# Patient Record
Sex: FEMALE | Race: ASIAN | NOT HISPANIC OR LATINO
[De-identification: names, ages, dates, MRNs, and addresses within clinical notes are randomized per-mention and may not be internally consistent; named-entity substitution may affect disease eponyms.]

---

## 2014-04-30 RX ORDER — TICAGRELOR 90 MG/1
1 TABLET ORAL
Qty: 0 | Refills: 0 | DISCHARGE
Start: 2014-04-30

## 2015-05-04 RX ORDER — ATORVASTATIN CALCIUM 80 MG/1
1 TABLET, FILM COATED ORAL
Qty: 0 | Refills: 0 | DISCHARGE
Start: 2015-05-04

## 2015-05-04 RX ORDER — ISOSORBIDE MONONITRATE 60 MG/1
1 TABLET, EXTENDED RELEASE ORAL
Qty: 0 | Refills: 0 | DISCHARGE
Start: 2015-05-04

## 2017-01-05 ENCOUNTER — TRANSCRIPTION ENCOUNTER (OUTPATIENT)
Age: 42
End: 2017-01-05

## 2017-01-10 ENCOUNTER — TRANSCRIPTION ENCOUNTER (OUTPATIENT)
Age: 42
End: 2017-01-10

## 2017-01-18 ENCOUNTER — NON-APPOINTMENT (OUTPATIENT)
Age: 42
End: 2017-01-18

## 2017-01-18 ENCOUNTER — APPOINTMENT (OUTPATIENT)
Dept: CARDIOLOGY | Facility: CLINIC | Age: 42
End: 2017-01-18

## 2017-01-18 VITALS
OXYGEN SATURATION: 100 % | BODY MASS INDEX: 40.84 KG/M2 | SYSTOLIC BLOOD PRESSURE: 120 MMHG | DIASTOLIC BLOOD PRESSURE: 81 MMHG | HEART RATE: 82 BPM | RESPIRATION RATE: 16 BRPM | HEIGHT: 60 IN | WEIGHT: 208 LBS

## 2017-02-27 ENCOUNTER — TRANSCRIPTION ENCOUNTER (OUTPATIENT)
Age: 42
End: 2017-02-27

## 2017-04-19 ENCOUNTER — APPOINTMENT (OUTPATIENT)
Dept: CARDIOLOGY | Facility: CLINIC | Age: 42
End: 2017-04-19

## 2017-04-26 ENCOUNTER — TRANSCRIPTION ENCOUNTER (OUTPATIENT)
Age: 42
End: 2017-04-26

## 2017-05-31 ENCOUNTER — APPOINTMENT (OUTPATIENT)
Dept: CARDIOLOGY | Facility: CLINIC | Age: 42
End: 2017-05-31

## 2017-08-09 ENCOUNTER — APPOINTMENT (OUTPATIENT)
Dept: CARDIOLOGY | Facility: CLINIC | Age: 42
End: 2017-08-09
Payer: MEDICAID

## 2017-08-09 ENCOUNTER — NON-APPOINTMENT (OUTPATIENT)
Age: 42
End: 2017-08-09

## 2017-08-09 VITALS
SYSTOLIC BLOOD PRESSURE: 135 MMHG | RESPIRATION RATE: 16 BRPM | DIASTOLIC BLOOD PRESSURE: 80 MMHG | OXYGEN SATURATION: 98 % | HEART RATE: 84 BPM

## 2017-08-09 VITALS — WEIGHT: 200 LBS | BODY MASS INDEX: 39.06 KG/M2

## 2017-08-09 PROCEDURE — 93000 ELECTROCARDIOGRAM COMPLETE: CPT

## 2017-08-09 PROCEDURE — 99215 OFFICE O/P EST HI 40 MIN: CPT

## 2017-08-09 PROCEDURE — 36415 COLL VENOUS BLD VENIPUNCTURE: CPT

## 2017-08-10 LAB
ALBUMIN SERPL ELPH-MCNC: 4 G/DL
ALP BLD-CCNC: 86 U/L
ALT SERPL-CCNC: 16 U/L
ANION GAP SERPL CALC-SCNC: 21 MMOL/L
AST SERPL-CCNC: 15 U/L
BASOPHILS # BLD AUTO: 0.01 K/UL
BASOPHILS NFR BLD AUTO: 0.1 %
BILIRUB SERPL-MCNC: 0.3 MG/DL
BUN SERPL-MCNC: 14 MG/DL
CALCIUM SERPL-MCNC: 9.3 MG/DL
CHLORIDE SERPL-SCNC: 99 MMOL/L
CHOLEST SERPL-MCNC: 276 MG/DL
CHOLEST/HDLC SERPL: 4.8 RATIO
CO2 SERPL-SCNC: 21 MMOL/L
CREAT SERPL-MCNC: 0.7 MG/DL
EOSINOPHIL # BLD AUTO: 0.13 K/UL
EOSINOPHIL NFR BLD AUTO: 1.3 %
GLUCOSE SERPL-MCNC: 63 MG/DL
HBA1C MFR BLD HPLC: 5.8 %
HCT VFR BLD CALC: 38.4 %
HDLC SERPL-MCNC: 58 MG/DL
HGB BLD-MCNC: 12.1 G/DL
IMM GRANULOCYTES NFR BLD AUTO: 0.2 %
LDLC SERPL CALC-MCNC: 181 MG/DL
LYMPHOCYTES # BLD AUTO: 2.25 K/UL
LYMPHOCYTES NFR BLD AUTO: 23.3 %
MAN DIFF?: NORMAL
MCHC RBC-ENTMCNC: 28.9 PG
MCHC RBC-ENTMCNC: 31.5 GM/DL
MCV RBC AUTO: 91.9 FL
MONOCYTES # BLD AUTO: 0.39 K/UL
MONOCYTES NFR BLD AUTO: 4 %
NEUTROPHILS # BLD AUTO: 6.86 K/UL
NEUTROPHILS NFR BLD AUTO: 71.1 %
PLATELET # BLD AUTO: 292 K/UL
POTASSIUM SERPL-SCNC: 3.7 MMOL/L
PROT SERPL-MCNC: 7.8 G/DL
RBC # BLD: 4.18 M/UL
RBC # FLD: 15.7 %
SODIUM SERPL-SCNC: 141 MMOL/L
TRIGL SERPL-MCNC: 184 MG/DL
TSH SERPL-ACNC: 1.33 UIU/ML
WBC # FLD AUTO: 9.66 K/UL

## 2017-10-23 ENCOUNTER — TRANSCRIPTION ENCOUNTER (OUTPATIENT)
Age: 42
End: 2017-10-23

## 2018-02-21 ENCOUNTER — NON-APPOINTMENT (OUTPATIENT)
Age: 43
End: 2018-02-21

## 2018-02-21 ENCOUNTER — APPOINTMENT (OUTPATIENT)
Dept: CARDIOLOGY | Facility: CLINIC | Age: 43
End: 2018-02-21
Payer: MEDICAID

## 2018-02-21 VITALS
HEART RATE: 92 BPM | OXYGEN SATURATION: 98 % | DIASTOLIC BLOOD PRESSURE: 90 MMHG | WEIGHT: 202 LBS | HEIGHT: 60 IN | RESPIRATION RATE: 16 BRPM | BODY MASS INDEX: 39.66 KG/M2 | SYSTOLIC BLOOD PRESSURE: 133 MMHG

## 2018-02-21 PROCEDURE — 99215 OFFICE O/P EST HI 40 MIN: CPT

## 2018-02-21 PROCEDURE — 93000 ELECTROCARDIOGRAM COMPLETE: CPT

## 2018-03-22 ENCOUNTER — TRANSCRIPTION ENCOUNTER (OUTPATIENT)
Age: 43
End: 2018-03-22

## 2018-03-27 ENCOUNTER — TRANSCRIPTION ENCOUNTER (OUTPATIENT)
Age: 43
End: 2018-03-27

## 2018-03-29 ENCOUNTER — TRANSCRIPTION ENCOUNTER (OUTPATIENT)
Age: 43
End: 2018-03-29

## 2018-08-21 ENCOUNTER — APPOINTMENT (OUTPATIENT)
Dept: CARDIOLOGY | Facility: CLINIC | Age: 43
End: 2018-08-21
Payer: COMMERCIAL

## 2018-08-21 ENCOUNTER — NON-APPOINTMENT (OUTPATIENT)
Age: 43
End: 2018-08-21

## 2018-08-21 VITALS
OXYGEN SATURATION: 99 % | DIASTOLIC BLOOD PRESSURE: 93 MMHG | BODY MASS INDEX: 41.23 KG/M2 | SYSTOLIC BLOOD PRESSURE: 146 MMHG | HEIGHT: 60 IN | HEART RATE: 78 BPM | WEIGHT: 210 LBS

## 2018-08-21 PROCEDURE — 99215 OFFICE O/P EST HI 40 MIN: CPT

## 2018-08-21 PROCEDURE — 93000 ELECTROCARDIOGRAM COMPLETE: CPT

## 2018-11-08 ENCOUNTER — TRANSCRIPTION ENCOUNTER (OUTPATIENT)
Age: 43
End: 2018-11-08

## 2018-11-09 ENCOUNTER — INPATIENT (INPATIENT)
Facility: HOSPITAL | Age: 43
LOS: 2 days | Discharge: HOME | End: 2018-11-12
Attending: SPECIALIST | Admitting: SPECIALIST
Payer: MEDICAID

## 2018-11-09 VITALS
DIASTOLIC BLOOD PRESSURE: 90 MMHG | HEART RATE: 135 BPM | SYSTOLIC BLOOD PRESSURE: 147 MMHG | RESPIRATION RATE: 19 BRPM | TEMPERATURE: 101 F | OXYGEN SATURATION: 100 %

## 2018-11-09 DIAGNOSIS — Z90.710 ACQUIRED ABSENCE OF BOTH CERVIX AND UTERUS: Chronic | ICD-10-CM

## 2018-11-09 DIAGNOSIS — A41.9 SEPSIS, UNSPECIFIED ORGANISM: ICD-10-CM

## 2018-11-09 LAB
ALBUMIN SERPL ELPH-MCNC: 3.5 G/DL — SIGNIFICANT CHANGE UP (ref 3.3–5)
ALP SERPL-CCNC: 112 U/L — SIGNIFICANT CHANGE UP (ref 40–120)
ALT FLD-CCNC: 22 U/L — SIGNIFICANT CHANGE UP (ref 4–33)
APPEARANCE UR: SIGNIFICANT CHANGE UP
APTT BLD: 28 SEC — SIGNIFICANT CHANGE UP (ref 27.5–36.3)
AST SERPL-CCNC: 21 U/L — SIGNIFICANT CHANGE UP (ref 4–32)
BACTERIA # UR AUTO: SIGNIFICANT CHANGE UP
BASE EXCESS BLDV CALC-SCNC: -2.7 MMOL/L — SIGNIFICANT CHANGE UP
BASOPHILS # BLD AUTO: 0.05 K/UL — SIGNIFICANT CHANGE UP (ref 0–0.2)
BASOPHILS NFR BLD AUTO: 0.4 % — SIGNIFICANT CHANGE UP (ref 0–2)
BILIRUB SERPL-MCNC: 0.4 MG/DL — SIGNIFICANT CHANGE UP (ref 0.2–1.2)
BILIRUB UR-MCNC: NEGATIVE — SIGNIFICANT CHANGE UP
BLD GP AB SCN SERPL QL: NEGATIVE — SIGNIFICANT CHANGE UP
BLOOD GAS VENOUS - CREATININE: 0.9 MG/DL — SIGNIFICANT CHANGE UP (ref 0.5–1.3)
BLOOD UR QL VISUAL: SIGNIFICANT CHANGE UP
BUN SERPL-MCNC: 15 MG/DL — SIGNIFICANT CHANGE UP (ref 7–23)
CALCIUM SERPL-MCNC: 8.7 MG/DL — SIGNIFICANT CHANGE UP (ref 8.4–10.5)
CHLORIDE BLDV-SCNC: 107 MMOL/L — SIGNIFICANT CHANGE UP (ref 96–108)
CHLORIDE SERPL-SCNC: 98 MMOL/L — SIGNIFICANT CHANGE UP (ref 98–107)
CO2 SERPL-SCNC: 20 MMOL/L — LOW (ref 22–31)
COLOR SPEC: YELLOW — SIGNIFICANT CHANGE UP
CREAT SERPL-MCNC: 0.98 MG/DL — SIGNIFICANT CHANGE UP (ref 0.5–1.3)
EOSINOPHIL # BLD AUTO: 0.07 K/UL — SIGNIFICANT CHANGE UP (ref 0–0.5)
EOSINOPHIL NFR BLD AUTO: 0.5 % — SIGNIFICANT CHANGE UP (ref 0–6)
GAS PNL BLDV: 135 MMOL/L — LOW (ref 136–146)
GLUCOSE BLDV-MCNC: 161 — HIGH (ref 70–99)
GLUCOSE SERPL-MCNC: 166 MG/DL — HIGH (ref 70–99)
GLUCOSE UR-MCNC: NEGATIVE — SIGNIFICANT CHANGE UP
HCG SERPL-ACNC: < 5 MIU/ML — SIGNIFICANT CHANGE UP
HCO3 BLDV-SCNC: 22 MMOL/L — SIGNIFICANT CHANGE UP (ref 20–27)
HCT VFR BLD CALC: 36.1 % — SIGNIFICANT CHANGE UP (ref 34.5–45)
HCT VFR BLDV CALC: 35.9 % — SIGNIFICANT CHANGE UP (ref 34.5–45)
HGB BLD-MCNC: 11.7 G/DL — SIGNIFICANT CHANGE UP (ref 11.5–15.5)
HGB BLDV-MCNC: 11.6 G/DL — SIGNIFICANT CHANGE UP (ref 11.5–15.5)
IMM GRANULOCYTES # BLD AUTO: 0.27 # — SIGNIFICANT CHANGE UP
IMM GRANULOCYTES NFR BLD AUTO: 1.9 % — HIGH (ref 0–1.5)
INR BLD: 1.23 — HIGH (ref 0.88–1.17)
KETONES UR-MCNC: NEGATIVE — SIGNIFICANT CHANGE UP
LACTATE BLDV-MCNC: 2.8 MMOL/L — HIGH (ref 0.5–2)
LACTATE SERPL-SCNC: 1.5 MMOL/L — SIGNIFICANT CHANGE UP (ref 0.5–2)
LEUKOCYTE ESTERASE UR-ACNC: HIGH
LYMPHOCYTES # BLD AUTO: 0.7 K/UL — LOW (ref 1–3.3)
LYMPHOCYTES # BLD AUTO: 4.9 % — LOW (ref 13–44)
MCHC RBC-ENTMCNC: 29.8 PG — SIGNIFICANT CHANGE UP (ref 27–34)
MCHC RBC-ENTMCNC: 32.4 % — SIGNIFICANT CHANGE UP (ref 32–36)
MCV RBC AUTO: 92.1 FL — SIGNIFICANT CHANGE UP (ref 80–100)
MONOCYTES # BLD AUTO: 0.56 K/UL — SIGNIFICANT CHANGE UP (ref 0–0.9)
MONOCYTES NFR BLD AUTO: 4 % — SIGNIFICANT CHANGE UP (ref 2–14)
NEUTROPHILS # BLD AUTO: 12.5 K/UL — HIGH (ref 1.8–7.4)
NEUTROPHILS NFR BLD AUTO: 88.3 % — HIGH (ref 43–77)
NITRITE UR-MCNC: POSITIVE — SIGNIFICANT CHANGE UP
NRBC # FLD: 0 — SIGNIFICANT CHANGE UP
PCO2 BLDV: 32 MMHG — LOW (ref 41–51)
PH BLDV: 7.43 PH — SIGNIFICANT CHANGE UP (ref 7.32–7.43)
PH UR: 6.5 — SIGNIFICANT CHANGE UP (ref 5–8)
PLATELET # BLD AUTO: 280 K/UL — SIGNIFICANT CHANGE UP (ref 150–400)
PMV BLD: 10.8 FL — SIGNIFICANT CHANGE UP (ref 7–13)
PO2 BLDV: 49 MMHG — HIGH (ref 35–40)
POTASSIUM BLDV-SCNC: 3.6 MMOL/L — SIGNIFICANT CHANGE UP (ref 3.4–4.5)
POTASSIUM SERPL-MCNC: 3.8 MMOL/L — SIGNIFICANT CHANGE UP (ref 3.5–5.3)
POTASSIUM SERPL-SCNC: 3.8 MMOL/L — SIGNIFICANT CHANGE UP (ref 3.5–5.3)
PROT SERPL-MCNC: 7.4 G/DL — SIGNIFICANT CHANGE UP (ref 6–8.3)
PROT UR-MCNC: NEGATIVE — SIGNIFICANT CHANGE UP
PROTHROM AB SERPL-ACNC: 14.1 SEC — HIGH (ref 9.8–13.1)
RBC # BLD: 3.92 M/UL — SIGNIFICANT CHANGE UP (ref 3.8–5.2)
RBC # FLD: 13.5 % — SIGNIFICANT CHANGE UP (ref 10.3–14.5)
RBC CASTS # UR COMP ASSIST: SIGNIFICANT CHANGE UP (ref 0–?)
RH IG SCN BLD-IMP: POSITIVE — SIGNIFICANT CHANGE UP
SAO2 % BLDV: 84.5 % — SIGNIFICANT CHANGE UP (ref 60–85)
SODIUM SERPL-SCNC: 134 MMOL/L — LOW (ref 135–145)
SP GR SPEC: 1.02 — SIGNIFICANT CHANGE UP (ref 1–1.04)
SQUAMOUS # UR AUTO: SIGNIFICANT CHANGE UP
UROBILINOGEN FLD QL: NORMAL — SIGNIFICANT CHANGE UP
WBC # BLD: 14.15 K/UL — HIGH (ref 3.8–10.5)
WBC # FLD AUTO: 14.15 K/UL — HIGH (ref 3.8–10.5)
WBC UR QL: HIGH (ref 0–?)

## 2018-11-09 PROCEDURE — 99223 1ST HOSP IP/OBS HIGH 75: CPT

## 2018-11-09 PROCEDURE — 74176 CT ABD & PELVIS W/O CONTRAST: CPT | Mod: 26,GC

## 2018-11-09 PROCEDURE — 71045 X-RAY EXAM CHEST 1 VIEW: CPT | Mod: 26

## 2018-11-09 PROCEDURE — 76770 US EXAM ABDO BACK WALL COMP: CPT | Mod: 26

## 2018-11-09 PROCEDURE — 76775 US EXAM ABDO BACK WALL LIM: CPT | Mod: 26

## 2018-11-09 RX ORDER — CEFTRIAXONE 500 MG/1
1 INJECTION, POWDER, FOR SOLUTION INTRAMUSCULAR; INTRAVENOUS ONCE
Qty: 0 | Refills: 0 | Status: COMPLETED | OUTPATIENT
Start: 2018-11-09 | End: 2018-11-09

## 2018-11-09 RX ORDER — ISOSORBIDE MONONITRATE 60 MG/1
60 TABLET, EXTENDED RELEASE ORAL DAILY
Qty: 0 | Refills: 0 | Status: DISCONTINUED | OUTPATIENT
Start: 2018-11-09 | End: 2018-11-09

## 2018-11-09 RX ORDER — CEFTRIAXONE 500 MG/1
1 INJECTION, POWDER, FOR SOLUTION INTRAMUSCULAR; INTRAVENOUS EVERY 24 HOURS
Qty: 0 | Refills: 0 | Status: DISCONTINUED | OUTPATIENT
Start: 2018-11-09 | End: 2018-11-09

## 2018-11-09 RX ORDER — METOPROLOL TARTRATE 50 MG
25 TABLET ORAL ONCE
Qty: 0 | Refills: 0 | Status: COMPLETED | OUTPATIENT
Start: 2018-11-09 | End: 2018-11-09

## 2018-11-09 RX ORDER — SENNA PLUS 8.6 MG/1
2 TABLET ORAL AT BEDTIME
Qty: 0 | Refills: 0 | Status: DISCONTINUED | OUTPATIENT
Start: 2018-11-09 | End: 2018-11-09

## 2018-11-09 RX ORDER — MORPHINE SULFATE 50 MG/1
4 CAPSULE, EXTENDED RELEASE ORAL
Qty: 0 | Refills: 0 | Status: DISCONTINUED | OUTPATIENT
Start: 2018-11-09 | End: 2018-11-09

## 2018-11-09 RX ORDER — METOPROLOL TARTRATE 50 MG
50 TABLET ORAL
Qty: 0 | Refills: 0 | Status: DISCONTINUED | OUTPATIENT
Start: 2018-11-09 | End: 2018-11-09

## 2018-11-09 RX ORDER — POTASSIUM CHLORIDE 20 MEQ
10 PACKET (EA) ORAL DAILY
Qty: 0 | Refills: 0 | Status: DISCONTINUED | OUTPATIENT
Start: 2018-11-09 | End: 2018-11-12

## 2018-11-09 RX ORDER — FUROSEMIDE 40 MG
20 TABLET ORAL
Qty: 0 | Refills: 0 | Status: DISCONTINUED | OUTPATIENT
Start: 2018-11-09 | End: 2018-11-12

## 2018-11-09 RX ORDER — ATORVASTATIN CALCIUM 80 MG/1
10 TABLET, FILM COATED ORAL AT BEDTIME
Qty: 0 | Refills: 0 | Status: DISCONTINUED | OUTPATIENT
Start: 2018-11-09 | End: 2018-11-09

## 2018-11-09 RX ORDER — OXYCODONE AND ACETAMINOPHEN 5; 325 MG/1; MG/1
1 TABLET ORAL EVERY 4 HOURS
Qty: 0 | Refills: 0 | Status: DISCONTINUED | OUTPATIENT
Start: 2018-11-09 | End: 2018-11-09

## 2018-11-09 RX ORDER — ACETAMINOPHEN 500 MG
650 TABLET ORAL ONCE
Qty: 0 | Refills: 0 | Status: COMPLETED | OUTPATIENT
Start: 2018-11-09 | End: 2018-11-09

## 2018-11-09 RX ORDER — ONDANSETRON 8 MG/1
4 TABLET, FILM COATED ORAL ONCE
Qty: 0 | Refills: 0 | Status: DISCONTINUED | OUTPATIENT
Start: 2018-11-09 | End: 2018-11-09

## 2018-11-09 RX ORDER — SODIUM CHLORIDE 9 MG/ML
1000 INJECTION, SOLUTION INTRAVENOUS
Qty: 0 | Refills: 0 | Status: DISCONTINUED | OUTPATIENT
Start: 2018-11-09 | End: 2018-11-10

## 2018-11-09 RX ORDER — ONDANSETRON 8 MG/1
4 TABLET, FILM COATED ORAL EVERY 6 HOURS
Qty: 0 | Refills: 0 | Status: DISCONTINUED | OUTPATIENT
Start: 2018-11-09 | End: 2018-11-09

## 2018-11-09 RX ORDER — METOPROLOL TARTRATE 50 MG
50 TABLET ORAL
Qty: 0 | Refills: 0 | Status: DISCONTINUED | OUTPATIENT
Start: 2018-11-09 | End: 2018-11-12

## 2018-11-09 RX ORDER — KETOCONAZOLE 20 MG/G
1 AEROSOL, FOAM TOPICAL
Qty: 0 | Refills: 0 | Status: DISCONTINUED | OUTPATIENT
Start: 2018-11-09 | End: 2018-11-09

## 2018-11-09 RX ORDER — FENTANYL CITRATE 50 UG/ML
25 INJECTION INTRAVENOUS
Qty: 0 | Refills: 0 | Status: DISCONTINUED | OUTPATIENT
Start: 2018-11-09 | End: 2018-11-09

## 2018-11-09 RX ORDER — SODIUM CHLORIDE 9 MG/ML
1000 INJECTION INTRAMUSCULAR; INTRAVENOUS; SUBCUTANEOUS ONCE
Qty: 0 | Refills: 0 | Status: COMPLETED | OUTPATIENT
Start: 2018-11-09 | End: 2018-11-09

## 2018-11-09 RX ORDER — HEPARIN SODIUM 5000 [USP'U]/ML
5000 INJECTION INTRAVENOUS; SUBCUTANEOUS EVERY 8 HOURS
Qty: 0 | Refills: 0 | Status: DISCONTINUED | OUTPATIENT
Start: 2018-11-09 | End: 2018-11-09

## 2018-11-09 RX ORDER — ACETAMINOPHEN 500 MG
650 TABLET ORAL EVERY 6 HOURS
Qty: 0 | Refills: 0 | Status: DISCONTINUED | OUTPATIENT
Start: 2018-11-09 | End: 2018-11-12

## 2018-11-09 RX ORDER — SENNA PLUS 8.6 MG/1
2 TABLET ORAL AT BEDTIME
Qty: 0 | Refills: 0 | Status: DISCONTINUED | OUTPATIENT
Start: 2018-11-09 | End: 2018-11-12

## 2018-11-09 RX ORDER — KETOROLAC TROMETHAMINE 30 MG/ML
15 SYRINGE (ML) INJECTION ONCE
Qty: 0 | Refills: 0 | Status: DISCONTINUED | OUTPATIENT
Start: 2018-11-09 | End: 2018-11-09

## 2018-11-09 RX ORDER — HEPARIN SODIUM 5000 [USP'U]/ML
5000 INJECTION INTRAVENOUS; SUBCUTANEOUS EVERY 8 HOURS
Qty: 0 | Refills: 0 | Status: DISCONTINUED | OUTPATIENT
Start: 2018-11-09 | End: 2018-11-12

## 2018-11-09 RX ORDER — TICAGRELOR 90 MG/1
60 TABLET ORAL
Qty: 0 | Refills: 0 | Status: DISCONTINUED | OUTPATIENT
Start: 2018-11-09 | End: 2018-11-12

## 2018-11-09 RX ORDER — OXYCODONE HYDROCHLORIDE 5 MG/1
5 TABLET ORAL ONCE
Qty: 0 | Refills: 0 | Status: DISCONTINUED | OUTPATIENT
Start: 2018-11-09 | End: 2018-11-09

## 2018-11-09 RX ORDER — BUDESONIDE AND FORMOTEROL FUMARATE DIHYDRATE 160; 4.5 UG/1; UG/1
2 AEROSOL RESPIRATORY (INHALATION)
Qty: 0 | Refills: 0 | Status: DISCONTINUED | OUTPATIENT
Start: 2018-11-09 | End: 2018-11-12

## 2018-11-09 RX ORDER — HYDROCORTISONE 1 %
1 OINTMENT (GRAM) TOPICAL
Qty: 0 | Refills: 0 | Status: DISCONTINUED | OUTPATIENT
Start: 2018-11-09 | End: 2018-11-09

## 2018-11-09 RX ORDER — KETOCONAZOLE 20 MG/G
1 AEROSOL, FOAM TOPICAL
Qty: 0 | Refills: 0 | Status: DISCONTINUED | OUTPATIENT
Start: 2018-11-09 | End: 2018-11-12

## 2018-11-09 RX ORDER — OXYCODONE AND ACETAMINOPHEN 5; 325 MG/1; MG/1
2 TABLET ORAL EVERY 6 HOURS
Qty: 0 | Refills: 0 | Status: DISCONTINUED | OUTPATIENT
Start: 2018-11-09 | End: 2018-11-09

## 2018-11-09 RX ORDER — ATORVASTATIN CALCIUM 80 MG/1
80 TABLET, FILM COATED ORAL AT BEDTIME
Qty: 0 | Refills: 0 | Status: DISCONTINUED | OUTPATIENT
Start: 2018-11-09 | End: 2018-11-12

## 2018-11-09 RX ORDER — ASPIRIN/CALCIUM CARB/MAGNESIUM 324 MG
81 TABLET ORAL DAILY
Qty: 0 | Refills: 0 | Status: DISCONTINUED | OUTPATIENT
Start: 2018-11-09 | End: 2018-11-12

## 2018-11-09 RX ORDER — CEFTRIAXONE 500 MG/1
2 INJECTION, POWDER, FOR SOLUTION INTRAMUSCULAR; INTRAVENOUS EVERY 24 HOURS
Qty: 0 | Refills: 0 | Status: DISCONTINUED | OUTPATIENT
Start: 2018-11-09 | End: 2018-11-12

## 2018-11-09 RX ORDER — ASPIRIN/CALCIUM CARB/MAGNESIUM 324 MG
81 TABLET ORAL DAILY
Qty: 0 | Refills: 0 | Status: DISCONTINUED | OUTPATIENT
Start: 2018-11-09 | End: 2018-11-09

## 2018-11-09 RX ORDER — LOSARTAN POTASSIUM 100 MG/1
25 TABLET, FILM COATED ORAL DAILY
Qty: 0 | Refills: 0 | Status: DISCONTINUED | OUTPATIENT
Start: 2018-11-09 | End: 2018-11-12

## 2018-11-09 RX ORDER — ONDANSETRON 8 MG/1
4 TABLET, FILM COATED ORAL EVERY 6 HOURS
Qty: 0 | Refills: 0 | Status: DISCONTINUED | OUTPATIENT
Start: 2018-11-09 | End: 2018-11-12

## 2018-11-09 RX ORDER — SODIUM CHLORIDE 9 MG/ML
1000 INJECTION, SOLUTION INTRAVENOUS
Qty: 0 | Refills: 0 | Status: DISCONTINUED | OUTPATIENT
Start: 2018-11-09 | End: 2018-11-09

## 2018-11-09 RX ADMIN — Medication 650 MILLIGRAM(S): at 21:59

## 2018-11-09 RX ADMIN — Medication 650 MILLIGRAM(S): at 03:58

## 2018-11-09 RX ADMIN — SODIUM CHLORIDE 125 MILLILITER(S): 9 INJECTION, SOLUTION INTRAVENOUS at 07:50

## 2018-11-09 RX ADMIN — CEFTRIAXONE 1 GRAM(S): 500 INJECTION, POWDER, FOR SOLUTION INTRAMUSCULAR; INTRAVENOUS at 03:30

## 2018-11-09 RX ADMIN — SODIUM CHLORIDE 1000 MILLILITER(S): 9 INJECTION INTRAMUSCULAR; INTRAVENOUS; SUBCUTANEOUS at 03:58

## 2018-11-09 RX ADMIN — SODIUM CHLORIDE 1000 MILLILITER(S): 9 INJECTION INTRAMUSCULAR; INTRAVENOUS; SUBCUTANEOUS at 05:04

## 2018-11-09 RX ADMIN — ATORVASTATIN CALCIUM 80 MILLIGRAM(S): 80 TABLET, FILM COATED ORAL at 21:10

## 2018-11-09 RX ADMIN — SODIUM CHLORIDE 1000 MILLILITER(S): 9 INJECTION INTRAMUSCULAR; INTRAVENOUS; SUBCUTANEOUS at 04:02

## 2018-11-09 RX ADMIN — Medication 650 MILLIGRAM(S): at 20:56

## 2018-11-09 RX ADMIN — Medication 25 MILLIGRAM(S): at 23:35

## 2018-11-09 RX ADMIN — ONDANSETRON 4 MILLIGRAM(S): 8 TABLET, FILM COATED ORAL at 21:11

## 2018-11-09 RX ADMIN — SODIUM CHLORIDE 125 MILLILITER(S): 9 INJECTION, SOLUTION INTRAVENOUS at 21:11

## 2018-11-09 RX ADMIN — TICAGRELOR 60 MILLIGRAM(S): 90 TABLET ORAL at 19:22

## 2018-11-09 RX ADMIN — Medication 50 MILLIGRAM(S): at 21:10

## 2018-11-09 RX ADMIN — HEPARIN SODIUM 5000 UNIT(S): 5000 INJECTION INTRAVENOUS; SUBCUTANEOUS at 21:11

## 2018-11-09 RX ADMIN — Medication 15 MILLIGRAM(S): at 06:49

## 2018-11-09 RX ADMIN — CEFTRIAXONE 100 GRAM(S): 500 INJECTION, POWDER, FOR SOLUTION INTRAMUSCULAR; INTRAVENOUS at 03:06

## 2018-11-09 RX ADMIN — HEPARIN SODIUM 5000 UNIT(S): 5000 INJECTION INTRAVENOUS; SUBCUTANEOUS at 14:31

## 2018-11-09 RX ADMIN — SODIUM CHLORIDE 1000 MILLILITER(S): 9 INJECTION INTRAMUSCULAR; INTRAVENOUS; SUBCUTANEOUS at 03:00

## 2018-11-09 RX ADMIN — Medication 650 MILLIGRAM(S): at 03:00

## 2018-11-09 RX ADMIN — Medication 15 MILLIGRAM(S): at 07:15

## 2018-11-09 NOTE — ED PROVIDER NOTE - CONSTITUTIONAL, MLM
normal... appears tired, well nourished, awake, alert, oriented to person, place, time/situation and in no apparent distress.

## 2018-11-09 NOTE — ED ADULT TRIAGE NOTE - CHIEF COMPLAINT QUOTE
Right flank pain x 1 week accompanied fever, chills, n/v/d, general malaise. Diagnosed with uti sunday and taking antibiotics. Hx cardiac stent

## 2018-11-09 NOTE — H&P ADULT - NSHPLABSRESULTS_GEN_ALL_CORE
11.7                  Neurophils% (auto):   88.3   (11-09 @ 02:30):    14.15)-----------(280          Lymphocytes% (auto):  4.9                                           36.1                   Eosinphils% (auto):   0.5      Manual%: Neutrophils x    ; Lymphocytes x    ; Eosinophils x    ; Bands%: x    ; Blasts x          11-09    134<L>  |  98  |  15  ----------------------------<  166<H>  3.8   |  20<L>  |  0.98    Ca    8.7      09 Nov 2018 02:30    TPro  7.4  /  Alb  3.5  /  TBili  0.4  /  DBili  x   /  AST  21  /  ALT  22  /  AlkPhos  112  11-09        VBG - ( 09 Nov 2018 02:30 )  pH: 7.43  /  pCO2: 32    /  pO2: 49    / HCO3: 22    / Base Excess: -2.7  /  SvO2: 84.5  / Lactate: 2.8      RECENT CULTURES:

## 2018-11-09 NOTE — ED ADULT NURSE NOTE - OBJECTIVE STATEMENT
Break Coverage RN: Patient is a 44 y/o female a&ox4 p/w a c/c of right flank pain x1 week that originated in right groin.  Patient endorsing fevers/chill, general malaise, reports was seen at Urgent Care dxed with UTI and started on abx.  Patient denies dysuria/hematuria, SOB, CP.  Respirations unlabored.  Patient endorsing multiple episodes of N/V, inability to tolerate PO for last several days.  18 gauge PIV placed in left ac, vs as noted, waiting to be seen by MD.

## 2018-11-09 NOTE — CHART NOTE - NSCHARTNOTEFT_GEN_A_CORE
Post op Check: 42 yo 4 POD #0 right ureteral stent placement    Pt seen and examined without complaints. Pain is controlled. Denies SOB/CP/N/V, voided without problem    Vital Signs Last 24 Hrs  T(C): 36.6 (09 Nov 2018 16:41), Max: 38.3 (09 Nov 2018 00:58)  T(F): 97.8 (09 Nov 2018 16:41), Max: 101 (09 Nov 2018 00:58)  HR: 85 (09 Nov 2018 16:41) (79 - 135)  BP: 138/82 (09 Nov 2018 16:41) (116/74 - 147/90)  BP(mean): --  RR: 16 (09 Nov 2018 16:41) (14 - 22)  SpO2: 95% (09 Nov 2018 16:41) (95% - 100%)    I&O's Summary    Void: 350 pink tinged        Physical Exam  Gen: NAD  Abd: Soft, NT, ND  Back: No CVAT  Venodynes: in place                          11.7   14.15 )-----------( 280      ( 09 Nov 2018 02:30 )             36.1       11-09    134<L>  |  98  |  15  ----------------------------<  166<H>  3.8   |  20<L>  |  0.98    Ca    8.7      09 Nov 2018 02:30    TPro  7.4  /  Alb  3.5  /  TBili  0.4  /  DBili  x   /  AST  21  /  ALT  22  /  AlkPhos  112  11-09      Assessment:   42 yo 4 POD #0 right ureteral stent placement    Plan:   IVF: LR @ 125  Diet: Reg  Labs: In AM  Abx: Ceftriaxone  Strict I&O's  Analgesia and antiemetics as needed  DVT prophylaxis/OOB  Incentive spirometry

## 2018-11-09 NOTE — CONSULT NOTE ADULT - SUBJECTIVE AND OBJECTIVE BOX
HPI:  Ms. Gaming is a 42 yo female who presents with 1 week right sided flank pain and fevers. Patient states pain started without incident and has been worsening over the last week. Patient states fevers have decreased with motrin but have recurred following. Patient denies hematuria, dysuria, frequency. Patient states she has had intermittent nausea, vomiting and diarrhea. Patient has no history of kidney stones.  CT demonstrates large right sided upper ureteral stone with hydronephrosis. Patient is currently febrile to 101 in ED.    Review of symptoms:  Remainder ROS negative    PAST MEDICAL & SURGICAL HISTORY:  Hypothyroid (not on meds  Coronary artery disease  HLD (hyperlipidemia)  HTN (hypertension)  H/O total hysterectomy      Allergies  codeine (Itching)      Social History:   Denies etoh, drugs, tobacco    FAMILY HISTORY:  Family history of coronary artery disease younger than 40 years of age      MEDICATIONS  (STANDING):  aspirin  chewable 81 milliGRAM(s) Oral daily  atorvastatin 80 milliGRAM(s) Oral at bedtime  buDESOnide  80 MICROgram(s)/formoterol 4.5 MICROgram(s) Inhaler 2 Puff(s) Inhalation two times a day  cefTRIAXone   IVPB 2 Gram(s) IV Intermittent every 24 hours  furosemide    Tablet 20 milliGRAM(s) Oral <User Schedule>  heparin  Injectable 5000 Unit(s) SubCutaneous every 8 hours  ketoconazole 2% Cream 1 Application(s) Topical two times a day  lactated ringers. 1000 milliLiter(s) (125 mL/Hr) IV Continuous <Continuous>  losartan 25 milliGRAM(s) Oral daily  metoprolol tartrate 50 milliGRAM(s) Oral two times a day  potassium chloride    Tablet ER 10 milliEquivalent(s) Oral daily  ticagrelor 60 milliGRAM(s) Oral two times a day    MEDICATIONS  (PRN):  fentaNYL    Injectable 25 MICROGram(s) IV Push every 5 minutes PRN Moderate Pain (4 - 6)  ondansetron Injectable 4 milliGRAM(s) IV Push once PRN Nausea and/or Vomiting  ondansetron Injectable 4 milliGRAM(s) IV Push every 6 hours PRN Nausea and/or Vomiting  oxyCODONE    IR 5 milliGRAM(s) Oral once PRN Severe Pain (7 - 10)  senna 2 Tablet(s) Oral at bedtime PRN Constipation      CAPILLARY BLOOD GLUCOSE        I&O's Summary    2018 07:01  -  2018 15:06  --------------------------------------------------------  IN: 195 mL / OUT: 200 mL / NET: -5 mL        PHYSICAL EXAM:  GEN: NAD  EYES: conjunctiva and sclera clear  HEENT: mmm  CHEST/LUNG: CTABL  HEART: RRR, no m/r/g  ABDOMEN: Soft, nt, nd  EXTREMITIES:  wwp, no edema  PSYCH: AAOx3  NEUROLOGY: non-focal  SKIN: No rashes or lesions    LABS:                        11.7   14.15 )-----------( 280      ( 2018 02:30 )             36.1         134<L>  |  98  |  15  ----------------------------<  166<H>  3.8   |  20<L>  |  0.98    Ca    8.7      2018 02:30    TPro  7.4  /  Alb  3.5  /  TBili  0.4  /  DBili  x   /  AST  21  /  ALT  22  /  AlkPhos  112  11-09    PT/INR - ( 2018 07:46 )   PT: 14.1 SEC;   INR: 1.23          PTT - ( 2018 07:46 )  PTT:28.0 SEC      Urinalysis Basic - ( 2018 03:10 )    Color: YELLOW / Appearance: HAZY / S.020 / pH: 6.5  Gluc: NEGATIVE / Ketone: NEGATIVE  / Bili: NEGATIVE / Urobili: NORMAL   Blood: SMALL / Protein: NEGATIVE / Nitrite: POSITIVE   Leuk Esterase: SMALL / RBC: 2-5 / WBC 11-25   Sq Epi: OCC / Non Sq Epi: x / Bacteria: FEW        RADIOLOGY & ADDITIONAL TESTS:    Imaging Personally Reviewed: CXR poor quality, CTAP with obstructing stone and right sided hydro    Consultant(s) Notes Reviewed:      Care Discussed with Consultants/Other Providers:    EKG: Sinus tach

## 2018-11-09 NOTE — H&P ADULT - ASSESSMENT
Pt is a 42 yo with right obstructing stone causing sepsis.  - Continue CTX  - Follow up cultures  - Admit to urology for emergent stent placement. Emergent stent placement indicated given high risk of progression of sepsis in obstructed kidney.

## 2018-11-09 NOTE — ED ADULT NURSE REASSESSMENT NOTE - NS ED NURSE REASSESS COMMENT FT1
Pt and spouse advised to plan of care - add on to OR for urology procedure : renal stent placement.  Pt is calm/cooperative, accepting of plan of care, in no acute distress at this time.  PreOp labs drawn/sent as ordered, maintenance IVF initiated per Uro admitting orders.  Pre Op check list completed per routine.   Pt and spouse advised that all belongings must go w/ spouse or to security.  Will CTM.

## 2018-11-09 NOTE — ED ADULT NURSE NOTE - NSIMPLEMENTINTERV_GEN_ALL_ED
Implemented All Fall with Harm Risk Interventions:  [place] to call system. Call bell, personal items and telephone within reach. Instruct patient to call for assistance. Room bathroom lighting operational. Non-slip footwear when patient is off stretcher. Physically safe environment: no spills, clutter or unnecessary equipment. Stretcher in lowest position, wheels locked, appropriate side rails in place. Provide visual cue, wrist band, yellow gown, etc. Monitor gait and stability. Monitor for mental status changes and reorient to person, place, and time. Review medications for side effects contributing to fall risk. Reinforce activity limits and safety measures with patient and family. Provide visual clues: red socks.

## 2018-11-09 NOTE — ED PROVIDER NOTE - CARE PLAN
Principal Discharge DX:	Sepsis  Secondary Diagnosis:	UTI (urinary tract infection)  Secondary Diagnosis:	Hydronephrosis

## 2018-11-09 NOTE — ED PROVIDER NOTE - MEDICAL DECISION MAKING DETAILS
43F w/ above history p/w R flank pain, urinary frequency, N/V, malaise, fever, no abd tenderness, concerning for pyelo vs UTI  -labs, ivf, u/a, abx

## 2018-11-09 NOTE — ED PROVIDER NOTE - ATTENDING CONTRIBUTION TO CARE
MD Cordova:  I performed a face to face bedside interview with patient regarding history of present illness, review of symptoms and past medical history. I completed an independent physical exam(documented below).  I have discussed patient's plan of care with resident.   I agree with note as stated above, having amended the EMR as needed to reflect my findings. I have discussed the assessment and plan of care.  This includes during the time I functioned as the attending physician for this patient.  PE:  Gen: Alert, mild distress  Head: NC, AT,  EOMI, normal lids/conjunctiva  ENT:  normal hearing, patent oropharynx without erythema/exudate  Neck: +supple, no tenderness/meningismus/JVD, +Trachea midline  Chest: no chest wall tenderness, equal chest rise  Pulm: Bilateral BS, normal resp effort, no wheeze/stridor/retractions  CV: tachy, no M/R/G, +dist pulses  Abd: +BS, soft, NT/ND  Rectal: deferred  Mskel: no edema/erythema/cyanosis  back: +R cva ttp  Skin: no rash  Neuro: AAOx3  MDM:  44yo F w/ pmh of htn, hypothyroidism, CAD w/ cardiac stent, +family hx of renal stones c/o dysuria X 1wk and associated w/ R flank pain w/ rads to suprapubic region, associated w/ f/c/n/v, mildly improved w/ motrin. [place] [facility] [place] and started on abx for UTI (name unk), but symptoms have since worsened. Septic (tachycardic, febrile). Ddx includes infected renal stone vs pyelo. Less likely acute appy or ovarian pathology. Labs, US, UA, meds, fluids, reassess.

## 2018-11-09 NOTE — BRIEF OPERATIVE NOTE - PROCEDURE
<<-----Click on this checkbox to enter Procedure Cystoscopy with insertion of indwelling ureteral stent  11/09/2018    Active  GRACIELA

## 2018-11-09 NOTE — ED PROVIDER NOTE - PROGRESS NOTE DETAILS
ENEIDA OWENS: Patient signed out to me to f/u CT and urology. CT shows right severe hydro with [id number] stone. Pt is taken to OR by urology. ENEIDA OWENS: Patient signed out to me to f/u CT and urology. CT shows right severe hydro with [id number] stone prox right ureter. Pt is admit to urology for emergent stent placement.

## 2018-11-09 NOTE — CONSULT NOTE ADULT - ASSESSMENT
42 yo female who presents with 1 week right sided flank pain and fevers found to have obstructing kidney stone and severe R hydro    #Sepsis due to urinary tract infection  - pt meets sepsis criteria with elevated wbc, tachy to 135, fever, and urinary source  - growing kleb in blood and GNR in urine  - c/w ctx  - fu sensitivities  - Agree with IVF given recent n/v/d however, given hx of CAD and lasix use, monitor closely for overload  - trend lactate    #CAD  - c/w home asa, ticagrelor  - c/w home statin    #HTN  - c/w home lopressor, losartan, lasix    #FEN/ppx  - DASH diet  - Improve score of 0, no indication for DVT ppx 44 yo female who presents with 1 week right sided flank pain and fevers found to have obstructing kidney stone and severe R hydro    #Sepsis due to urinary tract infection  - pt meets sepsis criteria with elevated wbc, tachy to 135, fever, and urinary source  - growing kleb in blood and GNR in urine  - c/w ctx  - fu sensitivities  - Agree with IVF given recent n/v/d however, given hx of CAD and lasix use, monitor closely for overload  - trend lactate, monitor cbc, fever curve    #CAD  - c/w home asa, ticagrelor  - c/w home statin    #HTN  - c/w home lopressor, losartan, lasix    #FEN/ppx  - DASH diet  - Improve score of 0, no indication for DVT ppx 42 yo female who presents with 1 week right sided flank pain and fevers found to have obstructing kidney stone and severe R hydro    #Sepsis due to urinary tract infection  - pt meets sepsis criteria with elevated wbc, tachy to 135, fever, and urinary source  - fu Bcx, Ucx  - c/w ctx  - fu sensitivities  - Agree with IVF given recent n/v/d however, given hx of CAD and lasix use, monitor closely for overload  - trend lactate, monitor cbc, fever curve    #CAD  - c/w home asa, ticagrelor  - c/w home statin    #HTN  - c/w home lopressor, losartan, lasix    #FEN/ppx  - DASH diet  - Improve score of 0, no indication for DVT ppx

## 2018-11-09 NOTE — H&P ADULT - FAMILY HISTORY
Mother  Still living? Unknown  Family history of coronary artery disease younger than 40 years of age, Age at diagnosis: Age Unknown

## 2018-11-09 NOTE — ED PROVIDER NOTE - OBJECTIVE STATEMENT
43F h/o HTN, Hyporthyroid, CAD with stent presenting w/ R flank pain. Started 1 week ago, R flank radiating to suprapubic area, worsening, a/w fever, chills, malaise, nausea, vomiting, urinary frequency. Was prescribed nitrofurantoin 5 days ago after going to urgent care. 43F h/o HTN, Hyporthyroid, CAD with stent presenting w/ R flank pain. Started 1 week ago, R flank radiating to suprapubic area, persistent, a/w fever, chills, malaise, nausea, vomiting, urinary frequency. Alleviated temporarily w/ motrin at home. Was prescribed antibiotic 5 days ago after going to urgent care, cannot recall name.

## 2018-11-09 NOTE — PRE-OP CHECKLIST - TO WHOM
shakila ponce ED to [facility] rn holding area shakila hernandez ED to [facility] rn holding [facility] SUSANNA HERNANDEZ

## 2018-11-10 DIAGNOSIS — A41.9 SEPSIS, UNSPECIFIED ORGANISM: ICD-10-CM

## 2018-11-10 LAB
SPECIMEN SOURCE: SIGNIFICANT CHANGE UP

## 2018-11-10 PROCEDURE — 99231 SBSQ HOSP IP/OBS SF/LOW 25: CPT

## 2018-11-10 PROCEDURE — 99232 SBSQ HOSP IP/OBS MODERATE 35: CPT

## 2018-11-10 RX ORDER — ACETAMINOPHEN 500 MG
650 TABLET ORAL ONCE
Qty: 0 | Refills: 0 | Status: COMPLETED | OUTPATIENT
Start: 2018-11-10 | End: 2018-11-10

## 2018-11-10 RX ADMIN — LOSARTAN POTASSIUM 25 MILLIGRAM(S): 100 TABLET, FILM COATED ORAL at 05:05

## 2018-11-10 RX ADMIN — BUDESONIDE AND FORMOTEROL FUMARATE DIHYDRATE 2 PUFF(S): 160; 4.5 AEROSOL RESPIRATORY (INHALATION) at 21:30

## 2018-11-10 RX ADMIN — Medication 650 MILLIGRAM(S): at 11:40

## 2018-11-10 RX ADMIN — CEFTRIAXONE 100 GRAM(S): 500 INJECTION, POWDER, FOR SOLUTION INTRAMUSCULAR; INTRAVENOUS at 04:30

## 2018-11-10 RX ADMIN — ATORVASTATIN CALCIUM 80 MILLIGRAM(S): 80 TABLET, FILM COATED ORAL at 21:31

## 2018-11-10 RX ADMIN — TICAGRELOR 60 MILLIGRAM(S): 90 TABLET ORAL at 05:05

## 2018-11-10 RX ADMIN — HEPARIN SODIUM 5000 UNIT(S): 5000 INJECTION INTRAVENOUS; SUBCUTANEOUS at 13:40

## 2018-11-10 RX ADMIN — HEPARIN SODIUM 5000 UNIT(S): 5000 INJECTION INTRAVENOUS; SUBCUTANEOUS at 05:06

## 2018-11-10 RX ADMIN — Medication 650 MILLIGRAM(S): at 23:13

## 2018-11-10 RX ADMIN — BUDESONIDE AND FORMOTEROL FUMARATE DIHYDRATE 2 PUFF(S): 160; 4.5 AEROSOL RESPIRATORY (INHALATION) at 11:40

## 2018-11-10 RX ADMIN — Medication 650 MILLIGRAM(S): at 22:13

## 2018-11-10 RX ADMIN — TICAGRELOR 60 MILLIGRAM(S): 90 TABLET ORAL at 17:26

## 2018-11-10 RX ADMIN — Medication 81 MILLIGRAM(S): at 11:40

## 2018-11-10 RX ADMIN — HEPARIN SODIUM 5000 UNIT(S): 5000 INJECTION INTRAVENOUS; SUBCUTANEOUS at 21:31

## 2018-11-10 RX ADMIN — Medication 100 MILLIGRAM(S): at 22:12

## 2018-11-10 RX ADMIN — Medication 50 MILLIGRAM(S): at 17:26

## 2018-11-10 RX ADMIN — Medication 50 MILLIGRAM(S): at 05:05

## 2018-11-10 RX ADMIN — Medication 10 MILLIEQUIVALENT(S): at 11:40

## 2018-11-10 NOTE — PROGRESS NOTE ADULT - SUBJECTIVE AND OBJECTIVE BOX
Patient is a 43y old  Female who presents with a chief complaint of Right sided flank pain with obstructing stone (10 Nov 2018 13:53)      SUBJECTIVE / OVERNIGHT EVENTS: Pt reports significant clinical improvement, no complaints.    MEDICATIONS  (STANDING):  aspirin  chewable 81 milliGRAM(s) Oral daily  atorvastatin 80 milliGRAM(s) Oral at bedtime  buDESOnide  80 MICROgram(s)/formoterol 4.5 MICROgram(s) Inhaler 2 Puff(s) Inhalation two times a day  cefTRIAXone   IVPB 2 Gram(s) IV Intermittent every 24 hours  furosemide    Tablet 20 milliGRAM(s) Oral <User Schedule>  heparin  Injectable 5000 Unit(s) SubCutaneous every 8 hours  ketoconazole 2% Cream 1 Application(s) Topical two times a day  losartan 25 milliGRAM(s) Oral daily  metoprolol tartrate 50 milliGRAM(s) Oral two times a day  potassium chloride    Tablet ER 10 milliEquivalent(s) Oral daily  ticagrelor 60 milliGRAM(s) Oral two times a day    MEDICATIONS  (PRN):  acetaminophen   Tablet .. 650 milliGRAM(s) Oral every 6 hours PRN Temp greater or equal to 38.5C (101.3F), Mild Pain (1 - 3)  ondansetron Injectable 4 milliGRAM(s) IV Push every 6 hours PRN Nausea and/or Vomiting  senna 2 Tablet(s) Oral at bedtime PRN Constipation      Vital Signs Last 24 Hrs  T(C): 37.1 (11-10-18 @ 14:05), Max: 37.1 (11-10-18 @ 14:05)  T(F): 98.7 (11-10-18 @ 14:05), Max: 98.7 (11-10-18 @ 14:05)  HR: 83 (11-10-18 @ 14:05) (83 - 93)  BP: 128/72 (11-10-18 @ 14:05) (120/71 - 138/82)  BP(mean): --  RR: 18 (11-10-18 @ 14:05) (16 - 18)  SpO2: 95% (11-10-18 @ 14:05) (94% - 97%)  CAPILLARY BLOOD GLUCOSE        I&O's Summary    2018 07:01  -  10 Nov 2018 07:00  --------------------------------------------------------  IN: 570 mL / OUT: 1815 mL / NET: -1245 mL    10 Nov 2018 07:01  -  10 Nov 2018 15:21  --------------------------------------------------------  IN: 0 mL / OUT: 125 mL / NET: -125 mL        PHYSICAL EXAM:  GENERAL: NAD, well-nourished  HEENT: mmm  CHEST/LUNG: CTABL  HEART: RRR, no m/r/g  ABDOMEN: soft, nt, nd  EXTREMITIES:  wwp, no c/c/e  : no CVA tenderness  PSYCH: AAOx3  NEUROLOGY: non-focal  SKIN: No rashes or lesions    LABS:                        11.7   14.15 )-----------( 280      ( 2018 02:30 )             36.1         134<L>  |  98  |  15  ----------------------------<  166<H>  3.8   |  20<L>  |  0.98    Ca    8.7      2018 02:30    TPro  7.4  /  Alb  3.5  /  TBili  0.4  /  DBili  x   /  AST  21  /  ALT  22  /  AlkPhos  112  11-09    PT/INR - ( 2018 07:46 )   PT: 14.1 SEC;   INR: 1.23          PTT - ( 2018 07:46 )  PTT:28.0 SEC      Urinalysis Basic - ( 2018 03:10 )    Color: YELLOW / Appearance: HAZY / S.020 / pH: 6.5  Gluc: NEGATIVE / Ketone: NEGATIVE  / Bili: NEGATIVE / Urobili: NORMAL   Blood: SMALL / Protein: NEGATIVE / Nitrite: POSITIVE   Leuk Esterase: SMALL / RBC: 2-5 / WBC 11-25   Sq Epi: OCC / Non Sq Epi: x / Bacteria: FEW        RADIOLOGY & ADDITIONAL TESTS:    Imaging Personally Reviewed:    Consultant(s) Notes Reviewed:      Care Discussed with Consultants/Other Providers:  1.

## 2018-11-10 NOTE — PROGRESS NOTE ADULT - SUBJECTIVE AND OBJECTIVE BOX
Overnight events:  None, remained afebrile    Subjective:  Pt c/o slight urethral discomfort when voiding, otherwise no complaints    Objective:    Vital signs  T(C): , Max: 37 (11-09-18 @ 10:27)  HR: 86 (11-10-18 @ 05:03)  BP: 123/69 (11-10-18 @ 05:03)  SpO2: 94% (11-10-18 @ 05:03)  Wt(kg): --    Output   Void: 1340      Gen: NAD  Abd: soft, nontender, no CVAT      Labs: none today                 Urine Cx: Pending  OR Cx: Pending    Culture - Blood (11.09.18 @ 04:23)    Culture - Blood:   NO ORGANISMS ISOLATED  NO ORGANISMS ISOLATED AT 24 HOURS    Specimen Source: BLOOD VENOUS    Culture - Blood (11.09.18 @ 04:23)    Culture - Blood:   NO ORGANISMS ISOLATED  NO ORGANISMS ISOLATED AT 24 HOURS    Specimen Source: BLOOD PERIPHERAL

## 2018-11-10 NOTE — PROGRESS NOTE ADULT - SUBJECTIVE AND OBJECTIVE BOX
ANESTHESIA POSTOP CHECK    43y Female POSTOP DAY 1 S/P General anesthesia for cystoscopy, right ureteral stent, right pyelogram on 11/09/2018    Vital Signs Last 24 Hrs  T(C): 36.9 (10 Nov 2018 09:20), Max: 36.9 (09 Nov 2018 21:08)  T(F): 98.5 (10 Nov 2018 09:20), Max: 98.5 (09 Nov 2018 21:08)  HR: 84 (10 Nov 2018 09:20) (80 - 93)  BP: 124/67 (10 Nov 2018 09:20) (116/74 - 138/82)  BP(mean): --  RR: 18 (10 Nov 2018 09:20) (14 - 18)  SpO2: 97% (10 Nov 2018 09:20) (94% - 100%)  I&O's Summary    09 Nov 2018 07:01  -  10 Nov 2018 07:00  --------------------------------------------------------  IN: 570 mL / OUT: 1815 mL / NET: -1245 mL    10 Nov 2018 07:01  -  10 Nov 2018 13:53  --------------------------------------------------------  IN: 0 mL / OUT: 125 mL / NET: -125 mL         NO APPARENT ANESTHESIA COMPLICATIONS      Comments:

## 2018-11-10 NOTE — PROGRESS NOTE ADULT - ASSESSMENT
42 yo female who presents with 1 week right sided flank pain and fevers found to have obstructing kidney stone and severe R hydro    #Sepsis due to urinary tract infection  - pt met sepsis criteria on admission with elevated wbc, tachy to 135, fever, and urinary source  - growing kleb in blood and GNR in urine  - c/w ctx  - fu sensitivities  - monitor cbc, fever curve    #CAD  - c/w home asa, ticagrelor  - c/w home statin    #HTN  - c/w home lopressor, losartan, lasix    #FEN/ppx  - DASH diet  - Improve score of 0, no indication for DVT ppx 44 yo female who presents with 1 week right sided flank pain and fevers found to have obstructing kidney stone and severe R hydro    #Sepsis due to urinary tract infection  - pt met sepsis criteria on admission with elevated wbc, tachy to 135, fever, and urinary source  - fu Bcx, Ucx  - c/w ctx  - fu sensitivities  - monitor cbc, fever curve    #CAD  - c/w home asa, ticagrelor  - c/w home statin    #HTN  - c/w home lopressor, losartan, lasix    #FEN/ppx  - DASH diet  - Improve score of 0, no indication for DVT ppx

## 2018-11-11 LAB
-  AMIKACIN: SIGNIFICANT CHANGE UP
-  AMPICILLIN/SULBACTAM: SIGNIFICANT CHANGE UP
-  AMPICILLIN: SIGNIFICANT CHANGE UP
-  AZTREONAM: SIGNIFICANT CHANGE UP
-  CEFAZOLIN: SIGNIFICANT CHANGE UP
-  CEFEPIME: SIGNIFICANT CHANGE UP
-  CEFOXITIN: SIGNIFICANT CHANGE UP
-  CEFTAZIDIME: SIGNIFICANT CHANGE UP
-  CEFTRIAXONE: SIGNIFICANT CHANGE UP
-  CIPROFLOXACIN: SIGNIFICANT CHANGE UP
-  ERTAPENEM: SIGNIFICANT CHANGE UP
-  GENTAMICIN: SIGNIFICANT CHANGE UP
-  IMIPENEM: SIGNIFICANT CHANGE UP
-  LEVOFLOXACIN: SIGNIFICANT CHANGE UP
-  MEROPENEM: SIGNIFICANT CHANGE UP
-  NITROFURANTOIN: SIGNIFICANT CHANGE UP
-  PIPERACILLIN/TAZOBACTAM: SIGNIFICANT CHANGE UP
-  TIGECYCLINE: SIGNIFICANT CHANGE UP
-  TOBRAMYCIN: SIGNIFICANT CHANGE UP
-  TRIMETHOPRIM/SULFAMETHOXAZOLE: SIGNIFICANT CHANGE UP
BACTERIA UR CULT: SIGNIFICANT CHANGE UP
METHOD TYPE: SIGNIFICANT CHANGE UP
ORGANISM # SPEC MICROSCOPIC CNT: SIGNIFICANT CHANGE UP

## 2018-11-11 PROCEDURE — 99231 SBSQ HOSP IP/OBS SF/LOW 25: CPT

## 2018-11-11 PROCEDURE — 99232 SBSQ HOSP IP/OBS MODERATE 35: CPT

## 2018-11-11 RX ADMIN — Medication 100 MILLIGRAM(S): at 06:48

## 2018-11-11 RX ADMIN — ATORVASTATIN CALCIUM 80 MILLIGRAM(S): 80 TABLET, FILM COATED ORAL at 21:37

## 2018-11-11 RX ADMIN — Medication 100 MILLIGRAM(S): at 21:38

## 2018-11-11 RX ADMIN — Medication 10 MILLIEQUIVALENT(S): at 13:37

## 2018-11-11 RX ADMIN — Medication 650 MILLIGRAM(S): at 06:48

## 2018-11-11 RX ADMIN — LOSARTAN POTASSIUM 25 MILLIGRAM(S): 100 TABLET, FILM COATED ORAL at 06:48

## 2018-11-11 RX ADMIN — Medication 650 MILLIGRAM(S): at 07:48

## 2018-11-11 RX ADMIN — TICAGRELOR 60 MILLIGRAM(S): 90 TABLET ORAL at 06:48

## 2018-11-11 RX ADMIN — Medication 81 MILLIGRAM(S): at 13:33

## 2018-11-11 RX ADMIN — TICAGRELOR 60 MILLIGRAM(S): 90 TABLET ORAL at 17:38

## 2018-11-11 RX ADMIN — CEFTRIAXONE 100 GRAM(S): 500 INJECTION, POWDER, FOR SOLUTION INTRAMUSCULAR; INTRAVENOUS at 04:03

## 2018-11-11 RX ADMIN — HEPARIN SODIUM 5000 UNIT(S): 5000 INJECTION INTRAVENOUS; SUBCUTANEOUS at 06:48

## 2018-11-11 RX ADMIN — BUDESONIDE AND FORMOTEROL FUMARATE DIHYDRATE 2 PUFF(S): 160; 4.5 AEROSOL RESPIRATORY (INHALATION) at 10:04

## 2018-11-11 RX ADMIN — Medication 50 MILLIGRAM(S): at 17:38

## 2018-11-11 RX ADMIN — Medication 50 MILLIGRAM(S): at 06:48

## 2018-11-11 RX ADMIN — Medication 100 MILLIGRAM(S): at 13:34

## 2018-11-11 RX ADMIN — HEPARIN SODIUM 5000 UNIT(S): 5000 INJECTION INTRAVENOUS; SUBCUTANEOUS at 21:37

## 2018-11-11 NOTE — PROGRESS NOTE ADULT - ASSESSMENT
44 yo female who presents with 1 week right sided flank pain and fevers found to have obstructing kidney stone and severe R hydro    #Sepsis due to urinary tract infection  - pt met sepsis criteria on admission with elevated wbc, tachy to 135, fever, and urinary source  - Bcx negative, E. Coli in urine  - based on sensitivities consider changing to PO Cefddinir (3rd gen cephalosporin for d/c    #CAD  - c/w home asa, ticagrelor  - c/w home statin    #HTN  - c/w home lopressor, losartan, lasix    #FEN/ppx  - DASH diet  - Improve score of 0, no indication for DVT ppx

## 2018-11-11 NOTE — PROGRESS NOTE ADULT - SUBJECTIVE AND OBJECTIVE BOX
Patient is a 43y old  Female who presents with a chief complaint of Right sided flank pain with obstructing stone (11 Nov 2018 07:55)      SUBJECTIVE / OVERNIGHT EVENTS: Pt without complaints, would like to go home.    MEDICATIONS  (STANDING):  aspirin  chewable 81 milliGRAM(s) Oral daily  atorvastatin 80 milliGRAM(s) Oral at bedtime  buDESOnide  80 MICROgram(s)/formoterol 4.5 MICROgram(s) Inhaler 2 Puff(s) Inhalation two times a day  cefTRIAXone   IVPB 2 Gram(s) IV Intermittent every 24 hours  furosemide    Tablet 20 milliGRAM(s) Oral <User Schedule>  heparin  Injectable 5000 Unit(s) SubCutaneous every 8 hours  ketoconazole 2% Cream 1 Application(s) Topical two times a day  losartan 25 milliGRAM(s) Oral daily  metoprolol tartrate 50 milliGRAM(s) Oral two times a day  potassium chloride    Tablet ER 10 milliEquivalent(s) Oral daily  ticagrelor 60 milliGRAM(s) Oral two times a day    MEDICATIONS  (PRN):  acetaminophen   Tablet .. 650 milliGRAM(s) Oral every 6 hours PRN Temp greater or equal to 38.5C (101.3F), Mild Pain (1 - 3)  guaiFENesin    Syrup 100 milliGRAM(s) Oral every 6 hours PRN Cough  ondansetron Injectable 4 milliGRAM(s) IV Push every 6 hours PRN Nausea and/or Vomiting  senna 2 Tablet(s) Oral at bedtime PRN Constipation      Vital Signs Last 24 Hrs  T(C): 36.8 (11-11-18 @ 13:32), Max: 37.1 (11-10-18 @ 17:12)  T(F): 98.2 (11-11-18 @ 13:32), Max: 98.8 (11-10-18 @ 17:12)  HR: 76 (11-11-18 @ 13:32) (76 - 87)  BP: 128/68 (11-11-18 @ 13:32) (128/68 - 158/84)  BP(mean): --  RR: 18 (11-11-18 @ 13:32) (18 - 18)  SpO2: 98% (11-11-18 @ 13:32) (94% - 98%)  CAPILLARY BLOOD GLUCOSE        I&O's Summary    10 Nov 2018 07:01  -  11 Nov 2018 07:00  --------------------------------------------------------  IN: 0 mL / OUT: 1745 mL / NET: -1745 mL        PHYSICAL EXAM:  GENERAL: NAD, well-nourished  HEENT: mmm  CHEST/LUNG: CTABL  HEART: RRR, no m/r/g  ABDOMEN: soft, nt, nd  EXTREMITIES:  wwp, no c/c/e  PSYCH: AAOx3  NEUROLOGY: non-focal  SKIN: No rashes or lesions    LABS:                    RADIOLOGY & ADDITIONAL TESTS:    Imaging Personally Reviewed:    Consultant(s) Notes Reviewed:      Care Discussed with Consultants/Other Providers:

## 2018-11-11 NOTE — PROGRESS NOTE ADULT - SUBJECTIVE AND OBJECTIVE BOX
UROLOGY Progress Note  LOUIE QUINTANILLA    S: Patient seen at bedside.  Doing well, mild catheter pain.  Denies n/v.      O:  T(C): 36.3 (11-11-18 @ 06:42), Max: 37.1 (11-10-18 @ 14:05)  HR: 81 (11-11-18 @ 06:42) (80 - 87)  BP: 157/93 (11-11-18 @ 06:42) (124/67 - 158/84)  RR: 18 (11-11-18 @ 06:42) (18 - 18)  SpO2: 96% (11-11-18 @ 06:42) (94% - 97%)        Physical Exam:  Gen: NAD  Abd: Soft/obese/NT  : No CVAT        A/P: 43y Female s/p R. ureteral stent placement on 11/9 for proximal ureteral calculi    - F/U UCx sensitivities, growing E.Coli +2GNR (follow up speciation)  - C/W CTX, adjust PO per sensitivities  - ticagrelor/asa per medicine  - OOB  - DVT ppx

## 2018-11-12 ENCOUNTER — TRANSCRIPTION ENCOUNTER (OUTPATIENT)
Age: 43
End: 2018-11-12

## 2018-11-12 VITALS
HEART RATE: 77 BPM | TEMPERATURE: 99 F | OXYGEN SATURATION: 99 % | DIASTOLIC BLOOD PRESSURE: 83 MMHG | RESPIRATION RATE: 15 BRPM | SYSTOLIC BLOOD PRESSURE: 141 MMHG

## 2018-11-12 DIAGNOSIS — N39.0 URINARY TRACT INFECTION, SITE NOT SPECIFIED: ICD-10-CM

## 2018-11-12 PROCEDURE — 99231 SBSQ HOSP IP/OBS SF/LOW 25: CPT

## 2018-11-12 RX ORDER — LOSARTAN POTASSIUM 100 MG/1
1 TABLET, FILM COATED ORAL
Qty: 0 | Refills: 0 | DISCHARGE
Start: 2018-11-12

## 2018-11-12 RX ORDER — POTASSIUM CHLORIDE 20 MEQ
1 PACKET (EA) ORAL
Qty: 0 | Refills: 0 | DISCHARGE
Start: 2018-11-12

## 2018-11-12 RX ORDER — FUROSEMIDE 40 MG
1 TABLET ORAL
Qty: 0 | Refills: 0 | DISCHARGE
Start: 2018-11-12

## 2018-11-12 RX ORDER — FUROSEMIDE 40 MG
0.5 TABLET ORAL
Qty: 0 | Refills: 0 | DISCHARGE
Start: 2018-11-12

## 2018-11-12 RX ORDER — CEFDINIR 250 MG/5ML
1 POWDER, FOR SUSPENSION ORAL
Qty: 14 | Refills: 0
Start: 2018-11-12 | End: 2018-11-18

## 2018-11-12 RX ADMIN — LOSARTAN POTASSIUM 25 MILLIGRAM(S): 100 TABLET, FILM COATED ORAL at 05:02

## 2018-11-12 RX ADMIN — HEPARIN SODIUM 5000 UNIT(S): 5000 INJECTION INTRAVENOUS; SUBCUTANEOUS at 05:02

## 2018-11-12 RX ADMIN — Medication 50 MILLIGRAM(S): at 05:02

## 2018-11-12 RX ADMIN — CEFTRIAXONE 100 GRAM(S): 500 INJECTION, POWDER, FOR SOLUTION INTRAMUSCULAR; INTRAVENOUS at 05:02

## 2018-11-12 RX ADMIN — TICAGRELOR 60 MILLIGRAM(S): 90 TABLET ORAL at 05:03

## 2018-11-12 NOTE — DISCHARGE NOTE ADULT - NS AS DC FOLLOWUP STROKE INST
ibuprofen, aspirin,  ureteral stent implantation cystoscopy/Influenza vaccination (VIS Pub Date: August 7, 2015)

## 2018-11-12 NOTE — DISCHARGE NOTE ADULT - CARE PROVIDER_API CALL
Louie Sanchez), Urology  [address] [place] [address]  Phone: [phone]  Fax: [phone]

## 2018-11-12 NOTE — DISCHARGE NOTE ADULT - CARE PLAN
Principal Discharge DX:	Sepsis  Goal:	stent  Assessment and plan of treatment:	as above; make appt to have stone removed

## 2018-11-12 NOTE — DISCHARGE NOTE ADULT - MEDICATION SUMMARY - MEDICATIONS TO STOP TAKING
I will STOP taking the medications listed below when I get home from the hospital:    azithromycin 250 mg oral tablet  -- 1 tab(s) by mouth every 24 hours  END DATE 5/7/2014    cephalexin 500 mg oral tablet  -- 1 tab(s) by mouth 2 times a day  -- Finish all this medication unless otherwise directed by prescriber.    Keflex 500 mg oral capsule  -- 1 cap(s) by mouth 2 times a day  -- Finish all this medication unless otherwise directed by prescriber.

## 2018-11-12 NOTE — DISCHARGE NOTE ADULT - PATIENT PORTAL LINK FT
You can access the [facility] Patient Portal, offered by [facility], by registering with the following website: [email or web address]

## 2018-11-12 NOTE — DISCHARGE NOTE ADULT - INSTRUCTIONS
as tolerated Call MD for any c/o difficulty urinating, bloody urine, fever, pain not relieved after medicated for pain and a return appointment.  Take over the counter stool softener to prevent constipation that can be a side effect from taking pain medication.

## 2018-11-12 NOTE — DISCHARGE NOTE ADULT - MEDICATION SUMMARY - MEDICATIONS TO TAKE
I will START or STAY ON the medications listed below when I get home from the hospital:    aspirin 81 mg oral tablet  -- 1 tab(s) by mouth once a day  -- Indication: For Home med     mg oral tablet  -- 1 tab(s) by mouth every 8 hours, As Needed  -- Do not take this drug if you are pregnant.  It is very important that you take or use this exactly as directed.  Do not skip doses or discontinue unless directed by your doctor.  May cause drowsiness or dizziness.  Obtain medical advice before taking any non-prescription drugs as some may affect the action of this medication.  Take with food or milk.      -- Indication: For Home med as needed    losartan 25 mg oral tablet  -- 1 tab(s) by mouth once a day  -- Indication: For Home med    Imdur 60 mg oral tablet, extended release  --  by mouth once a day  -- Indication: For Home med    atorvastatin 10 mg oral tablet  -- 1 tab(s) by mouth once a day (at bedtime)  -- Indication: For Home med    ticagrelor 90 mg oral tablet  -- 1 tab(s) by mouth 2 times a day  -- Indication: For Home med    methimazole 10 mg oral tablet  -- 1 tab(s) by mouth once a day  -- Indication: For Home med    metoprolol tartrate 50 mg oral tablet  -- 1 tab(s) by mouth 2 times a day  -- Indication: For Home med    cefdinir 300 mg oral capsule  -- 1 cap(s) by mouth every 12 hours   -- Finish all this medication unless otherwise directed by prescriber.    -- Indication: For antibiotic    ketoconazole 2% topical cream  -- Apply on skin to affected area 2 times a day  -- Indication: For Home med    furosemide 20 mg oral tablet  -- 1 tab(s) by mouth   -- Indication: For Home med    potassium chloride 10 mEq oral tablet, extended release  -- 1 tab(s) by mouth once a day  -- Indication: For Home med

## 2018-11-12 NOTE — DISCHARGE NOTE ADULT - HOSPITAL COURSE
Pt admitted with R. prox stone and fever; stent placed; has improved; urine growing E. coli; blood NTD; home on cefdinir; f/u in office to remove stone

## 2018-11-12 NOTE — PROGRESS NOTE ADULT - REASON FOR ADMISSION
Right sided flank pain with obstructing stone

## 2018-11-13 LAB — BACTERIA UR CULT: SIGNIFICANT CHANGE UP

## 2018-11-14 LAB
BACTERIA BLD CULT: SIGNIFICANT CHANGE UP
BACTERIA BLD CULT: SIGNIFICANT CHANGE UP

## 2018-11-20 ENCOUNTER — APPOINTMENT (OUTPATIENT)
Dept: UROLOGY | Facility: CLINIC | Age: 43
End: 2018-11-20
Payer: COMMERCIAL

## 2018-11-20 DIAGNOSIS — N20.1 CALCULUS OF URETER: ICD-10-CM

## 2018-11-20 PROCEDURE — 99213 OFFICE O/P EST LOW 20 MIN: CPT

## 2018-11-23 RX ORDER — CIPROFLOXACIN LACTATE 400MG/40ML
400 VIAL (ML) INTRAVENOUS ONCE
Qty: 0 | Refills: 0 | Status: DISCONTINUED | OUTPATIENT
Start: 2018-11-26 | End: 2018-12-11

## 2018-11-25 ENCOUNTER — TRANSCRIPTION ENCOUNTER (OUTPATIENT)
Age: 43
End: 2018-11-25

## 2018-11-26 ENCOUNTER — OUTPATIENT (OUTPATIENT)
Dept: OUTPATIENT SERVICES | Facility: HOSPITAL | Age: 43
LOS: 1 days | End: 2018-11-26
Payer: COMMERCIAL

## 2018-11-26 ENCOUNTER — APPOINTMENT (OUTPATIENT)
Dept: UROLOGY | Facility: HOSPITAL | Age: 43
End: 2018-11-26

## 2018-11-26 ENCOUNTER — RESULT REVIEW (OUTPATIENT)
Age: 43
End: 2018-11-26

## 2018-11-26 VITALS
WEIGHT: 201.06 LBS | TEMPERATURE: 98 F | OXYGEN SATURATION: 99 % | RESPIRATION RATE: 18 BRPM | HEIGHT: 60 IN | SYSTOLIC BLOOD PRESSURE: 134 MMHG | HEART RATE: 82 BPM | DIASTOLIC BLOOD PRESSURE: 88 MMHG

## 2018-11-26 VITALS
DIASTOLIC BLOOD PRESSURE: 87 MMHG | HEART RATE: 88 BPM | SYSTOLIC BLOOD PRESSURE: 136 MMHG | OXYGEN SATURATION: 100 % | RESPIRATION RATE: 20 BRPM | TEMPERATURE: 98 F

## 2018-11-26 DIAGNOSIS — N20.0 CALCULUS OF KIDNEY: ICD-10-CM

## 2018-11-26 DIAGNOSIS — Z90.710 ACQUIRED ABSENCE OF BOTH CERVIX AND UTERUS: Chronic | ICD-10-CM

## 2018-11-26 LAB — BACTERIA UR CULT: NORMAL

## 2018-11-26 PROCEDURE — 76000 FLUOROSCOPY <1 HR PHYS/QHP: CPT

## 2018-11-26 PROCEDURE — 52356 CYSTO/URETERO W/LITHOTRIPSY: CPT | Mod: RT

## 2018-11-26 PROCEDURE — C1758: CPT

## 2018-11-26 PROCEDURE — C1889: CPT

## 2018-11-26 PROCEDURE — 87086 URINE CULTURE/COLONY COUNT: CPT

## 2018-11-26 PROCEDURE — 52352 CYSTOURETERO W/STONE REMOVE: CPT | Mod: 59,RT

## 2018-11-26 PROCEDURE — 88300 SURGICAL PATH GROSS: CPT

## 2018-11-26 PROCEDURE — 88300 SURGICAL PATH GROSS: CPT | Mod: 26

## 2018-11-26 PROCEDURE — 82365 CALCULUS SPECTROSCOPY: CPT

## 2018-11-26 PROCEDURE — C1769: CPT

## 2018-11-26 PROCEDURE — C2617: CPT

## 2018-11-26 RX ORDER — HYDROMORPHONE HYDROCHLORIDE 2 MG/ML
0.5 INJECTION INTRAMUSCULAR; INTRAVENOUS; SUBCUTANEOUS
Qty: 0 | Refills: 0 | Status: DISCONTINUED | OUTPATIENT
Start: 2018-11-26 | End: 2018-11-26

## 2018-11-26 RX ORDER — SODIUM CHLORIDE 9 MG/ML
1000 INJECTION, SOLUTION INTRAVENOUS
Qty: 0 | Refills: 0 | Status: DISCONTINUED | OUTPATIENT
Start: 2018-11-26 | End: 2018-12-11

## 2018-11-26 RX ORDER — ONDANSETRON 8 MG/1
4 TABLET, FILM COATED ORAL
Qty: 0 | Refills: 0 | Status: DISCONTINUED | OUTPATIENT
Start: 2018-11-26 | End: 2018-11-26

## 2018-11-26 RX ORDER — SODIUM CHLORIDE 9 MG/ML
3 INJECTION INTRAMUSCULAR; INTRAVENOUS; SUBCUTANEOUS EVERY 8 HOURS
Qty: 0 | Refills: 0 | Status: DISCONTINUED | OUTPATIENT
Start: 2018-11-26 | End: 2018-11-26

## 2018-11-26 NOTE — BRIEF OPERATIVE NOTE - PROCEDURE
<<-----Click on this checkbox to enter Procedure Cystoscopy and ureteroscopy with laser lithotripsy  11/26/2018    Active  ANG5

## 2018-11-26 NOTE — ASU DISCHARGE PLAN (ADULT/PEDIATRIC). - MEDICATION SUMMARY - MEDICATIONS TO TAKE
I will START or STAY ON the medications listed below when I get home from the hospital:    aspirin 81 mg oral tablet  -- 1 tab(s) by mouth once a day  -- Indication: For home med    losartan 25 mg oral tablet  -- 1 tab(s) by mouth once a day  -- Indication: For home med    Imdur 60 mg oral tablet, extended release  --  by mouth once a day  -- Indication: For home med    atorvastatin 10 mg oral tablet  -- 1 tab(s) by mouth once a day (at bedtime)  -- Indication: For home med    ticagrelor 90 mg oral tablet  -- 1 tab(s) by mouth 2 times a day  -- Indication: For home med    methimazole 10 mg oral tablet  -- 1 tab(s) by mouth once a day  -- Indication: For home med    metoprolol tartrate 50 mg oral tablet  -- 1 tab(s) by mouth 2 times a day  -- Indication: For home med    cefdinir 300 mg oral capsule  -- 1 cap(s) by mouth every 12 hours   -- Finish all this medication unless otherwise directed by prescriber.    -- Indication: For prevention    ketoconazole 2% topical cream  -- Apply on skin to affected area 2 times a day  -- Indication: For home med    furosemide 20 mg oral tablet  -- 1 tab(s) by mouth   -- Indication: For home med    potassium chloride 10 mEq oral tablet, extended release  -- 1 tab(s) by mouth once a day  -- Indication: For home med

## 2018-11-26 NOTE — ASU DISCHARGE PLAN (ADULT/PEDIATRIC). - ITEMS TO FOLLOWUP WITH YOUR PHYSICIAN'S
Please follow up with Dr. Sanchez on 11/30 for removal of stent. You may call  to schedule an appointment.     You have a stent in place. It is attached to a string, which is taped to your pubis. Do not manipulate the string.    Please finish your course of cefdinir.

## 2018-11-27 ENCOUNTER — APPOINTMENT (OUTPATIENT)
Dept: UROLOGY | Facility: CLINIC | Age: 43
End: 2018-11-27
Payer: COMMERCIAL

## 2018-11-27 DIAGNOSIS — N20.0 CALCULUS OF KIDNEY: ICD-10-CM

## 2018-11-27 PROCEDURE — 99213 OFFICE O/P EST LOW 20 MIN: CPT

## 2018-11-28 LAB
COMPN STONE: SIGNIFICANT CHANGE UP
CULTURE RESULTS: SIGNIFICANT CHANGE UP
SPECIMEN SOURCE: SIGNIFICANT CHANGE UP

## 2018-11-29 LAB — SURGICAL PATHOLOGY STUDY: SIGNIFICANT CHANGE UP

## 2019-02-27 ENCOUNTER — NON-APPOINTMENT (OUTPATIENT)
Age: 44
End: 2019-02-27

## 2019-02-27 ENCOUNTER — APPOINTMENT (OUTPATIENT)
Dept: CARDIOLOGY | Facility: CLINIC | Age: 44
End: 2019-02-27
Payer: COMMERCIAL

## 2019-02-27 VITALS
HEART RATE: 84 BPM | OXYGEN SATURATION: 99 % | RESPIRATION RATE: 16 BRPM | BODY MASS INDEX: 40.84 KG/M2 | SYSTOLIC BLOOD PRESSURE: 147 MMHG | HEIGHT: 60 IN | WEIGHT: 208 LBS | DIASTOLIC BLOOD PRESSURE: 94 MMHG

## 2019-02-27 DIAGNOSIS — R73.03 PREDIABETES.: ICD-10-CM

## 2019-02-27 PROCEDURE — 93000 ELECTROCARDIOGRAM COMPLETE: CPT

## 2019-02-27 PROCEDURE — 99214 OFFICE O/P EST MOD 30 MIN: CPT

## 2019-02-27 NOTE — PHYSICAL EXAM
[General Appearance - Well Developed] : well developed [Normal Appearance] : normal appearance [Well Groomed] : well groomed [General Appearance - Well Nourished] : well nourished [No Deformities] : no deformities [General Appearance - In No Acute Distress] : no acute distress [Normal Conjunctiva] : the conjunctiva exhibited no abnormalities [Eyelids - No Xanthelasma] : the eyelids demonstrated no xanthelasmas [Normal Oral Mucosa] : normal oral mucosa [No Oral Pallor] : no oral pallor [No Oral Cyanosis] : no oral cyanosis [Normal Jugular Venous A Waves Present] : normal jugular venous A waves present [Normal Jugular Venous V Waves Present] : normal jugular venous V waves present [No Jugular Venous Ferguson A Waves] : no jugular venous ferguson A waves [Respiration, Rhythm And Depth] : normal respiratory rhythm and effort [Exaggerated Use Of Accessory Muscles For Inspiration] : no accessory muscle use [Auscultation Breath Sounds / Voice Sounds] : lungs were clear to auscultation bilaterally [Heart Rate And Rhythm] : heart rate and rhythm were normal [Heart Sounds] : normal S1 and S2 [Murmurs] : no murmurs present [Abdomen Soft] : soft [Abdomen Tenderness] : non-tender [Abdomen Mass (___ Cm)] : no abdominal mass palpated [Abnormal Walk] : normal gait [Gait - Sufficient For Exercise Testing] : the gait was sufficient for exercise testing [Nail Clubbing] : no clubbing of the fingernails [Cyanosis, Localized] : no localized cyanosis [Petechial Hemorrhages (___cm)] : no petechial hemorrhages [Skin Color & Pigmentation] : normal skin color and pigmentation [] : no rash [No Venous Stasis] : no venous stasis [Skin Lesions] : no skin lesions [No Skin Ulcers] : no skin ulcer [No Xanthoma] : no  xanthoma was observed [Oriented To Time, Place, And Person] : oriented to person, place, and time [Affect] : the affect was normal [Mood] : the mood was normal [No Anxiety] : not feeling anxious

## 2019-02-27 NOTE — ASSESSMENT
[FreeTextEntry1] : 1. Coronary artery disease. No signs of angina. Continue current medications.\par \par 2. Hyperlipidemia. Her primary medical physician checked her labs recently and lower her atorvastatin 40 mg daily.\par \par 3. Elevated hemoglobin A1c. Her primary medical physician appropriately recommended gastric sleeve surgery. The patient still deciding. \par

## 2019-02-27 NOTE — REASON FOR VISIT
[Follow-Up - Clinic] : a clinic follow-up of [Coronary Artery Disease] : coronary artery disease [Hyperlipidemia] : hyperlipidemia [FreeTextEntry1] : Since her last visit, she has been doing very well. However, she did have one episode of urosepsis from infected stone. She underwent extraction of lithotripsy and responded well. Since then, she has been free of symptoms and has had no chest pain or shortness of breath.\par \par 1. Coronary artery disease. No signs of angina. Continue current medications.\par \par 2. Hyperlipidemia. Her primary medical physician checked her labs recently and lower her atorvastatin 40 mg daily.\par \par 3. Elevated hemoglobin A1c. Her primary medical physician appropriately recommended gastric sleeve surgery. The patient still deciding. \par

## 2019-03-28 ENCOUNTER — TRANSCRIPTION ENCOUNTER (OUTPATIENT)
Age: 44
End: 2019-03-28

## 2019-08-09 ENCOUNTER — NON-APPOINTMENT (OUTPATIENT)
Age: 44
End: 2019-08-09

## 2019-08-09 ENCOUNTER — APPOINTMENT (OUTPATIENT)
Dept: CARDIOLOGY | Facility: CLINIC | Age: 44
End: 2019-08-09
Payer: MEDICAID

## 2019-08-09 VITALS
OXYGEN SATURATION: 97 % | WEIGHT: 214 LBS | SYSTOLIC BLOOD PRESSURE: 126 MMHG | HEART RATE: 81 BPM | HEIGHT: 60 IN | DIASTOLIC BLOOD PRESSURE: 85 MMHG | BODY MASS INDEX: 42.01 KG/M2

## 2019-08-09 PROCEDURE — 99215 OFFICE O/P EST HI 40 MIN: CPT

## 2019-08-09 PROCEDURE — 93000 ELECTROCARDIOGRAM COMPLETE: CPT

## 2019-08-09 NOTE — PHYSICAL EXAM
[General Appearance - Well Developed] : well developed [Normal Appearance] : normal appearance [Well Groomed] : well groomed [General Appearance - Well Nourished] : well nourished [No Deformities] : no deformities [General Appearance - In No Acute Distress] : no acute distress [Normal Conjunctiva] : the conjunctiva exhibited no abnormalities [Eyelids - No Xanthelasma] : the eyelids demonstrated no xanthelasmas [Normal Oral Mucosa] : normal oral mucosa [No Oral Pallor] : no oral pallor [No Oral Cyanosis] : no oral cyanosis [Normal Jugular Venous A Waves Present] : normal jugular venous A waves present [Normal Jugular Venous V Waves Present] : normal jugular venous V waves present [No Jugular Venous Ferguson A Waves] : no jugular venous ferguson A waves [Respiration, Rhythm And Depth] : normal respiratory rhythm and effort [Exaggerated Use Of Accessory Muscles For Inspiration] : no accessory muscle use [Auscultation Breath Sounds / Voice Sounds] : lungs were clear to auscultation bilaterally [Heart Rate And Rhythm] : heart rate and rhythm were normal [Heart Sounds] : normal S1 and S2 [Murmurs] : no murmurs present [Abdomen Soft] : soft [Abdomen Tenderness] : non-tender [Abdomen Mass (___ Cm)] : no abdominal mass palpated [Gait - Sufficient For Exercise Testing] : the gait was sufficient for exercise testing [Abnormal Walk] : normal gait [Nail Clubbing] : no clubbing of the fingernails [Cyanosis, Localized] : no localized cyanosis [Petechial Hemorrhages (___cm)] : no petechial hemorrhages [Skin Color & Pigmentation] : normal skin color and pigmentation [] : no rash [No Venous Stasis] : no venous stasis [Skin Lesions] : no skin lesions [No Skin Ulcers] : no skin ulcer [No Xanthoma] : no  xanthoma was observed [Oriented To Time, Place, And Person] : oriented to person, place, and time [Affect] : the affect was normal [Mood] : the mood was normal [No Anxiety] : not feeling anxious

## 2019-08-09 NOTE — REASON FOR VISIT
[Follow-Up - Clinic] : a clinic follow-up of [Coronary Artery Disease] : coronary artery disease [Hyperlipidemia] : hyperlipidemia [FreeTextEntry1] : The patient is here for an urgent evaluation of chest pain. She first complained of this last week, and called me. I asked her to come in urgently, but she wanted to wait until her day off from work, which is today. She has chest pain, which is pressure-like, substernal, occurring in the context of exertion and relieved with rest. The symptoms last for several minutes in duration and are moderate in intensity. So far, she has had no rest angina.\par \par Unstable angina. I asked the patient to schedule an urgent cardiac catheterization. I explained that I felt there was a risk to her life. She was adamant about waiting until August 29 or 30th, approximately 3 weeks away, until she had some time off from her work. She understood the risks of waiting.\par \par I explained that if she were to have rest symptoms, she should come in immediately to the emergency room as there is a high likelihood of having a heart attack or dying in the short-term.\par \par I have restricted her from any physical activity, including sexual intercourse. She reports that she has symptoms during intercourse currently.\par \par Increase her atorvastatin 80 mg daily, and start isosorbide mononitrate 30 mg daily.\par \par The patient will followup with me by phone next week.\par

## 2019-08-09 NOTE — ASSESSMENT
[FreeTextEntry1] : Unstable angina. I asked the patient to schedule an urgent cardiac catheterization. I explained that I felt there was a risk to her life. She was adamant about waiting until August 29 or 30th, approximately 3 weeks away, until she had some time off from her work. She understood the risks of waiting.\par \par I explained that if she were to have rest symptoms, she should come in immediately to the emergency room as there is a high likelihood of having a heart attack or dying in the short-term.\par \par I have restricted her from any physical activity, including sexual intercourse. She reports that she has symptoms during intercourse currently.\par \par Increase her atorvastatin 80 mg daily, and start isosorbide mononitrate 30 mg daily.\par \par The patient will followup with me by phone next week.\par

## 2019-08-14 ENCOUNTER — TRANSCRIPTION ENCOUNTER (OUTPATIENT)
Age: 44
End: 2019-08-14

## 2019-08-30 ENCOUNTER — INPATIENT (INPATIENT)
Facility: HOSPITAL | Age: 44
LOS: 1 days | Discharge: HOME | End: 2019-09-01
Attending: INTERNAL MEDICINE | Admitting: INTERNAL MEDICINE
Payer: MEDICAID

## 2019-08-30 VITALS
SYSTOLIC BLOOD PRESSURE: 133 MMHG | TEMPERATURE: 98 F | RESPIRATION RATE: 18 BRPM | HEIGHT: 60 IN | HEART RATE: 73 BPM | WEIGHT: 208.78 LBS | OXYGEN SATURATION: 100 % | DIASTOLIC BLOOD PRESSURE: 80 MMHG

## 2019-08-30 DIAGNOSIS — Z90.710 ACQUIRED ABSENCE OF BOTH CERVIX AND UTERUS: Chronic | ICD-10-CM

## 2019-08-30 DIAGNOSIS — Z95.5 PRESENCE OF CORONARY ANGIOPLASTY IMPLANT AND GRAFT: Chronic | ICD-10-CM

## 2019-08-30 DIAGNOSIS — I25.10 ATHEROSCLEROTIC HEART DISEASE OF NATIVE CORONARY ARTERY WITHOUT ANGINA PECTORIS: ICD-10-CM

## 2019-08-30 LAB
ANION GAP SERPL CALC-SCNC: 12 MMO/L — SIGNIFICANT CHANGE UP (ref 7–14)
BUN SERPL-MCNC: 17 MG/DL — SIGNIFICANT CHANGE UP (ref 7–23)
CALCIUM SERPL-MCNC: 9.5 MG/DL — SIGNIFICANT CHANGE UP (ref 8.4–10.5)
CHLORIDE SERPL-SCNC: 105 MMOL/L — SIGNIFICANT CHANGE UP (ref 98–107)
CO2 SERPL-SCNC: 23 MMOL/L — SIGNIFICANT CHANGE UP (ref 22–31)
CREAT SERPL-MCNC: 0.78 MG/DL — SIGNIFICANT CHANGE UP (ref 0.5–1.3)
GLUCOSE SERPL-MCNC: 135 MG/DL — HIGH (ref 70–99)
HBA1C BLD-MCNC: 6.5 % — HIGH (ref 4–5.6)
HCG UR QL: NEGATIVE — SIGNIFICANT CHANGE UP
HCT VFR BLD CALC: 39.9 % — SIGNIFICANT CHANGE UP (ref 34.5–45)
HGB BLD-MCNC: 12.7 G/DL — SIGNIFICANT CHANGE UP (ref 11.5–15.5)
MCHC RBC-ENTMCNC: 29.6 PG — SIGNIFICANT CHANGE UP (ref 27–34)
MCHC RBC-ENTMCNC: 31.8 % — LOW (ref 32–36)
MCV RBC AUTO: 93 FL — SIGNIFICANT CHANGE UP (ref 80–100)
NRBC # FLD: 0 K/UL — SIGNIFICANT CHANGE UP (ref 0–0)
PLATELET # BLD AUTO: 245 K/UL — SIGNIFICANT CHANGE UP (ref 150–400)
PMV BLD: 12.1 FL — SIGNIFICANT CHANGE UP (ref 7–13)
POTASSIUM SERPL-MCNC: 4.2 MMOL/L — SIGNIFICANT CHANGE UP (ref 3.5–5.3)
POTASSIUM SERPL-SCNC: 4.2 MMOL/L — SIGNIFICANT CHANGE UP (ref 3.5–5.3)
RBC # BLD: 4.29 M/UL — SIGNIFICANT CHANGE UP (ref 3.8–5.2)
RBC # FLD: 13.2 % — SIGNIFICANT CHANGE UP (ref 10.3–14.5)
SODIUM SERPL-SCNC: 140 MMOL/L — SIGNIFICANT CHANGE UP (ref 135–145)
WBC # BLD: 8.61 K/UL — SIGNIFICANT CHANGE UP (ref 3.8–10.5)
WBC # FLD AUTO: 8.61 K/UL — SIGNIFICANT CHANGE UP (ref 3.8–10.5)

## 2019-08-30 PROCEDURE — 93010 ELECTROCARDIOGRAM REPORT: CPT

## 2019-08-30 RX ORDER — TICAGRELOR 90 MG/1
90 TABLET ORAL EVERY 12 HOURS
Refills: 0 | Status: DISCONTINUED | OUTPATIENT
Start: 2019-08-31 | End: 2019-09-01

## 2019-08-30 RX ORDER — HEPARIN SODIUM 5000 [USP'U]/ML
5000 INJECTION INTRAVENOUS; SUBCUTANEOUS EVERY 12 HOURS
Refills: 0 | Status: DISCONTINUED | OUTPATIENT
Start: 2019-08-31 | End: 2019-09-01

## 2019-08-30 RX ORDER — ISOSORBIDE MONONITRATE 60 MG/1
30 TABLET, EXTENDED RELEASE ORAL DAILY
Refills: 0 | Status: DISCONTINUED | OUTPATIENT
Start: 2019-08-30 | End: 2019-09-01

## 2019-08-30 RX ORDER — FUROSEMIDE 40 MG
10 TABLET ORAL
Refills: 0 | Status: DISCONTINUED | OUTPATIENT
Start: 2019-08-30 | End: 2019-09-01

## 2019-08-30 RX ORDER — METOPROLOL TARTRATE 50 MG
50 TABLET ORAL
Refills: 0 | Status: DISCONTINUED | OUTPATIENT
Start: 2019-08-30 | End: 2019-09-01

## 2019-08-30 RX ORDER — SODIUM CHLORIDE 9 MG/ML
3 INJECTION INTRAMUSCULAR; INTRAVENOUS; SUBCUTANEOUS EVERY 8 HOURS
Refills: 0 | Status: DISCONTINUED | OUTPATIENT
Start: 2019-08-30 | End: 2019-09-01

## 2019-08-30 RX ORDER — ASPIRIN/CALCIUM CARB/MAGNESIUM 324 MG
81 TABLET ORAL DAILY
Refills: 0 | Status: DISCONTINUED | OUTPATIENT
Start: 2019-08-31 | End: 2019-09-01

## 2019-08-30 RX ORDER — ATORVASTATIN CALCIUM 80 MG/1
40 TABLET, FILM COATED ORAL AT BEDTIME
Refills: 0 | Status: DISCONTINUED | OUTPATIENT
Start: 2019-08-30 | End: 2019-09-01

## 2019-08-30 RX ORDER — LOSARTAN POTASSIUM 100 MG/1
25 TABLET, FILM COATED ORAL DAILY
Refills: 0 | Status: DISCONTINUED | OUTPATIENT
Start: 2019-08-30 | End: 2019-09-01

## 2019-08-30 RX ADMIN — Medication 50 MILLIGRAM(S): at 17:43

## 2019-08-30 RX ADMIN — SODIUM CHLORIDE 3 MILLILITER(S): 9 INJECTION INTRAMUSCULAR; INTRAVENOUS; SUBCUTANEOUS at 22:10

## 2019-08-30 RX ADMIN — ATORVASTATIN CALCIUM 40 MILLIGRAM(S): 80 TABLET, FILM COATED ORAL at 22:14

## 2019-08-30 RX ADMIN — SODIUM CHLORIDE 3 MILLILITER(S): 9 INJECTION INTRAMUSCULAR; INTRAVENOUS; SUBCUTANEOUS at 15:31

## 2019-08-30 RX ADMIN — Medication 10 MILLIGRAM(S): at 22:14

## 2019-08-30 NOTE — H&P CARDIOLOGY - HISTORY OF PRESENT ILLNESS
44 y/o F w/ PMH of CAD s/p MIGUEL ANGEL(mLAD 2014), HTN, HLD and Obesity presents for cardiac catheretization. Patient states that she started experiencing exertional dyspnea and chest pressure with minimal exercise over the past few weeks. Patient states that these symptoms are similar to what she experienced prior to needing a stent. Patients symptoms are relieved with rest. Pt denies N/V/D, fevers, chills, cough, palpitations, syncope, orthopnea, nocturnal paroxysmal dyspnea, edema, cyanosis, heart murmurs, varicosities, phlebitis, claudication. 42 y/o F w/ PMH of CAD s/p MIGUEL ANGEL(mLAD 2014), HTN, HLD and Obesity presents for cardiac catheretization. Patient states that she started experiencing exertional dyspnea and chest pressure with minimal exercise over the past few weeks. Patient states that these symptoms are similar to what she experienced prior to needing a stent. Patients symptoms are relieved with rest. Pt denies N/V/D, fevers, chills, cough, palpitations, syncope, orthopnea, nocturnal paroxysmal dyspnea, edema, cyanosis, heart murmurs, varicosities, phlebitis, claudication.

## 2019-08-30 NOTE — H&P CARDIOLOGY - FAMILY HISTORY
Sibling  Still living? Unknown  Family history of coronary artery disease younger than 40 years of age, Age at diagnosis: Age Unknown

## 2019-08-31 LAB
ANION GAP SERPL CALC-SCNC: 13 MMO/L — SIGNIFICANT CHANGE UP (ref 7–14)
BUN SERPL-MCNC: 15 MG/DL — SIGNIFICANT CHANGE UP (ref 7–23)
CALCIUM SERPL-MCNC: 9.2 MG/DL — SIGNIFICANT CHANGE UP (ref 8.4–10.5)
CHLORIDE SERPL-SCNC: 104 MMOL/L — SIGNIFICANT CHANGE UP (ref 98–107)
CO2 SERPL-SCNC: 22 MMOL/L — SIGNIFICANT CHANGE UP (ref 22–31)
CREAT SERPL-MCNC: 0.73 MG/DL — SIGNIFICANT CHANGE UP (ref 0.5–1.3)
GLUCOSE SERPL-MCNC: 117 MG/DL — HIGH (ref 70–99)
HCT VFR BLD CALC: 40 % — SIGNIFICANT CHANGE UP (ref 34.5–45)
HGB BLD-MCNC: 12.6 G/DL — SIGNIFICANT CHANGE UP (ref 11.5–15.5)
MAGNESIUM SERPL-MCNC: 1.9 MG/DL — SIGNIFICANT CHANGE UP (ref 1.6–2.6)
MCHC RBC-ENTMCNC: 29.2 PG — SIGNIFICANT CHANGE UP (ref 27–34)
MCHC RBC-ENTMCNC: 31.5 % — LOW (ref 32–36)
MCV RBC AUTO: 92.8 FL — SIGNIFICANT CHANGE UP (ref 80–100)
NRBC # FLD: 0 K/UL — SIGNIFICANT CHANGE UP (ref 0–0)
PHOSPHATE SERPL-MCNC: 3.1 MG/DL — SIGNIFICANT CHANGE UP (ref 2.5–4.5)
PLATELET # BLD AUTO: 235 K/UL — SIGNIFICANT CHANGE UP (ref 150–400)
PMV BLD: 12.3 FL — SIGNIFICANT CHANGE UP (ref 7–13)
POTASSIUM SERPL-MCNC: 4 MMOL/L — SIGNIFICANT CHANGE UP (ref 3.5–5.3)
POTASSIUM SERPL-SCNC: 4 MMOL/L — SIGNIFICANT CHANGE UP (ref 3.5–5.3)
RBC # BLD: 4.31 M/UL — SIGNIFICANT CHANGE UP (ref 3.8–5.2)
RBC # FLD: 12.9 % — SIGNIFICANT CHANGE UP (ref 10.3–14.5)
SODIUM SERPL-SCNC: 139 MMOL/L — SIGNIFICANT CHANGE UP (ref 135–145)
WBC # BLD: 9.95 K/UL — SIGNIFICANT CHANGE UP (ref 3.8–10.5)
WBC # FLD AUTO: 9.95 K/UL — SIGNIFICANT CHANGE UP (ref 3.8–10.5)

## 2019-08-31 RX ADMIN — TICAGRELOR 90 MILLIGRAM(S): 90 TABLET ORAL at 05:48

## 2019-08-31 RX ADMIN — Medication 81 MILLIGRAM(S): at 12:22

## 2019-08-31 RX ADMIN — Medication 50 MILLIGRAM(S): at 05:48

## 2019-08-31 RX ADMIN — TICAGRELOR 90 MILLIGRAM(S): 90 TABLET ORAL at 17:05

## 2019-08-31 RX ADMIN — ATORVASTATIN CALCIUM 40 MILLIGRAM(S): 80 TABLET, FILM COATED ORAL at 22:06

## 2019-08-31 RX ADMIN — HEPARIN SODIUM 5000 UNIT(S): 5000 INJECTION INTRAVENOUS; SUBCUTANEOUS at 05:48

## 2019-08-31 RX ADMIN — SODIUM CHLORIDE 3 MILLILITER(S): 9 INJECTION INTRAMUSCULAR; INTRAVENOUS; SUBCUTANEOUS at 22:06

## 2019-08-31 RX ADMIN — SODIUM CHLORIDE 3 MILLILITER(S): 9 INJECTION INTRAMUSCULAR; INTRAVENOUS; SUBCUTANEOUS at 05:46

## 2019-08-31 RX ADMIN — ISOSORBIDE MONONITRATE 30 MILLIGRAM(S): 60 TABLET, EXTENDED RELEASE ORAL at 12:22

## 2019-08-31 RX ADMIN — SODIUM CHLORIDE 3 MILLILITER(S): 9 INJECTION INTRAMUSCULAR; INTRAVENOUS; SUBCUTANEOUS at 14:46

## 2019-08-31 RX ADMIN — Medication 50 MILLIGRAM(S): at 17:05

## 2019-08-31 RX ADMIN — LOSARTAN POTASSIUM 25 MILLIGRAM(S): 100 TABLET, FILM COATED ORAL at 07:42

## 2019-09-01 ENCOUNTER — TRANSCRIPTION ENCOUNTER (OUTPATIENT)
Age: 44
End: 2019-09-01

## 2019-09-01 ENCOUNTER — OUTPATIENT (OUTPATIENT)
Dept: OUTPATIENT SERVICES | Facility: HOSPITAL | Age: 44
LOS: 1 days | End: 2019-09-01
Payer: MEDICAID

## 2019-09-01 VITALS
TEMPERATURE: 98 F | SYSTOLIC BLOOD PRESSURE: 130 MMHG | OXYGEN SATURATION: 100 % | DIASTOLIC BLOOD PRESSURE: 78 MMHG | RESPIRATION RATE: 16 BRPM | HEART RATE: 78 BPM

## 2019-09-01 DIAGNOSIS — Z90.710 ACQUIRED ABSENCE OF BOTH CERVIX AND UTERUS: Chronic | ICD-10-CM

## 2019-09-01 DIAGNOSIS — Z95.5 PRESENCE OF CORONARY ANGIOPLASTY IMPLANT AND GRAFT: Chronic | ICD-10-CM

## 2019-09-01 LAB
ANION GAP SERPL CALC-SCNC: 11 MMO/L — SIGNIFICANT CHANGE UP (ref 7–14)
BASOPHILS # BLD AUTO: 0.04 K/UL — SIGNIFICANT CHANGE UP (ref 0–0.2)
BASOPHILS NFR BLD AUTO: 0.4 % — SIGNIFICANT CHANGE UP (ref 0–2)
BUN SERPL-MCNC: 17 MG/DL — SIGNIFICANT CHANGE UP (ref 7–23)
CALCIUM SERPL-MCNC: 9.5 MG/DL — SIGNIFICANT CHANGE UP (ref 8.4–10.5)
CHLORIDE SERPL-SCNC: 103 MMOL/L — SIGNIFICANT CHANGE UP (ref 98–107)
CO2 SERPL-SCNC: 24 MMOL/L — SIGNIFICANT CHANGE UP (ref 22–31)
CREAT SERPL-MCNC: 0.82 MG/DL — SIGNIFICANT CHANGE UP (ref 0.5–1.3)
EOSINOPHIL # BLD AUTO: 0.2 K/UL — SIGNIFICANT CHANGE UP (ref 0–0.5)
EOSINOPHIL NFR BLD AUTO: 2.1 % — SIGNIFICANT CHANGE UP (ref 0–6)
GLUCOSE SERPL-MCNC: 125 MG/DL — HIGH (ref 70–99)
HCT VFR BLD CALC: 43.9 % — SIGNIFICANT CHANGE UP (ref 34.5–45)
HGB BLD-MCNC: 13.5 G/DL — SIGNIFICANT CHANGE UP (ref 11.5–15.5)
IMM GRANULOCYTES NFR BLD AUTO: 0.3 % — SIGNIFICANT CHANGE UP (ref 0–1.5)
LYMPHOCYTES # BLD AUTO: 2.89 K/UL — SIGNIFICANT CHANGE UP (ref 1–3.3)
LYMPHOCYTES # BLD AUTO: 29.9 % — SIGNIFICANT CHANGE UP (ref 13–44)
MAGNESIUM SERPL-MCNC: 1.9 MG/DL — SIGNIFICANT CHANGE UP (ref 1.6–2.6)
MCHC RBC-ENTMCNC: 29.2 PG — SIGNIFICANT CHANGE UP (ref 27–34)
MCHC RBC-ENTMCNC: 30.8 % — LOW (ref 32–36)
MCV RBC AUTO: 94.8 FL — SIGNIFICANT CHANGE UP (ref 80–100)
MONOCYTES # BLD AUTO: 0.74 K/UL — SIGNIFICANT CHANGE UP (ref 0–0.9)
MONOCYTES NFR BLD AUTO: 7.7 % — SIGNIFICANT CHANGE UP (ref 2–14)
NEUTROPHILS # BLD AUTO: 5.75 K/UL — SIGNIFICANT CHANGE UP (ref 1.8–7.4)
NEUTROPHILS NFR BLD AUTO: 59.6 % — SIGNIFICANT CHANGE UP (ref 43–77)
NRBC # FLD: 0 K/UL — SIGNIFICANT CHANGE UP (ref 0–0)
PHOSPHATE SERPL-MCNC: 3.5 MG/DL — SIGNIFICANT CHANGE UP (ref 2.5–4.5)
PLATELET # BLD AUTO: 234 K/UL — SIGNIFICANT CHANGE UP (ref 150–400)
PMV BLD: 12.8 FL — SIGNIFICANT CHANGE UP (ref 7–13)
POTASSIUM SERPL-MCNC: 4 MMOL/L — SIGNIFICANT CHANGE UP (ref 3.5–5.3)
POTASSIUM SERPL-SCNC: 4 MMOL/L — SIGNIFICANT CHANGE UP (ref 3.5–5.3)
RBC # BLD: 4.63 M/UL — SIGNIFICANT CHANGE UP (ref 3.8–5.2)
RBC # FLD: 13 % — SIGNIFICANT CHANGE UP (ref 10.3–14.5)
SODIUM SERPL-SCNC: 138 MMOL/L — SIGNIFICANT CHANGE UP (ref 135–145)
WBC # BLD: 9.65 K/UL — SIGNIFICANT CHANGE UP (ref 3.8–10.5)
WBC # FLD AUTO: 9.65 K/UL — SIGNIFICANT CHANGE UP (ref 3.8–10.5)

## 2019-09-01 PROCEDURE — G9001: CPT

## 2019-09-01 RX ORDER — TICAGRELOR 90 MG/1
1 TABLET ORAL
Qty: 60 | Refills: 0
Start: 2019-09-01 | End: 2019-09-30

## 2019-09-01 RX ADMIN — Medication 81 MILLIGRAM(S): at 11:37

## 2019-09-01 RX ADMIN — Medication 50 MILLIGRAM(S): at 05:40

## 2019-09-01 RX ADMIN — SODIUM CHLORIDE 3 MILLILITER(S): 9 INJECTION INTRAMUSCULAR; INTRAVENOUS; SUBCUTANEOUS at 11:29

## 2019-09-01 RX ADMIN — SODIUM CHLORIDE 3 MILLILITER(S): 9 INJECTION INTRAMUSCULAR; INTRAVENOUS; SUBCUTANEOUS at 05:38

## 2019-09-01 RX ADMIN — ISOSORBIDE MONONITRATE 30 MILLIGRAM(S): 60 TABLET, EXTENDED RELEASE ORAL at 11:37

## 2019-09-01 RX ADMIN — LOSARTAN POTASSIUM 25 MILLIGRAM(S): 100 TABLET, FILM COATED ORAL at 05:40

## 2019-09-01 RX ADMIN — TICAGRELOR 90 MILLIGRAM(S): 90 TABLET ORAL at 17:37

## 2019-09-01 RX ADMIN — TICAGRELOR 90 MILLIGRAM(S): 90 TABLET ORAL at 05:40

## 2019-09-01 RX ADMIN — Medication 50 MILLIGRAM(S): at 17:37

## 2019-09-01 NOTE — DISCHARGE NOTE PROVIDER - CARE PROVIDER_API CALL
Abdoul Lam)  Cardiovascular Disease; Nuclear Cardiology  [address] [address] [place] [address]  Phone: [phone]  Fax: [phone]  Follow Up Time:

## 2019-09-01 NOTE — PROGRESS NOTE ADULT - ASSESSMENT
42 y/o F w/ PMH of CAD s/p MIGUEL ANGEL(mLAD 2014), HTN, HLD and Obesity presents for cardiac catheretization. Patient states that she started experiencing exertional dyspnea and chest pressure with minimal exercise over the past few weeks. Patient states that these symptoms are similar to what she experienced prior to needing a stent. Patients symptoms are relieved with rest. Pt denies N/V/D, fevers, chills, cough, palpitations, syncope, orthopnea, nocturnal paroxysmal dyspnea, edema, cyanosis, heart murmurs, varicosities, phlebitis, claudication.    U/A  - s/p PCI pLAD , OM1   - Continue DAPT   - continue current meds  - f/U with Dr. Lam in 2 weeks     Discussed with Dr. Rodriguez

## 2019-09-01 NOTE — DISCHARGE NOTE NURSING/CASE MANAGEMENT/SOCIAL WORK - PATIENT PORTAL LINK FT
You can access the FollowMyHealth Patient Portal offered by [facility] by registering at the following website: [email or web address]. By joining [facility]’s FollowMyHealth portal, you will also be able to view your health information using other applications (apps) compatible with our system.

## 2019-09-01 NOTE — DISCHARGE NOTE PROVIDER - HOSPITAL COURSE
42 y/o F w/ PMHx of CAD s/p MIGUEL ANGEL (mLAD 2014), HTN, HLD and Obesity presents for cardiac catheterization. LHC: pLAD 80% x1 MIGUEL ANGEL, OM1 80% x2 MIGUEL ANGEL, RRA accessed, site with ecchymosis, Hgb stable. Increased Brilinta to 90mg BID. Continue Aspirin. Patient to follow up with Dr. Lam in 2 weeks.        Case discussed with Cristian (Cardiology NP) who discussed with Dr. Rodriguez and patient is medically stable for discharge home.

## 2019-09-01 NOTE — PROGRESS NOTE ADULT - SUBJECTIVE AND OBJECTIVE BOX
Tele: NSR     Overnight: no complaints     MEDICATIONS  (STANDING):  aspirin enteric coated 81 milliGRAM(s) Oral daily  atorvastatin 40 milliGRAM(s) Oral at bedtime  furosemide    Tablet 10 milliGRAM(s) Oral <User Schedule>  heparin  Injectable 5000 Unit(s) SubCutaneous every 12 hours  isosorbide   mononitrate ER Tablet (IMDUR) 30 milliGRAM(s) Oral daily  losartan 25 milliGRAM(s) Oral daily  metoprolol tartrate 50 milliGRAM(s) Oral two times a day  sodium chloride 0.9% lock flush 3 milliLiter(s) IV Push every 8 hours  ticagrelor 90 milliGRAM(s) Oral every 12 hours    MEDICATIONS  (PRN):        Vital Signs Last 24 Hrs  T(C): 36.6 (01 Sep 2019 11:35), Max: 37.2 (31 Aug 2019 20:16)  T(F): 97.8 (01 Sep 2019 11:35), Max: 98.9 (31 Aug 2019 20:16)  HR: 73 (01 Sep 2019 11:35) (73 - 88)  BP: 20/78 (01 Sep 2019 11:35) (20/78 - 134/89)  RR: 16 (01 Sep 2019 11:35) (16 - 18)  SpO2: 100% (01 Sep 2019 11:35) (98% - 100%)  I&O's Detail        Physical exam:   Gen- NAD  Resp- CTAB  CV- S1S2 RRR  ABD- +BSx4 ND/NT  EXT- No edema   Neuro- A&Ox3                             13.5   9.65  )-----------( 234      ( 01 Sep 2019 05:29 )             43.9       01 Sep 2019 05:29    138    |  103    |  17     ----------------------------<  125<H>  4.0     |  24     |  0.82   31 Aug 2019 05:28    139    |  104    |  15     ----------------------------<  117<H>  4.0     |  22     |  0.73     Ca    9.5        01 Sep 2019 05:29  Ca    9.2        31 Aug 2019 05:28  Phos  3.5       01 Sep 2019 05:29  Phos  3.1       31 Aug 2019 05:28  Mg     1.9       01 Sep 2019 05:29  Mg     1.9       31 Aug 2019 05:28

## 2019-09-01 NOTE — DISCHARGE NOTE PROVIDER - NSDCCPCAREPLAN_GEN_ALL_CORE_FT
PRINCIPAL DISCHARGE DIAGNOSIS  Diagnosis: CAD (coronary artery disease)  Assessment and Plan of Treatment: You received 3 stents this admission. Brilinta was increased to 90mg twice a day. Continue Aspirin, Losartan, Metoprolol, Imdur, and Atorvastatin as prescribed. Follow up with Dr. Lam in 2 weeks, office located at [facility], 4th floor of Oncology Building; call [phone] for appointment.

## 2019-09-04 DIAGNOSIS — Z71.89 OTHER SPECIFIED COUNSELING: ICD-10-CM

## 2019-10-02 ENCOUNTER — NON-APPOINTMENT (OUTPATIENT)
Age: 44
End: 2019-10-02

## 2019-10-02 ENCOUNTER — APPOINTMENT (OUTPATIENT)
Dept: CARDIOLOGY | Facility: CLINIC | Age: 44
End: 2019-10-02
Payer: MEDICAID

## 2019-10-02 VITALS
WEIGHT: 214 LBS | OXYGEN SATURATION: 98 % | HEIGHT: 60 IN | SYSTOLIC BLOOD PRESSURE: 117 MMHG | BODY MASS INDEX: 42.01 KG/M2 | HEART RATE: 70 BPM | DIASTOLIC BLOOD PRESSURE: 77 MMHG

## 2019-10-02 PROCEDURE — 99214 OFFICE O/P EST MOD 30 MIN: CPT

## 2019-10-02 PROCEDURE — 93000 ELECTROCARDIOGRAM COMPLETE: CPT

## 2019-10-02 RX ORDER — ISOSORBIDE MONONITRATE 30 MG/1
30 TABLET, EXTENDED RELEASE ORAL DAILY
Qty: 30 | Refills: 6 | Status: DISCONTINUED | COMMUNITY
Start: 2019-08-09 | End: 2019-10-02

## 2019-10-02 RX ORDER — TAMSULOSIN HYDROCHLORIDE 0.4 MG/1
0.4 CAPSULE ORAL
Qty: 14 | Refills: 1 | Status: DISCONTINUED | COMMUNITY
Start: 2018-11-27 | End: 2019-10-02

## 2019-10-02 NOTE — ASSESSMENT
[FreeTextEntry1] : 1. Coronary artery disease. Discontinue isosorbide. Increase Brilinta to 90 mg twice daily.\par \par 2. Hyperlipidemia. Continue atorvastatin. She will have her blood work done tomorrow with her primary medical physician.\par \par 3. Elevated hemoglobin A1c. We discussed carbohydrate restriction. She has successfully discontinued wheat products and sugar, but continues to eat rice regularly.\par \par 3. Hypertension. Blood pressure is adequately controlled. \par

## 2019-10-02 NOTE — REASON FOR VISIT
[Follow-Up - Clinic] : a clinic follow-up of [Coronary Artery Disease] : coronary artery disease [Hyperlipidemia] : hyperlipidemia [Hypertension] : hypertension [FreeTextEntry1] : Since her last visit, the patient underwent cardiac catheterization with PCI to left anterior descending and 2 stents to her left circumflex artery. Her angina has improved considerably, and she has no chest pain or shortness of breath.\par \par 1. Coronary artery disease. Discontinue isosorbide. Increase Brilinta to 90 mg twice daily.\par \par 2. Hyperlipidemia. Continue atorvastatin. She will have her blood work done tomorrow with her primary medical physician.\par \par 3. Elevated hemoglobin A1c. We discussed carbohydrate restriction. She has successfully discontinued wheat products and sugar, but continues to eat rice regularly.\par \par 3. Hypertension. Blood pressure is adequately controlled. \par

## 2019-11-25 ENCOUNTER — APPOINTMENT (OUTPATIENT)
Dept: CV DIAGNOSTICS | Facility: HOSPITAL | Age: 44
End: 2019-11-25
Payer: MEDICAID

## 2019-11-25 ENCOUNTER — OUTPATIENT (OUTPATIENT)
Dept: OUTPATIENT SERVICES | Facility: HOSPITAL | Age: 44
LOS: 1 days | End: 2019-11-25

## 2019-11-25 DIAGNOSIS — I25.10 ATHEROSCLEROTIC HEART DISEASE OF NATIVE CORONARY ARTERY WITHOUT ANGINA PECTORIS: ICD-10-CM

## 2019-11-25 DIAGNOSIS — Z95.5 PRESENCE OF CORONARY ANGIOPLASTY IMPLANT AND GRAFT: Chronic | ICD-10-CM

## 2019-11-25 DIAGNOSIS — Z90.710 ACQUIRED ABSENCE OF BOTH CERVIX AND UTERUS: Chronic | ICD-10-CM

## 2019-11-25 PROCEDURE — 93016 CV STRESS TEST SUPVJ ONLY: CPT | Mod: GC

## 2019-11-25 PROCEDURE — 93018 CV STRESS TEST I&R ONLY: CPT | Mod: GC

## 2019-12-24 ENCOUNTER — APPOINTMENT (OUTPATIENT)
Dept: CARDIOLOGY | Facility: CLINIC | Age: 44
End: 2019-12-24
Payer: MEDICAID

## 2019-12-24 ENCOUNTER — NON-APPOINTMENT (OUTPATIENT)
Age: 44
End: 2019-12-24

## 2019-12-24 VITALS
SYSTOLIC BLOOD PRESSURE: 117 MMHG | RESPIRATION RATE: 16 BRPM | WEIGHT: 214 LBS | HEIGHT: 60 IN | HEART RATE: 82 BPM | DIASTOLIC BLOOD PRESSURE: 69 MMHG | BODY MASS INDEX: 42.01 KG/M2 | OXYGEN SATURATION: 98 %

## 2019-12-24 PROCEDURE — 99214 OFFICE O/P EST MOD 30 MIN: CPT

## 2019-12-24 PROCEDURE — 93000 ELECTROCARDIOGRAM COMPLETE: CPT

## 2019-12-24 NOTE — PHYSICAL EXAM
[General Appearance - Well Developed] : well developed [Normal Appearance] : normal appearance [Well Groomed] : well groomed [No Deformities] : no deformities [General Appearance - Well Nourished] : well nourished [Eyelids - No Xanthelasma] : the eyelids demonstrated no xanthelasmas [Normal Conjunctiva] : the conjunctiva exhibited no abnormalities [General Appearance - In No Acute Distress] : no acute distress [No Oral Pallor] : no oral pallor [Normal Oral Mucosa] : normal oral mucosa [No Oral Cyanosis] : no oral cyanosis [Normal Jugular Venous A Waves Present] : normal jugular venous A waves present [Normal Jugular Venous V Waves Present] : normal jugular venous V waves present [No Jugular Venous Ferguson A Waves] : no jugular venous ferguson A waves [Auscultation Breath Sounds / Voice Sounds] : lungs were clear to auscultation bilaterally [Exaggerated Use Of Accessory Muscles For Inspiration] : no accessory muscle use [Respiration, Rhythm And Depth] : normal respiratory rhythm and effort [Heart Sounds] : normal S1 and S2 [Heart Rate And Rhythm] : heart rate and rhythm were normal [Abdomen Soft] : soft [Abdomen Tenderness] : non-tender [Murmurs] : no murmurs present [Abnormal Walk] : normal gait [Abdomen Mass (___ Cm)] : no abdominal mass palpated [Gait - Sufficient For Exercise Testing] : the gait was sufficient for exercise testing [Nail Clubbing] : no clubbing of the fingernails [Cyanosis, Localized] : no localized cyanosis [Petechial Hemorrhages (___cm)] : no petechial hemorrhages [Skin Color & Pigmentation] : normal skin color and pigmentation [] : no rash [No Venous Stasis] : no venous stasis [Skin Lesions] : no skin lesions [No Skin Ulcers] : no skin ulcer [No Xanthoma] : no  xanthoma was observed [Oriented To Time, Place, And Person] : oriented to person, place, and time [Mood] : the mood was normal [Affect] : the affect was normal [No Anxiety] : not feeling anxious

## 2019-12-24 NOTE — REASON FOR VISIT
[Hyperlipidemia] : hyperlipidemia [Follow-Up - Clinic] : a clinic follow-up of [Coronary Artery Disease] : coronary artery disease [Hypertension] : hypertension [FreeTextEntry1] : Since her last visit, the patient did well. She has had no chest pain or shortness of breath. She has not been exercising, and her exercise stress test revealed 3 minutes and 30 seconds of Ton protocol with an elevated heart rate and hypertensive response.\par \par 1. Coronary artery disease. Recommend regular exercise given her deconditioning. The patient is planning on joining a gym this week.\par \par 2. Hyperlipidemia. Continue atorvastatin. Her blood work is done by her endocrinologist. We will obtain the labs from her physician.\par \par 3. Hypertension. Blood pressure is well controlled at baseline, still elevated on exercise. This should improve with physical conditioning.\par

## 2019-12-24 NOTE — ASSESSMENT
[FreeTextEntry1] : 1. Coronary artery disease. Recommend regular exercise given her deconditioning. The patient is planning on joining a gym this week.\par \par 2. Hyperlipidemia. Continue atorvastatin. Her blood work is done by her endocrinologist. We will obtain the labs from her physician.\par \par 3. Hypertension. Blood pressure is well controlled at baseline, still elevated on exercise. This should improve with physical conditioning.\par

## 2020-03-03 ENCOUNTER — TRANSCRIPTION ENCOUNTER (OUTPATIENT)
Age: 45
End: 2020-03-03

## 2020-03-16 ENCOUNTER — TRANSCRIPTION ENCOUNTER (OUTPATIENT)
Age: 45
End: 2020-03-16

## 2020-03-17 ENCOUNTER — RX CHANGE (OUTPATIENT)
Age: 45
End: 2020-03-17

## 2020-04-10 ENCOUNTER — INPATIENT (INPATIENT)
Facility: HOSPITAL | Age: 45
LOS: 1 days | Discharge: HOME | End: 2020-04-12
Attending: HOSPITALIST | Admitting: HOSPITALIST
Payer: MEDICAID

## 2020-04-10 VITALS
HEART RATE: 112 BPM | TEMPERATURE: 99 F | RESPIRATION RATE: 20 BRPM | SYSTOLIC BLOOD PRESSURE: 153 MMHG | DIASTOLIC BLOOD PRESSURE: 94 MMHG | OXYGEN SATURATION: 98 %

## 2020-04-10 DIAGNOSIS — Z90.710 ACQUIRED ABSENCE OF BOTH CERVIX AND UTERUS: Chronic | ICD-10-CM

## 2020-04-10 DIAGNOSIS — Z95.5 PRESENCE OF CORONARY ANGIOPLASTY IMPLANT AND GRAFT: Chronic | ICD-10-CM

## 2020-04-10 DIAGNOSIS — B34.9 VIRAL INFECTION, UNSPECIFIED: ICD-10-CM

## 2020-04-10 LAB
ALBUMIN SERPL ELPH-MCNC: 3.8 G/DL — SIGNIFICANT CHANGE UP (ref 3.3–5)
ALP SERPL-CCNC: 84 U/L — SIGNIFICANT CHANGE UP (ref 40–120)
ALT FLD-CCNC: 33 U/L — SIGNIFICANT CHANGE UP (ref 4–33)
ANION GAP SERPL CALC-SCNC: 19 MMO/L — HIGH (ref 7–14)
ANISOCYTOSIS BLD QL: SLIGHT — SIGNIFICANT CHANGE UP
AST SERPL-CCNC: 48 U/L — HIGH (ref 4–32)
BASE EXCESS BLDV CALC-SCNC: 0.1 MMOL/L — SIGNIFICANT CHANGE UP
BASOPHILS # BLD AUTO: 0.02 K/UL — SIGNIFICANT CHANGE UP (ref 0–0.2)
BASOPHILS NFR BLD AUTO: 0.3 % — SIGNIFICANT CHANGE UP (ref 0–2)
BASOPHILS NFR SPEC: 0 % — SIGNIFICANT CHANGE UP (ref 0–2)
BILIRUB SERPL-MCNC: 0.5 MG/DL — SIGNIFICANT CHANGE UP (ref 0.2–1.2)
BLASTS # FLD: 0 % — SIGNIFICANT CHANGE UP (ref 0–0)
BLOOD GAS VENOUS - CREATININE: 0.77 MG/DL — SIGNIFICANT CHANGE UP (ref 0.5–1.3)
BUN SERPL-MCNC: 12 MG/DL — SIGNIFICANT CHANGE UP (ref 7–23)
CALCIUM SERPL-MCNC: 8.9 MG/DL — SIGNIFICANT CHANGE UP (ref 8.4–10.5)
CHLORIDE BLDV-SCNC: 104 MMOL/L — SIGNIFICANT CHANGE UP (ref 96–108)
CHLORIDE SERPL-SCNC: 101 MMOL/L — SIGNIFICANT CHANGE UP (ref 98–107)
CO2 SERPL-SCNC: 21 MMOL/L — LOW (ref 22–31)
CREAT SERPL-MCNC: 0.71 MG/DL — SIGNIFICANT CHANGE UP (ref 0.5–1.3)
CRP SERPL-MCNC: 28.8 MG/L — HIGH
D DIMER BLD IA.RAPID-MCNC: 195 NG/ML — SIGNIFICANT CHANGE UP
EOSINOPHIL # BLD AUTO: 0.06 K/UL — SIGNIFICANT CHANGE UP (ref 0–0.5)
EOSINOPHIL NFR BLD AUTO: 0.9 % — SIGNIFICANT CHANGE UP (ref 0–6)
EOSINOPHIL NFR FLD: 0 % — SIGNIFICANT CHANGE UP (ref 0–6)
FERRITIN SERPL-MCNC: 1179 NG/ML — HIGH (ref 15–150)
GAS PNL BLDV: 141 MMOL/L — SIGNIFICANT CHANGE UP (ref 136–146)
GIANT PLATELETS BLD QL SMEAR: PRESENT — SIGNIFICANT CHANGE UP
GLUCOSE BLDV-MCNC: 131 MG/DL — HIGH (ref 70–99)
GLUCOSE SERPL-MCNC: 136 MG/DL — HIGH (ref 70–99)
HCO3 BLDV-SCNC: 23 MMOL/L — SIGNIFICANT CHANGE UP (ref 20–27)
HCT VFR BLD CALC: 37.3 % — SIGNIFICANT CHANGE UP (ref 34.5–45)
HCT VFR BLDV CALC: 41.3 % — SIGNIFICANT CHANGE UP (ref 34.5–45)
HGB BLD-MCNC: 12.5 G/DL — SIGNIFICANT CHANGE UP (ref 11.5–15.5)
HGB BLDV-MCNC: 13.4 G/DL — SIGNIFICANT CHANGE UP (ref 11.5–15.5)
IMM GRANULOCYTES NFR BLD AUTO: 0.7 % — SIGNIFICANT CHANGE UP (ref 0–1.5)
LACTATE BLDV-MCNC: 4 MMOL/L — CRITICAL HIGH (ref 0.5–2)
LDH SERPL L TO P-CCNC: 338 U/L — HIGH (ref 135–225)
LYMPHOCYTES # BLD AUTO: 1.95 K/UL — SIGNIFICANT CHANGE UP (ref 1–3.3)
LYMPHOCYTES # BLD AUTO: 27.9 % — SIGNIFICANT CHANGE UP (ref 13–44)
LYMPHOCYTES NFR SPEC AUTO: 16.8 % — SIGNIFICANT CHANGE UP (ref 13–44)
MACROCYTES BLD QL: SLIGHT — SIGNIFICANT CHANGE UP
MCHC RBC-ENTMCNC: 30.6 PG — SIGNIFICANT CHANGE UP (ref 27–34)
MCHC RBC-ENTMCNC: 33.5 % — SIGNIFICANT CHANGE UP (ref 32–36)
MCV RBC AUTO: 91.2 FL — SIGNIFICANT CHANGE UP (ref 80–100)
METAMYELOCYTES # FLD: 0 % — SIGNIFICANT CHANGE UP (ref 0–1)
MONOCYTES # BLD AUTO: 0.63 K/UL — SIGNIFICANT CHANGE UP (ref 0–0.9)
MONOCYTES NFR BLD AUTO: 9 % — SIGNIFICANT CHANGE UP (ref 2–14)
MONOCYTES NFR BLD: 6.2 % — SIGNIFICANT CHANGE UP (ref 2–9)
MYELOCYTES NFR BLD: 0 % — SIGNIFICANT CHANGE UP (ref 0–0)
NEUTROPHIL AB SER-ACNC: 68.1 % — SIGNIFICANT CHANGE UP (ref 43–77)
NEUTROPHILS # BLD AUTO: 4.27 K/UL — SIGNIFICANT CHANGE UP (ref 1.8–7.4)
NEUTROPHILS NFR BLD AUTO: 61.2 % — SIGNIFICANT CHANGE UP (ref 43–77)
NEUTS BAND # BLD: 0 % — SIGNIFICANT CHANGE UP (ref 0–6)
NRBC # FLD: 0 K/UL — SIGNIFICANT CHANGE UP (ref 0–0)
OTHER - HEMATOLOGY %: 0 — SIGNIFICANT CHANGE UP
PCO2 BLDV: 42 MMHG — SIGNIFICANT CHANGE UP (ref 41–51)
PH BLDV: 7.38 PH — SIGNIFICANT CHANGE UP (ref 7.32–7.43)
PLATELET # BLD AUTO: 358 K/UL — SIGNIFICANT CHANGE UP (ref 150–400)
PLATELET COUNT - ESTIMATE: NORMAL — SIGNIFICANT CHANGE UP
PMV BLD: 11 FL — SIGNIFICANT CHANGE UP (ref 7–13)
PO2 BLDV: 28 MMHG — LOW (ref 35–40)
POIKILOCYTOSIS BLD QL AUTO: SLIGHT — SIGNIFICANT CHANGE UP
POTASSIUM BLDV-SCNC: 3.5 MMOL/L — SIGNIFICANT CHANGE UP (ref 3.4–4.5)
POTASSIUM SERPL-MCNC: 4.3 MMOL/L — SIGNIFICANT CHANGE UP (ref 3.5–5.3)
POTASSIUM SERPL-SCNC: 4.3 MMOL/L — SIGNIFICANT CHANGE UP (ref 3.5–5.3)
PROCALCITONIN SERPL-MCNC: 0.06 NG/ML — SIGNIFICANT CHANGE UP (ref 0.02–0.1)
PROMYELOCYTES # FLD: 0 % — SIGNIFICANT CHANGE UP (ref 0–0)
PROT SERPL-MCNC: 7.7 G/DL — SIGNIFICANT CHANGE UP (ref 6–8.3)
RBC # BLD: 4.09 M/UL — SIGNIFICANT CHANGE UP (ref 3.8–5.2)
RBC # FLD: 13.2 % — SIGNIFICANT CHANGE UP (ref 10.3–14.5)
SAO2 % BLDV: 42.4 % — LOW (ref 60–85)
SODIUM SERPL-SCNC: 141 MMOL/L — SIGNIFICANT CHANGE UP (ref 135–145)
TROPONIN T, HIGH SENSITIVITY: 8 NG/L — SIGNIFICANT CHANGE UP (ref ?–14)
VARIANT LYMPHS # BLD: 8.9 % — SIGNIFICANT CHANGE UP
WBC # BLD: 6.98 K/UL — SIGNIFICANT CHANGE UP (ref 3.8–10.5)
WBC # FLD AUTO: 6.98 K/UL — SIGNIFICANT CHANGE UP (ref 3.8–10.5)

## 2020-04-10 PROCEDURE — 71045 X-RAY EXAM CHEST 1 VIEW: CPT | Mod: 26

## 2020-04-10 RX ORDER — SODIUM CHLORIDE 9 MG/ML
1000 INJECTION INTRAMUSCULAR; INTRAVENOUS; SUBCUTANEOUS ONCE
Refills: 0 | Status: COMPLETED | OUTPATIENT
Start: 2020-04-10 | End: 2020-04-10

## 2020-04-10 RX ORDER — ALBUTEROL 90 UG/1
1 AEROSOL, METERED ORAL ONCE
Refills: 0 | Status: COMPLETED | OUTPATIENT
Start: 2020-04-10 | End: 2020-04-10

## 2020-04-10 RX ADMIN — ALBUTEROL 1 PUFF(S): 90 AEROSOL, METERED ORAL at 20:49

## 2020-04-10 RX ADMIN — SODIUM CHLORIDE 1000 MILLILITER(S): 9 INJECTION INTRAMUSCULAR; INTRAVENOUS; SUBCUTANEOUS at 21:31

## 2020-04-10 NOTE — ED PROVIDER NOTE - CLINICAL SUMMARY MEDICAL DECISION MAKING FREE TEXT BOX
45 y/o female pmh htn, hld, CAD s/p 4 stents c/o URI symptoms x10 days w/ worsening sob over the last 2-3 days- concern for COVID infection but also concern for possible ACS- will check labs, ekg, cxr and reassess

## 2020-04-10 NOTE — ED ADULT NURSE NOTE - OBJECTIVE STATEMENT
Pt presents to the ED AxOx4, ambulatory, c/o cough and shortness of breath for 1 week. Pt denies any sick contacts or recent travel. Denies chest pain, headache, fevers, chills, nausea, vomiting. Albuterol inhaler given as per MD orders. Respirations even and unlabored. Abdomen soft and nontender. Skin warm, dry and intact. 20 gauge IV placed in left antecubital. Blood drawn and sent to lab. Comfort measures provided. Awaiting further orders. Will continue to monitor.

## 2020-04-10 NOTE — ED ADULT NURSE NOTE - NSIMPLEMENTINTERV_GEN_ALL_ED
Implemented All Universal Safety Interventions:  [place] to call system. Call bell, personal items and telephone within reach. Instruct patient to call for assistance. Room bathroom lighting operational. Non-slip footwear when patient is off stretcher. Physically safe environment: no spills, clutter or unnecessary equipment. Stretcher in lowest position, wheels locked, appropriate side rails in place.

## 2020-04-10 NOTE — ED ADULT TRIAGE NOTE - CHIEF COMPLAINT QUOTE
Pt c/o fever/chills acc with cough x1 week. c/o difficulty breathing. Denies chest pain. Hx of cardiac stents x4.

## 2020-04-10 NOTE — ED CLERICAL - NS ED CLERK NOTE PRE-ARRIVAL INFORMATION; ADDITIONAL PRE-ARRIVAL INFORMATION
This patient is enrolled in the [facility]s Program and receives comprehensive home-based primary care.    - To obtain additional clinical information , or to discuss any questions or concerns, you can call the [facility] Calls team at [phone], 24 hours a day.    - If discharged, this patient will be followed up by the [facility]s team within 2 days.    - If this patient requires admission, please use the hospitalist service.,

## 2020-04-11 ENCOUNTER — TRANSCRIPTION ENCOUNTER (OUTPATIENT)
Age: 45
End: 2020-04-11

## 2020-04-11 DIAGNOSIS — I25.10 ATHEROSCLEROTIC HEART DISEASE OF NATIVE CORONARY ARTERY WITHOUT ANGINA PECTORIS: ICD-10-CM

## 2020-04-11 DIAGNOSIS — Z29.9 ENCOUNTER FOR PROPHYLACTIC MEASURES, UNSPECIFIED: ICD-10-CM

## 2020-04-11 DIAGNOSIS — Z02.9 ENCOUNTER FOR ADMINISTRATIVE EXAMINATIONS, UNSPECIFIED: ICD-10-CM

## 2020-04-11 DIAGNOSIS — I10 ESSENTIAL (PRIMARY) HYPERTENSION: ICD-10-CM

## 2020-04-11 DIAGNOSIS — E78.5 HYPERLIPIDEMIA, UNSPECIFIED: ICD-10-CM

## 2020-04-11 DIAGNOSIS — R68.89 OTHER GENERAL SYMPTOMS AND SIGNS: ICD-10-CM

## 2020-04-11 DIAGNOSIS — J96.01 ACUTE RESPIRATORY FAILURE WITH HYPOXIA: ICD-10-CM

## 2020-04-11 LAB
ALBUMIN SERPL ELPH-MCNC: 3.3 G/DL — SIGNIFICANT CHANGE UP (ref 3.3–5)
ALP SERPL-CCNC: 72 U/L — SIGNIFICANT CHANGE UP (ref 40–120)
ALT FLD-CCNC: 26 U/L — SIGNIFICANT CHANGE UP (ref 4–33)
ANION GAP SERPL CALC-SCNC: 14 MMO/L — SIGNIFICANT CHANGE UP (ref 7–14)
AST SERPL-CCNC: 35 U/L — HIGH (ref 4–32)
BILIRUB SERPL-MCNC: 0.5 MG/DL — SIGNIFICANT CHANGE UP (ref 0.2–1.2)
BUN SERPL-MCNC: 9 MG/DL — SIGNIFICANT CHANGE UP (ref 7–23)
CALCIUM SERPL-MCNC: 8.3 MG/DL — LOW (ref 8.4–10.5)
CHLORIDE SERPL-SCNC: 102 MMOL/L — SIGNIFICANT CHANGE UP (ref 98–107)
CO2 SERPL-SCNC: 19 MMOL/L — LOW (ref 22–31)
CREAT SERPL-MCNC: 0.59 MG/DL — SIGNIFICANT CHANGE UP (ref 0.5–1.3)
GLUCOSE SERPL-MCNC: 101 MG/DL — HIGH (ref 70–99)
HCT VFR BLD CALC: 38.3 % — SIGNIFICANT CHANGE UP (ref 34.5–45)
HGB BLD-MCNC: 12.4 G/DL — SIGNIFICANT CHANGE UP (ref 11.5–15.5)
MAGNESIUM SERPL-MCNC: 2.3 MG/DL — SIGNIFICANT CHANGE UP (ref 1.6–2.6)
MCHC RBC-ENTMCNC: 29.7 PG — SIGNIFICANT CHANGE UP (ref 27–34)
MCHC RBC-ENTMCNC: 32.4 % — SIGNIFICANT CHANGE UP (ref 32–36)
MCV RBC AUTO: 91.6 FL — SIGNIFICANT CHANGE UP (ref 80–100)
NRBC # FLD: 0 K/UL — SIGNIFICANT CHANGE UP (ref 0–0)
PHOSPHATE SERPL-MCNC: 2.8 MG/DL — SIGNIFICANT CHANGE UP (ref 2.5–4.5)
PLATELET # BLD AUTO: 314 K/UL — SIGNIFICANT CHANGE UP (ref 150–400)
PMV BLD: 10.7 FL — SIGNIFICANT CHANGE UP (ref 7–13)
POTASSIUM SERPL-MCNC: 3.5 MMOL/L — SIGNIFICANT CHANGE UP (ref 3.5–5.3)
POTASSIUM SERPL-SCNC: 3.5 MMOL/L — SIGNIFICANT CHANGE UP (ref 3.5–5.3)
PROT SERPL-MCNC: 6.7 G/DL — SIGNIFICANT CHANGE UP (ref 6–8.3)
RBC # BLD: 4.18 M/UL — SIGNIFICANT CHANGE UP (ref 3.8–5.2)
RBC # FLD: 13.3 % — SIGNIFICANT CHANGE UP (ref 10.3–14.5)
SARS-COV-2 RNA SPEC QL NAA+PROBE: DETECTED
SODIUM SERPL-SCNC: 135 MMOL/L — SIGNIFICANT CHANGE UP (ref 135–145)
WBC # BLD: 6.92 K/UL — SIGNIFICANT CHANGE UP (ref 3.8–10.5)
WBC # FLD AUTO: 6.92 K/UL — SIGNIFICANT CHANGE UP (ref 3.8–10.5)

## 2020-04-11 PROCEDURE — 99233 SBSQ HOSP IP/OBS HIGH 50: CPT

## 2020-04-11 RX ORDER — ENOXAPARIN SODIUM 100 MG/ML
40 INJECTION SUBCUTANEOUS EVERY 12 HOURS
Refills: 0 | Status: DISCONTINUED | OUTPATIENT
Start: 2020-04-11 | End: 2020-04-12

## 2020-04-11 RX ORDER — ACETAMINOPHEN 500 MG
650 TABLET ORAL EVERY 4 HOURS
Refills: 0 | Status: DISCONTINUED | OUTPATIENT
Start: 2020-04-11 | End: 2020-04-12

## 2020-04-11 RX ORDER — LOSARTAN POTASSIUM 100 MG/1
25 TABLET, FILM COATED ORAL DAILY
Refills: 0 | Status: DISCONTINUED | OUTPATIENT
Start: 2020-04-11 | End: 2020-04-12

## 2020-04-11 RX ORDER — ATORVASTATIN CALCIUM 80 MG/1
40 TABLET, FILM COATED ORAL AT BEDTIME
Refills: 0 | Status: DISCONTINUED | OUTPATIENT
Start: 2020-04-11 | End: 2020-04-12

## 2020-04-11 RX ORDER — ASPIRIN/CALCIUM CARB/MAGNESIUM 324 MG
81 TABLET ORAL DAILY
Refills: 0 | Status: DISCONTINUED | OUTPATIENT
Start: 2020-04-11 | End: 2020-04-12

## 2020-04-11 RX ORDER — TICAGRELOR 90 MG/1
60 TABLET ORAL EVERY 12 HOURS
Refills: 0 | Status: DISCONTINUED | OUTPATIENT
Start: 2020-04-11 | End: 2020-04-12

## 2020-04-11 RX ORDER — METOPROLOL TARTRATE 50 MG
50 TABLET ORAL
Refills: 0 | Status: DISCONTINUED | OUTPATIENT
Start: 2020-04-11 | End: 2020-04-12

## 2020-04-11 RX ORDER — FUROSEMIDE 40 MG
20 TABLET ORAL
Refills: 0 | Status: DISCONTINUED | OUTPATIENT
Start: 2020-04-11 | End: 2020-04-12

## 2020-04-11 RX ADMIN — Medication 81 MILLIGRAM(S): at 18:02

## 2020-04-11 RX ADMIN — ENOXAPARIN SODIUM 40 MILLIGRAM(S): 100 INJECTION SUBCUTANEOUS at 06:47

## 2020-04-11 RX ADMIN — Medication 50 MILLIGRAM(S): at 06:47

## 2020-04-11 RX ADMIN — Medication 50 MILLIGRAM(S): at 18:02

## 2020-04-11 RX ADMIN — ATORVASTATIN CALCIUM 40 MILLIGRAM(S): 80 TABLET, FILM COATED ORAL at 20:37

## 2020-04-11 RX ADMIN — Medication 100 MILLIGRAM(S): at 12:21

## 2020-04-11 RX ADMIN — ENOXAPARIN SODIUM 40 MILLIGRAM(S): 100 INJECTION SUBCUTANEOUS at 18:02

## 2020-04-11 RX ADMIN — LOSARTAN POTASSIUM 25 MILLIGRAM(S): 100 TABLET, FILM COATED ORAL at 06:47

## 2020-04-11 RX ADMIN — Medication 100 MILLIGRAM(S): at 18:02

## 2020-04-11 RX ADMIN — TICAGRELOR 60 MILLIGRAM(S): 90 TABLET ORAL at 06:47

## 2020-04-11 RX ADMIN — Medication 100 MILLIGRAM(S): at 06:47

## 2020-04-11 RX ADMIN — TICAGRELOR 60 MILLIGRAM(S): 90 TABLET ORAL at 18:02

## 2020-04-11 NOTE — H&P ADULT - NSHPLABSRESULTS_GEN_ALL_CORE
LABS and ADDITIONAL STUDIES:                        12.5   6.98  )-----------( 358      ( 10 Apr 2020 20:43 )             37.3     04-10    141  |  101  |  12  ----------------------------<  136<H>  4.3   |  21<L>  |  0.71    Ca    8.9      10 Apr 2020 20:43    TPro  7.7  /  Alb  3.8  /  TBili  0.5  /  DBili  x   /  AST  48<H>  /  ALT  33  /  AlkPhos  84  04-10    LIVER FUNCTIONS - ( 10 Apr 2020 20:43 )  Alb: 3.8 g/dL / Pro: 7.7 g/dL / ALK PHOS: 84 u/L / ALT: 33 u/L / AST: 48 u/L / GGT: x           D-Dimer Assay, Quantitative (04.10.20 @ 20:43)    D-Dimer Assay, Quantitative: 195    Ferritin, Serum (04.10.20 @ 20:43)    Ferritin, Serum: 1179 ng/mL  Procalcitonin, Serum (04.10.20 @ 20:43)    Procalcitonin, Serum: 0.06  Lactate Dehydrogenase, Serum (04.10.20 @ 20:43)    Lactate Dehydrogenase, Serum: 338: SPECIMEN MILDLY HEMOLYZED U/L  C-Reactive Protein, Serum (04.10.20 @ 20:43)    C-Reactive Protein, Serum: 28.8 mg/L    Blood Gas Venous Comprehensive (04.10.20 @ 20:43)    Blood Gas Venous - Lactate: 4.0: Please note updated reference range. mmol/L    < from: Xray Chest 1 View- PORTABLE-Urgent (04.10.20 @ 20:27) >  ******PRELIMINARY REPORT******        INTERPRETATION:  Clear lungs.  < end of copied text >    EKG - Sinus tach at 109, QRS 80, QTc 455, LVH by voltage criteria, no significant ST-T wave changes from prior

## 2020-04-11 NOTE — H&P ADULT - HISTORY OF PRESENT ILLNESS
This is a 44F with history of CAD s/p 4 stents (most recent in September 2019), HTN, and HLD who presents to the hospital with complaints of cough, SOB, and fevers/chills since last monday. Was prescribed levofloxacin by her PCP but her symptoms never improved and she therefore came to the hospital fo evaluation. Currently states that her breathing is better with supplemental O2. No other complaints. Lives with her  and child who have minimal symptoms.     On arrival to the ED, her vitals were T 98.9. P 112, /94, RR 98% RA (90% with ambulation). Her lab work showed elevated inflammatory markers and elevated lactate. Her CXR showed clear lungs. She was given NS 1L and alb inh x1. She was admitted to medicine for further management.

## 2020-04-11 NOTE — DISCHARGE NOTE PROVIDER - HOSPITAL COURSE
H&P:    This is a 44F with history of CAD s/p 4 stents (most recent in September 2019), HTN, and HLD who presents to the hospital with complaints of cough, SOB, and fevers/chills since last monday. Was prescribed levofloxacin by her PCP but her symptoms never improved and she therefore came to the hospital fo evaluation. Currently states that her breathing is better with supplemental O2. No other complaints. Lives with her  and child who have minimal symptoms.         On arrival to the ED, her vitals were T 98.9. P 112, /94, RR 98% RA (90% with ambulation). Her lab work showed elevated inflammatory markers and elevated lactate. Her CXR showed clear lungs. She was given NS 1L and alb inh x1. She was admitted to medicine for further management.         Hospital Course:        Pt weaned to RA. H&P:    This is a 44F with history of CAD s/p 4 stents (most recent in September 2019), HTN, and HLD who presents to the hospital with complaints of cough, SOB, and fevers/chills since last monday. Was prescribed levofloxacin by her PCP but her symptoms never improved and she therefore came to the hospital fo evaluation. Currently states that her breathing is better with supplemental O2. No other complaints. Lives with her  and child who have minimal symptoms.         On arrival to the ED, her vitals were T 98.9. P 112, /94, RR 98% RA (90% with ambulation). Her lab work showed elevated inflammatory markers and elevated lactate. Her CXR showed clear lungs. She was given NS 1L and alb inh x1. She was admitted to medicine for further management.         Hospital Course:        COVID test was positive. Pt weaned from 2L NC to RA. Pt ambulatory in room with no SOB.        The patient was seen and evaluated and deemed medically stable for discharge.

## 2020-04-11 NOTE — H&P ADULT - NSHPPHYSICALEXAM_GEN_ALL_CORE
Vital Signs Last 24 Hrs  T(C): 36.8 (11 Apr 2020 02:15), Max: 37.2 (10 Apr 2020 18:27)  T(F): 98.2 (11 Apr 2020 02:15), Max: 98.9 (10 Apr 2020 18:27)  HR: 90 (11 Apr 2020 02:15) (90 - 112)  BP: 115/65 (11 Apr 2020 02:15) (115/65 - 153/94)  BP(mean): --  RR: 20 (11 Apr 2020 02:15) (17 - 20)  SpO2: 98% (11 Apr 2020 02:15) (90% - 100%)    As per ED:  · CONSTITUTIONAL: - - -  · Appearance: well appearing  · Development: well developed  · Distress: uncomfortable  · Manner: appropriate for situation  · Mentation: awake, alert, oriented to person, place, time/situation  · Mood: appropriate  · Nourishment: well  · ENMT: Airway patent, Nasal mucosa clear. Mouth with normal mucosa. Throat has no vesicles, no oropharyngeal exudates and uvula is midline.  · CARDIAC: - - -  · CARDIAC RHYTHM: regular  · CARDIAC RATE: TACHYCARDIC  · CARDIAC SOUNDS: S1-S2  · CARDIAC PEDAL EDEMA: absent  · RESPIRATORY: Breath sounds clear and equal bilaterally.  · GASTROINTESTINAL: Abdomen soft, non-tender, no guarding.  · MUSCULOSKELETAL: Spine appears normal, range of motion is not limited, no muscle or joint tenderness  · NEUROLOGICAL: Alert and oriented, no focal deficits, no motor or sensory deficits.  · SKIN: Skin normal color for race, warm, dry and intact. No evidence of rash.

## 2020-04-11 NOTE — DISCHARGE NOTE PROVIDER - PROVIDER TOKENS
FREE:[LAST:[Bautista],FIRST:[Lisa],PHONE:[phone]],FAX:[(   [phone]    -],ADDRESS:[[address] [place] [address]]]

## 2020-04-11 NOTE — H&P ADULT - NSHPREVIEWOFSYSTEMS_GEN_ALL_CORE
REVIEW OF SYSTEMS:    CONSTITUTIONAL: +Fevers/chills, no malaise  EYES: No visual changes or eye discharge  ENT: No rhinorrhea or sore throat  NECK: No pain or stiffness  RESPIRATORY: +Cough (dry), +SOB, no wheezing  CARDIOVASCULAR: No chest pain or palpitations; No lower extremity edema  GASTROINTESTINAL: No abdominal or epigastric pain. No nausea, vomiting, or hematemesis; No diarrhea or constipation. No melena or hematochezia.  BACK: No back pain  GENITOURINARY: No dysuria, frequency or hematuria  NEUROLOGICAL: No numbness or weakness  SKIN: No itching, burning, rashes, or lesions

## 2020-04-11 NOTE — PROGRESS NOTE ADULT - PROBLEM SELECTOR PLAN 1
- Concern for COVID-19 infection, on RA  - Has elevated inflammatory markers but low procalcitonin and no focal opacity on CXR, will hold off abx for now  - f/u COVID-19 swab, if positive consider plaquenol (QTc 455)  - Lactate elevated to 4, given NS 1L in ED

## 2020-04-11 NOTE — H&P ADULT - ATTENDING COMMENTS
Per current hospital medicine emergency protocol, in an effort to reduce COVID exposures and also conserve PPE for necessary encounters, H&P below is obtained from chart review without direct patient contact unless acute changes.    Patient evaluated on 4/11/2020

## 2020-04-11 NOTE — H&P ADULT - NSICDXFAMILYHX_GEN_ALL_CORE_FT
FAMILY HISTORY:  Sibling  Still living? Unknown  Family history of coronary artery disease younger than 40 years of age, Age at diagnosis: Age Unknown

## 2020-04-11 NOTE — H&P ADULT - PROBLEM SELECTOR PLAN 1
- Concern for COVID-19 infection, currently satting well on nasal cannula  - Has elevated inflammatory markers but low procalcitonin and no focal opacity on CXR, will hold off abx for now  - f/u COVID-19 swab, if positive consider plaquenol (QTc 455) - Concern for COVID-19 infection, currently satting well on nasal cannula  - Has elevated inflammatory markers but low procalcitonin and no focal opacity on CXR, will hold off abx for now  - f/u COVID-19 swab, if positive consider plaquenol (QTc 455)  - Lactate elevated to 4, given NS 1L in ED, will f/u lactate in AM

## 2020-04-11 NOTE — DISCHARGE NOTE PROVIDER - NSDCMRMEDTOKEN_GEN_ALL_CORE_FT
aspirin 81 mg oral tablet: 1 tab(s) orally once a day  atorvastatin 40 mg oral tablet: 1 tab(s) orally once a day  benzonatate 100 mg oral capsule: 1 cap(s) orally 3 times a day, As needed, Cough  furosemide 20 mg oral tablet: 1 tab(s) orally once a week on Friday  losartan 25 mg oral tablet: 1 tab(s) orally once a day  metoprolol tartrate 50 mg oral tablet: 1 tab(s) orally 2 times a day  ticagrelor 90 mg oral tablet: 1 tab(s) orally every 12 hours

## 2020-04-11 NOTE — H&P ADULT - NSICDXPASTSURGICALHX_GEN_ALL_CORE_FT
PAST SURGICAL HISTORY:  H/O total hysterectomy     S/P coronary artery stent placement mLAD x1 MIGUEL ANGEL (2014)

## 2020-04-11 NOTE — H&P ADULT - NSICDXPASTMEDICALHX_GEN_ALL_CORE_FT
PAST MEDICAL HISTORY:  Coronary artery disease s/p 4 stents    HLD (hyperlipidemia)     HTN (hypertension)     Hypothyroid

## 2020-04-11 NOTE — PROGRESS NOTE ADULT - SUBJECTIVE AND OBJECTIVE BOX
Patient is a 44y old  Female who presents with a chief complaint of Cough, fevers/chills, SOB (11 Apr 2020 04:17)      INTERVAL HPI/OVERNIGHT EVENTS: Admitted overnight.       REVIEW OF SYSTEMS:    CONSTITUTIONAL: No weakness, fevers or chills  EYES/ENT: No visual changes;  No vertigo or throat pain   NECK: No pain or stiffness  RESPIRATORY: + cough, no wheezing, hemoptysis; No shortness of breath  CARDIOVASCULAR: No chest pain or palpitations  GASTROINTESTINAL: No abdominal or epigastric pain. No nausea, vomiting, or hematemesis; No diarrhea or constipation. No melena or hematochezia.  GENITOURINARY: No dysuria, frequency or hematuria  NEUROLOGICAL: No numbness or weakness  All other review of systems is negative unless indicated above.    FAMILY HISTORY:  Family history of coronary artery disease younger than 40 years of age (Sibling)    T(C): 36.8 (04-11-20 @ 06:45), Max: 37.2 (04-10-20 @ 18:27)  HR: 88 (04-11-20 @ 06:45) (88 - 112)  BP: 121/88 (04-11-20 @ 06:45) (115/65 - 153/94)  RR: 18 (04-11-20 @ 06:45) (17 - 20)  SpO2: 97% (04-11-20 @ 06:45) (90% - 100%)  Wt(kg): --Vital Signs Last 24 Hrs  T(C): 36.8 (11 Apr 2020 06:45), Max: 37.2 (10 Apr 2020 18:27)  T(F): 98.2 (11 Apr 2020 06:45), Max: 98.9 (10 Apr 2020 18:27)  HR: 88 (11 Apr 2020 06:45) (88 - 112)  BP: 121/88 (11 Apr 2020 06:45) (115/65 - 153/94)  BP(mean): --  RR: 18 (11 Apr 2020 06:45) (17 - 20)  SpO2: 97% (11 Apr 2020 06:45) (90% - 100%)    PHYSICAL EXAM:  GENERAL: NAD, well-groomed, well-developed  HEAD:  Atraumatic, Normocephalic  EYES: EOMI, PERRLA, conjunctiva and sclera clear  ENMT: No tonsillar erythema, exudates, or enlargement; Moist mucous membranes, Good dentition, No lesions  NECK: Supple, No JVD, Normal thyroid  NERVOUS SYSTEM:  Alert & Oriented X3, Good concentration; Motor Strength 5/5 B/L upper and lower extremities; DTRs 2+ intact and symmetric  CHEST/LUNG: Clear to percussion bilaterally; No rales, rhonchi, wheezing, or rubs  HEART: Regular rate and rhythm; No murmurs, rubs, or gallops  ABDOMEN: Soft, Nontender, Nondistended; Bowel sounds present  EXTREMITIES:  2+ Peripheral Pulses, No clubbing, cyanosis, or edema  LYMPH: No lymphadenopathy noted  SKIN: No rashes or lesions    Consultant(s) Notes Reviewed:  [x ] YES  [ ] NO  Care Discussed with Consultants/Other Providers [ x] YES  [ ] NO    LABS:                        12.4   6.92  )-----------( 314      ( 11 Apr 2020 07:00 )             38.3     10 Apr 2020 20:43    141    |  101    |  12     ----------------------------<  136    4.3     |  21     |  0.71     Ca    8.9        10 Apr 2020 20:43    TPro  7.7    /  Alb  3.8    /  TBili  0.5    /  DBili  x      /  AST  48     /  ALT  33     /  AlkPhos  84     10 Apr 2020 20:43      CAPILLARY BLOOD GLUCOSE        BLOOD CULTURE      RADIOLOGY & ADDITIONAL TESTS:    Imaging Personally Reviewed:  [ ] YES  [ ] NO  acetaminophen   Tablet .. 650 milliGRAM(s) Oral every 4 hours PRN  aspirin enteric coated 81 milliGRAM(s) Oral daily  atorvastatin 40 milliGRAM(s) Oral at bedtime  benzonatate 100 milliGRAM(s) Oral three times a day PRN  enoxaparin Injectable 40 milliGRAM(s) SubCutaneous every 12 hours  furosemide    Tablet 20 milliGRAM(s) Oral <User Schedule>  losartan 25 milliGRAM(s) Oral daily  metoprolol tartrate 50 milliGRAM(s) Oral two times a day  ticagrelor 60 milliGRAM(s) Oral every 12 hours      HEALTH ISSUES - PROBLEM Dx:  Need for prophylactic measure: Need for prophylactic measure  Hyperlipidemia, unspecified hyperlipidemia type: Hyperlipidemia, unspecified hyperlipidemia type  Essential hypertension: Essential hypertension  Coronary artery disease involving native coronary artery of native heart without angina pectoris: Coronary artery disease involving native coronary artery of native heart without angina pectoris  Acute respiratory failure with hypoxia: Acute respiratory failure with hypoxia  Suspected 2019 novel coronavirus infection: Suspected 2019 novel coronavirus infection

## 2020-04-11 NOTE — H&P ADULT - ASSESSMENT
This is a 44F with history of CAD s/p stents, HTN, and HLD who presents to the hospital with likely COVID-19 infection.

## 2020-04-11 NOTE — DISCHARGE NOTE PROVIDER - NSDCCPCAREPLAN_GEN_ALL_CORE_FT
PRINCIPAL DISCHARGE DIAGNOSIS  Diagnosis: COVID-19  Assessment and Plan of Treatment: Coronavirus disease 2019, or "COVID-19," is an infection caused by a specific virus called SARS-CoV-2. The virus first appeared in late 2019 in the city of [place], [place]. People with COVID-19 can have fever, cough, and trouble breathing. Problems with breathing happen when the infection affects the lungs and causes pneumonia. COVID-19 mainly spreads from person to person, similar to the flu. This usually happens when a sick person coughs or sneezes near other people. Doctors also think it is possible to get sick if you touch a surface that has the virus on it and then touch your mouth, nose, or eyes. From what experts know so far, COVID-19 seems to spread most easily when people are showing symptoms. It is possible to spread it without having symptoms, too, but experts don't know how often this happens. Symptoms usually start a few days after a person is infected with the virus. But in some people it can take even longer for symptoms to appear. Symptoms can include: Fever; Cough; Feeling tired; Trouble breathing; Muscle aches. Most people have mild symptoms. Some people have no symptoms at all. But in other people, COVID-19 can lead to serious problems like pneumonia, not getting enough oxygen, or even death. This is more common in people who are older or have other health problems. While children can get COVID-19, they seem less likely to have severe symptoms. There is no specific treatment for COVID-19. Many people will be able to stay home while they get better, but people with serious symptoms or other health problems might need to go to the hospital: Most people with COVID-19 have only mild illness and can rest at home until they get better. People with mild symptoms seem to get better after about 2 weeks, but it's not the same for everyone. If you are recovering from COVID-19, it's important to stay home, and away from other people, until your doctor or nurse tells you it's safe to go back to your normal activities.        SECONDARY DISCHARGE DIAGNOSES  Diagnosis: Dyspnea  Assessment and Plan of Treatment:

## 2020-04-12 ENCOUNTER — TRANSCRIPTION ENCOUNTER (OUTPATIENT)
Age: 45
End: 2020-04-12

## 2020-04-12 VITALS
TEMPERATURE: 98 F | RESPIRATION RATE: 18 BRPM | DIASTOLIC BLOOD PRESSURE: 76 MMHG | SYSTOLIC BLOOD PRESSURE: 123 MMHG | OXYGEN SATURATION: 94 % | HEART RATE: 70 BPM

## 2020-04-12 LAB
ALBUMIN SERPL ELPH-MCNC: 3.2 G/DL — LOW (ref 3.3–5)
ALP SERPL-CCNC: 71 U/L — SIGNIFICANT CHANGE UP (ref 40–120)
ALT FLD-CCNC: 26 U/L — SIGNIFICANT CHANGE UP (ref 4–33)
ANION GAP SERPL CALC-SCNC: 15 MMO/L — HIGH (ref 7–14)
AST SERPL-CCNC: 28 U/L — SIGNIFICANT CHANGE UP (ref 4–32)
BILIRUB SERPL-MCNC: 0.5 MG/DL — SIGNIFICANT CHANGE UP (ref 0.2–1.2)
BUN SERPL-MCNC: 11 MG/DL — SIGNIFICANT CHANGE UP (ref 7–23)
CALCIUM SERPL-MCNC: 8.8 MG/DL — SIGNIFICANT CHANGE UP (ref 8.4–10.5)
CHLORIDE SERPL-SCNC: 105 MMOL/L — SIGNIFICANT CHANGE UP (ref 98–107)
CO2 SERPL-SCNC: 22 MMOL/L — SIGNIFICANT CHANGE UP (ref 22–31)
CREAT SERPL-MCNC: 0.62 MG/DL — SIGNIFICANT CHANGE UP (ref 0.5–1.3)
GLUCOSE SERPL-MCNC: 124 MG/DL — HIGH (ref 70–99)
HCT VFR BLD CALC: 37.7 % — SIGNIFICANT CHANGE UP (ref 34.5–45)
HGB BLD-MCNC: 12 G/DL — SIGNIFICANT CHANGE UP (ref 11.5–15.5)
LACTATE SERPL-SCNC: 2.8 MMOL/L — HIGH (ref 0.5–2)
MCHC RBC-ENTMCNC: 29.1 PG — SIGNIFICANT CHANGE UP (ref 27–34)
MCHC RBC-ENTMCNC: 31.8 % — LOW (ref 32–36)
MCV RBC AUTO: 91.5 FL — SIGNIFICANT CHANGE UP (ref 80–100)
NRBC # FLD: 0 K/UL — SIGNIFICANT CHANGE UP (ref 0–0)
PLATELET # BLD AUTO: 359 K/UL — SIGNIFICANT CHANGE UP (ref 150–400)
PMV BLD: 10.3 FL — SIGNIFICANT CHANGE UP (ref 7–13)
POTASSIUM SERPL-MCNC: 3.3 MMOL/L — LOW (ref 3.5–5.3)
POTASSIUM SERPL-SCNC: 3.3 MMOL/L — LOW (ref 3.5–5.3)
PROT SERPL-MCNC: 6.8 G/DL — SIGNIFICANT CHANGE UP (ref 6–8.3)
RBC # BLD: 4.12 M/UL — SIGNIFICANT CHANGE UP (ref 3.8–5.2)
RBC # FLD: 13.2 % — SIGNIFICANT CHANGE UP (ref 10.3–14.5)
SODIUM SERPL-SCNC: 142 MMOL/L — SIGNIFICANT CHANGE UP (ref 135–145)
WBC # BLD: 7.24 K/UL — SIGNIFICANT CHANGE UP (ref 3.8–10.5)
WBC # FLD AUTO: 7.24 K/UL — SIGNIFICANT CHANGE UP (ref 3.8–10.5)

## 2020-04-12 RX ORDER — POTASSIUM CHLORIDE 20 MEQ
20 PACKET (EA) ORAL ONCE
Refills: 0 | Status: COMPLETED | OUTPATIENT
Start: 2020-04-12 | End: 2020-04-12

## 2020-04-12 RX ADMIN — Medication 100 MILLIGRAM(S): at 05:27

## 2020-04-12 RX ADMIN — Medication 50 MILLIGRAM(S): at 05:27

## 2020-04-12 RX ADMIN — ENOXAPARIN SODIUM 40 MILLIGRAM(S): 100 INJECTION SUBCUTANEOUS at 05:27

## 2020-04-12 RX ADMIN — Medication 20 MILLIEQUIVALENT(S): at 14:48

## 2020-04-12 RX ADMIN — LOSARTAN POTASSIUM 25 MILLIGRAM(S): 100 TABLET, FILM COATED ORAL at 05:27

## 2020-04-12 RX ADMIN — TICAGRELOR 60 MILLIGRAM(S): 90 TABLET ORAL at 05:27

## 2020-04-12 NOTE — DISCHARGE NOTE NURSING/CASE MANAGEMENT/SOCIAL WORK - PATIENT PORTAL LINK FT
You can access the FollowMyHealth Patient Portal offered by [facility] by registering at the following website: [email or web address]. By joining [employer]’s FollowMyHealth portal, you will also be able to view your health information using other applications (apps) compatible with our system.

## 2020-04-12 NOTE — PROGRESS NOTE ADULT - SUBJECTIVE AND OBJECTIVE BOX
Patient is a 44y old  Female who presents with a chief complaint of Cough, fevers/chills, SOB (11 Apr 2020 10:38)      INTERVAL HPI/OVERNIGHT EVENTS: BE.       REVIEW OF SYSTEMS:    CONSTITUTIONAL: No weakness, fevers or chills  EYES/ENT: No visual changes;  No vertigo or throat pain   NECK: No pain or stiffness  RESPIRATORY: No cough, wheezing, hemoptysis; No shortness of breath  CARDIOVASCULAR: No chest pain or palpitations  GASTROINTESTINAL: No abdominal or epigastric pain. No nausea, vomiting, or hematemesis; No diarrhea or constipation. No melena or hematochezia.  GENITOURINARY: No dysuria, frequency or hematuria  NEUROLOGICAL: No numbness or weakness  All other review of systems is negative unless indicated above.    FAMILY HISTORY:  Family history of coronary artery disease younger than 40 years of age (Sibling)    T(C): 36.8 (04-12-20 @ 05:25), Max: 37 (04-11-20 @ 12:19)  HR: 69 (04-12-20 @ 05:25) (69 - 80)  BP: 120/64 (04-12-20 @ 05:25) (100/64 - 140/79)  RR: 16 (04-12-20 @ 05:25) (16 - 18)  SpO2: 98% (04-12-20 @ 05:25) (97% - 100%)  Wt(kg): --Vital Signs Last 24 Hrs  T(C): 36.8 (12 Apr 2020 05:25), Max: 37 (11 Apr 2020 12:19)  T(F): 98.2 (12 Apr 2020 05:25), Max: 98.6 (11 Apr 2020 12:19)  HR: 69 (12 Apr 2020 05:25) (69 - 80)  BP: 120/64 (12 Apr 2020 05:25) (100/64 - 140/79)  BP(mean): --  RR: 16 (12 Apr 2020 05:25) (16 - 18)  SpO2: 98% (12 Apr 2020 05:25) (97% - 100%)    PHYSICAL EXAM:  GENERAL: NAD, well-groomed, well-developed  HEAD:  Atraumatic, Normocephalic  EYES: EOMI, PERRLA, conjunctiva and sclera clear  ENMT: No tonsillar erythema, exudates, or enlargement; Moist mucous membranes, Good dentition, No lesions  NECK: Supple, No JVD, Normal thyroid  NERVOUS SYSTEM:  Alert & Oriented X3  CHEST/LUNG: Clear to percussion bilaterally; No rales, rhonchi, wheezing, or rubs  HEART: Regular rate and rhythm; No murmurs, rubs, or gallops  ABDOMEN: Soft, Nontender, Nondistended; Bowel sounds present  EXTREMITIES:  2+ Peripheral Pulses, No clubbing, cyanosis, or edema  LYMPH: No lymphadenopathy noted  SKIN: No rashes or lesions    Consultant(s) Notes Reviewed:  [x ] YES  [ ] NO  Care Discussed with Consultants/Other Providers [ x] YES  [ ] NO    LABS:                        12.0   7.24  )-----------( 359      ( 12 Apr 2020 07:10 )             37.7       Ca    8.3        11 Apr 2020 07:00        CAPILLARY BLOOD GLUCOSE        BLOOD CULTURE      RADIOLOGY & ADDITIONAL TESTS:    Imaging Personally Reviewed:  [ ] YES  [ ] NO  acetaminophen   Tablet .. 650 milliGRAM(s) Oral every 4 hours PRN  aspirin enteric coated 81 milliGRAM(s) Oral daily  atorvastatin 40 milliGRAM(s) Oral at bedtime  benzonatate 100 milliGRAM(s) Oral three times a day PRN  enoxaparin Injectable 40 milliGRAM(s) SubCutaneous every 12 hours  furosemide    Tablet 20 milliGRAM(s) Oral <User Schedule>  losartan 25 milliGRAM(s) Oral daily  metoprolol tartrate 50 milliGRAM(s) Oral two times a day  ticagrelor 60 milliGRAM(s) Oral every 12 hours      HEALTH ISSUES - PROBLEM Dx:  Discharge planning issues: Discharge planning issues  Need for prophylactic measure: Need for prophylactic measure  Hyperlipidemia, unspecified hyperlipidemia type: Hyperlipidemia, unspecified hyperlipidemia type  Essential hypertension: Essential hypertension  Coronary artery disease involving native coronary artery of native heart without angina pectoris: Coronary artery disease involving native coronary artery of native heart without angina pectoris  Acute respiratory failure with hypoxia: Acute respiratory failure with hypoxia  Suspected 2019 novel coronavirus infection: Suspected 2019 novel coronavirus infection

## 2020-04-13 ENCOUNTER — TRANSCRIPTION ENCOUNTER (OUTPATIENT)
Age: 45
End: 2020-04-13

## 2020-05-26 ENCOUNTER — APPOINTMENT (OUTPATIENT)
Dept: CARDIOLOGY | Facility: CLINIC | Age: 45
End: 2020-05-26

## 2020-06-17 ENCOUNTER — TRANSCRIPTION ENCOUNTER (OUTPATIENT)
Age: 45
End: 2020-06-17

## 2020-07-15 ENCOUNTER — TRANSCRIPTION ENCOUNTER (OUTPATIENT)
Age: 45
End: 2020-07-15

## 2020-07-31 ENCOUNTER — TRANSCRIPTION ENCOUNTER (OUTPATIENT)
Age: 45
End: 2020-07-31

## 2020-08-26 ENCOUNTER — TRANSCRIPTION ENCOUNTER (OUTPATIENT)
Age: 45
End: 2020-08-26

## 2020-08-27 ENCOUNTER — OUTPATIENT (OUTPATIENT)
Dept: OUTPATIENT SERVICES | Facility: HOSPITAL | Age: 45
LOS: 1 days | End: 2020-08-27
Payer: SELF-PAY

## 2020-08-27 ENCOUNTER — TRANSCRIPTION ENCOUNTER (OUTPATIENT)
Age: 45
End: 2020-08-27

## 2020-08-27 VITALS
HEART RATE: 84 BPM | RESPIRATION RATE: 16 BRPM | DIASTOLIC BLOOD PRESSURE: 80 MMHG | TEMPERATURE: 98 F | OXYGEN SATURATION: 98 % | WEIGHT: 216.05 LBS | SYSTOLIC BLOOD PRESSURE: 140 MMHG | HEIGHT: 60 IN

## 2020-08-27 DIAGNOSIS — Z90.710 ACQUIRED ABSENCE OF BOTH CERVIX AND UTERUS: Chronic | ICD-10-CM

## 2020-08-27 DIAGNOSIS — Z41.1 ENCOUNTER FOR COSMETIC SURGERY: ICD-10-CM

## 2020-08-27 DIAGNOSIS — Z01.818 ENCOUNTER FOR OTHER PREPROCEDURAL EXAMINATION: ICD-10-CM

## 2020-08-27 DIAGNOSIS — I10 ESSENTIAL (PRIMARY) HYPERTENSION: ICD-10-CM

## 2020-08-27 DIAGNOSIS — Z95.5 PRESENCE OF CORONARY ANGIOPLASTY IMPLANT AND GRAFT: Chronic | ICD-10-CM

## 2020-08-27 DIAGNOSIS — Z95.5 PRESENCE OF CORONARY ANGIOPLASTY IMPLANT AND GRAFT: ICD-10-CM

## 2020-08-27 PROCEDURE — G0463: CPT

## 2020-08-27 NOTE — H&P PST ADULT - NS PRO REGISTERED ORGAN DONOR
62 yo female with PMH o f cholecystectomy and kidney stones presents to the ER for dysuria, frequency, urgency +suprapubic pain. Labs, urine and ct done. Pt to be placed on abx for likely pyelo. No infected obstructed stone. No

## 2020-08-27 NOTE — H&P PST ADULT - HISTORY OF PRESENT ILLNESS
This is a 44F with history of CAD s/p 4 stents (most recent in September 2019), HTN, and HLD, obesity, COVID 19 infection (4/2020)  scheduled for abdominoplasty on 9/01/2020.  **Pt on Aspirin & Brilinta, will continue as per cardiology & surgeon  **Covid 19 PCR scheduled on 8/30/2020 This is a 44F with history of CAD s/p 4 stents (most recent in September 2019), HTN, and HLD, obesity (BMI 42), COVID 19 infection (4/2020)  scheduled for abdominoplasty on 9/01/2020.  **Pt on Aspirin & Brilinta, will continue as per cardiology & surgeon  **Covid 19 PCR scheduled on 8/30/2020

## 2020-08-27 NOTE — H&P PST ADULT - RS GEN PE MLT RESP DETAILS PC
airway patent/good air movement/no chest wall tenderness/clear to auscultation bilaterally/breath sounds equal/respirations non-labored

## 2020-08-27 NOTE — H&P PST ADULT - NSICDXPROBLEM_GEN_ALL_CORE_FT
PROBLEM DIAGNOSES  Problem: Encounter for cosmetic surgery  Assessment and Plan: Abdominoplasty  Labs- done with PMD  Pre op eval prior to OR    Problem: HTN (hypertension)  Assessment and Plan: continue with meds    Problem: Stented coronary artery  Assessment and Plan: continue with Brilinta & aspirin as per Cardilogist &Surgeon PROBLEM DIAGNOSES  Problem: Encounter for cosmetic surgery  Assessment and Plan: Abdominoplasty  Labs- done with PMD  Pre op eval prior to OR    Problem: HTN (hypertension)  Assessment and Plan: continue with meds    Problem: Stented coronary artery  Assessment and Plan: continue with Brilinta & aspirin as per Cardiologist &Surgeon  Pre op instructions discussed

## 2020-08-27 NOTE — H&P PST ADULT - NSICDXPASTSURGICALHX_GEN_ALL_CORE_FT
PAST SURGICAL HISTORY:  H/O total hysterectomy 2017    S/P coronary artery stent placement mLAD x1 MIGUEL ANGEL (2014),  3 stents 09/2019

## 2020-08-27 NOTE — H&P PST ADULT - TRANSFUSION REACTION, PREVIOUS, PROFILE
----- Message from Sarah Beth Diaz sent at 3/17/2017  9:59 AM CDT -----  REFILLS:   Patient is requesting a medication refill.   RX name:levothyroxine  Strength: 25 mg  Directions:   Pharmacy name: [facility]  Pharmacy phone #:         no

## 2020-08-27 NOTE — H&P PST ADULT - NSICDXPASTMEDICALHX_GEN_ALL_CORE_FT
PAST MEDICAL HISTORY:  Coronary artery disease s/p MIGUEL ANGEL x 3 (9/2019), MIGUEL ANGEL x1 (2014)    HLD (hyperlipidemia)     HTN (hypertension)     Hypothyroid Resolved    Obesity

## 2020-08-30 ENCOUNTER — OUTPATIENT (OUTPATIENT)
Dept: OUTPATIENT SERVICES | Facility: HOSPITAL | Age: 45
LOS: 1 days | End: 2020-08-30
Payer: MEDICAID

## 2020-08-30 DIAGNOSIS — Z11.59 ENCOUNTER FOR SCREENING FOR OTHER VIRAL DISEASES: ICD-10-CM

## 2020-08-30 DIAGNOSIS — Z90.710 ACQUIRED ABSENCE OF BOTH CERVIX AND UTERUS: Chronic | ICD-10-CM

## 2020-08-30 DIAGNOSIS — Z95.5 PRESENCE OF CORONARY ANGIOPLASTY IMPLANT AND GRAFT: Chronic | ICD-10-CM

## 2020-08-30 LAB — SARS-COV-2 RNA SPEC QL NAA+PROBE: SIGNIFICANT CHANGE UP

## 2020-08-30 PROCEDURE — U0003: CPT

## 2020-08-31 ENCOUNTER — TRANSCRIPTION ENCOUNTER (OUTPATIENT)
Age: 45
End: 2020-08-31

## 2020-08-31 NOTE — ASU PATIENT PROFILE, ADULT - PSH
H/O total hysterectomy  2017  S/P coronary artery stent placement  mLAD x1 MIGUEL ANGEL (2014),  3 stents 09/2019

## 2020-09-01 ENCOUNTER — INPATIENT (INPATIENT)
Facility: HOSPITAL | Age: 45
LOS: 2 days | Discharge: HOME | DRG: 629 | End: 2020-09-04
Attending: PLASTIC SURGERY | Admitting: PLASTIC SURGERY
Payer: SELF-PAY

## 2020-09-01 VITALS
RESPIRATION RATE: 18 BRPM | HEART RATE: 75 BPM | HEIGHT: 60 IN | OXYGEN SATURATION: 100 % | WEIGHT: 220.02 LBS | SYSTOLIC BLOOD PRESSURE: 166 MMHG | TEMPERATURE: 98 F | DIASTOLIC BLOOD PRESSURE: 87 MMHG

## 2020-09-01 DIAGNOSIS — Z95.5 PRESENCE OF CORONARY ANGIOPLASTY IMPLANT AND GRAFT: Chronic | ICD-10-CM

## 2020-09-01 DIAGNOSIS — Z41.1 ENCOUNTER FOR COSMETIC SURGERY: ICD-10-CM

## 2020-09-01 DIAGNOSIS — Z90.710 ACQUIRED ABSENCE OF BOTH CERVIX AND UTERUS: Chronic | ICD-10-CM

## 2020-09-01 LAB
HCT VFR BLD CALC: 31.2 % — LOW (ref 34.5–45)
HCT VFR BLD CALC: 33.4 % — LOW (ref 34.5–45)
HGB BLD-MCNC: 10.3 G/DL — LOW (ref 11.5–15.5)
HGB BLD-MCNC: 10.8 G/DL — LOW (ref 11.5–15.5)
MCHC RBC-ENTMCNC: 30.2 PG — SIGNIFICANT CHANGE UP (ref 27–34)
MCHC RBC-ENTMCNC: 32.3 GM/DL — SIGNIFICANT CHANGE UP (ref 32–36)
MCV RBC AUTO: 93.3 FL — SIGNIFICANT CHANGE UP (ref 80–100)
NRBC # BLD: 0 /100 WBCS — SIGNIFICANT CHANGE UP (ref 0–0)
PLATELET # BLD AUTO: 196 K/UL — SIGNIFICANT CHANGE UP (ref 150–400)
RBC # BLD: 3.58 M/UL — LOW (ref 3.8–5.2)
RBC # FLD: 13.5 % — SIGNIFICANT CHANGE UP (ref 10.3–14.5)
WBC # BLD: 18.56 K/UL — HIGH (ref 3.8–10.5)
WBC # FLD AUTO: 18.56 K/UL — HIGH (ref 3.8–10.5)

## 2020-09-01 PROCEDURE — 99232 SBSQ HOSP IP/OBS MODERATE 35: CPT

## 2020-09-01 RX ORDER — SODIUM CHLORIDE 9 MG/ML
1000 INJECTION INTRAMUSCULAR; INTRAVENOUS; SUBCUTANEOUS
Refills: 0 | Status: DISCONTINUED | OUTPATIENT
Start: 2020-09-01 | End: 2020-09-02

## 2020-09-01 RX ORDER — ONDANSETRON 8 MG/1
4 TABLET, FILM COATED ORAL EVERY 6 HOURS
Refills: 0 | Status: DISCONTINUED | OUTPATIENT
Start: 2020-09-01 | End: 2020-09-01

## 2020-09-01 RX ORDER — SODIUM CHLORIDE 9 MG/ML
250 INJECTION, SOLUTION INTRAVENOUS ONCE
Refills: 0 | Status: COMPLETED | OUTPATIENT
Start: 2020-09-01 | End: 2020-09-01

## 2020-09-01 RX ORDER — SODIUM CHLORIDE 9 MG/ML
1000 INJECTION, SOLUTION INTRAVENOUS
Refills: 0 | Status: DISCONTINUED | OUTPATIENT
Start: 2020-09-01 | End: 2020-09-01

## 2020-09-01 RX ORDER — OXYCODONE AND ACETAMINOPHEN 5; 325 MG/1; MG/1
2 TABLET ORAL EVERY 6 HOURS
Refills: 0 | Status: DISCONTINUED | OUTPATIENT
Start: 2020-09-01 | End: 2020-09-04

## 2020-09-01 RX ORDER — CEFAZOLIN SODIUM 1 G
2000 VIAL (EA) INJECTION ONCE
Refills: 0 | Status: COMPLETED | OUTPATIENT
Start: 2020-09-01 | End: 2020-09-01

## 2020-09-01 RX ORDER — HYDROMORPHONE HYDROCHLORIDE 2 MG/ML
0.5 INJECTION INTRAMUSCULAR; INTRAVENOUS; SUBCUTANEOUS
Refills: 0 | Status: DISCONTINUED | OUTPATIENT
Start: 2020-09-01 | End: 2020-09-01

## 2020-09-01 RX ORDER — ONDANSETRON 8 MG/1
4 TABLET, FILM COATED ORAL ONCE
Refills: 0 | Status: COMPLETED | OUTPATIENT
Start: 2020-09-01 | End: 2020-09-01

## 2020-09-01 RX ORDER — OXYCODONE AND ACETAMINOPHEN 5; 325 MG/1; MG/1
1 TABLET ORAL EVERY 4 HOURS
Refills: 0 | Status: DISCONTINUED | OUTPATIENT
Start: 2020-09-01 | End: 2020-09-04

## 2020-09-01 RX ORDER — ATORVASTATIN CALCIUM 80 MG/1
40 TABLET, FILM COATED ORAL AT BEDTIME
Refills: 0 | Status: DISCONTINUED | OUTPATIENT
Start: 2020-09-01 | End: 2020-09-04

## 2020-09-01 RX ORDER — TICAGRELOR 90 MG/1
90 TABLET ORAL EVERY 12 HOURS
Refills: 0 | Status: DISCONTINUED | OUTPATIENT
Start: 2020-09-01 | End: 2020-09-02

## 2020-09-01 RX ORDER — ONDANSETRON 8 MG/1
4 TABLET, FILM COATED ORAL EVERY 6 HOURS
Refills: 0 | Status: COMPLETED | OUTPATIENT
Start: 2020-09-01 | End: 2020-09-03

## 2020-09-01 RX ORDER — CEFAZOLIN SODIUM 1 G
2000 VIAL (EA) INJECTION EVERY 8 HOURS
Refills: 0 | Status: DISCONTINUED | OUTPATIENT
Start: 2020-09-01 | End: 2020-09-04

## 2020-09-01 RX ORDER — INSULIN LISPRO 100/ML
2 VIAL (ML) SUBCUTANEOUS ONCE
Refills: 0 | Status: COMPLETED | OUTPATIENT
Start: 2020-09-01 | End: 2020-09-01

## 2020-09-01 RX ORDER — ASPIRIN/CALCIUM CARB/MAGNESIUM 324 MG
81 TABLET ORAL DAILY
Refills: 0 | Status: DISCONTINUED | OUTPATIENT
Start: 2020-09-01 | End: 2020-09-02

## 2020-09-01 RX ORDER — HYDROMORPHONE HYDROCHLORIDE 2 MG/ML
1 INJECTION INTRAMUSCULAR; INTRAVENOUS; SUBCUTANEOUS
Refills: 0 | Status: DISCONTINUED | OUTPATIENT
Start: 2020-09-01 | End: 2020-09-01

## 2020-09-01 RX ORDER — SODIUM CHLORIDE 9 MG/ML
500 INJECTION INTRAMUSCULAR; INTRAVENOUS; SUBCUTANEOUS ONCE
Refills: 0 | Status: COMPLETED | OUTPATIENT
Start: 2020-09-01 | End: 2020-09-01

## 2020-09-01 RX ORDER — LOSARTAN POTASSIUM 100 MG/1
25 TABLET, FILM COATED ORAL DAILY
Refills: 0 | Status: DISCONTINUED | OUTPATIENT
Start: 2020-09-01 | End: 2020-09-02

## 2020-09-01 RX ORDER — METOPROLOL TARTRATE 50 MG
50 TABLET ORAL
Refills: 0 | Status: DISCONTINUED | OUTPATIENT
Start: 2020-09-01 | End: 2020-09-04

## 2020-09-01 RX ADMIN — Medication 100 MILLIGRAM(S): at 19:40

## 2020-09-01 RX ADMIN — SODIUM CHLORIDE 75 MILLILITER(S): 9 INJECTION, SOLUTION INTRAVENOUS at 14:32

## 2020-09-01 RX ADMIN — TICAGRELOR 90 MILLIGRAM(S): 90 TABLET ORAL at 18:20

## 2020-09-01 RX ADMIN — OXYCODONE AND ACETAMINOPHEN 2 TABLET(S): 5; 325 TABLET ORAL at 19:43

## 2020-09-01 RX ADMIN — SODIUM CHLORIDE 500 MILLILITER(S): 9 INJECTION, SOLUTION INTRAVENOUS at 15:35

## 2020-09-01 RX ADMIN — ONDANSETRON 4 MILLIGRAM(S): 8 TABLET, FILM COATED ORAL at 14:34

## 2020-09-01 RX ADMIN — SODIUM CHLORIDE 1000 MILLILITER(S): 9 INJECTION INTRAMUSCULAR; INTRAVENOUS; SUBCUTANEOUS at 18:20

## 2020-09-01 RX ADMIN — SODIUM CHLORIDE 75 MILLILITER(S): 9 INJECTION, SOLUTION INTRAVENOUS at 09:58

## 2020-09-01 RX ADMIN — SODIUM CHLORIDE 125 MILLILITER(S): 9 INJECTION INTRAMUSCULAR; INTRAVENOUS; SUBCUTANEOUS at 19:40

## 2020-09-01 RX ADMIN — Medication 2 UNIT(S): at 14:46

## 2020-09-01 RX ADMIN — OXYCODONE AND ACETAMINOPHEN 2 TABLET(S): 5; 325 TABLET ORAL at 21:09

## 2020-09-01 RX ADMIN — ONDANSETRON 4 MILLIGRAM(S): 8 TABLET, FILM COATED ORAL at 18:24

## 2020-09-01 RX ADMIN — ATORVASTATIN CALCIUM 40 MILLIGRAM(S): 80 TABLET, FILM COATED ORAL at 22:24

## 2020-09-01 NOTE — PROGRESS NOTE ADULT - SUBJECTIVE AND OBJECTIVE BOX
S  Pt s/p panniculectomy/abdominoplasty    O VSS  Dressing clean and dry  JUDY's serosanguinous output-milked    A & P Pt doing well post-operatively           Admit for observation           D/w Dr Perlman who will see the patient in consultation

## 2020-09-01 NOTE — PROGRESS NOTE ADULT - SUBJECTIVE AND OBJECTIVE BOX
S  Pt feeling well  Tolerated clear liquids        H/H 10.8/33.4    O Afebrile  VSS     Dressings clean and dry  No bleeding     JUDY's milked-serosanguinous output  R 45cc  L 30cc     No calf tenderness    A&P Pt stable post-op         Incentive Spirometer         Advance diet in AM         Continue monitoring

## 2020-09-01 NOTE — ASU PREOP CHECKLIST - LOOSE TEETH
Occupational Therapy Progress Note    Referred by: Jose F Krishna PA-C  Medical Diagnosis (from order):   Diagnosis Information      Diagnosis    V45.89 (ICD-9-CM) - Z98.890 (ICD-10-CM) - S/P arthroscopy of left shoulder              Current Functional Limitations: Activities above chest height, functional reaching, resistive activities involving affected extremity.     OBJECTIVE   remeasurements as noted in attached daily treatment note    Outcome Measures: (Outcome Scoring)  Quick Disabilities of the Arm, Shoulder and Hand (QDASH): 33 (11-55); Calculated Score: 50 (0-100) (scored 0-100; a higher score indicates greater disability)    ASSESSMENT    To date the patient has made gains not as expected due to edema (which has now resolved) and stiffness in the shoulder joint. as reported which is improving now. Patient continues to have impairments and functional deficits as noted. Patient will continue to benefit from skilled care as outlined.      PLAN    Updates to plan of care: Extend to 19    Goals  To be obtained by end of this plan of care:  1. Patient will be independent with progressed and modified home exercise program  (Pt performing independently)  2. Decrease pain/symptoms to 0-1/10 IMPROVIN/10 intermittently met  3. Improve involved strength to 4+/5 to 5/5 in shoulder, elbow/5  (Initiated within last 2 weeks per reconstruction protocol)  4. Improve involved range of motion to within 5-10 degrees of unaffected  shoulder motion; WNL elbow flexion, extension  (MET for elbow)  PROGRESSING for shoulder  5. Improve  strength to at least 45 #  6. Decrease left affected arm/hand edema, to allow full motion of left shoulder, elbow, to position for reach all directions for functional tasks.  (improving as seen in edema chart)  MET  through improvements listed above patient will:  7. Be able to complete upper body, lower body dressing independently  MET  8. Be able to reach overhead, out to side,  behind back without pain/difficulty to improve activities of independent daily living, instrumental activities of daily living, activity tolerance  PROGRESSING  9. Be able to sleep 6 hours without disruption from pain   PROGRESSING - still difficulty sleeping in bbed  10. Quick DASH: Patient will complete form to reflect an improved score from initial score of 68 to less than or equal to 20 to indicate patient reported improvement in function/disability/impairment.  PROGRESSING: SCORE NOW 50      Function is progressing, with pt able to perform light functional tasks between waist and chest height.       Occupational Therapy Daily Treatment    Visit Count: 29  Plan of Care: 4/15/2019 Through: 7/8/2019  EXTEND TO 8/30/19     NEXT MEDICARE PROGRESS NOTE DUE BY VISIT 31 or prior to MD appt whichever is first)  Insurance Information: Medicare/AARP Supplement; follows medicare guidelines; no auth required    Referred by: Jose F Krishna PA-C; Next provider visit (if known/scheduled): 7/30/19 Dr. Villeda  Medical Diagnosis (from order):  S/P arthroscopy of left shoulder [Z98.890]  Treatment Diagnosis: Shoulder s/p surgery symptoms with increased pain/symptoms, impaired posture, impaired strength, impaired range of motion, impaired scapulohumeral rhythm, increased swelling, impaired joint mobility/play, impaired activity tolerance, impaired motor function/performance/coordination     Date of Surgery: 3/20/2019 Surgery Performed: Left Shoulder Arthroscopy, subacromial decompression, biceps tenotomy, rotator cuff repair Superior Capsular Reconstruction with allograft - Left  Physician Guidelines/Precautions: Follow Type 3 s/p RCR protocol  Per MD as relayed by nurse: with the SCR, he advises that patient follow the Type 3 protocol up until the 3 month derick. At 3 months, he would like patient to hold therapy for 1 month and continue with active ROM on his own during this time. At 4 months , you can resume the protocol and  start initiating light strengthening.     Chart reviewed at time of initial evaluation (relevant co-morbidities, allergies, tests and medications listed): See EMR, significant for chronic left shoulder pain; pt had outpt OT prior to surgery to help maximize ROM       SUBJECTIVE   \"Stiff like usual. Heated today before coming here\"  Current Pain (0-10 scale): 1-2    Functional Change: trying to use arm more and more. Can now wash right side of body and comb hair with L UE.     Patient Goals: Be able to get back to bowling; be able to move the arm more normally    OBJECTIVE     7/2/19:  Received OK from MD office to progress to strengthening 7/12/19.    6/13/`19:  Received message from MD office.  OK to continue both lymph treatment and ROM (more aggressive); but no strengthening for one month, as the pt is quite stiff.    Posture/Observation:  Pt presents with arm beside; muscles are notably tight in the proximal upper arm; tenderness/tightness left upper trapezius    Range of Motion (degrees)  (PROM) AROM          Active standing; passive supine    Left Right Left Left  Left  Left Left Left   Date 4/15/19 Initial (PROM) Initial 5/3/19 (PROM) 5/14/19 5/31/19 6/7/19 7/8/19 7/29/19   Shoulder Flexion (170-180) 74 160 80 P: 95 P 105 A 35  P 115 A 76  P 160 A 97  P 165   Shoulder Adduction        A 40   Shoulder Extension (50-60) NT 65 NT NT A: 35 A 40 A 52 A 55   Shoulder Abduction (170-180) 70 158 65 P: 75 P 90 A 58  P 103 A  74  P 165 A 111  P 165   Shoulder Internal Rotation () 23 (arm at 45 deg. abd) 70 40 (ar at 45 deg abd) P: 45 P 55 A 48 w elbow beside  P 65 w arm 45 deg. abd   A 65 std. Testing position;; functionally just behind left hip    P 70 w arm 90 deg abd A 70  P  w arm 90 deg abd;    Functionally just medial to midline at S1   Shoulder External Rotation (80-90) 25 (arm at 45 deg. abd) 85 25 (arm at 45 deg abd) P: 30 P: 35 A 32 w elbow beside  P 54 w arm 45 deg abd   A 49 against gravity      P 80  w arm 90 deg abd A  65   against gravity std testing position    P 87 w arm 90 deg abd   Elbow Flexion (140-150) NT WNL (120) P A: 125 A (supine) 125  A: (seated) 115 A 135 A 145 NT   Elbow Extension (0-10) -15 WNL -27 A A: -10 A: -4 A 0 A 0 NT   Forearm Supination (90) NT WNL NT  A: 70 A 80 WNL NT   Forearm Pronation (90) NT WNL NT  A: 75 A 90 NT NT   Reported in degrees, active range of motion recorded unless noted as AA=active assistive or P=passive; standard testing positions unless otherwise noted, norms included in ( ); *=pain       Strength  Date: 7/29/19  :  45# left  :  51# right (but pt reports this is sore)    Lymphedema Circumference (cm): Upper Extremity  Date 5/20/19 5/20/19 6/7/19 7/8/19    Landmarks   left right diff. left left    20 cm prox         15 cm prox 36 33.8  34.7 32.7    10 cm prox 34.2 31.5  31.5 30    5 cm prox 29.7 29  29.4 28.5    elbow 28.5 28  28.3 27.5    5 cm dist 30 29  29.4 28.8    10 cm dist 28.5 27.2  27.4 26.7    15 cm dist 24 22  22.5 21.8    20 cm dist         wrist 17.8 17.4  17.8 17.8    Metacarpo  phalangeal  21.5 20.7  21 20.8    Total         % loss/gain         Key: prox = proximal; dist = distal; different=difference        Treatment     Therapeutic Exercises  Reviewed results of objective data/testing with pt  P/ROM shoulder flexion, abduction, ER/IR;  Sidelying abduction, flexion, protraction/retractionion and pro/retraction   Standing A/ROM shoulder    Manual Treatment:   Sidelying scapular mobs  Sidelying stretch to upper trapezius  Grade 3 joint mobs shoulder    Skilled input: verbal instruction/cues, tactile instruction/cues;       Home Program:  * above=instructed home program    Exercise: Date issued Date DC Comments   Reviewed icing 3x/day Reviewed 5/3/19 4/15/19   Therapist instruction   Gentle scapular motion, exercises as reviewed from protocol - hand full finger flexion, hooks; wrist, forearm, and elbow A/ROM (reviewed 5/3/19) 4/15/19    Therapist instruction, pt demonstration   Size D Tubigrip stockinette (also had been issued glove by MD office)  (reinforced use 5/3/19) 4/15/19   Therapist instruction    Pendulum exercises 5/3/19  6/24/19 Therapist instruction, sarita, pt demo, handout   Table P/ROM - only E/R to 45 deg. 5/6/19;  Doing flexion, E/R; abduction added  5/3/19    Therapist instruction, sarita, pt demo, handout   Hand bandaging and lymph bandaging as above 5/17 & 20/2019 6/17/19 Therapist instruction re: removal of coban wrap and bandages.   Clear/flow A/ROM shoulder (as able- small motion flexion); elbow to hand/ and back up for clear and flow exercises 5/20/19 6/24/19 Therapist instruction, pt sarita, handout   Self bandaging - lymph 6/3/19 6/24/19 Therapist instruction/demo   AA/ROM with stick as able      AA/ROM with right as able; scapular stabilization-support of right while doing alphabet with arm at 90 deg. In supine      I/R P/ROM arm at 45 deg. abd      I/R AA/ROM towel stretch (6/24/19 doing with stick which is easier) 6/17/19  Therapist instruction/sarita pt sarita   E/R doorway stretch 6/17/19  Therapist instruction/sarita, pt demo   Towel slide up down wall or finger crawl 6/17/19  \"  \"   AA/ROM E/R with assistance of right 6/21/19  Therapist instruction, pt sarita   AA/ROM with dowel I/R behind back; pulling up 6/21/19  Therapist instruction, pt demo   Sidelying A/AA/ROM flexion, abduction, E/R 6/24/19  Therapist instruction, pt demo   Isometric strengthening 5 second holds, 5 reps to begin with 7/15/19 7/22/19 Therapist instruction/sarita pt sarita, handout   Gripping with ball 7/15/19  Pt demonstration   Light yellow t-band RC strengthening 7/22/19  Therapist instruction, pt demo; handout         Writer verbally educated the patient and received verbal consent from the patient on hand placement, positioning of patient, and techniques to be performed today including therapist position for techniques, hand placement and palpation  for techniques   as described  and how they are pertinent to the patient's plan of care.       Suggestions for next session as indicated: progress per plan of care, Progress as per Type 3 RC protocol  May begin with light strengthening July 12, 2019, continue A/AA/PROM, scapular stabilization., progressive strengthening    ASSESSMENT   Patient slowly progressing;now beginning to reach just above shoulder height.  See evaluation results.  Pt desires reduction to 2x/week, and this is appropriate.     Pain after treatment:  2    Result of above outlined education: Verbalizes understanding and Needs reinforcement         Frequency/Duration: 2 times per week for 12 weeks with tapering as the patient progresses for an estimated total of 24 visits  Changed on 5/28/19 to 4 times per week for up to 6 weeks, then tapering as the patient progresses. As of 7/8/19 is 3x/week and will reduce to 2x/week as appropriate; POC es extended to into August as noted.  As of 7/29/19 reducing to 2x/week.     patient involved in and agreed to plan of care and goals.         THERAPY DAILY BILLING   Insurance: MEDICARE 2. AARP     KX MODIFIERS BEGINNING JULY  Evaluation Procedures:  No evaluation codes were used on this date of service    Timed Procedures:  Manual Therapy, 8 minutes  Therapeutic Exercise, 42 minutes    Untimed Procedures:  No untimed codes were used on this date of service  (    Total Treatment Time:  50 minutes    MD signature on initial evaluation authorizes the plan of care.       no

## 2020-09-01 NOTE — PROGRESS NOTE ADULT - SUBJECTIVE AND OBJECTIVE BOX
LOUIE QUINTANILLA  MRN-[id number]    PLASTIC SURGERY/ DR. NAVARRO    NO N/V, DIZZINESS , SOB, CP, PALPITATION    MEDICATIONS  (STANDING):  aspirin  chewable 81 milliGRAM(s) Oral daily  atorvastatin 40 milliGRAM(s) Oral at bedtime  ceFAZolin   IVPB 2000 milliGRAM(s) IV Intermittent every 8 hours  losartan 25 milliGRAM(s) Oral daily  metoprolol tartrate 50 milliGRAM(s) Oral two times a day  ondansetron Injectable 4 milliGRAM(s) IV Push every 6 hours  sodium chloride 0.9%. 1000 milliLiter(s) (125 mL/Hr) IV Continuous <Continuous>  ticagrelor 90 milliGRAM(s) Oral every 12 hours    MEDICATIONS  (PRN):  oxycodone    5 mG/acetaminophen 325 mG 1 Tablet(s) Oral every 4 hours PRN Moderate Pain (4 - 6)  oxycodone    5 mG/acetaminophen 325 mG 2 Tablet(s) Oral every 6 hours PRN Severe Pain (7 - 10)      Vital Signs Last 24 Hrs  T(C): 36.5 (01 Sep 2020 16:48), Max: 36.7 (01 Sep 2020 09:25)  T(F): 97.7 (01 Sep 2020 16:48), Max: 98.1 (01 Sep 2020 09:25)  HR: 69 (01 Sep 2020 16:48) (60 - 87)  BP: 91/63 (01 Sep 2020 16:48) (91/63 - 166/87)  BP(mean): 66 (01 Sep 2020 16:21) (60 - 70)  RR: 17 (01 Sep 2020 16:48) (14 - 18)  SpO2: 98% (01 Sep 2020 16:48) (98% - 100%)      09-01-20 @ 07:01  -  09-01-20 @ 19:20  --------------------------------------------------------  IN: 400 mL / OUT: 210 mL / NET: 190 mL    BOSE CATH 135 ML  LEFT   JUDY 30 ML  RIGHT JUDY 45 ML      LUNGS: CLEAR TO AUSCULTATION , NO W/R/R  ABDOMEN: DRESSING DRY AND INTACT, FLAP VIABLE, UMBILICUS VIABLE, X2 JUDY IN PLACE SITE DRY AND INTACT, NOT DISTENDED, SOME INCISIONAL TENDERNESS                            SOFT, NON DISTENDED, SOME INCISIONAL TENDERNESS   EXTREMITY:  NO CALF TENDERNESS                             10.8   18.56 )-----------( 196      ( 01 Sep 2020 17:55 )             33.4     Complete Blood Count (09.01.20 @ 12:42)    Nucleated RBC: 0 /100 WBCs    WBC Count: 12.11 K/uL    RBC Count: 3.21 M/uL    Hemoglobin: 9.7 g/dL    Hematocrit: 29.6 %    Mean Cell Volume: 92.2 fl    Mean Cell Hemoglobin: 30.2 pg    Mean Cell Hemoglobin Conc: 32.8 gm/dL    Red Cell Distrib Width: 13.2 %    Platelet Count - Automated: 213 K/uL                           ASSESSMENT &  PLAN:      S/P ABDOMINOPLASTY, LARGE PANNICULECTOMY   HYPOTENSION   H/O CAD WITH STENTS , HTN, HYPERLIPIDEMIA     CLEAR LIQUID DIET  CONTINUE VITALS AND I& Os Q 4 HOURS  MAINTAIN BOSE  CATH   MAINTAIN AND MEASURE JUDY OUT PUT  CONTINUE REMOTE TELE  HOB AT 45 DEGREES AT ALL TIMES  MAINTAIN ABDOMINAL BINDER  ANCEF 1 GRAM EVERY 8 HOURS  ZOFRAN AROUND THE CLOCK   LOVENOX 40 MG SC STARTING TOMORROW  6 PM H/H NOTED, WILL REPEAT ANOTHER H/H AT 10 PM  CHANGE IVF TO NS  ML/ HR  CONTINUE ASA, TICAGRELOR  LOSARTAN AND METOPROLOL WITH PARAMETERS      SURGICAL TEAM WILL FOLLOW UP 10 PM H/H AND WILL REEVALUATE HYPOTENSION

## 2020-09-02 ENCOUNTER — RESULT REVIEW (OUTPATIENT)
Age: 45
End: 2020-09-02

## 2020-09-02 ENCOUNTER — TRANSCRIPTION ENCOUNTER (OUTPATIENT)
Age: 45
End: 2020-09-02

## 2020-09-02 DIAGNOSIS — E78.5 HYPERLIPIDEMIA, UNSPECIFIED: ICD-10-CM

## 2020-09-02 DIAGNOSIS — I25.10 ATHEROSCLEROTIC HEART DISEASE OF NATIVE CORONARY ARTERY WITHOUT ANGINA PECTORIS: ICD-10-CM

## 2020-09-02 DIAGNOSIS — I10 ESSENTIAL (PRIMARY) HYPERTENSION: ICD-10-CM

## 2020-09-02 LAB
ANION GAP SERPL CALC-SCNC: 8 MMOL/L — SIGNIFICANT CHANGE UP (ref 5–17)
BASOPHILS # BLD AUTO: 0.03 K/UL — SIGNIFICANT CHANGE UP (ref 0–0.2)
BASOPHILS NFR BLD AUTO: 0.2 % — SIGNIFICANT CHANGE UP (ref 0–2)
BUN SERPL-MCNC: 11 MG/DL — SIGNIFICANT CHANGE UP (ref 7–23)
CALCIUM SERPL-MCNC: 7.7 MG/DL — LOW (ref 8.5–10.1)
CHLORIDE SERPL-SCNC: 112 MMOL/L — HIGH (ref 96–108)
CO2 SERPL-SCNC: 21 MMOL/L — LOW (ref 22–31)
CREAT SERPL-MCNC: 0.7 MG/DL — SIGNIFICANT CHANGE UP (ref 0.5–1.3)
EOSINOPHIL # BLD AUTO: 0.01 K/UL — SIGNIFICANT CHANGE UP (ref 0–0.5)
EOSINOPHIL NFR BLD AUTO: 0.1 % — SIGNIFICANT CHANGE UP (ref 0–6)
GLUCOSE SERPL-MCNC: 149 MG/DL — HIGH (ref 70–99)
HCT VFR BLD CALC: 28.2 % — LOW (ref 34.5–45)
HGB BLD-MCNC: 9.2 G/DL — LOW (ref 11.5–15.5)
IMM GRANULOCYTES NFR BLD AUTO: 0.6 % — SIGNIFICANT CHANGE UP (ref 0–1.5)
LYMPHOCYTES # BLD AUTO: 12.8 % — LOW (ref 13–44)
LYMPHOCYTES # BLD AUTO: 2.09 K/UL — SIGNIFICANT CHANGE UP (ref 1–3.3)
MCHC RBC-ENTMCNC: 30.3 PG — SIGNIFICANT CHANGE UP (ref 27–34)
MCHC RBC-ENTMCNC: 32.6 GM/DL — SIGNIFICANT CHANGE UP (ref 32–36)
MCV RBC AUTO: 92.8 FL — SIGNIFICANT CHANGE UP (ref 80–100)
MONOCYTES # BLD AUTO: 1.18 K/UL — HIGH (ref 0–0.9)
MONOCYTES NFR BLD AUTO: 7.2 % — SIGNIFICANT CHANGE UP (ref 2–14)
NEUTROPHILS # BLD AUTO: 12.91 K/UL — HIGH (ref 1.8–7.4)
NEUTROPHILS NFR BLD AUTO: 79.1 % — HIGH (ref 43–77)
NRBC # BLD: 0 /100 WBCS — SIGNIFICANT CHANGE UP (ref 0–0)
PLATELET # BLD AUTO: 184 K/UL — SIGNIFICANT CHANGE UP (ref 150–400)
POTASSIUM SERPL-MCNC: 4 MMOL/L — SIGNIFICANT CHANGE UP (ref 3.5–5.3)
POTASSIUM SERPL-SCNC: 4 MMOL/L — SIGNIFICANT CHANGE UP (ref 3.5–5.3)
RBC # BLD: 3.04 M/UL — LOW (ref 3.8–5.2)
RBC # FLD: 13.6 % — SIGNIFICANT CHANGE UP (ref 10.3–14.5)
SODIUM SERPL-SCNC: 141 MMOL/L — SIGNIFICANT CHANGE UP (ref 135–145)
WBC # BLD: 16.32 K/UL — HIGH (ref 3.8–10.5)
WBC # FLD AUTO: 16.32 K/UL — HIGH (ref 3.8–10.5)

## 2020-09-02 PROCEDURE — 99223 1ST HOSP IP/OBS HIGH 75: CPT

## 2020-09-02 PROCEDURE — 93010 ELECTROCARDIOGRAM REPORT: CPT

## 2020-09-02 PROCEDURE — 88300 SURGICAL PATH GROSS: CPT | Mod: 26

## 2020-09-02 RX ORDER — ASPIRIN/CALCIUM CARB/MAGNESIUM 324 MG
162 TABLET ORAL DAILY
Refills: 0 | Status: DISCONTINUED | OUTPATIENT
Start: 2020-09-02 | End: 2020-09-04

## 2020-09-02 RX ORDER — ENOXAPARIN SODIUM 100 MG/ML
40 INJECTION SUBCUTANEOUS DAILY
Refills: 0 | Status: DISCONTINUED | OUTPATIENT
Start: 2020-09-02 | End: 2020-09-04

## 2020-09-02 RX ADMIN — TICAGRELOR 90 MILLIGRAM(S): 90 TABLET ORAL at 06:16

## 2020-09-02 RX ADMIN — Medication 100 MILLIGRAM(S): at 18:30

## 2020-09-02 RX ADMIN — ATORVASTATIN CALCIUM 40 MILLIGRAM(S): 80 TABLET, FILM COATED ORAL at 22:07

## 2020-09-02 RX ADMIN — ENOXAPARIN SODIUM 40 MILLIGRAM(S): 100 INJECTION SUBCUTANEOUS at 11:32

## 2020-09-02 RX ADMIN — OXYCODONE AND ACETAMINOPHEN 2 TABLET(S): 5; 325 TABLET ORAL at 11:32

## 2020-09-02 RX ADMIN — ONDANSETRON 4 MILLIGRAM(S): 8 TABLET, FILM COATED ORAL at 18:30

## 2020-09-02 RX ADMIN — ONDANSETRON 4 MILLIGRAM(S): 8 TABLET, FILM COATED ORAL at 06:17

## 2020-09-02 RX ADMIN — Medication 50 MILLIGRAM(S): at 18:32

## 2020-09-02 RX ADMIN — OXYCODONE AND ACETAMINOPHEN 2 TABLET(S): 5; 325 TABLET ORAL at 12:30

## 2020-09-02 RX ADMIN — ONDANSETRON 4 MILLIGRAM(S): 8 TABLET, FILM COATED ORAL at 00:12

## 2020-09-02 RX ADMIN — Medication 100 MILLIGRAM(S): at 11:31

## 2020-09-02 RX ADMIN — Medication 162 MILLIGRAM(S): at 11:32

## 2020-09-02 RX ADMIN — SODIUM CHLORIDE 125 MILLILITER(S): 9 INJECTION INTRAMUSCULAR; INTRAVENOUS; SUBCUTANEOUS at 03:04

## 2020-09-02 RX ADMIN — ONDANSETRON 4 MILLIGRAM(S): 8 TABLET, FILM COATED ORAL at 23:14

## 2020-09-02 RX ADMIN — ONDANSETRON 4 MILLIGRAM(S): 8 TABLET, FILM COATED ORAL at 11:34

## 2020-09-02 RX ADMIN — Medication 100 MILLIGRAM(S): at 03:05

## 2020-09-02 NOTE — CONSULT NOTE ADULT - SUBJECTIVE AND OBJECTIVE BOX
[facility] - Zakia Barraza, Oskar, Maye, Pj, Spike Rizzo  Office Number: [phone]    Initial Consult Note    CHIEF COMPLAINT: Patient is a 44y old  Female who presents with a chief complaint of Post-op  Observation (01 Sep 2020 21:41)      HPI: 45 yo F with PMH CAD s/p 4 stents, (3 in 2019), HTN, HLD, COVID (4/2020) had abdominoplasty yesterday with post op hypotension s/p 1 unit prbc and LR bolus. BP was 93/59, asx.   Pt was seen and examined at bedside. Pt has no complaints except mild pain at the incision site. Pt's BP when seen was 101/64, her normal SBP is 130-140s. Pt is on ticagrelor and asa due to her h/o stents. H/H stable at 10.3 after given one unit of blood. Pt denies any headache, dizziness, blurry vision, fatigue, weakness, CP, SOB, palpitations, abdominal pain, N/V/D, numbness/tingling.       PAST MEDICAL & SURGICAL HISTORY:  Obesity  Hypothyroid: Resolved  Coronary artery disease: s/p MIGUEL ANGEL x 3 (9/2019), MIGUEL ANGEL x1 (2014)  HLD (hyperlipidemia)  HTN (hypertension)  S/P coronary artery stent placement: mLAD x1 MIGUEL ANGEL (2014),  3 stents 09/2019  H/O total hysterectomy: 2017-11 units of blood given       SOCIAL HISTORY:  No tobacco, ethanol, or drug abuse.    FAMILY HISTORY:  Family history of coronary artery disease younger than 40 years of age (Sibling)    Mother-HTN, HLD, DM, open heart surgery     MEDICATIONS  (STANDING):  aspirin  chewable 81 milliGRAM(s) Oral daily  atorvastatin 40 milliGRAM(s) Oral at bedtime  ceFAZolin   IVPB 2000 milliGRAM(s) IV Intermittent every 8 hours  metoprolol tartrate 50 milliGRAM(s) Oral two times a day  ondansetron Injectable 4 milliGRAM(s) IV Push every 6 hours  sodium chloride 0.9%. 1000 milliLiter(s) (125 mL/Hr) IV Continuous <Continuous>  ticagrelor 90 milliGRAM(s) Oral every 12 hours    MEDICATIONS  (PRN):  oxycodone    5 mG/acetaminophen 325 mG 1 Tablet(s) Oral every 4 hours PRN Moderate Pain (4 - 6)  oxycodone    5 mG/acetaminophen 325 mG 2 Tablet(s) Oral every 6 hours PRN Severe Pain (7 - 10)      Allergies    codeine (Unknown)    Intolerances        REVIEW OF SYSTEMS:    CONSTITUTIONAL: No weakness, fevers or chills  EYES/ENT: No visual changes;  No vertigo or throat pain   NECK: No pain or stiffness  RESPIRATORY: No cough, wheezing, hemoptysis; No shortness of breath  CARDIOVASCULAR: No chest pain or palpitations  GASTROINTESTINAL: + mild abdominal pain around incision site. No nausea, vomiting, or hematemesis; No diarrhea or constipation. No melena or hematochezia.  GENITOURINARY: No dysuria, frequency or hematuria  NEUROLOGICAL: No numbness or weakness  All other review of systems is negative unless indicated above    VITAL SIGNS:   Vital Signs Last 24 Hrs  T(C): 36.6 (02 Sep 2020 04:45), Max: 37 (01 Sep 2020 20:55)  T(F): 97.9 (02 Sep 2020 04:45), Max: 98.6 (01 Sep 2020 20:55)  HR: 87 (02 Sep 2020 04:45) (60 - 89)  BP: 101/64 (02 Sep 2020 04:45) (91/63 - 166/87)  BP(mean): 66 (01 Sep 2020 16:21) (60 - 70)  RR: 17 (02 Sep 2020 04:45) (14 - 18)  SpO2: 95% (02 Sep 2020 04:45) (95% - 100%)    I&O's Summary    01 Sep 2020 07:01  -  02 Sep 2020 07:00  --------------------------------------------------------  IN: 2260 mL / OUT: 1390 mL / NET: 870 mL        On Exam:    Constitutional: NAD, alert and oriented x 3, laying  comfortably in bed  Lungs:  Non-labored, breath sounds are clear bilaterally, No wheezing, rales or rhonchi  Cardiovascular: RRR.  S1 and S2 positive.  No murmurs, rubs, gallops or clicks  Gastrointestinal: Bowel Sounds present, soft, post op dressing intact, no drainage noted    Lymph: No peripheral edema  Neurological: Alert, no focal deficits  Psych:  Mood & affect appropriate.    LABS: All Labs Reviewed:                        9.2    16.32 )-----------( 184      ( 02 Sep 2020 07:12 )             28.2                         10.3   x     )-----------( x        ( 01 Sep 2020 22:27 )             31.2                         10.8   18.56 )-----------( 196      ( 01 Sep 2020 17:55 )             33.4     02 Sep 2020 07:12    141    |  112    |  11     ----------------------------<  149    4.0     |  21     |  0.70     Ca    7.7        02 Sep 2020 07:12            Blood Culture:         RADIOLOGY: N/A    EKG: [facility] - Zakia Barraza, Oskar, Maye, Pj, Spike Rizzo  Office Number: [phone]    Initial Consult Note    CHIEF COMPLAINT: Patient is a 44y old  Female who presents with a chief complaint of Post-op  Observation (01 Sep 2020 21:41)      HPI: 43 yo F with PMH CAD s/p 4 stents, (3 in 2019), HTN, HLD, COVID (4/2020) had abdominoplasty yesterday with post op hypotension s/p 1 unit prbc and LR bolus. BP was 93/59, asx.   Pt was seen and examined at bedside. Pt has no complaints except mild pain at the incision site. Pt's BP when seen was 101/64, her normal SBP is 130-140s. Pt is on ticagrelor and asa due to her h/o stents. H/H stable at 10.3 after given one unit of blood. Still getting fluids. Pt denies any headache, dizziness, blurry vision, fatigue, weakness, CP, SOB, palpitations, abdominal pain, N/V/D, numbness/tingling.       PAST MEDICAL & SURGICAL HISTORY:  Obesity  Hypothyroid: Resolved  Coronary artery disease: s/p MIGUEL ANGEL x 3 (9/2019), MIGUEL ANGEL x1 (2014)  HLD (hyperlipidemia)  HTN (hypertension)  S/P coronary artery stent placement: LAD x1 MIGUEL ANGEL (2014),  3 stents 09/2019  H/O total hysterectomy: 2017-11 units of blood given       SOCIAL HISTORY:  No tobacco, ethanol, or drug abuse.    FAMILY HISTORY:  Family history of coronary artery disease younger than 40 years of age (Sibling)    Mother-HTN, HLD, DM, open heart surgery, MI i    MEDICATIONS  (STANDING):  aspirin  chewable 81 milliGRAM(s) Oral daily  atorvastatin 40 milliGRAM(s) Oral at bedtime  ceFAZolin   IVPB 2000 milliGRAM(s) IV Intermittent every 8 hours  metoprolol tartrate 50 milliGRAM(s) Oral two times a day  ondansetron Injectable 4 milliGRAM(s) IV Push every 6 hours  sodium chloride 0.9%. 1000 milliLiter(s) (125 mL/Hr) IV Continuous <Continuous>  ticagrelor 90 milliGRAM(s) Oral every 12 hours    MEDICATIONS  (PRN):  oxycodone    5 mG/acetaminophen 325 mG 1 Tablet(s) Oral every 4 hours PRN Moderate Pain (4 - 6)  oxycodone    5 mG/acetaminophen 325 mG 2 Tablet(s) Oral every 6 hours PRN Severe Pain (7 - 10)      Allergies    codeine (Unknown)    Intolerances        REVIEW OF SYSTEMS:    CONSTITUTIONAL: No weakness, fevers or chills  EYES/ENT: No visual changes;  No vertigo or throat pain   NECK: No pain or stiffness  RESPIRATORY: No cough, wheezing, hemoptysis; No shortness of breath  CARDIOVASCULAR: No chest pain or palpitations  GASTROINTESTINAL: + mild abdominal pain around incision site. No nausea, vomiting, or hematemesis; No diarrhea or constipation. No melena or hematochezia.  GENITOURINARY: No dysuria, frequency or hematuria  NEUROLOGICAL: No numbness or weakness  All other review of systems is negative unless indicated above    VITAL SIGNS:   Vital Signs Last 24 Hrs  T(C): 36.6 (02 Sep 2020 04:45), Max: 37 (01 Sep 2020 20:55)  T(F): 97.9 (02 Sep 2020 04:45), Max: 98.6 (01 Sep 2020 20:55)  HR: 87 (02 Sep 2020 04:45) (60 - 89)  BP: 101/64 (02 Sep 2020 04:45) (91/63 - 166/87)  BP(mean): 66 (01 Sep 2020 16:21) (60 - 70)  RR: 17 (02 Sep 2020 04:45) (14 - 18)  SpO2: 95% (02 Sep 2020 04:45) (95% - 100%)    I&O's Summary    01 Sep 2020 07:01  -  02 Sep 2020 07:00  --------------------------------------------------------  IN: 2260 mL / OUT: 1390 mL / NET: 870 mL        On Exam:    Constitutional: NAD, alert and oriented x 3, laying  comfortably in bed  Lungs:  Non-labored, breath sounds are clear bilaterally, No wheezing, rales or rhonchi  Cardiovascular: RRR.  S1 and S2 positive.  No murmurs, rubs, gallops or clicks  Gastrointestinal: Bowel Sounds present, soft, post op dressing intact, no drainage noted    Lymph: No peripheral edema  Neurological: Alert, no focal deficits  Psych:  Mood & affect appropriate.    LABS: All Labs Reviewed:                        9.2    16.32 )-----------( 184      ( 02 Sep 2020 07:12 )             28.2                         10.3   x     )-----------( x        ( 01 Sep 2020 22:27 )             31.2                         10.8   18.56 )-----------( 196      ( 01 Sep 2020 17:55 )             33.4     02 Sep 2020 07:12    141    |  112    |  11     ----------------------------<  149    4.0     |  21     |  0.70     Ca    7.7        02 Sep 2020 07:12            Blood Culture:         RADIOLOGY: N/A    EKG: ordered STAT, no ekg on file [facility] - Zakia Barraza, Oskar, Maye, Pj, Spike Rizzo  Office Number: [phone]    Initial Consult Note    CHIEF COMPLAINT: Patient is a 44y old  Female who presents with a chief complaint of Post-op  Observation (01 Sep 2020 21:41)      HPI: 45 yo F with PMH CAD s/p 4 stents, (3 in 2019), HTN, HLD, COVID (4/2020) had abdominoplasty yesterday with post op hypotension s/p 1 unit prbc and LR bolus. BP was 93/59, asx.   Pt was seen and examined at bedside. Pt has no complaints except mild pain at the incision site. Pt's BP when seen was 101/64, her normal SBP is 130-140s. Pt is on ticagrelor and asa due to her h/o stents. H/H stable at 10.3 after given one unit of blood. Still getting fluids. Pt denies any headache, dizziness, blurry vision, fatigue, weakness, CP, SOB, palpitations, abdominal pain, N/V/D, numbness/tingling.       PAST MEDICAL & SURGICAL HISTORY:  Obesity  Hypothyroid: Resolved  Coronary artery disease: s/p MIGUEL ANGEL x 3 (9/2019), MIGUEL ANGEL x1 (2014)  HLD (hyperlipidemia)  HTN (hypertension)  S/P coronary artery stent placement: LAD x1 MIGUEL ANGEL (2014),  3 stents 09/2019  H/O total hysterectomy: 2017-11 units of blood given       SOCIAL HISTORY:  No tobacco, ethanol, or drug abuse.    FAMILY HISTORY:  Family history of coronary artery disease younger than 40 years of age (Sibling)    Mother-HTN, HLD, DM, open heart surgery, MI i    MEDICATIONS  (STANDING):  aspirin  chewable 81 milliGRAM(s) Oral daily  atorvastatin 40 milliGRAM(s) Oral at bedtime  ceFAZolin   IVPB 2000 milliGRAM(s) IV Intermittent every 8 hours  metoprolol tartrate 50 milliGRAM(s) Oral two times a day  ondansetron Injectable 4 milliGRAM(s) IV Push every 6 hours  sodium chloride 0.9%. 1000 milliLiter(s) (125 mL/Hr) IV Continuous <Continuous>  ticagrelor 90 milliGRAM(s) Oral every 12 hours    MEDICATIONS  (PRN):  oxycodone    5 mG/acetaminophen 325 mG 1 Tablet(s) Oral every 4 hours PRN Moderate Pain (4 - 6)  oxycodone    5 mG/acetaminophen 325 mG 2 Tablet(s) Oral every 6 hours PRN Severe Pain (7 - 10)      Allergies    codeine (Unknown)    Intolerances        REVIEW OF SYSTEMS:    CONSTITUTIONAL: No weakness, fevers or chills  EYES/ENT: No visual changes;  No vertigo or throat pain   NECK: No pain or stiffness  RESPIRATORY: No cough, wheezing, hemoptysis; No shortness of breath  CARDIOVASCULAR: No chest pain or palpitations  GASTROINTESTINAL: + mild abdominal pain around incision site. No nausea, vomiting, or hematemesis; No diarrhea or constipation. No melena or hematochezia.  GENITOURINARY: No dysuria, frequency or hematuria  NEUROLOGICAL: No numbness or weakness  All other review of systems is negative unless indicated above    VITAL SIGNS:   Vital Signs Last 24 Hrs  T(C): 36.6 (02 Sep 2020 04:45), Max: 37 (01 Sep 2020 20:55)  T(F): 97.9 (02 Sep 2020 04:45), Max: 98.6 (01 Sep 2020 20:55)  HR: 87 (02 Sep 2020 04:45) (60 - 89)  BP: 101/64 (02 Sep 2020 04:45) (91/63 - 166/87)  BP(mean): 66 (01 Sep 2020 16:21) (60 - 70)  RR: 17 (02 Sep 2020 04:45) (14 - 18)  SpO2: 95% (02 Sep 2020 04:45) (95% - 100%)    I&O's Summary    01 Sep 2020 07:01  -  02 Sep 2020 07:00  --------------------------------------------------------  IN: 2260 mL / OUT: 1390 mL / NET: 870 mL        On Exam:    Constitutional: NAD, alert and oriented x 3, laying  comfortably in bed  Lungs:  Non-labored, breath sounds are clear bilaterally, No wheezing, rales or rhonchi  Cardiovascular: RRR.  S1 and S2 positive.  No murmurs, rubs, gallops or clicks  Gastrointestinal: Bowel Sounds present, soft, post op dressing intact, no drainage noted    Lymph: No peripheral edema  Neurological: Alert, no focal deficits  Psych:  Mood & affect appropriate.    LABS: All Labs Reviewed:                        9.2    16.32 )-----------( 184      ( 02 Sep 2020 07:12 )             28.2                         10.3   x     )-----------( x        ( 01 Sep 2020 22:27 )             31.2                         10.8   18.56 )-----------( 196      ( 01 Sep 2020 17:55 )             33.4     02 Sep 2020 07:12    141    |  112    |  11     ----------------------------<  149    4.0     |  21     |  0.70     Ca    7.7        02 Sep 2020 07:12            Blood Culture:         RADIOLOGY: N/A    EKG: sinus tachy, PVC's bigeminy pattern

## 2020-09-02 NOTE — PROGRESS NOTE ADULT - SUBJECTIVE AND OBJECTIVE BOX
S   Pt feeling well  Tolerating regular diet  Voided post Caldera removal  Ambulating without dizziness, lightheadedness or difficulty    O  Afebrile VSS  BP has normalized over the day      Abdomen-benign exam      Dressings-Clean and dry      JUDY's serosanguinous output-milked    A&P Pt improved         Continued observation

## 2020-09-02 NOTE — CONSULT NOTE ADULT - ASSESSMENT
43 yo F with PMH CAD s/p 4 stents, (3 in 2019), HTN, HLD, COVID (4/2020) had abdominoplasty yesterday with post op hypotension, cardio was consulted due to hypotension.     --CAD s/p 4 stents (1-2014, 3-2019)  -last echo was done on 8/25/20-pt unsure of results, with private PCP   -last stress test was done in 2019-wnl  -last angiogram was done in 2019 when she got DESx3   -Follow cardio outpt Dr. Abdoul Lam from [facility]   -c/w home meds ASA 81 QD and Ticagrelor 90 BID at home  -H/H stable s/p 1 unit prbc, monitor H/H     --HTN  -on Losartan 25 QD and Metoprolol succinate 50 BID at home  -BP: 101/64 HR: 87   -monitor on tele   -C/w home meds...    --HLD  -c/w home med Atorvastatin 40 QD 45 yo F with PMH CAD s/p 4 stents, (3 in 2019), HTN, HLD, COVID (4/2020) had abdominoplasty yesterday with post op hypotension, cardio was consulted due to hypotension.     --CAD s/p 4 stents (1-2014, 3-2019)  -last echo was done on 8/25/20-pt unsure of results, with private PCP   -last stress test was done in 2019-wnl  -last angiogram was done in 2019 when she got DESx3   -Follow cardio outpt Dr. Abdoul Lam from [facility]   -c/w home meds ASA 81 QD and Ticagrelor 90 BID at home  -H/H s/p 1 unit prbc 9.7-->10.3, monitor H/H     --HTN  -on Losartan 25 QD and Metoprolol succinate 50 BID at home  -BP: 101/64 HR: 87   -monitor on tele   -C/w home meds...    --HLD  -c/w home med Atorvastatin 40 QD 45 yo F with PMH CAD s/p 4 stents, (3 in 2019), HTN, HLD, COVID (4/2020) had abdominoplasty yesterday with post op hypotension, cardio was consulted due to hypotension.     --CAD s/p 4 stents (1-2014, 3-2019)  -last echo was done on 8/25/20-pt unsure of results, with private PCP  -reordered echo, f/u results   -last stress test was done in 2019-wnl  -last angiogram was done in sep 2019 when she got DESx3   -Follow cardio outpt Dr. Abdoul Lam from [facility]   -d/c Ticagrelor 90 BID, s/p PCI almost one year ago, no longer needed at this    -Start  daily   -H/H s/p 1 unit prbc 9.7-->10.3-->9.2 (on fluids)  -monitor H/H   -EKG STAT     --HTN  -on Losartan 25 QD and Metoprolol succinate 50 BID at home  -BP: 101/64 HR: 87   -monitor on tele   -Losartan HELD  -c/w Metoprolol with hold parameters     --HLD  -c/w home med Atorvastatin 40 QD 43 yo F with PMH CAD s/p 4 stents, (3 in 2019), HTN, HLD, COVID (4/2020) had abdominoplasty yesterday with post op hypotension, cardio was consulted due to hypotension.     --CAD s/p 4 stents (1-2014, 3-2019)  -last echo was done on 8/25/20-pt unsure of results, with private PCP  -reordered echo, f/u results   -last stress test was done in 2019-wnl  -last angiogram was done in sep 2019 when she got DESx3   -Follow cardio out[facility]   -d/c Ticagrelor 90 BID, s/p PCI almost one year ago, no longer needed at this    -Start  daily   -H/H s/p 1 unit prbc 9.7-->10.3-->9.2 (on fluids)  -monitor H/H   -EKG STAT     --HTN  -on Losartan 25 QD and Metoprolol succinate 50 BID at home  -BP: 101/64 HR: 87   -monitor on tele   -Losartan HELD  -c/w Metoprolol with hold parameters     --HLD  -c/w home med Atorvastatin 40 QD    --DVT PPX  -lovenox 45 yo F with PMH CAD s/p 4 stents, (3 in 2019), HTN, HLD, COVID (4/2020) had abdominoplasty yesterday with post op hypotension, cardio was consulted due to hypotension.     --CAD s/p 4 stents (1-2014, 3-2019)  -last echo was done on 8/25/20-pt unsure of results, with private PCP  -reordered echo, f/u results   -last stress test was done in 2019-wnl  -last angiogram was done in sep 2019 when she got DESx3   -Follow cardio outpt Dr. Abdoul Lam from [facility]   -d/c Ticagrelor 90 BID, s/p PCI almost one year ago, no longer needed at this    -Start  daily   -H/H s/p 1 unit prbc 9.7-->10.3-->9.2 (on fluids)  -monitor H/H   -EKG: sinus tachy, PVC's bigeminy pattern     --HTN  -on Losartan 25 QD and Metoprolol succinate 50 BID at home  -BP: 101/64 HR: 87   -monitor on tele   -Losartan HELD  -c/w Metoprolol with hold parameters     --HLD  -c/w home med Atorvastatin 40 QD    --DVT PPX  -Lovenox 40

## 2020-09-02 NOTE — CONSULT NOTE ADULT - PROBLEM SELECTOR RECOMMENDATION 9
hx PCI w stent   would cont DAPT for now  patient will follow up with her outpatient cardiologist to discuss proper timing of discontinuation of ticagrelor  cont aspirin, cont statin, beta blocker

## 2020-09-02 NOTE — PROGRESS NOTE ADULT - SUBJECTIVE AND OBJECTIVE BOX
S   Pt feeling well  Tolerating regular diet       Seen by Medicine and Cardiology    O  Afebrile  VSS      Abdomen-benign exam                      Dressing changed-suture line C & D  Umbilicus viable                      No collections, ecchymosis or hematoma      No calf tenderness      A&P Follow recommendations from Dr Perlman         D/C Caldera         Ambulate with assistance and walker          Spirometer given to pt-along with instructions in use         Pt to remain in hospital for observation S   Pt feeling well  Tolerating regular diet       Seen by Medicine and Cardiology         H/H 9.2/28.2    O  Afebrile  VSS      Abdomen-benign exam                      Dressing changed-suture line C & D  Umbilicus viable                      No collections, ecchymosis or hematoma      No calf tenderness      A&P Follow recommendations from Dr Perlman         D/C Werner         Ambulate with assistance and walker          Spirometer given to pt-along with instructions in use         Pt to remain in hospital for observation

## 2020-09-02 NOTE — PROGRESS NOTE ADULT - SUBJECTIVE AND OBJECTIVE BOX
STATUS POST:  abdominoplasty/panniculectomy     POST OPERATIVE DAY #: 1    SUBJECTIVE:  Patient seen and examined at bedside. hypotensive since post-op.  Patient with no new complaints at this time, tolerating regular diet, admits to flatus and denies bowel movement.  Patient denies any fevers, chills, chest pain, shortness of breath, nausea, vomiting or diarrhea.    Vital Signs Last 24 Hrs  T(C): 36.6 (02 Sep 2020 04:45), Max: 37 (01 Sep 2020 20:55)  T(F): 97.9 (02 Sep 2020 04:45), Max: 98.6 (01 Sep 2020 20:55)  HR: 87 (02 Sep 2020 04:45) (60 - 89)  BP: 101/64 (02 Sep 2020 04:45) (91/63 - 115/64)  BP(mean): 66 (01 Sep 2020 16:21) (60 - 70)  RR: 17 (02 Sep 2020 04:45) (14 - 18)  SpO2: 95% (02 Sep 2020 04:45) (95% - 100%)    PHYSICAL EXAM:  GENERAL: No acute distress, well-developed  HEAD:  Atraumatic, Normocephalic  ABDOMEN: Soft, minimally-tender, non-distended pannus incision with steris intact with minimal drainage. JUDY drains x2 intact and with serosanguinous drainage present   NEUROLOGY: A&O x 3, no focal deficits    I&O's Summary    01 Sep 2020 07:01  -  02 Sep 2020 07:00  --------------------------------------------------------  IN: 2260 mL / OUT: 1390 mL / NET: 870 mL      I&O's Detail    01 Sep 2020 07:01  -  02 Sep 2020 07:00  --------------------------------------------------------  IN:    lactated ringers.: 150 mL    lactated ringers.: 250 mL    Oral Fluid: 360 mL    sodium chloride 0.9%.: 1500 mL  Total IN: 2260 mL    OUT:    Bulb: 75 mL    Bulb: 80 mL    Indwelling Catheter - Urethral: 1235 mL  Total OUT: 1390 mL    Total NET: 870 mL        MEDICATIONS  (STANDING):  aspirin  chewable 81 milliGRAM(s) Oral daily  atorvastatin 40 milliGRAM(s) Oral at bedtime  ceFAZolin   IVPB 2000 milliGRAM(s) IV Intermittent every 8 hours  metoprolol tartrate 50 milliGRAM(s) Oral two times a day  ondansetron Injectable 4 milliGRAM(s) IV Push every 6 hours  ticagrelor 90 milliGRAM(s) Oral every 12 hours    MEDICATIONS  (PRN):  oxycodone    5 mG/acetaminophen 325 mG 1 Tablet(s) Oral every 4 hours PRN Moderate Pain (4 - 6)  oxycodone    5 mG/acetaminophen 325 mG 2 Tablet(s) Oral every 6 hours PRN Severe Pain (7 - 10)    LABS:                        9.2    16.32 )-----------( 184      ( 02 Sep 2020 07:12 )             28.2     09-02    141  |  112<H>  |  11  ----------------------------<  149<H>  4.0   |  21<L>  |  0.70    Ca    7.7<L>      02 Sep 2020 07:12      RADIOLOGY & ADDITIONAL STUDIES:  no new    ASSESSMENT    44y Female POD 1 s/p panniculectomy with persistent adrianne/post-op hypotension currently doing well post-op.    PLAN  - Will discussed and seen with Dr. Ruiz  - dressings changed  - d/c LUIS mendez ambulating  - cardio/med consulted for medical management and hypotension, recc's appreciated  - Per med brilinta adequate for DVT prophylaxis  - cozaar d/c'd for now, metoprolol with set parameters, holding for now  - slight drop in H/H, will continue to monitor, AM CBC  - incentive spirometer  - pain control  - Diabetic diet, IVF d/c'd  - serial abdominal exams  - labs in am    Surgical Team Contact Information  Spectralink: Ext: [phone] or [phone]  Pager: [phone]

## 2020-09-02 NOTE — CONSULT NOTE ADULT - SUBJECTIVE AND OBJECTIVE BOX
Patient is a 44y old  Female who presents with a chief complaint of Post-op  Observation (01 Sep 2020 21:41)  pod 1 abdominoplasty  feels well presently, without complaints      INTERVAL HPI/OVERNIGHT EVENTS:  T(C): 36.6 (09-02-20 @ 04:45), Max: 37 (09-01-20 @ 20:55)  HR: 87 (09-02-20 @ 04:45) (60 - 89)  BP: 101/64 (09-02-20 @ 04:45) (91/63 - 166/87)  RR: 17 (09-02-20 @ 04:45) (14 - 18)  SpO2: 95% (09-02-20 @ 04:45) (95% - 100%)  Wt(kg): --  I&O's Summary    01 Sep 2020 07:01  -  02 Sep 2020 07:00  --------------------------------------------------------  IN: 2260 mL / OUT: 1390 mL / NET: 870 mL        PAST MEDICAL & SURGICAL HISTORY:  Obesity  Hypothyroid: Resolved  Coronary artery disease: s/p MIGUEL ANGEL x 3 (9/2019), MIGUEL ANGEL x1 (2014)  HLD (hyperlipidemia)  HTN (hypertension)  S/P coronary artery stent placement: mLAD x1 MIGUEL ANGEL (2014),  3 stents 09/2019  H/O total hysterectomy: 2017      SOCIAL HISTORY  Alcohol: neg  Tobacco: neg  Illicit substance use: neg      FAMILY HISTORY: mother with htn, dm, dyslipidemia    Home Medications:  aspirin 81 mg oral tablet: 1 tab(s) orally once a day (01 Sep 2020 09:25)  atorvastatin 40 mg oral tablet: 1 tab(s) orally once a day (at bedtime) (01 Sep 2020 09:25)  losartan 25 mg oral tablet: 1 tab(s) orally once a day (at bedtime) (01 Sep 2020 09:25)  metoprolol tartrate 50 mg oral tablet: 1 tab(s) orally 2 times a day (01 Sep 2020 09:25)        LABS:                        9.2    16.32 )-----------( 184      ( 02 Sep 2020 07:12 )             28.2     09-02    141  |  112<H>  |  11  ----------------------------<  149<H>  4.0   |  21<L>  |  0.70    Ca    7.7<L>      02 Sep 2020 07:12          CAPILLARY BLOOD GLUCOSE      POCT Blood Glucose.: 212 mg/dL (01 Sep 2020 21:46)  POCT Blood Glucose.: 199 mg/dL (01 Sep 2020 16:54)  POCT Blood Glucose.: 220 mg/dL (01 Sep 2020 14:34)  POCT Blood Glucose.: 152 mg/dL (01 Sep 2020 09:53)            MEDICATIONS  (STANDING):  aspirin  chewable 81 milliGRAM(s) Oral daily  atorvastatin 40 milliGRAM(s) Oral at bedtime  ceFAZolin   IVPB 2000 milliGRAM(s) IV Intermittent every 8 hours  metoprolol tartrate 50 milliGRAM(s) Oral two times a day  ondansetron Injectable 4 milliGRAM(s) IV Push every 6 hours  sodium chloride 0.9%. 1000 milliLiter(s) (125 mL/Hr) IV Continuous <Continuous>  ticagrelor 90 milliGRAM(s) Oral every 12 hours    MEDICATIONS  (PRN):  oxycodone    5 mG/acetaminophen 325 mG 1 Tablet(s) Oral every 4 hours PRN Moderate Pain (4 - 6)  oxycodone    5 mG/acetaminophen 325 mG 2 Tablet(s) Oral every 6 hours PRN Severe Pain (7 - 10)      REVIEW OF SYSTEMS:  CONSTITUTIONAL: No fever, weight loss, or fatigue  EYES: No eye pain, visual disturbances, or discharge  ENMT:  No difficulty hearing, tinnitus, vertigo; No sinus or throat pain  NECK: No pain or stiffness  RESPIRATORY: No cough, wheezing, chills or hemoptysis; No shortness of breath  CARDIOVASCULAR: No chest pain, palpitations, dizziness, or leg swelling  GASTROINTESTINAL: No abdominal or epigastric pain. No nausea, vomiting, or hematemesis; No diarrhea or constipation. No melena or hematochezia.  GENITOURINARY: No dysuria, frequency, hematuria, or incontinence  NEUROLOGICAL: No headaches, memory loss, loss of strength, numbness, or tremors  SKIN: No itching, burning, rashes, or lesions   LYMPH NODES: No enlarged glands  ENDOCRINE: No heat or cold intolerance; No hair loss  MUSCULOSKELETAL: No joint pain or swelling; No muscle, back, or extremity pain  PSYCHIATRIC: No depression, anxiety, mood swings, or difficulty sleeping  HEME/LYMPH: No easy bruising, or bleeding gums  ALLERY AND IMMUNOLOGIC: No hives or eczema    RADIOLOGY & ADDITIONAL TESTS:    Imaging Personally Reviewed:  [ ] YES  [ ] NO    Consultant(s) Notes Reviewed:  [ ] YES  [ ] NO        PHYSICAL EXAM:  GENERAL: NAD, well-groomed, well-developed  HEAD:  Atraumatic, Normocephalic  EYES: EOMI, PERRLA, conjunctiva and sclera clear  ENMT: No tonsillar erythema, exudates, or enlargement; Moist mucous membranes, Good dentition, No lesions  NECK: Supple, No JVD, Normal thyroid  NERVOUS SYSTEM:  Alert & Oriented X3, Good concentration; Motor Strength 5/5 B/L upper and lower extremities; DTRs 2+ intact and symmetric  CHEST/LUNG: Clear to percussion bilaterally; No rales, rhonchi, wheezing, or rubs  HEART: Regular rate and rhythm; No murmurs, rubs, or gallops  ABDOMEN: Soft, Nontender, dressing c/d/i  EXTREMITIES:  2+ Peripheral Pulses, No clubbing, cyanosis, or edema  LYMPH: No lymphadenopathy noted  SKIN: No rashes or lesions    Care Discussed with Consultants/Other Providers [ ] YES  [ ] NO

## 2020-09-02 NOTE — ANESTHESIA FOLLOW-UP NOTE - NSEVALATIONFT_GEN_ALL_CORE
POD # 1 s/p abdominoplasty. Patient with asymptomatic hypotension postoperatively. Currently being evaluated by cardiology and medicine teams. Slight drop in H/H. Patient has no specific complaints.

## 2020-09-03 ENCOUNTER — TRANSCRIPTION ENCOUNTER (OUTPATIENT)
Age: 45
End: 2020-09-03

## 2020-09-03 DIAGNOSIS — D50.0 IRON DEFICIENCY ANEMIA SECONDARY TO BLOOD LOSS (CHRONIC): ICD-10-CM

## 2020-09-03 LAB
ANION GAP SERPL CALC-SCNC: 6 MMOL/L — SIGNIFICANT CHANGE UP (ref 5–17)
BUN SERPL-MCNC: 12 MG/DL — SIGNIFICANT CHANGE UP (ref 7–23)
CALCIUM SERPL-MCNC: 8 MG/DL — LOW (ref 8.5–10.1)
CHLORIDE SERPL-SCNC: 110 MMOL/L — HIGH (ref 96–108)
CO2 SERPL-SCNC: 26 MMOL/L — SIGNIFICANT CHANGE UP (ref 22–31)
CREAT SERPL-MCNC: 0.85 MG/DL — SIGNIFICANT CHANGE UP (ref 0.5–1.3)
GLUCOSE SERPL-MCNC: 142 MG/DL — HIGH (ref 70–99)
HCT VFR BLD CALC: 24.6 % — LOW (ref 34.5–45)
HCT VFR BLD CALC: 26.6 % — LOW (ref 34.5–45)
HGB BLD-MCNC: 8 G/DL — LOW (ref 11.5–15.5)
HGB BLD-MCNC: 8.6 G/DL — LOW (ref 11.5–15.5)
MCHC RBC-ENTMCNC: 30.3 PG — SIGNIFICANT CHANGE UP (ref 27–34)
MCHC RBC-ENTMCNC: 30.3 PG — SIGNIFICANT CHANGE UP (ref 27–34)
MCHC RBC-ENTMCNC: 32.3 GM/DL — SIGNIFICANT CHANGE UP (ref 32–36)
MCHC RBC-ENTMCNC: 32.5 GM/DL — SIGNIFICANT CHANGE UP (ref 32–36)
MCV RBC AUTO: 93.2 FL — SIGNIFICANT CHANGE UP (ref 80–100)
MCV RBC AUTO: 93.7 FL — SIGNIFICANT CHANGE UP (ref 80–100)
NRBC # BLD: 0 /100 WBCS — SIGNIFICANT CHANGE UP (ref 0–0)
NRBC # BLD: 0 /100 WBCS — SIGNIFICANT CHANGE UP (ref 0–0)
PLATELET # BLD AUTO: 166 K/UL — SIGNIFICANT CHANGE UP (ref 150–400)
PLATELET # BLD AUTO: 182 K/UL — SIGNIFICANT CHANGE UP (ref 150–400)
POTASSIUM SERPL-MCNC: 3.9 MMOL/L — SIGNIFICANT CHANGE UP (ref 3.5–5.3)
POTASSIUM SERPL-SCNC: 3.9 MMOL/L — SIGNIFICANT CHANGE UP (ref 3.5–5.3)
RBC # BLD: 2.64 M/UL — LOW (ref 3.8–5.2)
RBC # BLD: 2.84 M/UL — LOW (ref 3.8–5.2)
RBC # FLD: 13.7 % — SIGNIFICANT CHANGE UP (ref 10.3–14.5)
RBC # FLD: 13.8 % — SIGNIFICANT CHANGE UP (ref 10.3–14.5)
SODIUM SERPL-SCNC: 142 MMOL/L — SIGNIFICANT CHANGE UP (ref 135–145)
WBC # BLD: 13.55 K/UL — HIGH (ref 3.8–10.5)
WBC # BLD: 15.65 K/UL — HIGH (ref 3.8–10.5)
WBC # FLD AUTO: 13.55 K/UL — HIGH (ref 3.8–10.5)
WBC # FLD AUTO: 15.65 K/UL — HIGH (ref 3.8–10.5)

## 2020-09-03 PROCEDURE — 99232 SBSQ HOSP IP/OBS MODERATE 35: CPT

## 2020-09-03 RX ADMIN — ONDANSETRON 4 MILLIGRAM(S): 8 TABLET, FILM COATED ORAL at 05:23

## 2020-09-03 RX ADMIN — Medication 50 MILLIGRAM(S): at 05:21

## 2020-09-03 RX ADMIN — Medication 100 MILLIGRAM(S): at 02:40

## 2020-09-03 RX ADMIN — ENOXAPARIN SODIUM 40 MILLIGRAM(S): 100 INJECTION SUBCUTANEOUS at 11:21

## 2020-09-03 RX ADMIN — Medication 50 MILLIGRAM(S): at 18:18

## 2020-09-03 RX ADMIN — Medication 100 MILLIGRAM(S): at 18:12

## 2020-09-03 RX ADMIN — ONDANSETRON 4 MILLIGRAM(S): 8 TABLET, FILM COATED ORAL at 11:21

## 2020-09-03 RX ADMIN — Medication 100 MILLIGRAM(S): at 11:21

## 2020-09-03 RX ADMIN — ATORVASTATIN CALCIUM 40 MILLIGRAM(S): 80 TABLET, FILM COATED ORAL at 21:04

## 2020-09-03 RX ADMIN — Medication 162 MILLIGRAM(S): at 11:21

## 2020-09-03 NOTE — PROGRESS NOTE ADULT - SUBJECTIVE AND OBJECTIVE BOX
S  Pt feeling well  Toleration PO  Voiding  Ambulating without lightheadedness or dizziness       + BM      Pt had an echocardiogram today      She is using her spirometer regularly       H/H 8.6/26.6    O  Afebrile VSS      Benign abdominal exam      No bleeding-suture line intact      No hematomas or ecchymosis of op site      No calf tenderness    A&P  Pt stable          CPM

## 2020-09-03 NOTE — PROGRESS NOTE ADULT - SUBJECTIVE AND OBJECTIVE BOX
Patient is a 44y old  Female who presents with a chief complaint of hypotension (03 Sep 2020 08:52)  feels well presently, without complaints  no distress, no abdominal pain      INTERVAL HPI/OVERNIGHT EVENTS:  T(C): 36.7 (09-03-20 @ 05:15), Max: 37.3 (09-02-20 @ 20:18)  HR: 99 (09-03-20 @ 05:15) (87 - 102)  BP: 112/72 (09-03-20 @ 05:15) (105/73 - 131/73)  RR: 17 (09-03-20 @ 05:15) (16 - 17)  SpO2: 95% (09-03-20 @ 05:15) (95% - 97%)  Wt(kg): --  I&O's Summary    02 Sep 2020 07:01  -  03 Sep 2020 07:00  --------------------------------------------------------  IN: 1225 mL / OUT: 2180 mL / NET: -955 mL    03 Sep 2020 07:01  -  03 Sep 2020 10:37  --------------------------------------------------------  IN: 240 mL / OUT: 458 mL / NET: -218 mL        LABS:                        8.0    13.55 )-----------( 166      ( 03 Sep 2020 06:38 )             24.6     09-03    142  |  110<H>  |  12  ----------------------------<  142<H>  3.9   |  26  |  0.85    Ca    8.0<L>      03 Sep 2020 06:38          CAPILLARY BLOOD GLUCOSE                MEDICATIONS  (STANDING):  aspirin  chewable 162 milliGRAM(s) Oral daily  atorvastatin 40 milliGRAM(s) Oral at bedtime  ceFAZolin   IVPB 2000 milliGRAM(s) IV Intermittent every 8 hours  enoxaparin Injectable 40 milliGRAM(s) SubCutaneous daily  metoprolol tartrate 50 milliGRAM(s) Oral two times a day  ondansetron Injectable 4 milliGRAM(s) IV Push every 6 hours    MEDICATIONS  (PRN):  oxycodone    5 mG/acetaminophen 325 mG 1 Tablet(s) Oral every 4 hours PRN Moderate Pain (4 - 6)  oxycodone    5 mG/acetaminophen 325 mG 2 Tablet(s) Oral every 6 hours PRN Severe Pain (7 - 10)      REVIEW OF SYSTEMS:  CONSTITUTIONAL: No fever, weight loss, or fatigue  EYES: No eye pain, visual disturbances, or discharge  ENMT:  No difficulty hearing, tinnitus, vertigo; No sinus or throat pain  NECK: No pain or stiffness  RESPIRATORY: No cough, wheezing, chills or hemoptysis; No shortness of breath  CARDIOVASCULAR: No chest pain, palpitations, dizziness, or leg swelling  GASTROINTESTINAL: less post op discomfort.  GENITOURINARY: No dysuria, frequency, hematuria, or incontinence  NEUROLOGICAL: No headaches, memory loss, loss of strength, numbness, or tremors  SKIN: No itching, burning, rashes, or lesions   LYMPH NODES: No enlarged glands  ENDOCRINE: No heat or cold intolerance; No hair loss  MUSCULOSKELETAL: No joint pain or swelling; No muscle, back, or extremity pain  PSYCHIATRIC: No depression, anxiety, mood swings, or difficulty sleeping  HEME/LYMPH: No easy bruising, or bleeding gums  ALLERY AND IMMUNOLOGIC: No hives or eczema    RADIOLOGY & ADDITIONAL TESTS:    Imaging Personally Reviewed:  [ ] YES  [ ] NO    Consultant(s) Notes Reviewed:  [ ] YES  [ ] NO    PHYSICAL EXAM:  GENERAL: NAD, well-groomed, well-developed  HEAD:  Atraumatic, Normocephalic  EYES: EOMI, PERRLA, conjunctiva and sclera clear  ENMT: No tonsillar erythema, exudates, or enlargement; Moist mucous membranes, Good dentition, No lesions  NECK: Supple, No JVD, Normal thyroid  NERVOUS SYSTEM:  Alert & Oriented X3, Good concentration; Motor Strength 5/5 B/L upper and lower extremities; DTRs 2+ intact and symmetric  CHEST/LUNG: Clear to percussion bilaterally; No rales, rhonchi, wheezing, or rubs  HEART: Regular rate and rhythm; No murmurs, rubs, or gallops  ABDOMEN: Soft, Nontender, Nondistended; Bowel sounds present  EXTREMITIES:  2+ Peripheral Pulses, No clubbing, cyanosis, or edema  LYMPH: No lymphadenopathy noted  SKIN: No rashes or lesions    Care Discussed with Consultants/Other Providers [ ] YES  [ ] NO

## 2020-09-03 NOTE — PROGRESS NOTE ADULT - SUBJECTIVE AND OBJECTIVE BOX
S    Pt feeling well-Ambulating without difficulty, lightheadedness or dizziness-tolerating PO  Voiding without difficulty        Pt is being followed by Dr Perlman and cardiology          H/H 8/24.6    O  Afebrile  VSS      Benign abdominal exam      Suture lines intact      No collections or hematomas      Pubic ecchymosis      No calf tenderness        JUDY's R 20  L 38-last shift             milked-serosanguinous output      A&P Discussed with Dr Perlman-will continue to observe pt as dip in H/H may be due to third spacing as pt is completely asymptomatic- and the JUDY output is clearing         CPM

## 2020-09-03 NOTE — PROGRESS NOTE ADULT - SUBJECTIVE AND OBJECTIVE BOX
[facility] -- Zakia Barraza, Maye Schmitt, Matthias Oliva Savella  Office # [phone]      Follow Up:    hypotension     Subjective/Observations:   No events overnight resting comfortably in bed.  No complaints of chest pain, dyspnea, or palpitations reported. No signs of orthopnea or PND.      REVIEW OF SYSTEMS: All other review of systems is negative unless indicated above    PAST MEDICAL & SURGICAL HISTORY:  Obesity  Hypothyroid: Resolved  Coronary artery disease: s/p MIGUEL ANGEL x 3 (9/2019), MIGUEL ANGEL x1 (2014)  HLD (hyperlipidemia)  HTN (hypertension)  S/P coronary artery stent placement: mLAD x1 MIGUEL ANGEL (2014),  3 stents 09/2019  H/O total hysterectomy: 2017      MEDICATIONS  (STANDING):  aspirin  chewable 162 milliGRAM(s) Oral daily  atorvastatin 40 milliGRAM(s) Oral at bedtime  ceFAZolin   IVPB 2000 milliGRAM(s) IV Intermittent every 8 hours  enoxaparin Injectable 40 milliGRAM(s) SubCutaneous daily  metoprolol tartrate 50 milliGRAM(s) Oral two times a day  ondansetron Injectable 4 milliGRAM(s) IV Push every 6 hours    MEDICATIONS  (PRN):  oxycodone    5 mG/acetaminophen 325 mG 1 Tablet(s) Oral every 4 hours PRN Moderate Pain (4 - 6)  oxycodone    5 mG/acetaminophen 325 mG 2 Tablet(s) Oral every 6 hours PRN Severe Pain (7 - 10)      Allergies    codeine (Unknown)    Intolerances        Vital Signs Last 24 Hrs  T(C): 36.7 (03 Sep 2020 05:15), Max: 37.3 (02 Sep 2020 20:18)  T(F): 98 (03 Sep 2020 05:15), Max: 99.2 (02 Sep 2020 20:18)  HR: 99 (03 Sep 2020 05:15) (54 - 102)  BP: 112/72 (03 Sep 2020 05:15) (105/73 - 152/81)  BP(mean): --  RR: 17 (03 Sep 2020 05:15) (16 - 18)  SpO2: 95% (03 Sep 2020 05:15) (95% - 97%)    I&O's Summary    02 Sep 2020 07:01  -  03 Sep 2020 07:00  --------------------------------------------------------  IN: 1225 mL / OUT: 2180 mL / NET: -955 mL          PHYSICAL EXAM:  TELE: SR  Constitutional: NAD, awake and alert, well-developed  HEENT: Moist Mucous Membranes, Anicteric  Pulmonary: Non-labored, breath sounds are clear bilaterally, No wheezing, crackles or rhonchi  Cardiovascular: Regular, S1 and S2 nl, No murmurs, rubs, gallops or clicks  Gastrointestinal: Bowel Sounds present, soft, nontender, JUDY  Lymph: No lymphadenopathy. No peripheral edema.  Skin: No visible rashes or ulcers.  Psych:  Mood & affect appropriate    LABS: All Labs Reviewed:                        8.0    13.55 )-----------( 166      ( 03 Sep 2020 06:38 )             24.6                         9.2    16.32 )-----------( 184      ( 02 Sep 2020 07:12 )             28.2                         10.3   x     )-----------( x        ( 01 Sep 2020 22:27 )             31.2     03 Sep 2020 06:38    142    |  110    |  12     ----------------------------<  142    3.9     |  26     |  0.85   02 Sep 2020 07:12    141    |  112    |  11     ----------------------------<  149    4.0     |  21     |  0.70     Ca    8.0        03 Sep 2020 06:38  Ca    7.7        02 Sep 2020 07:12               ECG:  < from: 12 Lead ECG (09.02.20 @ 11:57) >    Ventricular Rate 110 BPM    Atrial Rate 110 BPM    P-R Interval 132 ms    QRS Duration 82 ms    Q-T Interval 414 ms    QTC Calculation(Bezet) 560 ms    P Axis 34 degrees    R Axis 4 degrees    T Axis 22 degrees    Diagnosis Line Sinus tachycardia with frequent premature ventricular complexes in a pattern of bigeminy  Nonspecific T wave abnormality  Prolonged QT  Abnormal ECG  No previous ECGs available    < end of copied text >    Echo:  < from: TTE with Doppler (w/Cont) (06.09.15 @ 10:17) >  OBSERVATIONS:  Mitral Valve: Mitral annular calcification, otherwise  normal mitral valve. Mild mitral regurgitation.  Aortic Root: Normal size aortic root. (Ao:2.6 cm).  Aortic Valve: Calcified trileaflet aortic valve with normal  opening.  Left Atrium: Normal left atrium.  Left Ventricle: Moderate segmental left ventricular  systolic dysfunction. The mid to distal anteroseptum are  hypokinetic. Normal left ventricular internal dimensions  and wall thicknesses. (DT:217 ms).  Right Heart: Normal right atrium. Normal right ventricular  size and function. Normal tricuspid valve. Mild tricuspid  regurgitation. Normal pulmonic valve.  Pericardium/PleuraNormal pericardium with no pericardial  effusion.  Hemodynamic: Estimated right ventricular systolic pressure  equals 29 mm Hg, assuming rightatrial pressure equals 10  mm Hg, consistent with normal pulmonary pressures.  ------------------------------------------------------------------------  CONCLUSIONS:  1. Moderate segmental left ventricular systolic  dysfunction. The mid to distal anteroseptum are  hypokinetic.  2. Normal right ventricular size and function.  -------------------------------------------------------------    < end of copied text >    Radiology:

## 2020-09-03 NOTE — PROGRESS NOTE ADULT - ASSESSMENT
43 yo F with PMH CAD s/p 4 stents, (3 in 2019), HTN, HLD, COVID (4/2020) had abdominoplasty with post op hypotension, cardio was consulted due to hypotension.     CAD s/p 4 stents (1-2014, 3-2019)  -echo pending,   echo 2015 delineated above revealing mild MR, mild TR, moderate LV dysfunction   -last stress test was done in 2019-wnl  -last angiogram was done in sep 2019 when she got DESx3   -Follow cardio outpt Dr. Abdoul Lam from [facility]   -continue asa  -EKG: sinus tachy, PVC's bigeminy pattern   -tele sr    HTN  initially hypotensive now improved, 112/72  -continue bb  -continue to hold arb   -c/w Metoprolol with hold parameters     HLD  -c/w home med Atorvastatin 40 QD    POD # 2 s/p abdominoplasty/panniculectomy  as per surgery   pain control  charmaine drain     --DVT PPX  -Lovenox 40    Monitor and replete electrolytes. Keep K>4.0 and Mg>2.0.  Toshia Jesus FNP-C  Cardiology NP  [facility] [phone] 43 yo F with PMH CAD s/p 4 stents, (3 in 2019), HTN, HLD, COVID (4/2020) had abdominoplasty with post op hypotension, cardio was consulted due to hypotension.     CAD s/p 4 stents (1-2014, 3-2019)  - echo pending,   - echo 2015 delineated above revealing mild MR, mild TR, moderate LV dysfunction   - last stress test was done in 2019-wnl  - last angiogram was done in sep 2019 when she got DESx3   - Follow cardio outpt Dr. Abdoul Lam from [facility]   - continue asa  - EKG: sinus tachy, PVC's bigeminy pattern   - tele sr    HTN  initially hypotensive now improved, 112/72  - continue bb  - continue to hold arb     HLD  -c/w home med Atorvastatin 40 QD    POD # 2 s/p abdominoplasty/panniculectomy  as per surgery   pain control  charmaine drain     -DVT PPX  -Lovenox 40    Monitor and replete electrolytes. Keep K>4.0 and Mg>2.0.  Toshia Jesus FNP-C  Cardiology NP  [facility] [phone]

## 2020-09-03 NOTE — PROGRESS NOTE ADULT - SUBJECTIVE AND OBJECTIVE BOX
POD # 2  s/p abdominoplasty/panniculectomy     SUBJECTIVE:  43 y/o F seen and examined at bedside. Pt offers no complaints at this time in NAD. PT states she has been tolerating reg diet without N/V. PT admits to flatus. Pt denies any fevers, chills, chest pain, shortness of breath, abdominal pain, nausea, vomiting or diarrhea.    Vital Signs Last 24 Hrs  T(C): 36.7 (03 Sep 2020 05:15), Max: 37.3 (02 Sep 2020 20:18)  T(F): 98 (03 Sep 2020 05:15), Max: 99.2 (02 Sep 2020 20:18)  HR: 99 (03 Sep 2020 05:15) (54 - 102)  BP: 112/72 (03 Sep 2020 05:15) (105/73 - 152/81)  BP(mean): --  RR: 17 (03 Sep 2020 05:15) (16 - 18)  SpO2: 95% (03 Sep 2020 05:15) (95% - 97%)    PHYSICAL EXAM:  GENERAL: No acute distress, well-developed  HEAD:  Atraumatic, Normocephalic  CHEST/LUNG: CTABL, no ronchi, rales or wheezing  HEART: RRR, no MRG  ABDOMEN: Soft, minimal incisional ttp, nondistended, +bs, incisional site c/d/i, well approximated without erythema or drainage, left and right JUDY drain in place with about 25 cc serosanguinous drainage,   NEUROLOGY: A&O x 3, no focal deficits    I&O's Summary    01 Sep 2020 07:01  -  02 Sep 2020 07:00  --------------------------------------------------------  IN: 2260 mL / OUT: 1390 mL / NET: 870 mL    02 Sep 2020 07:01  -  03 Sep 2020 05:22  --------------------------------------------------------  IN: 1225 mL / OUT: 2180 mL / NET: -955 mL      I&O's Detail    01 Sep 2020 07:01  -  02 Sep 2020 07:00  --------------------------------------------------------  IN:    lactated ringers.: 150 mL    lactated ringers.: 250 mL    Oral Fluid: 360 mL    sodium chloride 0.9%: 1500 mL  Total IN: 2260 mL    OUT:    Bulb: 75 mL    Bulb: 80 mL    Indwelling Catheter - Urethral: 1235 mL  Total OUT: 1390 mL    Total NET: 870 mL      02 Sep 2020 07:01  -  03 Sep 2020 05:22  --------------------------------------------------------  IN:    IV PiggyBack: 50 mL    Oral Fluid: 1050 mL    sodium chloride 0.9%: 125 mL  Total IN: 1225 mL    OUT:    Bulb: 200 mL    Bulb: 80 mL    Indwelling Catheter - Urethral: 800 mL    Voided: 1100 mL  Total OUT: 2180 mL    Total NET: -955 mL        MEDICATIONS  (STANDING):  aspirin  chewable 162 milliGRAM(s) Oral daily  atorvastatin 40 milliGRAM(s) Oral at bedtime  ceFAZolin   IVPB 2000 milliGRAM(s) IV Intermittent every 8 hours  enoxaparin Injectable 40 milliGRAM(s) SubCutaneous daily  metoprolol tartrate 50 milliGRAM(s) Oral two times a day  ondansetron Injectable 4 milliGRAM(s) IV Push every 6 hours    MEDICATIONS  (PRN):  oxycodone    5 mG/acetaminophen 325 mG 1 Tablet(s) Oral every 4 hours PRN Moderate Pain (4 - 6)  oxycodone    5 mG/acetaminophen 325 mG 2 Tablet(s) Oral every 6 hours PRN Severe Pain (7 - 10)    LABS:                        9.2    16.32 )-----------( 184      ( 02 Sep 2020 07:12 )             28.2     09-02    141  |  112<H>  |  11  ----------------------------<  149<H>  4.0   |  21<L>  |  0.70    Ca    7.7<L>      02 Sep 2020 07:12    ASSESSMENT  43 y/o F POD # 2 s/p abdominoplasty/panniculectomy, tolerating reg diet, with about 200 cc output of serosanguinous drainage of left JUDY drain, and about 80 cc serosanguinous output of right JUDY drain over last 24 hrs ,     PLAN  - f/u am cbc  - trend drain outputs  - pain control, supportive care  - cardio/med recs appreciated  - cont reg diet  - serial abdominal exams  - labs in am  - day team to discuss with Dr. [facility]    Surgical Team Contact Information  Spectralink: Ext: [phone] or [phone]  Pager: [phone]

## 2020-09-04 ENCOUNTER — TRANSCRIPTION ENCOUNTER (OUTPATIENT)
Age: 45
End: 2020-09-04

## 2020-09-04 VITALS
OXYGEN SATURATION: 98 % | RESPIRATION RATE: 17 BRPM | HEART RATE: 82 BPM | DIASTOLIC BLOOD PRESSURE: 80 MMHG | TEMPERATURE: 97 F | SYSTOLIC BLOOD PRESSURE: 120 MMHG

## 2020-09-04 LAB
HCT VFR BLD CALC: 27 % — LOW (ref 34.5–45)
HGB BLD-MCNC: 8.8 G/DL — LOW (ref 11.5–15.5)
MCHC RBC-ENTMCNC: 30.2 PG — SIGNIFICANT CHANGE UP (ref 27–34)
MCHC RBC-ENTMCNC: 32.6 GM/DL — SIGNIFICANT CHANGE UP (ref 32–36)
MCV RBC AUTO: 92.8 FL — SIGNIFICANT CHANGE UP (ref 80–100)
NRBC # BLD: 0 /100 WBCS — SIGNIFICANT CHANGE UP (ref 0–0)
PLATELET # BLD AUTO: 197 K/UL — SIGNIFICANT CHANGE UP (ref 150–400)
RBC # BLD: 2.91 M/UL — LOW (ref 3.8–5.2)
RBC # FLD: 13.3 % — SIGNIFICANT CHANGE UP (ref 10.3–14.5)
WBC # BLD: 13.92 K/UL — HIGH (ref 3.8–10.5)
WBC # FLD AUTO: 13.92 K/UL — HIGH (ref 3.8–10.5)

## 2020-09-04 PROCEDURE — 80048 BASIC METABOLIC PNL TOTAL CA: CPT

## 2020-09-04 PROCEDURE — 93306 TTE W/DOPPLER COMPLETE: CPT

## 2020-09-04 PROCEDURE — 86901 BLOOD TYPING SEROLOGIC RH(D): CPT

## 2020-09-04 PROCEDURE — 85018 HEMOGLOBIN: CPT

## 2020-09-04 PROCEDURE — C1889: CPT

## 2020-09-04 PROCEDURE — 86850 RBC ANTIBODY SCREEN: CPT

## 2020-09-04 PROCEDURE — 85027 COMPLETE CBC AUTOMATED: CPT

## 2020-09-04 PROCEDURE — 88300 SURGICAL PATH GROSS: CPT

## 2020-09-04 PROCEDURE — 93005 ELECTROCARDIOGRAM TRACING: CPT

## 2020-09-04 PROCEDURE — 86900 BLOOD TYPING SEROLOGIC ABO: CPT

## 2020-09-04 PROCEDURE — 85014 HEMATOCRIT: CPT

## 2020-09-04 PROCEDURE — P9016: CPT

## 2020-09-04 PROCEDURE — 82962 GLUCOSE BLOOD TEST: CPT

## 2020-09-04 PROCEDURE — 99232 SBSQ HOSP IP/OBS MODERATE 35: CPT

## 2020-09-04 PROCEDURE — 86923 COMPATIBILITY TEST ELECTRIC: CPT

## 2020-09-04 PROCEDURE — 36415 COLL VENOUS BLD VENIPUNCTURE: CPT

## 2020-09-04 PROCEDURE — 36430 TRANSFUSION BLD/BLD COMPNT: CPT

## 2020-09-04 RX ADMIN — Medication 162 MILLIGRAM(S): at 11:47

## 2020-09-04 RX ADMIN — ENOXAPARIN SODIUM 40 MILLIGRAM(S): 100 INJECTION SUBCUTANEOUS at 11:47

## 2020-09-04 RX ADMIN — Medication 50 MILLIGRAM(S): at 05:11

## 2020-09-04 RX ADMIN — Medication 100 MILLIGRAM(S): at 03:06

## 2020-09-04 RX ADMIN — Medication 100 MILLIGRAM(S): at 11:47

## 2020-09-04 NOTE — PROGRESS NOTE ADULT - ASSESSMENT
45 yo F with PMH CAD s/p 4 stents, (3 in 2019), HTN, HLD, COVID (4/2020) had abdominoplasty with post op hypotension, cardio was consulted due to hypotension.     CAD s/p 4 stents (1-2014, 3-2019)  - echo as above 9/3/2020 revealing trace MR, trace TR, lv ef 60-65%   - last stress test was done in 2019-wnl  - last angiogram was done in sep 2019 when she got DESx3   - Follow cardio outpt Dr. Abdoul Lam from [facility]   - continue asa  - EKG: sinus tachy, PVC's bigeminy pattern   - tele sr- can dc tele     HTN  initially hypotensive now improved, 128/79  continue bb   continue to hold arb     HLD  c/w home med Atorvastatin 40 QD    POD # 3 s/p abdominoplasty/panniculectomy  as per surgery   pain control  charmaine drain as per surgery     -DVT PPX  -Lovenox 40    Monitor and replete electrolytes. Keep K>4.0 and Mg>2.0.  Toshia Jesus FNP-C  Cardiology NP  [facility] [phone] 45 yo F with PMH CAD s/p 4 stents, (3 in 2019), HTN, HLD, COVID (4/2020) had abdominoplasty with post op hypotension, cardio was consulted due to hypotension.     CAD s/p 4 stents (1-2014, 3-2019)  - echo as above 9/3/2020 revealing trace MR, trace TR, lv ef 60-65%   - last stress test was done in 2019-wnl  - last angiogram was done in sep 2019 when she got DESx3   - Follow cardio outpt Dr. Abdoul Lam from [facility]   - continue asa, no need for dapt at this point  - EKG: sinus tachy, PVC's bigeminy pattern   - tele sr- can dc tele     HTN  initially hypotensive now improved, 128/79  continue bb   continue to hold arb     HLD  c/w home med Atorvastatin 40 QD    POD # 3 s/p abdominoplasty/panniculectomy  as per surgery   pain control  charmaine drain as per surgery     -DVT PPX  -Lovenox 40    Monitor and replete electrolytes. Keep K>4.0 and Mg>2.0.  Toshia Jesus FNP-C  Cardiology NP  [facility] [phone]

## 2020-09-04 NOTE — PROGRESS NOTE ADULT - REASON FOR ADMISSION
Observation and fluid management
Observation post op
Post op observation
Post op observation
Post-op  Observation
hypotension
observation post op
post op monitoring
s/p abdominoplasty/panniculectomy
hypotension

## 2020-09-04 NOTE — DISCHARGE NOTE NURSING/CASE MANAGEMENT/SOCIAL WORK - PATIENT PORTAL LINK FT
You can access the FollowMyHealth Patient Portal offered by [facility] by registering at the following website: [email or web address]. By joining [facility]’s FollowMyHealth portal, you will also be able to view your health information using other applications (apps) compatible with our system.

## 2020-09-04 NOTE — PROGRESS NOTE ADULT - SUBJECTIVE AND OBJECTIVE BOX
[facility] -- Zakia Barraza, Oskar, Maye, Matthias Oliva Savella  Office # [phone]      Follow Up:  CAd, hypotensive post op    Subjective/Observations:   No events overnight resting comfortably in bed.  No complaints of chest pain, dyspnea, or palpitations reported. No signs of orthopnea or PND.  REVIEW OF SYSTEMS: All other review of systems is negative unless indicated above    PAST MEDICAL & SURGICAL HISTORY:  Obesity  Hypothyroid: Resolved  Coronary artery disease: s/p MIGUEL ANGEL x 3 (9/2019), MIGUEL ANGEL x1 (2014)  HLD (hyperlipidemia)  HTN (hypertension)  S/P coronary artery stent placement: mLAD x1 MIGUEL ANGEL (2014),  3 stents 09/2019  H/O total hysterectomy: 2017      MEDICATIONS  (STANDING):  aspirin  chewable 162 milliGRAM(s) Oral daily  atorvastatin 40 milliGRAM(s) Oral at bedtime  ceFAZolin   IVPB 2000 milliGRAM(s) IV Intermittent every 8 hours  enoxaparin Injectable 40 milliGRAM(s) SubCutaneous daily  metoprolol tartrate 50 milliGRAM(s) Oral two times a day    MEDICATIONS  (PRN):  oxycodone    5 mG/acetaminophen 325 mG 1 Tablet(s) Oral every 4 hours PRN Moderate Pain (4 - 6)  oxycodone    5 mG/acetaminophen 325 mG 2 Tablet(s) Oral every 6 hours PRN Severe Pain (7 - 10)      Allergies    codeine (Unknown)    Intolerances        Vital Signs Last 24 Hrs  T(C): 36.8 (04 Sep 2020 04:45), Max: 37.7 (03 Sep 2020 21:20)  T(F): 98.2 (04 Sep 2020 04:45), Max: 99.8 (03 Sep 2020 21:20)  HR: 83 (04 Sep 2020 04:45) (83 - 102)  BP: 128/79 (04 Sep 2020 04:45) (108/70 - 128/79)  BP(mean): --  RR: 17 (04 Sep 2020 04:45) (17 - 17)  SpO2: 93% (04 Sep 2020 04:45) (93% - 96%)    I&O's Summary    03 Sep 2020 07:01  -  04 Sep 2020 07:00  --------------------------------------------------------  IN: 1120 mL / OUT: 1031 mL / NET: 89 mL          PHYSICAL EXAM:  TELE: Sr , PVC  Constitutional: NAD, awake and alert, obese  HEENT: Moist Mucous Membranes, Anicteric  Pulmonary: Non-labored, breath sounds are clear bilaterally, No wheezing, crackles or rhonchi  Cardiovascular: Regular, S1 and S2 nl, No murmurs, rubs, gallops or clicks  Gastrointestinal: Bowel Sounds present, soft, nontender.   Lymph: No lymphadenopathy. No peripheral edema.  Skin: No visible rashes or ulcers.  Psych:  Mood & affect appropriate    LABS: All Labs Reviewed:                        8.6    15.65 )-----------( 182      ( 03 Sep 2020 16:27 )             26.6                         8.0    13.55 )-----------( 166      ( 03 Sep 2020 06:38 )             24.6                         9.2    16.32 )-----------( 184      ( 02 Sep 2020 07:12 )             28.2     03 Sep 2020 06:38    142    |  110    |  12     ----------------------------<  142    3.9     |  26     |  0.85   02 Sep 2020 07:12    141    |  112    |  11     ----------------------------<  149    4.0     |  21     |  0.70     Ca    8.0        03 Sep 2020 06:38  Ca    7.7        02 Sep 2020 07:12               ECG:  < from: 12 Lead ECG (09.02.20 @ 11:57) >    Ventricular Rate 110 BPM    Atrial Rate 110 BPM    P-R Interval 132 ms    QRS Duration 82 ms    Q-T Interval 414 ms    QTC Calculation(Bezet) 560 ms    P Axis 34 degrees    R Axis 4 degrees    T Axis 22 degrees    Diagnosis Line Sinus tachycardia with frequent premature ventricular complexes in a pattern of bigeminy  Nonspecific T wave abnormality  Prolonged QT  Abnormal ECG  No previous ECGs available    < end of copied text >      Echo:  < from: TTE Echo Complete w/o Contrast w/ Doppler (09.03.20 @ 12:56) >    OBSERVATIONS:  Technically difficult and limited study  Mitral Valve: Thickened leaflets, trace physiologic MR.  Aortic Valve/Aorta: normal trileaflet aortic valve.  Tricuspid Valve: normal with trace TR.  Pulmonic Valve: normal  Left Atrium: normal  Right Atrium: normal  Left Ventricle: normal LV size and systolic function, estimated LVEF of 60-65%.  Right Ventricle: Grossly normal size and systolic function.  Pericardium/Pleura: normal, no significant pericardial effusion.      Conclusion:  Technically difficult and limited study  Normal left ventricular internal dimensions and systolic function, estimated LVEF of 60-65%.  Grossly normal RV size and systolic function.  Normal trileaflet aortic valve, without AI.  Trace physiologic MR and TR.  No significant pericardial effusion.      < end of copied text >      Radiology:

## 2020-09-04 NOTE — DISCHARGE NOTE PROVIDER - CARE PROVIDER_API CALL
Sheree Anderson  PLASTIC SURGERY  [address] [place] [address]  Phone: [phone]  Fax: [phone]  Follow Up Time:

## 2020-09-04 NOTE — PROGRESS NOTE ADULT - SUBJECTIVE AND OBJECTIVE BOX
STATUS POST:  abdominoplasty/panniculectomy     POST OPERATIVE DAY #: 3    SUBJECTIVE:  Patient seen and examined at bedside.  No overnight events.  Patient with no new complaints at this time, tolerating diet, admits to flatus and bowel movement.  Patient denies any fevers, chills, chest pain, shortness of breath, nausea, vomiting or diarrhea, dizziness, lightheadedness, orthostatic hypotension.    Vital Signs Last 24 Hrs  T(C): 36.8 (04 Sep 2020 04:45), Max: 37.7 (03 Sep 2020 21:20)  T(F): 98.2 (04 Sep 2020 04:45), Max: 99.8 (03 Sep 2020 21:20)  HR: 83 (04 Sep 2020 04:45) (83 - 102)  BP: 128/79 (04 Sep 2020 04:45) (108/70 - 128/79)  BP(mean): --  RR: 17 (04 Sep 2020 04:45) (17 - 17)  SpO2: 93% (04 Sep 2020 04:45) (93% - 96%)    PHYSICAL EXAM:  GENERAL: No acute distress, well-developed  HEAD:  Atraumatic, Normocephalic  ABDOMEN: Soft, minimally-tender, non-distended pannus incision with steris intact with minimal drainage. JUDY drains x2 intact and with serosanguinous drainage present   NEUROLOGY: A&O x 3, no focal deficits    I&O's Summary    03 Sep 2020 07:01  -  04 Sep 2020 07:00  --------------------------------------------------------  IN: 1120 mL / OUT: 1031 mL / NET: 89 mL    04 Sep 2020 07:01  -  04 Sep 2020 11:02  --------------------------------------------------------  IN: 320 mL / OUT: 0 mL / NET: 320 mL      I&O's Detail    03 Sep 2020 07:01  -  04 Sep 2020 07:00  --------------------------------------------------------  IN:    Oral Fluid: 1120 mL  Total IN: 1120 mL    OUT:    Bulb: 88 mL    Bulb: 143 mL    Voided: 800 mL  Total OUT: 1031 mL    Total NET: 89 mL      04 Sep 2020 07:01  -  04 Sep 2020 11:02  --------------------------------------------------------  IN:    Oral Fluid: 320 mL  Total IN: 320 mL    OUT:  Total OUT: 0 mL    Total NET: 320 mL        MEDICATIONS  (STANDING):  aspirin  chewable 162 milliGRAM(s) Oral daily  atorvastatin 40 milliGRAM(s) Oral at bedtime  ceFAZolin   IVPB 2000 milliGRAM(s) IV Intermittent every 8 hours  enoxaparin Injectable 40 milliGRAM(s) SubCutaneous daily  metoprolol tartrate 50 milliGRAM(s) Oral two times a day    MEDICATIONS  (PRN):  oxycodone    5 mG/acetaminophen 325 mG 1 Tablet(s) Oral every 4 hours PRN Moderate Pain (4 - 6)  oxycodone    5 mG/acetaminophen 325 mG 2 Tablet(s) Oral every 6 hours PRN Severe Pain (7 - 10)    LABS:                        8.6    15.65 )-----------( 182      ( 03 Sep 2020 16:27 )             26.6     09-03    142  |  110<H>  |  12  ----------------------------<  142<H>  3.9   |  26  |  0.85    Ca    8.0<L>      03 Sep 2020 06:38    RADIOLOGY & ADDITIONAL STUDIES:  no new    ASSESSMENT    44y Female POD 1 s/p panniculectomy with persistent adrianne/post-op hypotension currently doing well post-op.    PLAN  - Discussed with Dr. Ruiz  - STAT CBC pending  - If CBC is stable, patient for d/c today  - incentive spirometer  - pain control  - OOB    Surgical Team Contact Information  Spectralink: Ext: [phone] or [phone]  Pager: [phone]

## 2020-09-04 NOTE — PROGRESS NOTE ADULT - SUBJECTIVE AND OBJECTIVE BOX
S   Pt feeling well-Ambulating, tolerating PO, voiding without difficulty         H/H 8.8/27    O  Afebrile  VSS      Benign abdominal exam      Suture lines clean and dry      No collections or hematoma      Ext No calf tenderness        JUDY's serosanguinous output-milked      A&P Pt ready for discharge         Instructions given         Will discuss anticoagulation therapy with cardiology         Follow-up in office on Tuesday

## 2020-09-04 NOTE — PROGRESS NOTE ADULT - SUBJECTIVE AND OBJECTIVE BOX
Patient is a 44y old  Female who presents with a chief complaint of Post op observation (04 Sep 2020 11:51)      INTERVAL HPI/OVERNIGHT EVENTS: Patient seen and examined. NAD. No complaints.    Vital Signs Last 24 Hrs  T(C): 36.3 (04 Sep 2020 12:05), Max: 37.7 (03 Sep 2020 21:20)  T(F): 97.4 (04 Sep 2020 12:05), Max: 99.8 (03 Sep 2020 21:20)  HR: 82 (04 Sep 2020 12:05) (82 - 102)  BP: 120/80 (04 Sep 2020 12:05) (108/70 - 128/79)  BP(mean): --  RR: 17 (04 Sep 2020 12:05) (17 - 17)  SpO2: 98% (04 Sep 2020 12:05) (93% - 98%)    09-03    142  |  110<H>  |  12  ----------------------------<  142<H>  3.9   |  26  |  0.85    Ca    8.0<L>      03 Sep 2020 06:38                            8.8    13.92 )-----------( 197      ( 04 Sep 2020 11:41 )             27.0       CAPILLARY BLOOD GLUCOSE                  aspirin  chewable 162 milliGRAM(s) Oral daily  atorvastatin 40 milliGRAM(s) Oral at bedtime  enoxaparin Injectable 40 milliGRAM(s) SubCutaneous daily  metoprolol tartrate 50 milliGRAM(s) Oral two times a day  oxycodone    5 mG/acetaminophen 325 mG 1 Tablet(s) Oral every 4 hours PRN  oxycodone    5 mG/acetaminophen 325 mG 2 Tablet(s) Oral every 6 hours PRN              REVIEW OF SYSTEMS:  CONSTITUTIONAL: No fever, no weight loss, or no fatigue  NECK: No pain, no stiffness  RESPIRATORY: No cough, no wheezing, no chills, no hemoptysis, No shortness of breath  CARDIOVASCULAR: No chest pain, no palpitations, no dizziness, no leg swelling  GASTROINTESTINAL: No abdominal pain. No nausea, no vomiting, no hematemesis; No diarrhea, no constipation. No melena, no hematochezia.  GENITOURINARY: No dysuria, no frequency, no hematuria, no incontinence  NEUROLOGICAL: No headaches, no loss of strength, no numbness, no tremors  SKIN: No itching, no burning  MUSCULOSKELETAL: No joint pain, no swelling; No muscle, no back, no extremity pain  PSYCHIATRIC: No depression, no mood swings,   HEME/LYMPH: No easy bruising, no bleeding gums  ALLERY AND IMMUNOLOGIC: No hives       Consultant(s) Notes Reviewed:  [X] YES  [ ] NO    PHYSICAL EXAM:  GENERAL: NAD  HEAD:  Atraumatic, Normocephalic  EYES: EOMI, PERRLA, conjunctiva and sclera clear  ENMT: No tonsillar erythema, exudates, or enlargement; Moist mucous membranes  NECK: Supple, No JVD  NERVOUS SYSTEM:  Awake & alert  CHEST/LUNG: Clear to auscultation bilaterally; No rales, rhonchi, wheezing,  HEART: Regular rate and rhythm  ABDOMEN: Soft, Nontender, Nondistended; Bowel sounds present  EXTREMITIES:  No clubbing, cyanosis, or edema  LYMPH: No lymphadenopathy noted  SKIN: No rashes      Advanced care planning discussed with patient/family [X] YES   [ ] NO    Advanced care planning discussed with patient/family. Patient's health status was discussed. All appropriate changes have been made regarding patient's end-of-life care. Advanced care planning forms reviewed/discussed/completed.  20 minutes spent.

## 2020-09-04 NOTE — DISCHARGE NOTE PROVIDER - NSDCMRMEDTOKEN_GEN_ALL_CORE_FT
aspirin 81 mg oral tablet: 1 tab(s) orally once a day  atorvastatin 40 mg oral tablet: 1 tab(s) orally once a day (at bedtime)  losartan 25 mg oral tablet: 1 tab(s) orally once a day (at bedtime)  metoprolol tartrate 50 mg oral tablet: 1 tab(s) orally 2 times a day  ticagrelor 90 mg oral tablet: 1 tab(s) orally every 12 hours

## 2020-09-04 NOTE — PROGRESS NOTE ADULT - ATTENDING COMMENTS
Seen/examined. agree with above.
The patient was personally seen and examined, in addition to being examined and evaluated by NP.  All elements of the note were edited where appropriate.
D/C planning

## 2020-09-04 NOTE — DISCHARGE NOTE PROVIDER - NSDCFUADDINST_GEN_ALL_CORE_FT
Measure and empty JUDY drain and outputs daily.     Keep steri-strips clean, dry and intact x 24 hrs. Apply water proof ice pack 20 mins on, 20 mins off to help decrease pain and swelling. You may sponge bathe. Do NOT remove steri-strips. Do not scrub or soak incision site. Pat dry. NO tub baths, NO swimming pools. No hot tubs.  Do not lift anything over 10 lbs.  Take frequent walks.  Take over the counter Tylenol or Motrin/Ibuprofen as needed for  mild/moderate pain.  DO NOT DRIVE WHILE TAKING NARCOTIC PAIN MEDICATION.

## 2020-09-04 NOTE — DISCHARGE NOTE PROVIDER - HOSPITAL COURSE
This is a 44F with history of CAD s/p 4 stents (most recent in September 2019), HTN, and HLD, obesity (BMI 42), COVID 19 infection (4/2020)  scheduled for abdominoplasty on 9/01/2020. **Pt on Aspirin & Brilinta, will continue as per cardiology & surgeon.         Hospital course:        Patient underwent successful panniculectomy/abdominoplasty without complications, was sent to PACU then to the floor for monitoring.          Over then next few days patient was continued on antibiotics, given pain control. Diet was advanced appropriately until return of normal GI function was obtained as evidence by passing of flatus and BM in addition to toleration of diet. Lab values were monitored with H/H stabilizing. POD 0/1 patient was hypotensive, medicine and cardiology were consulted. Caldera was removed POD 1 with successful TOV.         Disposition: Patient to follow-up with Dr. Ruiz as outpatient.

## 2020-09-04 NOTE — PROGRESS NOTE ADULT - PROVIDER SPECIALTY LIST ADULT
Cardiology
Internal Medicine
Internal Medicine
Plastic Surgery
Surgery
Surgery
Cardiology

## 2020-09-09 ENCOUNTER — TRANSCRIPTION ENCOUNTER (OUTPATIENT)
Age: 45
End: 2020-09-09

## 2020-09-20 ENCOUNTER — INPATIENT (INPATIENT)
Facility: HOSPITAL | Age: 45
LOS: 11 days | Discharge: HOME | DRG: 857 | End: 2020-10-02
Attending: FAMILY MEDICINE | Admitting: STUDENT IN AN ORGANIZED HEALTH CARE EDUCATION/TRAINING PROGRAM
Payer: MEDICAID

## 2020-09-20 VITALS
TEMPERATURE: 99 F | HEART RATE: 120 BPM | RESPIRATION RATE: 18 BRPM | WEIGHT: 199.96 LBS | DIASTOLIC BLOOD PRESSURE: 82 MMHG | OXYGEN SATURATION: 94 % | HEIGHT: 60 IN | SYSTOLIC BLOOD PRESSURE: 118 MMHG

## 2020-09-20 DIAGNOSIS — L03.311 CELLULITIS OF ABDOMINAL WALL: ICD-10-CM

## 2020-09-20 DIAGNOSIS — I10 ESSENTIAL (PRIMARY) HYPERTENSION: ICD-10-CM

## 2020-09-20 DIAGNOSIS — E78.5 HYPERLIPIDEMIA, UNSPECIFIED: ICD-10-CM

## 2020-09-20 DIAGNOSIS — Z29.9 ENCOUNTER FOR PROPHYLACTIC MEASURES, UNSPECIFIED: ICD-10-CM

## 2020-09-20 DIAGNOSIS — E03.9 HYPOTHYROIDISM, UNSPECIFIED: ICD-10-CM

## 2020-09-20 DIAGNOSIS — T81.31XA DISRUPTION OF EXTERNAL OPERATION (SURGICAL) WOUND, NOT ELSEWHERE CLASSIFIED, INITIAL ENCOUNTER: ICD-10-CM

## 2020-09-20 DIAGNOSIS — Z95.5 PRESENCE OF CORONARY ANGIOPLASTY IMPLANT AND GRAFT: Chronic | ICD-10-CM

## 2020-09-20 DIAGNOSIS — R73.03 PREDIABETES: ICD-10-CM

## 2020-09-20 DIAGNOSIS — I25.10 ATHEROSCLEROTIC HEART DISEASE OF NATIVE CORONARY ARTERY WITHOUT ANGINA PECTORIS: ICD-10-CM

## 2020-09-20 DIAGNOSIS — Z90.710 ACQUIRED ABSENCE OF BOTH CERVIX AND UTERUS: Chronic | ICD-10-CM

## 2020-09-20 DIAGNOSIS — D64.9 ANEMIA, UNSPECIFIED: ICD-10-CM

## 2020-09-20 PROBLEM — E66.9 OBESITY, UNSPECIFIED: Chronic | Status: ACTIVE | Noted: 2020-08-27

## 2020-09-20 LAB
ALBUMIN SERPL ELPH-MCNC: 2.4 G/DL — LOW (ref 3.3–5)
ALP SERPL-CCNC: 141 U/L — HIGH (ref 40–120)
ALT FLD-CCNC: 37 U/L — SIGNIFICANT CHANGE UP (ref 12–78)
ANION GAP SERPL CALC-SCNC: 7 MMOL/L — SIGNIFICANT CHANGE UP (ref 5–17)
APPEARANCE UR: CLEAR — SIGNIFICANT CHANGE UP
APTT BLD: 27.1 SEC — LOW (ref 27.5–35.5)
AST SERPL-CCNC: 32 U/L — SIGNIFICANT CHANGE UP (ref 15–37)
BASOPHILS # BLD AUTO: 0.03 K/UL — SIGNIFICANT CHANGE UP (ref 0–0.2)
BASOPHILS NFR BLD AUTO: 0.3 % — SIGNIFICANT CHANGE UP (ref 0–2)
BILIRUB SERPL-MCNC: 0.6 MG/DL — SIGNIFICANT CHANGE UP (ref 0.2–1.2)
BILIRUB UR-MCNC: NEGATIVE — SIGNIFICANT CHANGE UP
BUN SERPL-MCNC: 8 MG/DL — SIGNIFICANT CHANGE UP (ref 7–23)
CALCIUM SERPL-MCNC: 9.2 MG/DL — SIGNIFICANT CHANGE UP (ref 8.5–10.1)
CHLORIDE SERPL-SCNC: 101 MMOL/L — SIGNIFICANT CHANGE UP (ref 96–108)
CO2 SERPL-SCNC: 27 MMOL/L — SIGNIFICANT CHANGE UP (ref 22–31)
COLOR SPEC: YELLOW — SIGNIFICANT CHANGE UP
CREAT SERPL-MCNC: 0.83 MG/DL — SIGNIFICANT CHANGE UP (ref 0.5–1.3)
DIFF PNL FLD: NEGATIVE — SIGNIFICANT CHANGE UP
EOSINOPHIL # BLD AUTO: 0.19 K/UL — SIGNIFICANT CHANGE UP (ref 0–0.5)
EOSINOPHIL NFR BLD AUTO: 1.6 % — SIGNIFICANT CHANGE UP (ref 0–6)
GLUCOSE SERPL-MCNC: 132 MG/DL — HIGH (ref 70–99)
GLUCOSE UR QL: NEGATIVE — SIGNIFICANT CHANGE UP
HCT VFR BLD CALC: 27.1 % — LOW (ref 34.5–45)
HGB BLD-MCNC: 8.9 G/DL — LOW (ref 11.5–15.5)
IMM GRANULOCYTES NFR BLD AUTO: 0.8 % — SIGNIFICANT CHANGE UP (ref 0–1.5)
INR BLD: 1.48 RATIO — HIGH (ref 0.88–1.16)
KETONES UR-MCNC: NEGATIVE — SIGNIFICANT CHANGE UP
LACTATE SERPL-SCNC: 2 MMOL/L — SIGNIFICANT CHANGE UP (ref 0.7–2)
LEUKOCYTE ESTERASE UR-ACNC: NEGATIVE — SIGNIFICANT CHANGE UP
LYMPHOCYTES # BLD AUTO: 18.8 % — SIGNIFICANT CHANGE UP (ref 13–44)
LYMPHOCYTES # BLD AUTO: 2.22 K/UL — SIGNIFICANT CHANGE UP (ref 1–3.3)
MCHC RBC-ENTMCNC: 29.3 PG — SIGNIFICANT CHANGE UP (ref 27–34)
MCHC RBC-ENTMCNC: 32.8 GM/DL — SIGNIFICANT CHANGE UP (ref 32–36)
MCV RBC AUTO: 89.1 FL — SIGNIFICANT CHANGE UP (ref 80–100)
MONOCYTES # BLD AUTO: 1.11 K/UL — HIGH (ref 0–0.9)
MONOCYTES NFR BLD AUTO: 9.4 % — SIGNIFICANT CHANGE UP (ref 2–14)
NEUTROPHILS # BLD AUTO: 8.18 K/UL — HIGH (ref 1.8–7.4)
NEUTROPHILS NFR BLD AUTO: 69.1 % — SIGNIFICANT CHANGE UP (ref 43–77)
NITRITE UR-MCNC: NEGATIVE — SIGNIFICANT CHANGE UP
NRBC # BLD: 0 /100 WBCS — SIGNIFICANT CHANGE UP (ref 0–0)
PH UR: 7 — SIGNIFICANT CHANGE UP (ref 5–8)
PLATELET # BLD AUTO: 656 K/UL — HIGH (ref 150–400)
POTASSIUM SERPL-MCNC: 4 MMOL/L — SIGNIFICANT CHANGE UP (ref 3.5–5.3)
POTASSIUM SERPL-SCNC: 4 MMOL/L — SIGNIFICANT CHANGE UP (ref 3.5–5.3)
PROT SERPL-MCNC: 8.4 G/DL — HIGH (ref 6–8.3)
PROT UR-MCNC: 15
PROTHROM AB SERPL-ACNC: 17 SEC — HIGH (ref 10.6–13.6)
RBC # BLD: 3.04 M/UL — LOW (ref 3.8–5.2)
RBC # FLD: 14.1 % — SIGNIFICANT CHANGE UP (ref 10.3–14.5)
SARS-COV-2 RNA SPEC QL NAA+PROBE: SIGNIFICANT CHANGE UP
SODIUM SERPL-SCNC: 135 MMOL/L — SIGNIFICANT CHANGE UP (ref 135–145)
SP GR SPEC: 1 — LOW (ref 1.01–1.02)
UROBILINOGEN FLD QL: NEGATIVE — SIGNIFICANT CHANGE UP
WBC # BLD: 11.83 K/UL — HIGH (ref 3.8–10.5)
WBC # FLD AUTO: 11.83 K/UL — HIGH (ref 3.8–10.5)

## 2020-09-20 PROCEDURE — 99285 EMERGENCY DEPT VISIT HI MDM: CPT

## 2020-09-20 PROCEDURE — 74177 CT ABD & PELVIS W/CONTRAST: CPT | Mod: 26

## 2020-09-20 PROCEDURE — 93010 ELECTROCARDIOGRAM REPORT: CPT

## 2020-09-20 PROCEDURE — 71045 X-RAY EXAM CHEST 1 VIEW: CPT | Mod: 26

## 2020-09-20 PROCEDURE — 99223 1ST HOSP IP/OBS HIGH 75: CPT | Mod: GC,AI

## 2020-09-20 RX ORDER — ATORVASTATIN CALCIUM 80 MG/1
40 TABLET, FILM COATED ORAL AT BEDTIME
Refills: 0 | Status: DISCONTINUED | OUTPATIENT
Start: 2020-09-20 | End: 2020-10-02

## 2020-09-20 RX ORDER — ACETAMINOPHEN 500 MG
650 TABLET ORAL ONCE
Refills: 0 | Status: COMPLETED | OUTPATIENT
Start: 2020-09-20 | End: 2020-09-20

## 2020-09-20 RX ORDER — SODIUM CHLORIDE 9 MG/ML
2800 INJECTION INTRAMUSCULAR; INTRAVENOUS; SUBCUTANEOUS ONCE
Refills: 0 | Status: COMPLETED | OUTPATIENT
Start: 2020-09-20 | End: 2020-09-20

## 2020-09-20 RX ORDER — ATORVASTATIN CALCIUM 80 MG/1
80 TABLET, FILM COATED ORAL AT BEDTIME
Refills: 0 | Status: DISCONTINUED | OUTPATIENT
Start: 2020-09-20 | End: 2020-09-20

## 2020-09-20 RX ORDER — ENOXAPARIN SODIUM 100 MG/ML
40 INJECTION SUBCUTANEOUS AT BEDTIME
Refills: 0 | Status: DISCONTINUED | OUTPATIENT
Start: 2020-09-20 | End: 2020-10-02

## 2020-09-20 RX ORDER — METOPROLOL TARTRATE 50 MG
50 TABLET ORAL
Refills: 0 | Status: DISCONTINUED | OUTPATIENT
Start: 2020-09-20 | End: 2020-10-02

## 2020-09-20 RX ORDER — PIPERACILLIN AND TAZOBACTAM 4; .5 G/20ML; G/20ML
3.38 INJECTION, POWDER, LYOPHILIZED, FOR SOLUTION INTRAVENOUS EVERY 8 HOURS
Refills: 0 | Status: DISCONTINUED | OUTPATIENT
Start: 2020-09-20 | End: 2020-09-22

## 2020-09-20 RX ORDER — VANCOMYCIN HCL 1 G
1000 VIAL (EA) INTRAVENOUS EVERY 12 HOURS
Refills: 0 | Status: DISCONTINUED | OUTPATIENT
Start: 2020-09-21 | End: 2020-09-21

## 2020-09-20 RX ORDER — ATORVASTATIN CALCIUM 80 MG/1
1 TABLET, FILM COATED ORAL
Qty: 0 | Refills: 0 | DISCHARGE

## 2020-09-20 RX ORDER — VANCOMYCIN HCL 1 G
1000 VIAL (EA) INTRAVENOUS ONCE
Refills: 0 | Status: COMPLETED | OUTPATIENT
Start: 2020-09-20 | End: 2020-09-20

## 2020-09-20 RX ORDER — ASPIRIN/CALCIUM CARB/MAGNESIUM 324 MG
81 TABLET ORAL DAILY
Refills: 0 | Status: DISCONTINUED | OUTPATIENT
Start: 2020-09-20 | End: 2020-10-02

## 2020-09-20 RX ORDER — ACETAMINOPHEN 500 MG
650 TABLET ORAL EVERY 6 HOURS
Refills: 0 | Status: DISCONTINUED | OUTPATIENT
Start: 2020-09-20 | End: 2020-10-02

## 2020-09-20 RX ORDER — PIPERACILLIN AND TAZOBACTAM 4; .5 G/20ML; G/20ML
3.38 INJECTION, POWDER, LYOPHILIZED, FOR SOLUTION INTRAVENOUS ONCE
Refills: 0 | Status: COMPLETED | OUTPATIENT
Start: 2020-09-20 | End: 2020-09-20

## 2020-09-20 RX ADMIN — Medication 650 MILLIGRAM(S): at 14:53

## 2020-09-20 RX ADMIN — Medication 650 MILLIGRAM(S): at 15:50

## 2020-09-20 RX ADMIN — SODIUM CHLORIDE 2800 MILLILITER(S): 9 INJECTION INTRAMUSCULAR; INTRAVENOUS; SUBCUTANEOUS at 16:30

## 2020-09-20 RX ADMIN — SODIUM CHLORIDE 2800 MILLILITER(S): 9 INJECTION INTRAMUSCULAR; INTRAVENOUS; SUBCUTANEOUS at 14:54

## 2020-09-20 RX ADMIN — Medication 650 MILLIGRAM(S): at 22:44

## 2020-09-20 RX ADMIN — Medication 250 MILLIGRAM(S): at 15:39

## 2020-09-20 RX ADMIN — PIPERACILLIN AND TAZOBACTAM 200 GRAM(S): 4; .5 INJECTION, POWDER, LYOPHILIZED, FOR SOLUTION INTRAVENOUS at 14:54

## 2020-09-20 RX ADMIN — PIPERACILLIN AND TAZOBACTAM 3.38 GRAM(S): 4; .5 INJECTION, POWDER, LYOPHILIZED, FOR SOLUTION INTRAVENOUS at 15:35

## 2020-09-20 RX ADMIN — ENOXAPARIN SODIUM 40 MILLIGRAM(S): 100 INJECTION SUBCUTANEOUS at 22:44

## 2020-09-20 RX ADMIN — Medication 650 MILLIGRAM(S): at 23:49

## 2020-09-20 RX ADMIN — Medication 1000 MILLIGRAM(S): at 16:40

## 2020-09-20 RX ADMIN — PIPERACILLIN AND TAZOBACTAM 25 GRAM(S): 4; .5 INJECTION, POWDER, LYOPHILIZED, FOR SOLUTION INTRAVENOUS at 22:44

## 2020-09-20 RX ADMIN — ATORVASTATIN CALCIUM 40 MILLIGRAM(S): 80 TABLET, FILM COATED ORAL at 22:44

## 2020-09-20 NOTE — ED ADULT NURSE NOTE - OBJECTIVE STATEMENT
Pt. received alert and oriented x3 with chief complaint of open wound post procedure 2 weeks ago. Pt. states fever in last few days. Pt. presents w/ open abdominal wound to bilateral lower abdominal area, black on surrounding areas and drainage from site.

## 2020-09-20 NOTE — ED PROVIDER NOTE - CARE PLAN
Principal Discharge DX:	Wound dehiscence, surgical, initial encounter  Secondary Diagnosis:	Cellulitis of abdominal wall

## 2020-09-20 NOTE — PROGRESS NOTE ADULT - SUBJECTIVE AND OBJECTIVE BOX
Pt is s/p panniculectomy on 9/1.  Pt presented to the office with a wound dehiscence of the mid 30% last week with an overlying dry eschar.  I advised the pt to be admitted to the hospital for IV  antibiotics and wound care due to her pmh.  She refused due to difficulties at home. Since then, she was undergoing local wound care at home and was closely followed in the office.  She was on Keflex and clinda and tolerating it well.  She denied pain or systemic symptoms, fever or chills   She called me earlier today to tell me that she had the onset of pain.  I told her to meet me at the [facility] ER    Presently, she complains of pain of the wound  In the ER, she was begun on Zosyn and Vanco.  C&S was taken prior to administration of abx    PMH  MI 2014           Hyperlipidemia  PSH   Coronary artery stent 2019  PE Temp 99.2  Abdomen-wound dehiscence and overlying dry eschar                   Erythema and induration of the lower aspect of the abdominal wall flap -no tenderness                  Fat necrosis-debrided beneath eschar                  Wound cleaned with betadine and packed with betadine soaked denny                  Aspiration of indurated region of lower abdominal flap attempted using sterile precations to r.o abscess-no aspirate obtained              A&P  ID, Cardiology and Wound Care consults          Pt to be admitted for IV abx and local wound care          CT of abdomen          Discussed with Hospitalist

## 2020-09-20 NOTE — H&P ADULT - HISTORY OF PRESENT ILLNESS
44 year old female PMHx CAD (s/p MI 2014, stent 2019), HTN, borderline T2DM, hyperlipidemia presents to ED for pain of wound s/p panniculectomy on 9/1. Patient presented to plastic surgeon, Dr. Hameed's office with a wound dehiscence last week with an overlying dry eschar. Patient was advised to be admitted to the hospital for IV antibiotic treatment and wound care, however refused due to difficulties at home. Patient called Dr. Hameed's office earlier today c/o acute onset of pain. Patient was advised to come to the ED for further management.     ED course:  Vitals: T 99 HR 94 /74 RR 19 SpO2 97% on RA  Labs: WBC 11.83, RBC 3.04 H/H 8.9/27.1, Platelet 656, Neutrophil 8.18, Monocyte 1.1, PTT 27.1, PT/INR 17.0/1.48, Glu 132, Protein 8.4, Alb 2.4, Alk phos 141  CT abd/pelvis w/IV cont: Extensive defects within the lower abdominal wall with foci of gas and inflammation. Findings worrisome for gas forming infection. Loculations of fluid are noted within the abdominal wall.  CXR: no active disease  EKG:  Given IV Zosyn x1, IV Vanco x1, IV NS 2.8 L bolus x1, Tylenol 650 mg PO x1 44 year old female PMHx CAD (mLAD x1 MIGUEL ANGEL (2014), x3 stents 09/2019), HTN, borderline T2DM, hyperlipidemia presents to ED for pain of wound s/p panniculectomy on 9/1. Patient presented to plastic surgeon, Dr. Hameed's office with a wound dehiscence last week with an overlying dry eschar. Patient was advised to be admitted to the hospital for IV antibiotic treatment and wound care, however refused due to difficulties at home. Patient called Dr. Hameed's office earlier today c/o acute onset of pain. Patient was advised to come to the ED for further management.     ED course:  Vitals: T 99 HR 94 /74 RR 19 SpO2 97% on RA  Labs: WBC 11.83, RBC 3.04 H/H 8.9/27.1, Platelet 656, Neutrophil 8.18, Monocyte 1.1, PTT 27.1, PT/INR 17.0/1.48, Glu 132, Protein 8.4, Alb 2.4, Alk phos 141  CT abd/pelvis w/IV cont: Extensive defects within the lower abdominal wall with foci of gas and inflammation. Findings worrisome for gas forming infection. Loculations of fluid are noted within the abdominal wall.  CXR: no active disease  EKG:  Given IV Zosyn x1, IV Vanco x1, IV NS 2.8 L bolus x1, Tylenol 650 mg PO x1 44 year old female PMHx CAD (mLAD x1 MIGUEL ANGEL (2014), x3 stents 09/2019), HTN, borderline T2DM, hyperlipidemia presents to ED for pain of wound s/p panniculectomy on 9/1. Patient presented to plastic surgeon, Dr. Hameed's office with a wound dehiscence last week with an overlying dry eschar. Patient was advised to be admitted to the hospital for IV antibiotic treatment and wound care, however refused due to difficulties at home. Patient called Dr. Hameed's office earlier today c/o acute onset of pain. Patient was advised to come to the ED for further management.     ED course:  Vitals: T 99 HR 94 /74 RR 19 SpO2 97% on RA  Labs: WBC 11.83, RBC 3.04 H/H 8.9/27.1, Platelet 656, Neutrophil 8.18, Monocyte 1.1, PTT 27.1, PT/INR 17.0/1.48, Glu 132, Protein 8.4, Alb 2.4, Alk phos 141  CT abd/pelvis w/IV cont: Extensive defects within the lower abdominal wall with foci of gas and inflammation. Findings worrisome for gas forming infection. Loculations of fluid are noted within the abdominal wall.  CXR: no active disease  EKG: NSR vent rate 97 bpm, minimal voltage criteria for LVH  Given IV Zosyn x1, IV Vanco x1, IV NS 2.8 L bolus x1, Tylenol 650 mg PO x1 44 year old female PMHx CAD (mLAD x1 MIGUEL ANGEL (2014), x3 stents 09/2019), HTN, prediabetes, hyperlipidemia presents to ED for pain of wound s/p panniculectomy on 9/1. Patient presented to plastic surgeon, Dr. Hameed's office with a wound dehiscence last week with an overlying dry eschar. Patient was advised to be admitted to the hospital for IV antibiotic treatment and wound care, however refused due to difficulties at home. Patient called Dr. Hameed's office earlier today c/o acute onset of pain. Patient was advised to come to the ED for further management.     ED course:  Vitals: T 99 HR 94 /74 RR 19 SpO2 97% on RA  Labs: WBC 11.83, RBC 3.04 H/H 8.9/27.1, Platelet 656, Neutrophil 8.18, Monocyte 1.1, PTT 27.1, PT/INR 17.0/1.48, Glu 132, Protein 8.4, Alb 2.4, Alk phos 141  CT abd/pelvis w/IV cont: Extensive defects within the lower abdominal wall with foci of gas and inflammation. Findings worrisome for gas forming infection. Loculations of fluid are noted within the abdominal wall.  CXR: no active disease  EKG: NSR vent rate 97 bpm, minimal voltage criteria for LVH  Given IV Zosyn x1, IV Vanco x1, IV NS 2.8 L bolus x1, Tylenol 650 mg PO x1 44 year old female PMHx CAD (mLAD x1 MIGUEL ANGEL (2014), x3 stents 09/2019), HTN, prediabetes, hyperlipidemia presents to ED for pain of wound s/p panniculectomy on 9/1. Patient presented to plastic surgeon, Dr. Hameed's office with a wound dehiscence last week with an overlying dry eschar. Patient was advised to be admitted to the hospital for IV antibiotic treatment and wound care, however refused due to difficulties at home. Patient called Dr. Hameed's office earlier today c/o acute onset of pain. Patient was advised to come to the ED for further management. Denies chest pain, palpitations, dyspnea, headache, dizziness, n/v/d.     ED course:  Vitals: T 99 HR 94 /74 RR 19 SpO2 97% on RA  Labs: WBC 11.83, RBC 3.04 H/H 8.9/27.1, Platelet 656, Neutrophil 8.18, Monocyte 1.1, PTT 27.1, PT/INR 17.0/1.48, Glu 132, Protein 8.4, Alb 2.4, Alk phos 141  CT abd/pelvis w/IV cont: Extensive defects within the lower abdominal wall with foci of gas and inflammation. Findings worrisome for gas forming infection. Loculations of fluid are noted within the abdominal wall.  CXR: no active disease  EKG: NSR vent rate 97 bpm, minimal voltage criteria for LVH  Given IV Zosyn x1, IV Vanco x1, IV NS 2.8 L bolus x1, Tylenol 650 mg PO x1

## 2020-09-20 NOTE — H&P ADULT - PROBLEM SELECTOR PLAN 9
IMPROVE VTE Individual Risk Assessment          RISK                                                          Points  [  ] Previous VTE                                                3  [  ] Thrombophilia                                             2  [  ] Lower limb paralysis                                   2        (unable to hold up >15 seconds)    [  ] Current Cancer                                             2         (within 6 months)  [  ] Immobilization > 24 hrs                              1  [  ] ICU/CCU stay > 24 hours                             1  [  ] Age > 60                                                         1    IMPROVE VTE Score: 0    DVT PPx: Lovenox 40 subQ qhs

## 2020-09-20 NOTE — H&P ADULT - PROBLEM SELECTOR PLAN 7
Chronic  - Stable off medications History of hypothyroidism  - Patient states she has been stable off medications for years History of prediabetes  - F/u A1c  - DASH, consistent carb diet

## 2020-09-20 NOTE — ED PROVIDER NOTE - PROGRESS NOTE DETAILS
Dr. hill in ED. recommended admission. Advised ct with iv contrast and abx. Sepsis work up applied. Dr hill reports patient was on keflex and clindamycin. Discussed findings with Dr. Ruiz, recommended no acute OR operation. Recommended 24 hours of IV abx and re-assessment.

## 2020-09-20 NOTE — H&P ADULT - NSHPSOCIALHISTORY_GEN_ALL_CORE
Tobacco:   EtOH:   Recreational drug use:  Lives with:  Ambulates:  ADLs:  Occupation:  Vaccinations: Tobacco: denies  EtOH: denies  Recreational drug use: denies  Lives with:  and 3 kids  Ambulates: independent  ADLs: independent

## 2020-09-20 NOTE — ED PROVIDER NOTE - ATTENDING CONTRIBUTION TO CARE
I have personally performed a face to face diagnostic evaluation on this patient.  I have reviewed the PA note and agree with the history, exam, and plan of care, except as noted.  History and Exam by me shows patient s/p panniculoectomy 9/1/20 with Dr. Ruiz, has been on keflex and clindamycin, has wound dehiscence of surgical wound and anterior abdominal wall with some drainage and concern of infection, abdomen tender to palpation, to be admitted to medicine for iv abx, wound care consult, ID consult, Dr. Ruiz will follow.

## 2020-09-20 NOTE — ED PROVIDER NOTE - PHYSICAL EXAMINATION
Constitutional: Awake, Alert, non-toxic. NAD. Well appearing, well nourished.   HEAD: Normocephalic, atraumatic.   EYES: EOM intact, conjunctiva and sclera are clear bilaterally.   ENT: No rhinorrhea, patent, mucous membranes pink/moist, no drooling or stridor.   NECK: Supple, non-tender  CARDIOVASCULAR: Normal S1, S2; regular rate and rhythm.  RESPIRATORY: Normal respiratory effort; breath sounds CTAB, no wheezes, rhonchi, or rales. Speaking in full sentences. No accessory muscle use.   ABDOMEN: Soft; (+) RLQ TTP, (+) dishcense of surgical wound of lower abdomen with malodorous discharge and erythema  EXTREMITIES: Full passive and active ROM in all extremities; non-tender to palpation; distal pulses palpable and symmetric  SKIN: Warm, dry; good skin turgor, no apparent lesions or rashes, no ecchymosis, brisk capillary refill.  NEURO: A&O x3. Sensory and motor functions are grossly intact. Speech is normal. Appearance and judgement seem appropriate for gender and age.

## 2020-09-20 NOTE — ED PROVIDER NOTE - CHIEF COMPLAINT
The patient is a 44y Female complaining of post op complication.
Inpatient
Inpatient-On Service Note
Inpatient

## 2020-09-20 NOTE — H&P ADULT - PROBLEM SELECTOR PLAN 8
IMPROVE VTE Individual Risk Assessment          RISK                                                          Points  [  ] Previous VTE                                                3  [  ] Thrombophilia                                             2  [  ] Lower limb paralysis                                   2        (unable to hold up >15 seconds)    [  ] Current Cancer                                             2         (within 6 months)  [  ] Immobilization > 24 hrs                              1  [  ] ICU/CCU stay > 24 hours                             1  [  ] Age > 60                                                         1    IMPROVE VTE Score: 0    DVT PPx: Lovenox 40 subQ qhs History of hypothyroidism  - Patient states she has been stable off medications for years History of hypothyroidism  - Patient states she has been stable off medications for years  - check TSH

## 2020-09-20 NOTE — ED ADULT NURSE NOTE - PMH
Coronary artery disease  s/p MIGUEL ANGEL x 3 (9/2019), MIGUEL ANGEL x1 (2014)  HLD (hyperlipidemia)    HTN (hypertension)    Hypothyroid  Resolved  Obesity

## 2020-09-20 NOTE — H&P ADULT - ASSESSMENT
44 year old female PMHx CAD (s/p MI 2014, stent 2019), HTN, borderline T2DM, hyperlipidemia presents to ED for pain of wound s/p panniculectomy on 9/1 admitted for cellulitis and partial wound dehiscence. 44 year old female PMHx CAD (mLAD x1 MIGUEL ANGEL (2014), x3 stents 09/2019), HTN, prediabetes, hyperlipidemia presents to ED for pain of wound s/p panniculectomy on 9/1 admitted for cellulitis and partial wound dehiscence.

## 2020-09-20 NOTE — ED PROVIDER NOTE - CLINICAL SUMMARY MEDICAL DECISION MAKING FREE TEXT BOX
sent by Dr. Ruiz today for post-op complications. Pt reports she had abdominoplasty done on 09/01 with dr. Ruiz. Pt reports she was seen last week in office in which tube taken out and notes brown bloody discharge. Pt reports wound opened and has been draining. pt reports she was taking Cipro antibiotic without relief. PLan includes labs, sepsis work up, IVF, IV abx, wound culture, Ct abd and pelvis, re-assess

## 2020-09-20 NOTE — ED PROVIDER NOTE - OBJECTIVE STATEMENT
45 y/o female with reported PMHx HLD, CAD, and DM sent by Dr. Ruiz today for post-op complications. Pt reports she had abdominoplasty done on 09/01 with dr. Ruiz. Pt reports she was seen last week in office in which tube taken out and notes brown bloody discharge. Pt reports wound opened and has been draining. pt reports she was taking Cipro antibiotic without relief. pt reports abdominal pain described as sore, non-radiating, and currently 8/10. pt denies fever, vomiting, dysuria, hematuria, N/V, cp, sob, or any other complaints.

## 2020-09-20 NOTE — H&P ADULT - PROBLEM SELECTOR PLAN 2
- Continue IV Vanc and IV Zosyn  - Monitor fever and WBC count  - Plan as above - Continue IV Vanc and IV Zosyn  - Monitor fever and WBC count  - ID (Dr. Gomez) consulted, f/u recs  - Plan as above

## 2020-09-20 NOTE — ED ADULT NURSE NOTE - PRIMARY CARE PROVIDER
9/14/2017    REVISION LEFT BELOW KNEE AMPUTATION, CLOSED  performed by Neal Patel MD at [address] Left 01/23/2017    I and D bone, bone biopsy with flap closure and pulse lavage    OTHER SURGICAL HISTORY Left 04/27/2017    I & D foot    TX DEEP DISSEC FOOT INFEC,1 BURSA Left 4/13/2017    FOOT DEBRIDEMENT WITH EPIFIX  performed by Ivania Rashid DPM at [address] INCIS/DRAIN THIGH/KNEE ABSCESS,DEEP Left 9/12/2017     LEFT BELOW KNEE AMPUTATION OPEN, WOUND VAC PLACEMENT performed by Neal Patel MD at [address] Left 2014    hallux    TOE AMPUTATION Left 12/09/2016    hallux    TOE AMPUTATION Left 4/27/2017    I AND D FOOT, BONE BIOPSY, POSSIBLE FILET 2ND TOE performed by Ivania Rashid DPM at [address] [facility]  Restrictions/Precautions  Restrictions/Precautions: Weight Bearing, Surgical Protocols, Fall Risk, Contact Precautions  Required Braces or Orthoses?: No  Lower Extremity Weight Bearing Restrictions  Left Lower Extremity Weight Bearing: Non Weight Bearing  Position Activity Restriction  Other position/activity restrictions: Left Knee immoblizer to re-inforce terminal extension Left knee. Subjective   General  Additional Pertinent Hx: Wilfred Tom  is a 47 y.o. right-handed     male admitted to the [address] [place] [address] unit on 9/16/2017. He was originally admitted to [place] on 8/31/2017 for wounds on the left foot, increased pain and discolored foot.  MRI of left foot shows osteomyelitis of the 1st and 3rd metatarsal, calcaneus and also of the 5th metatarsals , diffuse infectious myositis is seen. Concern for soft tissue gas and necrotizing infection. abscess formation measures 1.4 x 1.7 x 1.9 cm.lateral aspect of the foot.  Patient underwent a left BKA on 9/7/17. He underwent a left BKA ReVision without closure of wound wound VAC placement on 9/12/2017.   On 9/14/2017 he underwent closure of left below-knee amputation by Dr. Sarah Little. Family / Caregiver Present: No  Subjective  Subjective: Pt reports he is getting stronger; pt states he has no pain at this time  General Comment  Comments: Cooperative with PT; excellent effort  Pain Screening  Patient Currently in Pain: Denies  Pain Assessment  Clinical Progression: Not changed  Response to Pain Intervention: Patient Satisfied  Vital Signs  Patient Currently in Pain: Denies          09/19/17 1430   Restrictions/Precautions   Restrictions/Precautions Weight Bearing;Surgical Protocols; Fall Risk;Contact Precautions   Lower Extremity Weight Bearing Restrictions   Left Lower Extremity Weight Bearing Non Weight Bearing   Position Activity Restriction   Other position/activity restrictions stopped knee immobilizer during day only at night   General   Additional Pertinent Hx Christina Thompson  is a 47 y.o. right-handed     male admitted to the [address] [place] [address] on 9/16/2017. He was originally admitted to [place] on 8/31/2017 for wounds on the left foot, increased pain and discolored foot.  MRI of left foot shows osteomyelitis of the 1st and 3rd metatarsal, calcaneus and also of the 5th metatarsals , diffuse infectious myositis is seen. Concern for soft tissue gas and necrotizing infection. abscess formation measures 1.4 x 1.7 x 1.9 cm.lateral aspect of the foot.  Patient underwent a left BKA on 9/7/17. He underwent a left BKA ReVision without closure of wound wound VAC placement on 9/12/2017. On 9/14/2017 he underwent closure of left below-knee amputation by Dr. Sarah Little.    Subjective   Subjective no complaints   Bed Mobility   Bridging Independent   Scooting Independent   Comment flat mat no handrails   Transfers   Sit to Stand Contact guard assistance;Stand by assistance   Stand to sit Stand by assistance   Bed to Chair Contact guard assistance   Stand Pivot Transfers Contact guard assistance   Squat Pivot Recommendations:  Home with assist PRN, Patient would benefit from continued therapy after discharge    G-Code     OutComes Score                                                      Goals  Short term goals  Time Frame for Short term goals: 3 days  Short term goal 1: Mod-I transfers  Short term goal 2: Ascend/descend 4 steps with NWB LLE with 2 rails, Min A  Short term goal 3: Pt able to ambulate with RW dist of 50-70 ft SBA  Short term goal 4: Pt able to manuever W/C mod-I on level surfaces  Short term goal 5: Participate in 45 minutes of therapy to demonstrate increased endurance  Long term goals  Time Frame for Long term goals : 10 days  Long term goal 1:  Pt able to ambulate 50 ft Mod-I with RW, level surfaeces  Long term goal 2: Pt able to ambulate 10-15 ft with RW, at SBA on outside terraine  Long term goal 3: Pt able to go up and down 4 steps with 1 HR and 1 crutch or able to scoot up sitting on steps with 1 person assist .  Long term goal 4: I/S pt/family in residual limb ace wrapping. and HEP. Patient Goals   Patient goals : return home    Plan    Plan  Times per week: 5-7 x/wk, 1.5 hr/day.   Times per day: Daily  Current Treatment Recommendations: Strengthening, Balance Training, Functional Mobility Training, Transfer Training, Endurance Training, Gait Training, Stair training, Home Exercise Program, Safety Education & Training, Patient/Caregiver Education & Training, ROM, Wheelchair Mobility Training  Safety Devices  Type of devices: Call light within reach, Left in chair, Gait belt  Restraints  Initially in place: No     Therapy Time     09/19/17 0735 09/19/17 1244   PT Individual Minutes   Time In [phone]   Time Out 0825 1258   Minutes 50 44   PT Concurrent Minutes   Time In 0825 --    Time Out 0830 --    Minutes 5 --    Annye Reveal, PTA none

## 2020-09-20 NOTE — H&P ADULT - NSHPPHYSICALEXAM_GEN_ALL_CORE
T(C): 36.6 (09-20-20 @ 18:03), Max: 37.3 (09-20-20 @ 14:09)  HR: 95 (09-20-20 @ 18:03) (94 - 120)  BP: 118/80 (09-20-20 @ 18:03) (118/80 - 122/74)  RR: 18 (09-20-20 @ 18:03) (18 - 19)  SpO2: 98% (09-20-20 @ 18:03) (94% - 98%)    GENERAL: patient appears well, no acute distress, appropriate, pleasant  EYES: sclera clear, no exudates  ENMT: oropharynx clear without erythema, no exudates, moist mucous membranes  NECK: supple, soft, no thyromegaly noted  LUNGS: good air entry bilaterally, clear to auscultation, symmetric breath sounds, no wheezing or rhonchi appreciated  HEART: soft S1/S2, regular rate and rhythm, no murmurs noted, no lower extremity edema  GASTROINTESTINAL: abdomen is soft, nontender, nondistended, wound dehiscence and overlying dry eschar, erythema and induration of the lower aspect of the abdominal wall flap   INTEGUMENT: good skin turgor, no lesions noted  MUSCULOSKELETAL: no clubbing or cyanosis, no obvious deformity  NEUROLOGIC: awake, alert, oriented x3, good muscle tone in 4 extremities, no obvious sensory deficits  PSYCHIATRIC: mood is good, affect is congruent, linear and logical thought process  HEME/LYMPH: no palpable supraclavicular nodules, no obvious ecchymosis or petechiae

## 2020-09-20 NOTE — H&P ADULT - PROBLEM SELECTOR PLAN 5
MI 2014, s/p cardiac stent 2019  - Continue home statin mLAD x1 MIGUEL ANGEL (2014), x3 stents 09/2019  - Continue home Atorvastatin 80 mg PO qhs, ASA 81 mg PO qd  - Patient states she was taken off Brilinta last week by Dr. Lam, since it has been a year since stent  - Echo 9/3/2020: trace MR, trace TR, LVEF 60-65%   - Last angiogram was done in sep 2019 when she got DESx3   - Last stress test was done in 2019 - wnl

## 2020-09-20 NOTE — H&P ADULT - NSHPREVIEWOFSYSTEMS_GEN_ALL_CORE
Constitutional: denies fever, chills, diaphoresis   HEENT: denies blurry vision, double vision, eye pain, difficulty hearing  Respiratory: denies SOB, cough, sputum production  Cardiovascular: denies CP, palpitations, edema  Gastrointestinal: admits abdominal pain, denies nausea, vomiting, diarrhea, constipation  Genitourinary: denies dysuria, frequency, urgency, hematuria   Skin/Breast: denies rash, itching  Neurologic: denies headache, weakness, dizziness, paresthesias, numbness/tingling  Psychiatric: denies anxiety, depression, suicidal, homicidal thoughts  ROS negative except as noted above Constitutional: denies fever, chills, diaphoresis   HEENT: denies blurry vision, double vision, eye pain, difficulty hearing  Respiratory: denies SOB, cough, sputum production  Cardiovascular: denies CP, palpitations, edema  Gastrointestinal: admits abdominal pain, denies nausea, vomiting, diarrhea, constipation  Genitourinary: denies dysuria, frequency, urgency, hematuria   Skin/Breast: denies rash, itching  Neurologic: denies headache, weakness, dizziness, paresthesias, numbness/tingling  Psychiatric: denies anxiety, depression, suicidal, homicidal thoughts

## 2020-09-20 NOTE — H&P ADULT - PROBLEM SELECTOR PLAN 3
H/H 8.9/27.1 on admission likely 2/2 blood vs. iron deficiency  - Per chart review, baseline hemoglobin 12.0-12.5  - Monitor for signs and symptoms of active bleeding  - Blood transfusion consent obtained, placed in chart  - F/u AM CBC

## 2020-09-20 NOTE — H&P ADULT - PROBLEM SELECTOR PLAN 1
- Admit to F  - Patient afebrile, WBC 11.83  - F/u wound culture, obtained prior to administration of IV antibiotic   - Continue IV Zosyn and IV Vanco  - Pain regimen   - CT abd/pelvis w/IV cont: Extensive defects within the lower abdominal wall with foci of gas and inflammation. Findings worrisome for gas forming infection. Loculations of fluid are noted within the abdominal wall.  - ID (Dr. Gomez) consulted, f/u recs  - Cardiology (Madi baxter) consulted, f/u recs  - Plastic Surgery (Dr. Hameed) consulted, recs appreciated - IV abx and local wound care - Admit to [facility]  - Patient afebrile, WBC 11.83  - F/u wound culture, obtained prior to administration of IV antibiotic   - Continue IV Zosyn and IV Vanco  - Pain regimen   - CT abd/pelvis w/IV cont: Extensive defects within the lower abdominal wall with foci of gas and inflammation. Findings worrisome for gas forming infection. Loculations of fluid are noted within the abdominal wall.  - ID (Dr. Gomez) consulted, f/u recs  - Cardiology (Madi baxter) consulted, f/u recs  - Plastic Surgery (Dr. Hameed) consulted, recs appreciated - IV abx and local wound care  - RN wound care team consulted, f/u recs - Admit to [facility]  - Patient afebrile, WBC 11.83  - F/u wound culture, obtained prior to administration of IV antibiotic   - Continue IV Zosyn and IV Vanco  - Pain regimen   - CT abd/pelvis w/IV cont: Extensive defects within the lower abdominal wall with foci of gas and inflammation. Findings worrisome for gas forming infection. Loculations of fluid are noted within the abdominal wall.  - ID (Dr. Gomez) consulted, f/u recs  - Cardiology (Madi baxter) consulted, f/u recs  - Plastic Surgery (Dr. Hameed) consulted, recs appreciated - IV abx and local wound care  - RN wound care team consulted, f/u recs  - Wound care consult, will sign out to day team to place consult

## 2020-09-21 DIAGNOSIS — E66.9 OBESITY, UNSPECIFIED: ICD-10-CM

## 2020-09-21 DIAGNOSIS — D72.829 ELEVATED WHITE BLOOD CELL COUNT, UNSPECIFIED: ICD-10-CM

## 2020-09-21 LAB
A1C WITH ESTIMATED AVERAGE GLUCOSE RESULT: 6.5 % — HIGH (ref 4–5.6)
ALBUMIN SERPL ELPH-MCNC: 1.9 G/DL — LOW (ref 3.3–5)
ALP SERPL-CCNC: 107 U/L — SIGNIFICANT CHANGE UP (ref 40–120)
ALT FLD-CCNC: 29 U/L — SIGNIFICANT CHANGE UP (ref 12–78)
ANION GAP SERPL CALC-SCNC: 8 MMOL/L — SIGNIFICANT CHANGE UP (ref 5–17)
AST SERPL-CCNC: 22 U/L — SIGNIFICANT CHANGE UP (ref 15–37)
BASOPHILS # BLD AUTO: 0.03 K/UL — SIGNIFICANT CHANGE UP (ref 0–0.2)
BASOPHILS NFR BLD AUTO: 0.3 % — SIGNIFICANT CHANGE UP (ref 0–2)
BILIRUB SERPL-MCNC: 0.5 MG/DL — SIGNIFICANT CHANGE UP (ref 0.2–1.2)
BUN SERPL-MCNC: 8 MG/DL — SIGNIFICANT CHANGE UP (ref 7–23)
CALCIUM SERPL-MCNC: 8 MG/DL — LOW (ref 8.5–10.1)
CHLORIDE SERPL-SCNC: 106 MMOL/L — SIGNIFICANT CHANGE UP (ref 96–108)
CO2 SERPL-SCNC: 24 MMOL/L — SIGNIFICANT CHANGE UP (ref 22–31)
CREAT SERPL-MCNC: 0.87 MG/DL — SIGNIFICANT CHANGE UP (ref 0.5–1.3)
CULTURE RESULTS: SIGNIFICANT CHANGE UP
EOSINOPHIL # BLD AUTO: 0.24 K/UL — SIGNIFICANT CHANGE UP (ref 0–0.5)
EOSINOPHIL NFR BLD AUTO: 2.1 % — SIGNIFICANT CHANGE UP (ref 0–6)
ESTIMATED AVERAGE GLUCOSE: 140 MG/DL — HIGH (ref 68–114)
GLUCOSE SERPL-MCNC: 134 MG/DL — HIGH (ref 70–99)
HCT VFR BLD CALC: 24.7 % — LOW (ref 34.5–45)
HGB BLD-MCNC: 7.8 G/DL — LOW (ref 11.5–15.5)
IMM GRANULOCYTES NFR BLD AUTO: 0.8 % — SIGNIFICANT CHANGE UP (ref 0–1.5)
LYMPHOCYTES # BLD AUTO: 17.9 % — SIGNIFICANT CHANGE UP (ref 13–44)
LYMPHOCYTES # BLD AUTO: 2.02 K/UL — SIGNIFICANT CHANGE UP (ref 1–3.3)
MCHC RBC-ENTMCNC: 29.2 PG — SIGNIFICANT CHANGE UP (ref 27–34)
MCHC RBC-ENTMCNC: 31.6 GM/DL — LOW (ref 32–36)
MCV RBC AUTO: 92.5 FL — SIGNIFICANT CHANGE UP (ref 80–100)
MONOCYTES # BLD AUTO: 1.23 K/UL — HIGH (ref 0–0.9)
MONOCYTES NFR BLD AUTO: 10.9 % — SIGNIFICANT CHANGE UP (ref 2–14)
NEUTROPHILS # BLD AUTO: 7.7 K/UL — HIGH (ref 1.8–7.4)
NEUTROPHILS NFR BLD AUTO: 68 % — SIGNIFICANT CHANGE UP (ref 43–77)
NRBC # BLD: 0 /100 WBCS — SIGNIFICANT CHANGE UP (ref 0–0)
PLATELET # BLD AUTO: 651 K/UL — HIGH (ref 150–400)
POTASSIUM SERPL-MCNC: 4.2 MMOL/L — SIGNIFICANT CHANGE UP (ref 3.5–5.3)
POTASSIUM SERPL-SCNC: 4.2 MMOL/L — SIGNIFICANT CHANGE UP (ref 3.5–5.3)
PROT SERPL-MCNC: 6.7 G/DL — SIGNIFICANT CHANGE UP (ref 6–8.3)
RBC # BLD: 2.67 M/UL — LOW (ref 3.8–5.2)
RBC # FLD: 14.2 % — SIGNIFICANT CHANGE UP (ref 10.3–14.5)
SARS-COV-2 IGG SERPL QL IA: POSITIVE
SARS-COV-2 IGM SERPL IA-ACNC: 32.7 INDEX — HIGH
SODIUM SERPL-SCNC: 138 MMOL/L — SIGNIFICANT CHANGE UP (ref 135–145)
SPECIMEN SOURCE: SIGNIFICANT CHANGE UP
SPECIMEN SOURCE: SIGNIFICANT CHANGE UP
TSH SERPL-MCNC: 2.05 UIU/ML — SIGNIFICANT CHANGE UP (ref 0.36–3.74)
WBC # BLD: 11.31 K/UL — HIGH (ref 3.8–10.5)
WBC # FLD AUTO: 11.31 K/UL — HIGH (ref 3.8–10.5)

## 2020-09-21 PROCEDURE — 99222 1ST HOSP IP/OBS MODERATE 55: CPT

## 2020-09-21 PROCEDURE — 99233 SBSQ HOSP IP/OBS HIGH 50: CPT | Mod: GC

## 2020-09-21 PROCEDURE — 99223 1ST HOSP IP/OBS HIGH 75: CPT

## 2020-09-21 RX ORDER — VANCOMYCIN HCL 1 G
1500 VIAL (EA) INTRAVENOUS EVERY 12 HOURS
Refills: 0 | Status: DISCONTINUED | OUTPATIENT
Start: 2020-09-21 | End: 2020-09-22

## 2020-09-21 RX ORDER — POVIDONE-IODINE 5 %
1 AEROSOL (ML) TOPICAL THREE TIMES A DAY
Refills: 0 | Status: DISCONTINUED | OUTPATIENT
Start: 2020-09-21 | End: 2020-09-22

## 2020-09-21 RX ADMIN — Medication 300 MILLIGRAM(S): at 14:34

## 2020-09-21 RX ADMIN — Medication 50 MILLIGRAM(S): at 05:30

## 2020-09-21 RX ADMIN — ENOXAPARIN SODIUM 40 MILLIGRAM(S): 100 INJECTION SUBCUTANEOUS at 23:06

## 2020-09-21 RX ADMIN — Medication 50 MILLIGRAM(S): at 19:40

## 2020-09-21 RX ADMIN — Medication 250 MILLIGRAM(S): at 03:03

## 2020-09-21 RX ADMIN — Medication 1 APPLICATION(S): at 23:06

## 2020-09-21 RX ADMIN — Medication 100 MILLIGRAM(S): at 23:06

## 2020-09-21 RX ADMIN — ATORVASTATIN CALCIUM 40 MILLIGRAM(S): 80 TABLET, FILM COATED ORAL at 23:06

## 2020-09-21 RX ADMIN — PIPERACILLIN AND TAZOBACTAM 25 GRAM(S): 4; .5 INJECTION, POWDER, LYOPHILIZED, FOR SOLUTION INTRAVENOUS at 13:40

## 2020-09-21 RX ADMIN — PIPERACILLIN AND TAZOBACTAM 25 GRAM(S): 4; .5 INJECTION, POWDER, LYOPHILIZED, FOR SOLUTION INTRAVENOUS at 23:06

## 2020-09-21 RX ADMIN — PIPERACILLIN AND TAZOBACTAM 25 GRAM(S): 4; .5 INJECTION, POWDER, LYOPHILIZED, FOR SOLUTION INTRAVENOUS at 05:34

## 2020-09-21 RX ADMIN — Medication 81 MILLIGRAM(S): at 11:49

## 2020-09-21 NOTE — PROGRESS NOTE ADULT - PROBLEM SELECTOR PLAN 8
History of hypothyroidism  - Patient states she has been stable off medications for years  - TSH normal - Patient states she has been stable off medications for years  - TSH normal

## 2020-09-21 NOTE — PROGRESS NOTE ADULT - PROBLEM SELECTOR PLAN 4
Chronic stable   - Continue home Metoprolol Tartrate 50 mg PO BID  - Monitor routine hemodynamics  -- DASH, consistent carb diet.   - Patient follows outpatient cardiologist Dr. Abdoul Lam from [facility]. Chronic stable   - Continue home Metoprolol Tartrate 50mg PO BID  - Monitor routine hemodynamics  - DASH, consistent carb diet.   - Patient follows outpatient cardiologist Dr. Abdoul Lam from [facility].

## 2020-09-21 NOTE — PROGRESS NOTE ADULT - PROBLEM SELECTOR PLAN 7
Plan: History of prediabetes  - A1C: 6.1   - DASH, consistent carb diet. Plan: History of prediabetes  - A1C: 6.1%  - DASH, consistent carb diet.

## 2020-09-21 NOTE — CONSULT NOTE ADULT - PROBLEM SELECTOR RECOMMENDATION 4
sp surgery    Thank you for consulting us and involving us in the management of this most interesting and challenging case.     We will follow along in the care of this patient. Please call us at [phone] or text me directly on my cell# at [phone] with any concerns.

## 2020-09-21 NOTE — PROGRESS NOTE ADULT - SUBJECTIVE AND OBJECTIVE BOX
Patient is a 44y old  Female who presents with a chief complaint of Wound dehiscence s/p panniculectomy on 9/1       HOSPITALIZATION DAY: 20 Sep 2020     Antibiotic: Zosyn and Vanco                                 Started on: 09/20/20                                                 INTERVAL/OVERNIGHT EVENTS:    Patient examined at beside. No acute events over night. Patient refers mild wound pain in right  lower abdomen. Patient is voiding actively and last BM  yesterday. Patient denies chest pain, calf pain, abdominal pain, headaches, fever, chills, dysuria, hematuria, diarrhea, constipation, SOB, palpitations. Rest of ROS not contributory except for above.    Physical Exam     General; patient appears well, no acute distress, appropriate, pleasant  ENMT: moist mucous membranes  LUNGS: good air entry bilaterally, clear to auscultation, symmetric breath sounds, no wheezing or rhonchi appreciated  HEART: soft S1/S2, regular rate and rhythm, no murmurs noted, no lower extremity edema  GASTROINTESTINAL: abdomen is soft, nontender, nondistended  MUSCULOSKELETAL: no clubbing or cyanosis, no obvious deformity  NEUROLOGIC: awake, alert, oriented x3, good muscle tone in 4 extremities, no obvious sensory deficits  Skin: Large lower transverse incision wound dehiscence with large necrotic skin, base of the wound is mostly slough, some foul odor, mild cellulitis of periwound skin.       LABS                          7.8    11.31 )-----------( 651      ( 21 Sep 2020 08:51 )             24.7         09-21    138  |  106  |  8   ----------------------------<  134<H>  4.2   |  24  |  0.87    Ca    8.0<L>      21 Sep 2020 08:51    TPro  6.7  /  Alb  1.9<L>  /  TBili  0.5  /  DBili  x   /  AST  22  /  ALT  29  /  AlkPhos  107  09-21      LIVER FUNCTIONS - ( 21 Sep 2020 08:51 )  Alb: 1.9 g/dL / Pro: 6.7 g/dL / ALK PHOS: 107 U/L / ALT: 29 U/L / AST: 22 U/L / GGT: x             PT/INR - ( 20 Sep 2020 14:47 )   PT: 17.0 sec;   INR: 1.48 ratio         PTT - ( 20 Sep 2020 14:47 )  PTT:27.1 sec      IMAGING     CT Abdomen and Pelvis w/ IV Cont   Extensive defects within the lower abdominal wall with foci of gas and inflammation. Findings worrisome for gas forming infection.  Loculations of fluid are noted within the abdominal wall.        MEDICATIONS  (STANDING):  aspirin  chewable 81 milliGRAM(s) Oral daily  atorvastatin 40 milliGRAM(s) Oral at bedtime  enoxaparin Injectable 40 milliGRAM(s) SubCutaneous at bedtime  metoprolol tartrate 50 milliGRAM(s) Oral two times a day  piperacillin/tazobactam IVPB.. 3.375 Gram(s) IV Intermittent every 8 hours  vancomycin  IVPB 1500 milliGRAM(s) IV Intermittent every 12 hours    MEDICATIONS  (PRN):  acetaminophen   Tablet .. 650 milliGRAM(s) Oral every 6 hours PRN Mild Pain (1 - 3)             Patient is a 44y old  Female who presents with a chief complaint of pain at abdominal surgical wound site.      HOSPITALIZATION DAY: 20 Sep 2020     Antibiotic: Zosyn and Vanco                                 Started on: 09/20/20                                                 INTERVAL/OVERNIGHT EVENTS:    Patient examined at beside. No acute events overnight. Patient refers mild wound pain in right lower abdomen. Patient is voiding actively and last BM yesterday. Patient denies chest pain, calf pain, abdominal pain, headaches, fever, chills, dysuria, hematuria, diarrhea, constipation, SOB, palpitations. Rest of ROS not contributory except for above.    Physical Exam     General; no acute distress  ENMT: moist mucous membranes  LUNGS: good air entry bilaterally, clear to auscultation, symmetric breath sounds, no wheezing or rhonchi appreciated  HEART: S1, S2, regular rate and rhythm, no murmurs noted, no lower extremity edema  GASTROINTESTINAL: BS+, abdomen is soft, nondistended, tenderness near open abdominal wound  MUSCULOSKELETAL: no clubbing or cyanosis, no obvious deformity  NEUROLOGIC: awake, alert, oriented x3, good muscle tone in 4 extremities, no obvious sensory deficits  Skin: Large lower transverse incision wound dehiscence with large necrotic skin, base of the wound is mostly slough, some foul odor, cellulitis changes of periwound skin.       LABS                          7.8    11.31 )-----------( 651      ( 21 Sep 2020 08:51 )             24.7         09-21    138  |  106  |  8   ----------------------------<  134<H>  4.2   |  24  |  0.87    Ca    8.0<L>      21 Sep 2020 08:51    TPro  6.7  /  Alb  1.9<L>  /  TBili  0.5  /  DBili  x   /  AST  22  /  ALT  29  /  AlkPhos  107  09-21      LIVER FUNCTIONS - ( 21 Sep 2020 08:51 )  Alb: 1.9 g/dL / Pro: 6.7 g/dL / ALK PHOS: 107 U/L / ALT: 29 U/L / AST: 22 U/L / GGT: x             PT/INR - ( 20 Sep 2020 14:47 )   PT: 17.0 sec;   INR: 1.48 ratio         PTT - ( 20 Sep 2020 14:47 )  PTT:27.1 sec      IMAGING     CT Abdomen and Pelvis w/ IV Cont   Extensive defects within the lower abdominal wall with foci of gas and inflammation. Findings worrisome for gas forming infection.  Loculations of fluid are noted within the abdominal wall.        MEDICATIONS  (STANDING):  aspirin  chewable 81 milliGRAM(s) Oral daily  atorvastatin 40 milliGRAM(s) Oral at bedtime  enoxaparin Injectable 40 milliGRAM(s) SubCutaneous at bedtime  metoprolol tartrate 50 milliGRAM(s) Oral two times a day  piperacillin/tazobactam IVPB.. 3.375 Gram(s) IV Intermittent every 8 hours  vancomycin  IVPB 1500 milliGRAM(s) IV Intermittent every 12 hours    MEDICATIONS  (PRN):  acetaminophen   Tablet .. 650 milliGRAM(s) Oral every 6 hours PRN Mild Pain (1 - 3)

## 2020-09-21 NOTE — CONSULT NOTE ADULT - SUBJECTIVE AND OBJECTIVE BOX
[facility]         Zakia Barraza, Oskar, Maye, Pj, Matthias, Spike        [phone] (office)    CHIEF COMPLAINT: Patient is a 44y old  Female who presents with a chief complaint of Wound dehiscence s/p panniculectomy on 9/1 (20 Sep 2020 21:34)  CHARTING IN PROGRESSS    HPI:  44 year old female PMHx CAD (mLAD x1 MIGUEL ANGEL (2014), x3 stents 09/2019), HTN, prediabetes, hyperlipidemia presents to ED for pain of wound s/p panniculectomy on 9/1. Patient presented to plastic surgeon, Dr. Hameed's office with a wound dehiscence last week with an overlying dry eschar. Patient was advised to be admitted to the hospital for IV antibiotic treatment and wound care, however refused due to difficulties at home. Patient called Dr. Hameed's office earlier today c/o acute onset of pain. Patient was advised to come to the ED for further management. Denies chest pain, palpitations, dyspnea, headache, dizziness, n/v/d.      On admission found to be COVID Antibody IgG positive    cardiologist Dr. Abdoul Lam from [facility].   taken off Brilinta last week by Dr. Lam, since it has been a year since stent   Echo 9/3/2020: trace MR, trace TR, LVEF 60-65%     ED course:  Vitals: T 99 HR 94 /74 RR 19 SpO2 97% on RA  Labs: WBC 11.83, RBC 3.04 H/H 8.9/27.1, Platelet 656, Neutrophil 8.18, Monocyte 1.1, PTT 27.1, PT/INR 17.0/1.48, Glu 132, Protein 8.4, Alb 2.4, Alk phos 141  CT abd/pelvis w/IV cont: Extensive defects within the lower abdominal wall with foci of gas and inflammation. Findings worrisome for gas forming infection. Loculations of fluid are noted within the abdominal wall.  CXR: no active disease  EKG: NSR vent rate 97 bpm, minimal voltage criteria for LVH  Given IV Zosyn x1, IV Vanco x1, IV NS 2.8 L bolus x1, Tylenol 650 mg PO x1 (20 Sep 2020 17:54)      PAST MEDICAL & SURGICAL HISTORY:  Obesity    Hypothyroid  Resolved    Coronary artery disease  s/p MIGUEL ANGEL x 3 (9/2019), MIGUEL ANGEL x1 (2014)    HLD (hyperlipidemia)    HTN (hypertension)    S/P coronary artery stent placement  mLAD x1 MIGUEL ANGEL (2014),  3 stents 09/2019    H/O total hysterectomy  2017        SOCIAL HISTORY: No active tobacco, alcohol or illicit drug use    FAMILY HISTORY:  Family history of coronary artery disease younger than 40 years of age (Sibling)        Outpatient medications:    MEDICATIONS  (STANDING):  aspirin  chewable 81 milliGRAM(s) Oral daily  atorvastatin 40 milliGRAM(s) Oral at bedtime  enoxaparin Injectable 40 milliGRAM(s) SubCutaneous at bedtime  metoprolol tartrate 50 milliGRAM(s) Oral two times a day  piperacillin/tazobactam IVPB.. 3.375 Gram(s) IV Intermittent every 8 hours  vancomycin  IVPB 1000 milliGRAM(s) IV Intermittent every 12 hours    MEDICATIONS  (PRN):  acetaminophen   Tablet .. 650 milliGRAM(s) Oral every 6 hours PRN Mild Pain (1 - 3)      Allergies    codeine (Unknown)    Intolerances        REVIEW OF SYSTEMS: Is negative for eye, ENT, GI, , allergic, dermatologic, musculoskeletal and neurologic, except as described above.    VITAL SIGNS:   Vital Signs Last 24 Hrs  T(C): 36.8 (21 Sep 2020 05:27), Max: 37.3 (20 Sep 2020 14:09)  T(F): 98.2 (21 Sep 2020 05:27), Max: 99.2 (20 Sep 2020 14:09)  HR: 74 (21 Sep 2020 05:27) (74 - 120)  BP: 116/75 (21 Sep 2020 05:27) (116/75 - 122/74)  BP(mean): --  RR: 17 (21 Sep 2020 05:27) (17 - 19)  SpO2: 100% (21 Sep 2020 05:27) (94% - 100%)    I&O's Summary    20 Sep 2020 07:01  -  21 Sep 2020 07:00  --------------------------------------------------------  IN: 590 mL / OUT: 0 mL / NET: 590 mL        PHYSICAL EXAM:    Constitutional: NAD, awake and alert, well-developed  Eyes:  EOMI, no oral cyanosis, conjunctivae clear, anicteric.  Pulmonary: Non-labored, breath sounds are clear bilaterally, no wheezing, rales or rhonchi  Cardiovascular:  regular S1 and S2. No murmur.  No rubs, gallops or clicks  Gastrointestinal: Bowel Sounds present, soft, nontender.   Lymph: No peripheral edema.   Neurological: Alert, strength and sensitivity are grossly intact  Skin: No obvious lesions/rashes.   Psych:  Mood & affect appropriate .    LABS: All Labs Reviewed:                        8.9    11.83 )-----------( 656      ( 20 Sep 2020 14:47 )             27.1     20 Sep 2020 14:47    135    |  101    |  8      ----------------------------<  132    4.0     |  27     |  0.83     Ca    9.2        20 Sep 2020 14:47    TPro  8.4    /  Alb  2.4    /  TBili  0.6    /  DBili  x      /  AST  32     /  ALT  37     /  AlkPhos  141    20 Sep 2020 14:47    PT/INR - ( 20 Sep 2020 14:47 )   PT: 17.0 sec;   INR: 1.48 ratio         PTT - ( 20 Sep 2020 14:47 )  PTT:27.1 sec      Blood Culture:         RADIOLOGY:    EKG:   [facility]         Zakia Barraza, Oskar, Maye, Pj, Matthias, Spike        [phone] (office)    CHIEF COMPLAINT: Patient is a 44y old  Female who presents with a chief complaint of Wound dehiscence s/p panniculectomy on 9/1 (20 Sep 2020 21:34)      HPI:  44 year old female PMHx CAD (mLAD x1 MIGUEL ANGEL (2014), x3 stents 09/2019), HTN, prediabetes, hyperlipidemia, recent COVID-19 infection presents to ED for pain of wound s/p panniculectomy on 9/1. Patient presented to plastic surgeon, Dr. Hameed's office with a wound dehiscence last week with an overlying dry eschar. Patient was advised to be admitted to the hospital for IV antibiotic treatment and wound care, however refused due to difficulties at home. Patient called Dr. Hameed's office earlier today c/o acute onset of pain. Patient was advised to come to the ED for further management. Denies chest pain, palpitations, dyspnea, headache, dizziness, n/v/d.     ED course:  Vitals: T 99 HR 94 /74 RR 19 SpO2 97% on RA  Labs: WBC 11.83, RBC 3.04 H/H 8.9/27.1, Platelet 656, Neutrophil 8.18, Monocyte 1.1, PTT 27.1, PT/INR 17.0/1.48, Glu 132, Protein 8.4, Alb 2.4, Alk phos 141  CT abd/pelvis w/IV cont: Extensive defects within the lower abdominal wall with foci of gas and inflammation. Findings worrisome for gas forming infection. Loculations of fluid are noted within the abdominal wall.  CXR: no active disease  EKG: NSR vent rate 97 bpm, minimal voltage criteria for LVH  Given IV Zosyn x1, IV Vanco x1, IV NS 2.8 L bolus x1, Tylenol 650 mg PO x1 (20 Sep 2020 17:54)    Interval HPI: Patient seen and examined at bedside. Patient denies CP, SOB, palpitations, dizziness, headache, leg swelling, orthopnea. Denies dyspnea on exertion.       PAST MEDICAL & SURGICAL HISTORY:  Obesity    Hypothyroid  Resolved    Coronary artery disease  s/p MIGUEL ANGEL x 3 (9/2019), MIGUEL ANGEL x1 (2014)    HLD (hyperlipidemia)    HTN (hypertension)    S/P coronary artery stent placement  mLAD x1 MIGUEL ANGEL (2014),  3 stents 09/2019    H/O total hysterectomy  2017        SOCIAL HISTORY: No active tobacco, alcohol or illicit drug use    FAMILY HISTORY:  Family history of coronary artery disease younger than 40 years of age (Sibling)        Outpatient medications:    MEDICATIONS  (STANDING):  aspirin  chewable 81 milliGRAM(s) Oral daily  atorvastatin 40 milliGRAM(s) Oral at bedtime  enoxaparin Injectable 40 milliGRAM(s) SubCutaneous at bedtime  metoprolol tartrate 50 milliGRAM(s) Oral two times a day  piperacillin/tazobactam IVPB.. 3.375 Gram(s) IV Intermittent every 8 hours  vancomycin  IVPB 1000 milliGRAM(s) IV Intermittent every 12 hours    MEDICATIONS  (PRN):  acetaminophen   Tablet .. 650 milliGRAM(s) Oral every 6 hours PRN Mild Pain (1 - 3)      Allergies    codeine (Unknown)    Intolerances        REVIEW OF SYSTEMS: Is negative for eye, ENT, GI, , allergic, dermatologic, musculoskeletal and neurologic, except as described above.    VITAL SIGNS:   Vital Signs Last 24 Hrs  T(C): 36.8 (21 Sep 2020 05:27), Max: 37.3 (20 Sep 2020 14:09)  T(F): 98.2 (21 Sep 2020 05:27), Max: 99.2 (20 Sep 2020 14:09)  HR: 74 (21 Sep 2020 05:27) (74 - 120)  BP: 116/75 (21 Sep 2020 05:27) (116/75 - 122/74)  BP(mean): --  RR: 17 (21 Sep 2020 05:27) (17 - 19)  SpO2: 100% (21 Sep 2020 05:27) (94% - 100%)    I&O's Summary    20 Sep 2020 07:01  -  21 Sep 2020 07:00  --------------------------------------------------------  IN: 590 mL / OUT: 0 mL / NET: 590 mL        PHYSICAL EXAM:    Constitutional: NAD, awake and alert, well-developed  Eyes:  EOMI, no oral cyanosis, conjunctivae clear, anicteric.  Pulmonary: Non-labored, breath sounds are clear bilaterally, no wheezing, rales or rhonchi  Cardiovascular:  regular S1 and S2. No murmur.  No rubs, gallops or clicks  Gastrointestinal: Bowel Sounds present, soft, nontender.   Lymph: No peripheral edema.   Neurological: Alert, strength and sensitivity are grossly intact  Skin: No obvious lesions/rashes.   Psych:  Mood & affect appropriate .    LABS: All Labs Reviewed:                        8.9    11.83 )-----------( 656      ( 20 Sep 2020 14:47 )             27.1     20 Sep 2020 14:47    135    |  101    |  8      ----------------------------<  132    4.0     |  27     |  0.83     Ca    9.2        20 Sep 2020 14:47    TPro  8.4    /  Alb  2.4    /  TBili  0.6    /  DBili  x      /  AST  32     /  ALT  37     /  AlkPhos  141    20 Sep 2020 14:47    PT/INR - ( 20 Sep 2020 14:47 )   PT: 17.0 sec;   INR: 1.48 ratio         PTT - ( 20 Sep 2020 14:47 )  PTT:27.1 sec

## 2020-09-21 NOTE — CONSULT NOTE ADULT - PROBLEM SELECTOR RECOMMENDATION 2
Suggest patient should have debridement of all necrotic tissue in the OR, Wound vac might be applied then, F/U at Wound care center after discharge as patient is a candidate for Hyperbaric.

## 2020-09-21 NOTE — ADVANCED PRACTICE NURSE CONSULT - ASSESSMENT
Patient is a 45yo female  S/P paniculectomy september 1st,She was admitted   with wound dehiscence and cellulitis. PMHX: obesity, hypothyroid, CAD, HLD, Presents with a 38 x 5 x 4 abdominal dehiscence at surgical site. fat necrosis evident, patient denies discomfort/pain, no odor, superior periwound soft dark necrotic tissue approximately 10 x 4 may need some debridement in the future:  inferior wound tissue dark likely to resolve:

## 2020-09-21 NOTE — CONSULT NOTE ADULT - PROBLEM SELECTOR RECOMMENDATION 9
IV antibiotic Patient with headaches 2/14 after being kept awake by roommate; no headaches currently   -  tylenol 650 mg PRN

## 2020-09-21 NOTE — ADVANCED PRACTICE NURSE CONSULT - RECOMMEDATIONS
Veraflo NPWT will place tomorrow with surgeon  RN educated in the care of this patients dressing care  Discussed recommendations with Surgeon will place veraflo on in morning Veraflo NPWT will place tomorrow with surgeon  Nutrition consulted for Isaiah BID  RN educated in the care of this patients dressing care  Discussed recommendations with Surgeon will place veraflo on in morning

## 2020-09-21 NOTE — PROGRESS NOTE ADULT - ASSESSMENT
Female PMHx CAD (mLAD x1 MIGUEL ANGEL (2014), x3 stents 09/2019), HTN, prediabetes, hyperlipidemia presents to ED for pain of wound s/p panniculectomy on 9/1 admitted for cellulitis and partial wound dehiscence. negative 43yo F w/ PMHx of CAD (mLAD x1 MIGUEL ANGEL in 2014, x3 stents 09/2019), HTN, prediabetes, hyperlipidemia presents to ED for pain of wound s/p panniculectomy on 9/1 admitted for cellulitis and partial wound dehiscence with infection.

## 2020-09-21 NOTE — PROGRESS NOTE ADULT - PROBLEM SELECTOR PLAN 1
-Patient afebrile, WBC 11.3  -CT abd/pelvis w/IV cont: Extensive defects within the lower abdominal wall with foci of gas and inflammation. Findings worrisome for gas forming infection. Loculations of fluid are noted within the abdominal wall.  -F/u wound culture, and BCx   -Continue IV Zosyn and IV Vanco, until wound culture and BCx results   -Pain regimen, Tylenol q6h for mild PAIN    - ID (Dr. Gomez) consulted, f/u recs  - Cardiology ([facility]) consulted, f/u recs  - Pt evaluated by Plastic surgery, Dr. Ruiz; who will begin irrigation vac with betadine/Saline tomorrow am -Patient afebrile, WBC 11.3  -CT abd/pelvis w/IV cont: Extensive defects within the lower abdominal wall with foci of gas and inflammation. Findings worrisome for gas forming infection. Loculations of fluid are noted within the abdominal wall.  -F/u wound culture, and BCx   -Continue IV Zosyn and IV Vanco, until wound culture and BCx results   -Pain regimen, Tylenol q6h for mild PAIN    - ID (Dr. Gomez) consulted, recs appreciated   - Cardiology ([facility]) consulted, f/up  - Pt evaluated by Plastic surgery, Dr. Ruiz; who will begin irrigation vac with betadine/Saline tomorrow am -Patient afebrile, WBC 11.3  -CT abd/pelvis w/ IV cont radiology read: Extensive defects within the lower abdominal wall with foci of gas and inflammation. Findings worrisome for gas forming infection. Loculations of fluid are noted within the abdominal wall.  -F/u wound culture, and BCx   -Continue IV Zosyn and IV Vanco, until wound culture and BCx results   -Pain regimen: pt only having mild pain-- c/w Tylenol PRN  - ID (Dr. Gomez) consulted, recs appreciated   - Cardiology ([facility]) consulted, f/up  - Pt evaluated by Plastic surgery, Dr. Ruiz; who will begin irrigation vac with betadine/Saline tomorrow with wound care nurse  -per plastic surgery, she did some bedside debridement and pt is expected to have the gas/fluid loculations post-surgically with the surgery performed and does not plan to take to the OR for more aggressive debridement at this time.

## 2020-09-21 NOTE — PROGRESS NOTE ADULT - PROBLEM SELECTOR PLAN 3
H/H 8.9/27.1 on admission likely 2/2 blood vs. iron deficiency  - Per chart review, baseline hemoglobin 12.0-12.5  - Monitor for signs and symptoms of active bleeding  - Blood transfusion consent obtained, placed in chart  - Pending iron, TIBC, ferritin, B12 results H/H 8.9/27.1 on admission possibly combination of acute blood loss anemia from surgery with possible underlying iron deficiency,  - Per chart review, baseline hemoglobin 12.0-12.5  - Monitor for signs and symptoms of active bleeding  - Blood transfusion consent obtained, placed in chart  - Pending iron, TIBC, ferritin, B12 results

## 2020-09-21 NOTE — CONSULT NOTE ADULT - I WAS PHYSICALLY PRESENT FOR THE KEY PORTIONS OF THE EVALUATION AND MANAGEMENT (E/M) SERVICE PROVIDED.  I AGREE WITH THE ABOVE HISTORY, PHYSICAL, AND PLAN WHICH I HAVE REVIEWED AND EDITED WHERE APPROPRIATE
Per Negar, Dr. Bray would like to see her on Monday or Tuesday.  Monday is full, but we did have 8:15 or 8:45 on Tuesday open.     Statement Selected

## 2020-09-21 NOTE — CONSULT NOTE ADULT - SUBJECTIVE AND OBJECTIVE BOX
[facility] DIVISION of INFECTIOUS DISEASE  Capo Gomez MD PhD, Cat Rodriguez MD, Andree Randall MD, Alicja Rizzo MD  and providing coverage with Liliana Workman MD and Isaac Loyola MD  Providing Infectious Disease Consultations at [facility], [facility], [facility]'s    Office# [phone] to schedule follow up appointments  Answering Service for urgent calls or New Consults [phone]  Cell# to text for urgent issues Capo Gomez [phone]     HPI:  44 year old female PMHx CAD (mLAD x1 MIGUEL ANGEL (), x3 stents 2019), HTN, prediabetes, hyperlipidemia presents to ED for pain of wound s/p panniculectomy on . Patient presented to plastic surgeon, Dr. Hameed's office with a wound dehiscence last week with an overlying dry eschar. Patient was advised to be admitted to the hospital for IV antibiotic treatment and wound care, however refused due to difficulties at home. Patient called Dr. Hameed's office earlier today c/o acute onset of pain. Patient was advised to come to the ED for further management. Denies chest pain, palpitations, dyspnea, headache, dizziness, n/v/d.     ED course:  Vitals: T 99 HR 94 /74 RR 19 SpO2 97% on RA  Labs: WBC 11.83, RBC 3.04 H/H 8.9/27.1, Platelet 656, Neutrophil 8.18, Monocyte 1.1, PTT 27.1, PT/INR 17.0/1.48, Glu 132, Protein 8.4, Alb 2.4, Alk phos 141  CT abd/pelvis w/IV cont: Extensive defects within the lower abdominal wall with foci of gas and inflammation. Findings worrisome for gas forming infection. Loculations of fluid are noted within the abdominal wall.  CXR: no active disease  EKG: NSR vent rate 97 bpm, minimal voltage criteria for LVH  Given IV Zosyn x1, IV Vanco x1, IV NS 2.8 L bolus x1, Tylenol 650 mg PO x1 (20 Sep 2020 17:54)      PAST MEDICAL & SURGICAL HISTORY:  Obesity    Hypothyroid  Resolved    Coronary artery disease  s/p MIGUEL ANGEL x 3 (2019), MIGUEL ANGEL x1 ()    HLD (hyperlipidemia)    HTN (hypertension)    S/P coronary artery stent placement  mLAD x1 MIGUEL ANGEL (),  3 stents 2019    H/O total hysterectomy          Antimicrobials  piperacillin/tazobactam IVPB.. 3.375 Gram(s) IV Intermittent every 8 hours  vancomycin  IVPB 1500 milliGRAM(s) IV Intermittent every 12 hours      Immunological      Other  acetaminophen   Tablet .. 650 milliGRAM(s) Oral every 6 hours PRN  aspirin  chewable 81 milliGRAM(s) Oral daily  atorvastatin 40 milliGRAM(s) Oral at bedtime  enoxaparin Injectable 40 milliGRAM(s) SubCutaneous at bedtime  metoprolol tartrate 50 milliGRAM(s) Oral two times a day      Allergies    codeine (Unknown)    Intolerances        SOCIAL HISTORY:  Tobacco: denies  EtOH: denies  Recreational drug use: denies  Lives with:  and 3 kids  Ambulates: independent  ADLs: independent (20 Sep 2020 17:54)      FAMILY HISTORY:  Family history of coronary artery disease younger than 40 years of age (Sibling)        ROS:    EYES:  Negative  blurry vision or double vision  GASTROINTESTINAL:  Negative for nausea, vomiting, diarrhea  -otherwise negative except for subjective    Vital Signs Last 24 Hrs  T(C): 36.4 (21 Sep 2020 13:30), Max: 37.3 (20 Sep 2020 14:09)  T(F): 97.5 (21 Sep 2020 13:30), Max: 99.2 (20 Sep 2020 14:09)  HR: 83 (21 Sep 2020 13:30) (74 - 120)  BP: 111/76 (21 Sep 2020 13:30) (111/76 - 122/74)  BP(mean): --  RR: 17 (21 Sep 2020 13:30) (17 - 19)  SpO2: 95% (21 Sep 2020 13:30) (94% - 100%)    PE:  WDWN in no distress  HEENT:  NC, PERRL, sclerae anicteric, conjunctivae clear, EOMI.  Sinuses nontender, no nasal exudate.  No buccal or pharyngeal lesions, erythema or exudate  Neck:  Supple, no adenopathy  Lungs:  No accessory muscle use, bilaterally clear to auscultation  Cor:  RRR, S1, S2, no murmur appreciated  Abd:  Symmetric, lower abd wound with eschar and found smell, tender, surrounding erythema  Extrem:  No cyanosis or edema  Skin:  No rashes.  Neuro: grossly intact  Musc: moving all limbs freely, no focal deficits    LABS:                        7.8    11.31 )-----------( 651      ( 21 Sep 2020 08:51 )             24.7       WBC Count: 11.31 K/uL (20 @ 08:51)  WBC Count: 11.83 K/uL (20 @ 14:47)          138  |  106  |  8   ----------------------------<  134<H>  4.2   |  24  |  0.87    Ca    8.0<L>      21 Sep 2020 08:51    TPro  6.7  /  Alb  1.9<L>  /  TBili  0.5  /  DBili  x   /  AST  22  /  ALT  29  /  AlkPhos  107        Creatinine, Serum: 0.87 mg/dL (20 @ 08:51)  Creatinine, Serum: 0.83 mg/dL (20 @ 14:47)      Urinalysis Basic - ( 20 Sep 2020 17:49 )    Color: Yellow / Appearance: Clear / S.005 / pH: x  Gluc: x / Ketone: Negative  / Bili: Negative / Urobili: Negative   Blood: x / Protein: 15 / Nitrite: Negative   Leuk Esterase: Negative / RBC: Negative /HPF / WBC 0-2   Sq Epi: x / Non Sq Epi: Few / Bacteria: Occasional        MICROBIOLOGY:    COVID-19  Antibody - for prior infection screening (20 @ 23:34)    COVID-19 IgG Antibody Index: 32.70: Roche ECLIA Total AB (LUDMILA)  NOTE: This result index represents a total antibody measurement,  which  includes IgG, IgA, and IgM  Negative <= 0.99 Index  Positive >= 1.00 Index Index    COVID-19 IgG Antibody Interpretation: Positive:       RADIOLOGY & ADDITIONAL STUDIES:    --< from: CT Abdomen and Pelvis w/ IV Cont (20 @ 16:11) >    EXAM:  CT ABDOMEN AND PELVIS IC                            PROCEDURE DATE:  2020          INTERPRETATION:  CLINICAL INFORMATION: post op surgical complication/infection    COMPARISON: 18.    PROCEDURE:  CT of the Abdomen and Pelvis was performed with intravenous contrast.  Intravenous contrast: 90 ml Omnipaque 350. 10 ml discarded.  Oral contrast: None.  Sagittal and coronal reformats were performed.    FINDINGS:    LOWER CHEST: Within normal limits.    LIVER: Within normal limits.  BILE DUCTS: Normal caliber.  GALLBLADDER: Within normal limits.  SPLEEN: Within normal limits.  PANCREAS: Within normal limits.  ADRENALS: Within normal limits.  KIDNEYS/URETERS: Right renal cortical scarring. Mild fullness of the right collecting system.    BLADDER: Within normal limits.  REPRODUCTIVE ORGANS: Hysterectomy. No adnexal mass.    BOWEL: No bowel obstruction. The appendix is normal.  PERITONEUM: No ascites.  VESSELS:  Within normal limits.  RETROPERITONEUM/LYMPH NODES: No lymphadenopathy.  ABDOMINAL WALL: Loculation of fluid noted in the upper abdominal wall. Extensive defects are noted within the lower abdominal wall with foci of subcutaneous gas and inflammation. There are smaller loculations of fluid in the lower abdominal wall.  BONES: Within normal limits.    IMPRESSION: Extensive defects within the lower abdominal wall with foci of gas and inflammation. Findings worrisome for gas forming infection.    Loculations of fluid are noted within the abdominal wall.

## 2020-09-21 NOTE — PROGRESS NOTE ADULT - SUBJECTIVE AND OBJECTIVE BOX
S  Pt feeling better-no more pain at r aspect of wound     No systemic symptoms-no fever, chill, lightheadedness    Pt seen by General Surgery, Cardiology, ID, and Wound/Diabetes Nursing  She has been on Zosyn and Vanco    WBC 11.31  H/H 7.8/24.7  Albumin 1.9    O  Afebrile  VSS      Abdomen-induration and erythema(which was marked yesterday in ER) is slightly improved      Dry eschar unchanged      No tenderness or crepitus of abdominal wall-Pt had some tenderness at the right aspect of the wound yesterday which has resolved      Non-viable adipose tissue debrided at bedside      Wound dressed with betadine soaked denny/telfa and abd pads    A&P Discussed with Dr Marcus         CT-more consistent with post-operative changes         Nutrition Consultation         As per Wound/Diabetes Nursing(dressing changed with her)-will begin irrigation vac with betadine/Saline in am

## 2020-09-21 NOTE — PROGRESS NOTE ADULT - PROBLEM SELECTOR PLAN 2
-Continue IV Vanc and IV Zosyn  -F/u wound culture, and BCx   -Monitor fever and WBC count  - ID (Dr. Gomez) consulted, f/u recs -Continue IV Vanc and IV Zosyn  -F/u wound culture, and BCx   -Monitor fever and WBC count  - ID (Dr. Gomez) consulted, recs appreciated

## 2020-09-21 NOTE — PROGRESS NOTE ADULT - PROBLEM SELECTOR PLAN 5
Problem: Coronary artery disease.  Plan: mLAD x1 MIGUEL ANGEL (2014), x3 stents 09/2019  - Continue home Atorvastatin 80 mg PO qhs, ASA 81 mg PO qd  - Echo 9/3/2020: trace MR, trace TR, LVEF 60-65%   - Last angiogram was done in sep 2019 when she got DESx3   - Last stress test was done in 2019 - wnl. Problem: Coronary artery disease.  Plan: mLAD x1 MIGUEL ANGEL (2014), x3 stents 09/2019  - Continue home Atorvastatin 80 mg PO qhs, ASA 81 mg PO daily   - Echo 9/3/2020: trace MR, trace TR, LVEF 60-65%   - Last angiogram was done in sep 2019 when pt had DESx3   - Last stress test was done in 2019 - wnl.  - cardio ([facility]), recs appreciated

## 2020-09-21 NOTE — PROGRESS NOTE ADULT - SUBJECTIVE AND OBJECTIVE BOX
S  Pt without complaints or systemic symptoms.      Prelim wound C&S Enterococcus faecalis and EColi-sensitivities pending  Blood C&S No growth    Pt being followed by GS, ID, Cardiology, and Wound Management    O  Afebrile  VSS       Wound-packed with betadine soaked denny       Erythema slightly improved       Abdominal wall flap-without tenderness or crepitus     A&P VAC with betadine irrigation to begin tomorrow AM          Awaiting culture sensitivities           Continue local wound care and IV abx

## 2020-09-21 NOTE — CONSULT NOTE ADULT - SUBJECTIVE AND OBJECTIVE BOX
Chief Complaint: Abdominal surgical wound dehiscence with wound infection.      HPI: Patient is three weeks S/P paniculectomy, was admitted with wound dehiscence and infection.     PAST MEDICAL & SURGICAL HISTORY:  Obesity    Hypothyroid  Resolved    Coronary artery disease  s/p MIGUEL ANGEL x 3 (9/2019), MIGUEL ANGEL x1 (2014)    HLD (hyperlipidemia)    HTN (hypertension)    S/P coronary artery stent placement  mLAD x1 MIGUEL ANGEL (2014),  3 stents 09/2019    H/O total hysterectomy  2017        Allergies    codeine (Unknown)    Intolerances        MEDICATIONS  (STANDING):  aspirin  chewable 81 milliGRAM(s) Oral daily  atorvastatin 40 milliGRAM(s) Oral at bedtime  enoxaparin Injectable 40 milliGRAM(s) SubCutaneous at bedtime  metoprolol tartrate 50 milliGRAM(s) Oral two times a day  piperacillin/tazobactam IVPB.. 3.375 Gram(s) IV Intermittent every 8 hours  vancomycin  IVPB 1000 milliGRAM(s) IV Intermittent every 12 hours    MEDICATIONS  (PRN):  acetaminophen   Tablet .. 650 milliGRAM(s) Oral every 6 hours PRN Mild Pain (1 - 3)      FAMILY HISTORY:  Family history of coronary artery disease younger than 40 years of age (Sibling)            ROS:  CONSTITUTIONAL: No fever, weight loss, or fatigue  EYES: No eye pain, visual disturbances, or discharge  ENMT:  No difficulty hearing, tinnitus, vertigo; No sinus or throat pain  NECK: No pain or stiffness  BREASTS: No pain, masses, or nipple discharge  RESPIRATORY: No cough, wheezing, chills or hemoptysis; No shortness of breath  CARDIOVASCULAR: No chest pain, palpitations, dizziness, or leg swelling  GASTROINTESTINAL: No abdominal or epigastric pain. No nausea, vomiting, or hematemesis; No diarrhea or constipation. No melena or hematochezia.  GENITOURINARY: No dysuria, frequency, hematuria, or incontinence  NEUROLOGICAL: No headaches, memory loss, loss of strength, numbness, or tremors  SKIN: No itching, burning, rashes, or lesions   LYMPH NODES: No enlarged glands  ENDOCRINE: No heat or cold intolerance; No hair loss  MUSCULOSKELETAL: No joint pain or swelling; No muscle, back, or extremity pain  PSYCHIATRIC: No depression, anxiety, mood swings, or difficulty sleeping  HEME/LYMPH: No easy bruising, or bleeding gums  ALLERGY AND IMMUNOLOGIC: No hives or eczema    PHYSICAL EXAM-    Height (cm): 152.4 (09-20 @ 14:09)  Weight (kg): 90.7 (09-20 @ 14:09)  BMI (kg/m2): 39.1 (09-20 @ 14:09)  Vital Signs Last 24 Hrs  T(C): 36.8 (21 Sep 2020 05:27), Max: 37.3 (20 Sep 2020 14:09)  T(F): 98.2 (21 Sep 2020 05:27), Max: 99.2 (20 Sep 2020 14:09)  HR: 74 (21 Sep 2020 05:27) (74 - 120)  BP: 116/75 (21 Sep 2020 05:27) (116/75 - 122/74)  BP(mean): --  RR: 17 (21 Sep 2020 05:27) (17 - 19)  SpO2: 100% (21 Sep 2020 05:27) (94% - 100%)    Constitutional: well developed, well nourished, no apparent distress, alert, oriented x 3.  Neck: Supple   Pulmonary: no respiratory distress, normal respiratory rhythm and effort, lungs are clear to auscultation/percussion. No CVA tenderness.  Cardiovascular: heart rate normal, normal sinus rhythm; no murmurs, gallops, rubs, heaves or thrills   Abdomen: soft, non-tender, +BS, no guarding/rebound/rigidity.  Vascular: Lower extremities are well perfused.   Extremities: WNL  Skin: Large lower transverse incision wound dehiscence with large necrotic skin, base of the wound is mostly slough, some foul odor, mild cellulitis of periwound skin.                             7.8    11.31 )-----------( 651      ( 21 Sep 2020 08:51 )             24.7     09-21    138  |  106  |  8   ----------------------------<  134<H>  4.2   |  24  |  0.87    Ca    8.0<L>      21 Sep 2020 08:51    TPro  6.7  /  Alb  1.9<L>  /  TBili  0.5  /  DBili  x   /  AST  22  /  ALT  29  /  AlkPhos  107  09-21      Radiology:

## 2020-09-21 NOTE — CONSULT NOTE ADULT - PROBLEM SELECTOR RECOMMENDATION 9
foul smell, CT appearance very concerning for polymicrobial infection potentially involving anaerobes and with recent surgery also concern for MRSA. Hope to be able to modify abx based on surgical micro to use a regiment with less renal risk.   Local site control/debridement to be addressed by surgery

## 2020-09-22 LAB
-  AMIKACIN: SIGNIFICANT CHANGE UP
-  AMOXICILLIN/CLAVULANIC ACID: SIGNIFICANT CHANGE UP
-  AMPICILLIN/SULBACTAM: SIGNIFICANT CHANGE UP
-  AMPICILLIN: SIGNIFICANT CHANGE UP
-  AMPICILLIN: SIGNIFICANT CHANGE UP
-  AZTREONAM: SIGNIFICANT CHANGE UP
-  CEFAZOLIN: SIGNIFICANT CHANGE UP
-  CEFEPIME: SIGNIFICANT CHANGE UP
-  CEFOXITIN: SIGNIFICANT CHANGE UP
-  CEFTRIAXONE: SIGNIFICANT CHANGE UP
-  CIPROFLOXACIN: SIGNIFICANT CHANGE UP
-  ERTAPENEM: SIGNIFICANT CHANGE UP
-  GENTAMICIN: SIGNIFICANT CHANGE UP
-  IMIPENEM: SIGNIFICANT CHANGE UP
-  LEVOFLOXACIN: SIGNIFICANT CHANGE UP
-  MEROPENEM: SIGNIFICANT CHANGE UP
-  PIPERACILLIN/TAZOBACTAM: SIGNIFICANT CHANGE UP
-  TETRACYCLINE: SIGNIFICANT CHANGE UP
-  TOBRAMYCIN: SIGNIFICANT CHANGE UP
-  TRIMETHOPRIM/SULFAMETHOXAZOLE: SIGNIFICANT CHANGE UP
-  VANCOMYCIN: SIGNIFICANT CHANGE UP
ALBUMIN SERPL ELPH-MCNC: 2.1 G/DL — LOW (ref 3.3–5)
ALP SERPL-CCNC: 122 U/L — HIGH (ref 40–120)
ALT FLD-CCNC: 29 U/L — SIGNIFICANT CHANGE UP (ref 12–78)
ANION GAP SERPL CALC-SCNC: 6 MMOL/L — SIGNIFICANT CHANGE UP (ref 5–17)
AST SERPL-CCNC: 23 U/L — SIGNIFICANT CHANGE UP (ref 15–37)
BASOPHILS # BLD AUTO: 0.02 K/UL — SIGNIFICANT CHANGE UP (ref 0–0.2)
BASOPHILS NFR BLD AUTO: 0.2 % — SIGNIFICANT CHANGE UP (ref 0–2)
BILIRUB SERPL-MCNC: 0.5 MG/DL — SIGNIFICANT CHANGE UP (ref 0.2–1.2)
BUN SERPL-MCNC: 7 MG/DL — SIGNIFICANT CHANGE UP (ref 7–23)
CALCIUM SERPL-MCNC: 8.6 MG/DL — SIGNIFICANT CHANGE UP (ref 8.5–10.1)
CHLORIDE SERPL-SCNC: 107 MMOL/L — SIGNIFICANT CHANGE UP (ref 96–108)
CO2 SERPL-SCNC: 26 MMOL/L — SIGNIFICANT CHANGE UP (ref 22–31)
CREAT SERPL-MCNC: 0.96 MG/DL — SIGNIFICANT CHANGE UP (ref 0.5–1.3)
CULTURE RESULTS: SIGNIFICANT CHANGE UP
EOSINOPHIL # BLD AUTO: 0.34 K/UL — SIGNIFICANT CHANGE UP (ref 0–0.5)
EOSINOPHIL NFR BLD AUTO: 3 % — SIGNIFICANT CHANGE UP (ref 0–6)
FERRITIN SERPL-MCNC: 443 NG/ML — HIGH (ref 15–150)
FOLATE SERPL-MCNC: 18.6 NG/ML — SIGNIFICANT CHANGE UP
GLUCOSE SERPL-MCNC: 139 MG/DL — HIGH (ref 70–99)
HCT VFR BLD CALC: 25.6 % — LOW (ref 34.5–45)
HGB BLD-MCNC: 8.2 G/DL — LOW (ref 11.5–15.5)
IMM GRANULOCYTES NFR BLD AUTO: 1 % — SIGNIFICANT CHANGE UP (ref 0–1.5)
IRON SATN MFR SERPL: 11 % — LOW (ref 14–50)
IRON SATN MFR SERPL: 19 UG/DL — LOW (ref 30–160)
LYMPHOCYTES # BLD AUTO: 2.66 K/UL — SIGNIFICANT CHANGE UP (ref 1–3.3)
LYMPHOCYTES # BLD AUTO: 23.3 % — SIGNIFICANT CHANGE UP (ref 13–44)
MCHC RBC-ENTMCNC: 28.6 PG — SIGNIFICANT CHANGE UP (ref 27–34)
MCHC RBC-ENTMCNC: 32 GM/DL — SIGNIFICANT CHANGE UP (ref 32–36)
MCV RBC AUTO: 89.2 FL — SIGNIFICANT CHANGE UP (ref 80–100)
METHOD TYPE: SIGNIFICANT CHANGE UP
METHOD TYPE: SIGNIFICANT CHANGE UP
MONOCYTES # BLD AUTO: 1.38 K/UL — HIGH (ref 0–0.9)
MONOCYTES NFR BLD AUTO: 12.1 % — SIGNIFICANT CHANGE UP (ref 2–14)
MRSA PCR RESULT.: SIGNIFICANT CHANGE UP
NEUTROPHILS # BLD AUTO: 6.93 K/UL — SIGNIFICANT CHANGE UP (ref 1.8–7.4)
NEUTROPHILS NFR BLD AUTO: 60.4 % — SIGNIFICANT CHANGE UP (ref 43–77)
NRBC # BLD: 0 /100 WBCS — SIGNIFICANT CHANGE UP (ref 0–0)
PLATELET # BLD AUTO: 644 K/UL — HIGH (ref 150–400)
POTASSIUM SERPL-MCNC: 3.8 MMOL/L — SIGNIFICANT CHANGE UP (ref 3.5–5.3)
POTASSIUM SERPL-SCNC: 3.8 MMOL/L — SIGNIFICANT CHANGE UP (ref 3.5–5.3)
PROT SERPL-MCNC: 7.2 G/DL — SIGNIFICANT CHANGE UP (ref 6–8.3)
RBC # BLD: 2.87 M/UL — LOW (ref 3.8–5.2)
RBC # FLD: 14.2 % — SIGNIFICANT CHANGE UP (ref 10.3–14.5)
S AUREUS DNA NOSE QL NAA+PROBE: SIGNIFICANT CHANGE UP
SODIUM SERPL-SCNC: 139 MMOL/L — SIGNIFICANT CHANGE UP (ref 135–145)
TIBC SERPL-MCNC: 177 UG/DL — LOW (ref 220–430)
TRANSFERRIN SERPL-MCNC: 127 MG/DL — LOW (ref 200–360)
UIBC SERPL-MCNC: 158 UG/DL — SIGNIFICANT CHANGE UP (ref 110–370)
VANCOMYCIN TROUGH SERPL-MCNC: 11.8 UG/ML — SIGNIFICANT CHANGE UP (ref 10–20)
VIT B12 SERPL-MCNC: >2000 PG/ML — HIGH (ref 232–1245)
WBC # BLD: 11.44 K/UL — HIGH (ref 3.8–10.5)
WBC # FLD AUTO: 11.44 K/UL — HIGH (ref 3.8–10.5)

## 2020-09-22 PROCEDURE — 99232 SBSQ HOSP IP/OBS MODERATE 35: CPT

## 2020-09-22 PROCEDURE — 99233 SBSQ HOSP IP/OBS HIGH 50: CPT | Mod: GC

## 2020-09-22 RX ORDER — MEROPENEM 1 G/30ML
1000 INJECTION INTRAVENOUS EVERY 8 HOURS
Refills: 0 | Status: DISCONTINUED | OUTPATIENT
Start: 2020-09-23 | End: 2020-10-02

## 2020-09-22 RX ORDER — VANCOMYCIN HCL 1 G
1000 VIAL (EA) INTRAVENOUS ONCE
Refills: 0 | Status: COMPLETED | OUTPATIENT
Start: 2020-09-22 | End: 2020-09-22

## 2020-09-22 RX ORDER — MEROPENEM 1 G/30ML
1000 INJECTION INTRAVENOUS ONCE
Refills: 0 | Status: COMPLETED | OUTPATIENT
Start: 2020-09-22 | End: 2020-09-22

## 2020-09-22 RX ORDER — ERTAPENEM SODIUM 1 G/1
1000 INJECTION, POWDER, LYOPHILIZED, FOR SOLUTION INTRAMUSCULAR; INTRAVENOUS ONCE
Refills: 0 | Status: DISCONTINUED | OUTPATIENT
Start: 2020-09-22 | End: 2020-09-22

## 2020-09-22 RX ORDER — ERTAPENEM SODIUM 1 G/1
INJECTION, POWDER, LYOPHILIZED, FOR SOLUTION INTRAMUSCULAR; INTRAVENOUS
Refills: 0 | Status: DISCONTINUED | OUTPATIENT
Start: 2020-09-22 | End: 2020-09-22

## 2020-09-22 RX ORDER — MEROPENEM 1 G/30ML
INJECTION INTRAVENOUS
Refills: 0 | Status: DISCONTINUED | OUTPATIENT
Start: 2020-09-22 | End: 2020-10-02

## 2020-09-22 RX ADMIN — Medication 100 MILLIGRAM(S): at 22:19

## 2020-09-22 RX ADMIN — Medication 1 APPLICATION(S): at 06:53

## 2020-09-22 RX ADMIN — ATORVASTATIN CALCIUM 40 MILLIGRAM(S): 80 TABLET, FILM COATED ORAL at 22:19

## 2020-09-22 RX ADMIN — Medication 81 MILLIGRAM(S): at 11:13

## 2020-09-22 RX ADMIN — Medication 100 MILLIGRAM(S): at 07:21

## 2020-09-22 RX ADMIN — ENOXAPARIN SODIUM 40 MILLIGRAM(S): 100 INJECTION SUBCUTANEOUS at 22:20

## 2020-09-22 RX ADMIN — Medication 250 MILLIGRAM(S): at 23:33

## 2020-09-22 RX ADMIN — PIPERACILLIN AND TAZOBACTAM 25 GRAM(S): 4; .5 INJECTION, POWDER, LYOPHILIZED, FOR SOLUTION INTRAVENOUS at 06:52

## 2020-09-22 RX ADMIN — Medication 300 MILLIGRAM(S): at 04:03

## 2020-09-22 RX ADMIN — PIPERACILLIN AND TAZOBACTAM 25 GRAM(S): 4; .5 INJECTION, POWDER, LYOPHILIZED, FOR SOLUTION INTRAVENOUS at 13:28

## 2020-09-22 RX ADMIN — Medication 100 MILLIGRAM(S): at 17:04

## 2020-09-22 RX ADMIN — Medication 50 MILLIGRAM(S): at 17:04

## 2020-09-22 RX ADMIN — MEROPENEM 100 MILLIGRAM(S): 1 INJECTION INTRAVENOUS at 18:29

## 2020-09-22 NOTE — PROGRESS NOTE ADULT - PROBLEM SELECTOR PLAN 2
Plan as above   -Continue to trend fever, white count   -ID following, recs appreciated Abx Plan as above   -ID following, recs appreciated

## 2020-09-22 NOTE — PROGRESS NOTE ADULT - PROBLEM SELECTOR PLAN 7
Plan: History of prediabetes  - A1C: 6.1%  - DASH, consistent carb diet. Plan: History of prediabetes  -A1C: 6.1%  -DASH, consistent carb diet

## 2020-09-22 NOTE — PROGRESS NOTE ADULT - SUBJECTIVE AND OBJECTIVE BOX
Pt w/o complaints-No systemic symptoms-no fever, chills, lightheadedness  Tolerating PO, +BM  Pt undergoing TID dressing changes w/ betadine soaked denny     Afebrile  VSS  Abdomen-diminished erythema and induration                  No tenderness or crepitus                  No malodor, purulence or drainage                  Bedside debridement of non-viable tissue    VAC placed-with irrigation of betadine/saline    A&P Pt improving with betadine dressings and IV abx         Awaiting C&S sensitivities

## 2020-09-22 NOTE — PROGRESS NOTE ADULT - PROBLEM SELECTOR PLAN 4
Chronic stable   - Continue home Metoprolol Tartrate 50mg PO BID  - Monitor routine hemodynamics  - DASH, consistent carb diet.   - Patient follows outpatient cardiologist Dr. Abdoul Lam from [facility]. Chronic, stable   -Continue home Metoprolol Tartrate 50mg PO BID  -Monitor routine hemodynamics  -DASH, consistent carb diet.  -Patient follows outpatient cardiologist Dr. Abdoul Lam from [facility] -Continue home Metoprolol Tartrate 50mg PO BID  -Monitor routine hemodynamics  -DASH, consistent carb diet.  -Patient follows outpatient cardiologist Dr. Abdoul Lam from [facility]

## 2020-09-22 NOTE — PROGRESS NOTE ADULT - PROBLEM SELECTOR PLAN 5
Problem: Coronary artery disease.  Plan: mLAD x1 MIGUEL ANGEL (2014), x3 stents 09/2019  - Continue home Atorvastatin 80 mg PO qhs, ASA 81 mg PO daily   - Echo 9/3/2020: trace MR, trace TR, LVEF 60-65%   - Last angiogram was done in sep 2019 when pt had DESx3   - Last stress test was done in 2019 - wnl.  - cardio ([facility]), recs appreciated Chronic, a/p LAD x1 MIGUEL ANGEL (2014), x3 stents (09/2019)  -Continue atorvastatin 40 mg PO qhs, ASA 81 mg PO daily   -TTE 9/3/2020: trace MR, trace TR, LVEF 60-65%   -Cardio ([facility]), following, recs appreciated

## 2020-09-22 NOTE — ADVANCED PRACTICE NURSE CONSULT - ASSESSMENT
Patient is a 45yo female  S/P paniculectomy september 1st,She was admitted   with wound dehiscence and cellulitis. PMHX: obesity, hypothyroid, CAD, HLD, Presents with a 38 x 5 x 4 abdominal dehiscence at surgical site. fat necrosis evident, patient denies discomfort/pain, no odor, superior periwound soft dark necrotic tissue approximately 10 x 4 may need some debridement in the future:  inferior wound tissue dark likely to resolve:    Veraflo negative pressure wound therapy (NPWT) applied continuous therapy 125mmHg, 50mL NS/betadine 1:1 mixture instilled q2h change 3 times weekly    RN educated in the function of the NPWT machine and changing 1:1NS/Betadine mixture  Pharmacy to supply NS/betadine 1:1 mixture [id number]

## 2020-09-22 NOTE — PROGRESS NOTE ADULT - ASSESSMENT
44 year old female PMHx CAD (mLAD x1 MIGUEL ANGEL (2014), x3 stents 09/2019), HTN, prediabetes, hyperlipidemia, recent COVID-19 infection admitted for wound dehiscence and possible revision.    Obstructive CAD s/p PCI 9/ 2019   - Cardiologist Dr. Abdoul Lam from [facility]  - No acute changes on EKG compared to previous  - Echo 9/3/2020: trace MR, trace TR, LVEF 60-65%   - Continue ASA 81    HTN  - Continue metoprolol 50 mg BID with hold parameters     Anemia  -work up as per primary team   - Transfuse >8 given cardiac history     HLD  - Continue atorvastatin 40 mg qhs     Cellulitis   -wound cellulitis now sp wound vac  -follow up id, wound, plastic surgery recs    Toshia Jesus FNP-C  Cardiology NP  [id number] [phone]

## 2020-09-22 NOTE — PROGRESS NOTE ADULT - SUBJECTIVE AND OBJECTIVE BOX
[facility] -- Zakia Barraza, Oskar, Maye, Matthias Oliva Savella  Office # [phone]      Follow Up:  CAD, post op infection   Subjective/Observations:   No events overnight resting comfortably in bed.  No complaints of chest pain, dyspnea, or palpitations reported. No signs of orthopnea or PND.        REVIEW OF SYSTEMS: All other review of systems is negative unless indicated above    PAST MEDICAL & SURGICAL HISTORY:  Obesity    Hypothyroid  Resolved    Coronary artery disease  s/p MIGUEL ANGEL x 3 (9/2019), MIGUEL ANGEL x1 (2014)    HLD (hyperlipidemia)    HTN (hypertension)    S/P coronary artery stent placement  mLAD x1 MIGUEL ANGEL (2014),  3 stents 09/2019    H/O total hysterectomy  2017        MEDICATIONS  (STANDING):  0.9% Sodium Chloride/ Betadine 1000 milliLiter(s)   Irrigation <User Schedule>  aspirin  chewable 81 milliGRAM(s) Oral daily  atorvastatin 40 milliGRAM(s) Oral at bedtime  enoxaparin Injectable 40 milliGRAM(s) SubCutaneous at bedtime  metoprolol tartrate 50 milliGRAM(s) Oral two times a day  piperacillin/tazobactam IVPB.. 3.375 Gram(s) IV Intermittent every 8 hours  vancomycin  IVPB 1500 milliGRAM(s) IV Intermittent every 12 hours    MEDICATIONS  (PRN):  acetaminophen   Tablet .. 650 milliGRAM(s) Oral every 6 hours PRN Mild Pain (1 - 3)  benzonatate 100 milliGRAM(s) Oral three times a day PRN Cough      Allergies    codeine (Unknown)    Intolerances        Vital Signs Last 24 Hrs  T(C): 36.9 (22 Sep 2020 05:14), Max: 37.6 (21 Sep 2020 20:48)  T(F): 98.5 (22 Sep 2020 05:14), Max: 99.7 (21 Sep 2020 20:48)  HR: 80 (22 Sep 2020 05:14) (80 - 100)  BP: 95/62 (22 Sep 2020 05:14) (95/62 - 134/84)  BP(mean): --  RR: 16 (22 Sep 2020 05:14) (16 - 17)  SpO2: 97% (22 Sep 2020 05:14) (95% - 98%)    I&O's Summary    21 Sep 2020 07:01  -  22 Sep 2020 07:00  --------------------------------------------------------  IN: 2160 mL / OUT: 0 mL / NET: 2160 mL          PHYSICAL EXAM:  TELE: not on tele   Constitutional: NAD, awake and alert, well-developed  HEENT: Moist Mucous Membranes, Anicteric  Pulmonary: Non-labored, breath sounds are clear bilaterally, No wheezing, crackles or rhonchi  Cardiovascular: Regular, S1 and S2 nl, No murmurs, rubs, gallops or clicks  Gastrointestinal: Bowel Sounds present, soft, nontender, wound vac   Lymph: No lymphadenopathy. No peripheral edema.  Skin: No visible rashes or ulcers.  Psych:  Mood & affect appropriate    LABS: All Labs Reviewed:                        7.8    11.31 )-----------( 651      ( 21 Sep 2020 08:51 )             24.7                         8.9    11.83 )-----------( 656      ( 20 Sep 2020 14:47 )             27.1     21 Sep 2020 08:51cad    138    |  106    |  8      ----------------------------<  134    4.2     |  24     |  0.87   20 Sep 2020 14:47    135    |  101    |  8      ----------------------------<  132    4.0     |  27     |  0.83     Ca    8.0        21 Sep 2020 08:51  Ca    9.2        20 Sep 2020 14:47    TPro  6.7    /  Alb  1.9    /  TBili  0.5    /  DBili  x      /  AST  22     /  ALT  29     /  AlkPhos  107    21 Sep 2020 08:51  TPro  8.4    /  Alb  2.4    /  TBili  0.6    /  DBili  x      /  AST  32     /  ALT  37     /  AlkPhos  141    20 Sep 2020 14:47    PT/INR - ( 20 Sep 2020 14:47 )   PT: 17.0 sec;   INR: 1.48 ratio         PTT - ( 20 Sep 2020 14:47 )  PTT:27.1 sec         ECG:  < from: 12 Lead ECG (09.20.20 @ 15:49) >    Ventricular Rate 97 BPM    Atrial Rate 97 BPM    P-R Interval 146 ms    QRS Duration 80 ms    Q-T Interval 392 ms    QTC Calculation(Bazett) 497 ms    P Axis 38 degrees    R Axis 0 degrees    T Axis -7 degrees    Diagnosis Line Normal sinus rhythm  Minimal voltage criteria for LVH, may be normal variant  Nonspecific T wave abnormality  Abnormal ECG    < end of copied text >    Echo:  e< from: TTE Echo Complete w/o Contrast w/ Doppler (09.03.20 @ 12:56) >  OBSERVATIONS:  Technically difficult and limited study  Mitral Valve: Thickened leaflets, trace physiologic MR.  Aortic Valve/Aorta: normal trileaflet aortic valve.  Tricuspid Valve: normal with trace TR.  Pulmonic Valve: normal  Left Atrium: normal  Right Atrium: normal  Left Ventricle: normal LV size and systolic function, estimated LVEF of 60-65%.  Right Ventricle: Grossly normal size and systolic function.  Pericardium/Pleura: normal, no significant pericardial effusion.      Conclusion:  Technically difficult and limited study  Normal left ventricular internal dimensions and systolic function, estimated LVEF of 60-65%.  Grossly normal RV size and systolic function.  Normal trileaflet aortic valve, without AI.  Trace physiologic MR and TR.  No significant pericardial effusion.      < end of copied text >    Radiology:

## 2020-09-22 NOTE — PROGRESS NOTE ADULT - SUBJECTIVE AND OBJECTIVE BOX
[facility] DIVISION of INFECTIOUS DISEASE  Capo Gomez MD PhD, Cat Rodriguez MD, Andree Randall MD, Alicja Rizzo MD  and providing coverage with Liliana Workman MD and Isaac Loyola MD  Providing Infectious Disease Consultations at [facility], [facility], [facility]    Office# [phone] to schedule follow up appointments  Answering Service for urgent calls or New Consults [phone]  Cell# to text for urgent issues Capo Gomez [phone]     infectious diseases progress note:    LOUIE QUINTANILLA is a 44y y. o. Female patient    No concerning overnight events    Allergies    codeine (Unknown)    Intolerances        ANTIBIOTICS/RELEVANT:  antimicrobials  piperacillin/tazobactam IVPB.. 3.375 Gram(s) IV Intermittent every 8 hours  vancomycin  IVPB 1500 milliGRAM(s) IV Intermittent every 12 hours    immunologic:    OTHER:  0.9% Sodium Chloride/ Betadine 1000 milliLiter(s) 1000 milliLiter(s) Irrigation <User Schedule>  acetaminophen   Tablet .. 650 milliGRAM(s) Oral every 6 hours PRN  aspirin  chewable 81 milliGRAM(s) Oral daily  atorvastatin 40 milliGRAM(s) Oral at bedtime  benzonatate 100 milliGRAM(s) Oral three times a day PRN  enoxaparin Injectable 40 milliGRAM(s) SubCutaneous at bedtime  metoprolol tartrate 50 milliGRAM(s) Oral two times a day      Objective:  Vital Signs Last 24 Hrs  T(C): 36.9 (22 Sep 2020 05:14), Max: 37.6 (21 Sep 2020 20:48)  T(F): 98.5 (22 Sep 2020 05:14), Max: 99.7 (21 Sep 2020 20:48)  HR: 80 (22 Sep 2020 05:14) (80 - 100)  BP: 95/62 (22 Sep 2020 05:14) (95/62 - 134/84)  BP(mean): --  RR: 16 (22 Sep 2020 05:14) (16 - 17)  SpO2: 97% (22 Sep 2020 05:14) (95% - 98%)    T(C): 36.9 (20 @ 05:14), Max: 37.6 (20 @ 20:48)  T(C): 36.9 (20 @ 05:14), Max: 37.6 (20 @ 20:48)  T(C): 36.9 (20 @ 05:14), Max: 37.6 (20 @ 20:48)    PHYSICAL EXAM:  HEENT: NC atraumatic  Neck: supple  Respiratory: no accessory muscle use, breathing comfortably  Cardiovascular: distant  Gastrointestinal: normal appearing, nondistended, now with wound vac  Extremities: no clubbing, no cyanosis,      LABS:                          8.2    11.44 )-----------( 644      ( 22 Sep 2020 09:16 )             25.6       11.44  @ 09:16  11.31  @ 08:51  11.83  @ 14:47          139  |  107  |  7   ----------------------------<  139<H>  3.8   |  26  |  0.96    Ca    8.6      22 Sep 2020 09:16  Phos  3.0         TPro  7.2  /  Alb  2.1<L>  /  TBili  0.5  /  DBili  x   /  AST  23  /  ALT  29  /  AlkPhos  122<H>        Creatinine, Serum: 0.96 mg/dL (20 @ 09:16)  Creatinine, Serum: 0.87 mg/dL (20 @ 08:51)  Creatinine, Serum: 0.83 mg/dL (20 @ 14:47)      PT/INR - ( 20 Sep 2020 14:47 )   PT: 17.0 sec;   INR: 1.48 ratio         PTT - ( 20 Sep 2020 14:47 )  PTT:27.1 sec  Urinalysis Basic - ( 20 Sep 2020 17:49 )    Color: Yellow / Appearance: Clear / S.005 / pH: x  Gluc: x / Ketone: Negative  / Bili: Negative / Urobili: Negative   Blood: x / Protein: 15 / Nitrite: Negative   Leuk Esterase: Negative / RBC: Negative /HPF / WBC 0-2   Sq Epi: x / Non Sq Epi: Few / Bacteria: Occasional            INFLAMMATORY MARKERS  Auto Neutrophil #: 6.93 K/uL (20 @ 09:16)  Auto Lymphocyte #: 2.66 K/uL (20 @ 09:16)  Auto Neutrophil #: 7.70 K/uL (20 @ 08:51)  Auto Lymphocyte #: 2.02 K/uL (20 @ 08:51)  Auto Neutrophil #: 8.18 K/uL (20 @ 14:47)  Auto Lymphocyte #: 2.22 K/uL (20 @ 14:47)    Lactate, Blood: 2.0 mmol/L (20 @ 14:47)    Auto Eosinophil #: 0.34 K/uL (20 @ 09:16)  Auto Eosinophil #: 0.24 K/uL (20 @ 08:51)  Auto Eosinophil #: 0.19 K/uL (20 @ 14:47)    Activated Partial Thromboplastin Time: 27.1 sec (20 @ 14:47)  INR: 1.48 ratio (20 @ 14:47)          MICROBIOLOGY:    Culture - Surgical Swab (20 @ 18:52)    Specimen Source: .Surgical Swab    Culture Results:   Numerous Escherichia coli  Few Enterococcus faecalis    Culture - Abscess with Gram Stain (20 @ 18:50)    Specimen Source: .Abscess abdomen surgical site    Culture Results:   Few Escherichia coli Susceptibility to follow.  Moderate Enterococcus faecalis Susceptibility to follow.        RADIOLOGY & ADDITIONAL STUDIES:

## 2020-09-22 NOTE — PROGRESS NOTE ADULT - SUBJECTIVE AND OBJECTIVE BOX
S  Pt feeling well      No pain or constitutional symptoms      Seen by Heme/Onc, ID and Cardiology        WBC 11.44  H/H 8.2/25.6         Wound C&S Ecoli-sensitive to amikacin, augmentin,cefoxitin, imipenem.ertapenem. meropenem, gent and tobramycin                          Enterobacter-sensitive to ampicillin and vanco          Pt placed on Meropenem  Vanco d/c'ed             O Afebrile  VSS     Abdomen-non-tender No drainage or malodor Erythema and induration less     VAC in place with irrigation of Betadine/saline    A&P  CT c/w post op changes s/p abdominoplasty          Given c&s sensitivities-Suggest pt should continue on vanco-discussed with hospitalist who will re-order the vanco          CPM

## 2020-09-22 NOTE — PROGRESS NOTE ADULT - PROBLEM SELECTOR PLAN 3
H/H 8.9/27.1 on admission possibly combination of acute blood loss anemia from surgery with possible underlying iron deficiency,  - Per chart review, baseline hemoglobin 12.0-12.5  - Monitor for signs and symptoms of active bleeding  - Blood transfusion consent obtained  - Pending iron, TIBC, ferritin, B12 results Acute vs acute on chronic   -Per chart review, Hb was 12-12.5 in April of this year, this admission Hb ~8-9  -Given normocytic anemia, elevated ferritin, decreased total iron, TIBC, %sat without B12 or folate deficiency, anemia can be multifactorial including AOCD, inflammation, acute illness   -No s/s active bleed, continue to monitor   -BPs soft but acceptable, otherwise Pt is hemodynamically stable  -Continue to monitor H/H, transfuse Hb <7  -Heme/onc, Dr. Blanc, following, recs appreciated Acute vs acute on chronic   -Per chart review, Hgb was 12-12.5 in April of this year, this admission Hgb ~8-9  -Given normocytic anemia, elevated ferritin, decreased total iron, TIBC, %sat without B12 or folate deficiency, anemia suspect likely due to anemia of chronic disease / acute inflammation/infection  -No s/s active bleed, continue to monitor   -BPs soft but acceptable, otherwise pt is hemodynamically stable  -Continue to monitor H/H, transfuse if Hgb <7  -Heme/onc, Dr. Blanc, following, recs appreciated  -folate, B12 are not deficient

## 2020-09-22 NOTE — PROGRESS NOTE ADULT - PROBLEM SELECTOR PLAN 8
- Patient states she has been stable off medications for years  - TSH normal Chronic, stable   -Per Pt, she has been off medications for years without issue  -TSH WNL -Per Pt, she has been off medications for years without issue  -TSH WNL

## 2020-09-22 NOTE — PROGRESS NOTE ADULT - ASSESSMENT
44 year old female PMHx CAD (mLAD x1 MIGUEL ANGEL (2014), x3 stents 09/2019), HTN, prediabetes, hyperlipidemia adm with wound infection s/p panniculectomy on 9/1.     CT- Extensive defects within the lower abdominal wall with foci of gas and inflammation. Findings worrisome for gas forming infection.

## 2020-09-22 NOTE — PROGRESS NOTE ADULT - NUTRITIONAL ASSESSMENT
Acute, s/p panniculectomy on 9/1  -CT abd/pelvis w/ IV cont significant for extensive defects w/in lower ab wall with foci of gas & inflammation, worrisome for gas forming infection, loculations of fluid are noted w/in ab wall  -Bedside debridement yesterday by plastic surgery, wound vac placed today  -At this time, no plans for OR   -Pt afebrile, no leukocytosis, HD stable  -Continue to monitor for s/s acute infectious process  -Wound cultures growing E.Coli and E. faecalis, f/u susceptibilities   -D/c Vanc, continue IV Zosyn for now; will adjust pending susceptibilities  -Blood and urine cultures NGTD  -MRSA and Staph Aureus PCR negative   -Pain well controlled, continue Tylenol PRN   -ID (Dr. Gomez) consulted, recs appreciated   -Plastic surgery, Dr. Ruiz, following, recs appreciated Acute vs acute on chronic   -Per chart review, Hb was 12-12.5 in April of this year, this admission Hb ~8-9  -Given normocytic anemia, elevated ferritin, decreased total iron, TIBC, %sat without B12 or folate deficiency, anemia can be multifactorial including AOCD, inflammation, acute illness   -No s/s active bleed, Pt HD stable, continue to monitor   -Continue to monitor H/H, transfuse Hb <7  -Heme/onc, Dr. Blanc, following, recs appreciated

## 2020-09-22 NOTE — PROGRESS NOTE ADULT - PROBLEM SELECTOR PLAN 6
- Continue home Atorvastatin 80 mg PO qhs. Chronic  -Continue atorvastatin 40 mg PO qhs -Continue atorvastatin 40 mg PO QHS

## 2020-09-22 NOTE — PROGRESS NOTE ADULT - PROBLEM SELECTOR PLAN 1
appearance very concerning for polymicrobial infection and evidenced per micro potentially involving anaerobes. will modify abx based on surgical micro to use a regiment with less renal risk. Local site control/debridement to be addressed by surgery.

## 2020-09-22 NOTE — PHARMACOTHERAPY INTERVENTION NOTE - COMMENTS
Spoke to MD Zuleta), patient requires irrigation made up of 500 mL 0.9% Sodium Chloride and 500 mL Betadine to irrigate wound utilizing wound vac. Coordinated with wound care nurse, medical leadership as well as physician to provide supporting documents for use.

## 2020-09-22 NOTE — PROGRESS NOTE ADULT - ASSESSMENT
43yo F w/ PMHx of CAD (mLAD x1 MIGUEL ANGEL in 2014, x3 stents 09/2019), HTN, prediabetes, hyperlipidemia presents to ED for pain of wound s/p panniculectomy on 9/1 admitted for cellulitis and partial wound dehiscence with infection. 43yo F w/ PMHx of CAD (mLAD x1 MIGUEL ANGEL in 2014, x3 stents 09/2019), HTN, prediabetes, hyperlipidemia presents to ED for pain of wound s/p panniculectomy on 9/1 admitted for cellulitis and partial wound dehiscence with infection. Patient w/o acute complaints.

## 2020-09-22 NOTE — PROGRESS NOTE ADULT - SUBJECTIVE AND OBJECTIVE BOX
Patient is a 44y old  Female who presents with a chief complaint of Wound dehiscence s/p panniculectomy on      INTERVAL HPI/OVERNIGHT EVENTS: Pt seen and examined at bedside. No acute overnight events reported. Pt has no current complaints, is feeling stronger. Denies fevers, chills, HA, chest pain, SOB, abdominal pain, n/v/d/c.      T(C): 37.4 (20 @ 13:11), Max: 37.6 (20 @ 20:48)  HR: 96 (20 @ 13:11) (80 - 100)  BP: 104/72 (20 @ 13:11) (95/62 - 134/84)  RR: 17 (20 @ 13:11) (16 - 17)  SpO2: 98% (20 @ 13:11) (97% - 98%)    I&O's Summary  21 Sep 2020 07:  -  22 Sep 2020 07:00  --------------------------------------------------------  IN: 2160 mL / OUT: 0 mL / NET: 2160 mL    22 Sep 2020 07:  -  22 Sep 2020 14:42  --------------------------------------------------------  IN: 580 mL / OUT: 0 mL / NET: 580 mL      REVIEW OF SYSTEMS:  CONSTITUTIONAL: No fever, weight loss, or fatigue  EYES: No eye pain, visual disturbances, or discharge  ENMT:  No difficulty hearing, tinnitus, vertigo; No sinus or throat pain  NECK: No pain or stiffness  BREASTS: No pain, no masses,   RESPIRATORY: No cough, wheezing, chills or hemoptysis; No shortness of breath  CARDIOVASCULAR: No chest pain, palpitations, dizziness, or leg swelling  GASTROINTESTINAL: No abdominal or epigastric pain. No nausea, vomiting, or hematemesis; No diarrhea or constipation. No melena or hematochezia.  GENITOURINARY: No dysuria, frequency, hematuria, or incontinence  NEUROLOGICAL: No headaches, memory loss, loss of strength, numbness, or tremors  SKIN: No itching, burning, rashes, or lesions   LYMPH NODES: No enlarged glands  MUSCULOSKELETAL: No joint pain or swelling; No muscle, back, or extremity pain  PSYCHIATRIC: No depression, anxiety, mood swings, or difficulty sleeping  ALLERY No hives or eczema    PHYSICAL EXAM:  GENERAL: NAD, well-groomed, well-developed  HEAD:  Atraumatic, Normocephalic  EYES: EOMI, PERRLA, conjunctiva and sclera clear  ENMT: No tonsillar erythema, exudates, or enlargement; Moist mucous membranes, Good dentition, No lesions  NECK: Supple, No JVD, Normal thyroid  NERVOUS SYSTEM:  Alert & Oriented X3, Good concentration; Motor Strength 5/5 B/L upper and lower extremities; DTRs 2+ intact and symmetric  CHEST/LUNG: Clear to percussion bilaterally; No rales, rhonchi, wheezing, or rubs  HEART: Regular rate and rhythm; No murmurs, rubs, or gallops  ABDOMEN: Soft, Nontender, Nondistended; Bowel sounds present  EXTREMITIES:  2+ Peripheral Pulses, No clubbing, cyanosis, or edema  LYMPH: No lymphadenopathy noted  SKIN: No rashes or lesions    MEDICATIONS  (STANDING):  0.9% Sodium Chloride/ Betadine 1000 milliLiter(s) 1000 milliLiter(s) Irrigation <User Schedule>  aspirin  chewable 81 milliGRAM(s) Oral daily  atorvastatin 40 milliGRAM(s) Oral at bedtime  enoxaparin Injectable 40 milliGRAM(s) SubCutaneous at bedtime  metoprolol tartrate 50 milliGRAM(s) Oral two times a day  piperacillin/tazobactam IVPB.. 3.375 Gram(s) IV Intermittent every 8 hours    MEDICATIONS  (PRN):  acetaminophen   Tablet .. 650 milliGRAM(s) Oral every 6 hours PRN Mild Pain (1 - 3)  benzonatate 100 milliGRAM(s) Oral three times a day PRN Cough      LABS:                        8.2    11.44 )-----------( 644      ( 22 Sep 2020 09:16 )             25.6     09-22    139  |  107  |  7   ----------------------------<  139<H>  3.8   |  26  |  0.96    Ca    8.6      22 Sep 2020 09:16  Phos  3.0         TPro  7.2  /  Alb  2.1<L>  /  TBili  0.5  /  DBili  x   /  AST  23  /  ALT  29  /  AlkPhos  122<H>      PT/INR - ( 20 Sep 2020 14:47 )   PT: 17.0 sec;   INR: 1.48 ratio         PTT - ( 20 Sep 2020 14:47 )  PTT:27.1 sec  Urinalysis Basic - ( 20 Sep 2020 17:49 )    Color: Yellow / Appearance: Clear / S.005 / pH: x  Gluc: x / Ketone: Negative  / Bili: Negative / Urobili: Negative   Blood: x / Protein: 15 / Nitrite: Negative   Leuk Esterase: Negative / RBC: Negative /HPF / WBC 0-2   Sq Epi: x / Non Sq Epi: Few / Bacteria: Occasional       @ 21:56   <10,000 CFU/mL Normal Urogenital Kimi  --  --   @ 18:52   Numerous Escherichia coli  Few Enterococcus faecalis  --  --   @ 18:50   Few Escherichia coli Susceptibility to follow.  Moderate Enterococcus faecalis Susceptibility to follow.  --  --   @ 18:26   No growth to date.  --  --      RADIOLOGY & ADDITIONAL TESTS:  CT Abdomen and Pelvis w/ IV Cont (20): Extensive defects within the lower abdominal wall with foci of gas and inflammation. Findings worrisome for gas forming infection. oculations of fluid are noted within the abdominal wall.      Imaging Personally Reviewed: Yes               Patient is a 44y old  Female who presents with a chief complaint of Wound dehiscence s/p panniculectomy on      INTERVAL HPI/OVERNIGHT EVENTS: Pt seen and examined at bedside. No acute overnight events reported. Pt has no current complaints, is feeling stronger. Denies fevers, chills, HA, chest pain, SOB, abdominal pain, n/v/d/c.      T(C): 37.4 (20 @ 13:11), Max: 37.6 (20 @ 20:48)  HR: 96 (20 @ 13:11) (80 - 100)  BP: 104/72 (20 @ 13:11) (95/62 - 134/84)  RR: 17 (20 @ 13:11) (16 - 17)  SpO2: 98% (20 @ 13:11) (97% - 98%)    I&O's Summary  21 Sep 2020 07:  -  22 Sep 2020 07:00  --------------------------------------------------------  IN: 2160 mL / OUT: 0 mL / NET: 2160 mL    22 Sep 2020 07:  -  22 Sep 2020 14:42  --------------------------------------------------------  IN: 580 mL / OUT: 0 mL / NET: 580 mL      REVIEW OF SYSTEMS:  CONSTITUTIONAL: No fever, weight loss, or fatigue  EYES: No eye pain, visual disturbances, or discharge  ENMT:  No difficulty hearing, tinnitus, vertigo; No sinus or throat pain  NECK: No pain or stiffness  RESPIRATORY: No cough, wheezing, chills or hemoptysis; No shortness of breath  CARDIOVASCULAR: No chest pain, palpitations, dizziness, or leg swelling  GASTROINTESTINAL: No abdominal or epigastric pain. No nausea, vomiting, or hematemesis; No diarrhea or constipation. No melena or hematochezia.  GENITOURINARY: No dysuria, frequency, hematuria, or incontinence  NEUROLOGICAL: No headaches, memory loss, loss of strength, numbness, or tremors  LYMPH NODES: No enlarged glands  MUSCULOSKELETAL: No joint pain or swelling; No muscle, back, or extremity pain  PSYCHIATRIC: No depression, anxiety, mood swings, or difficulty sleeping  ALLERY No hives or eczema    PHYSICAL EXAM:  GENERAL: NAD, well-groomed, well-developed  HEAD:  Atraumatic, Normocephalic  EYES: EOMI, PERRLA, conjunctiva and sclera clear  ENMT: No tonsillar erythema, exudates, or enlargement; Moist mucous membranes, No lesions  NECK: Supple, Normal thyroid  NERVOUS SYSTEM:  Alert & Oriented X3, Good concentration; Motor Strength 5/5 B/L upper and lower extremities; DTRs 2+ intact and symmetric  CHEST/LUNG: Clear to percussion bilaterally; No rales, rhonchi, wheezing, or rubs  HEART: Regular rate and rhythm; No murmurs, rubs, or gallops  ABDOMEN: Soft, Nontender, Nondistended; Bowel sounds present  EXTREMITIES:  2+ Peripheral Pulses, No clubbing, cyanosis, or edema  LYMPH: No lymphadenopathy noted  SKIN: Large lower transverse incision wound dehiscence with large necrotic skin. Abdomen-diminished erythema and induration. No tenderness or crepitus. No malodor, purulence or drainage                   MEDICATIONS  (STANDING):  0.9% Sodium Chloride/ Betadine 1000 milliLiter(s) 1000 milliLiter(s) Irrigation <User Schedule>  aspirin  chewable 81 milliGRAM(s) Oral daily  atorvastatin 40 milliGRAM(s) Oral at bedtime  enoxaparin Injectable 40 milliGRAM(s) SubCutaneous at bedtime  metoprolol tartrate 50 milliGRAM(s) Oral two times a day  piperacillin/tazobactam IVPB.. 3.375 Gram(s) IV Intermittent every 8 hours    MEDICATIONS  (PRN):  acetaminophen   Tablet .. 650 milliGRAM(s) Oral every 6 hours PRN Mild Pain (1 - 3)  benzonatate 100 milliGRAM(s) Oral three times a day PRN Cough      LABS:                        8.2    11.44 )-----------( 644      ( 22 Sep 2020 09:16 )             25.6     09-22    139  |  107  |  7   ----------------------------<  139<H>  3.8   |  26  |  0.96    Ca    8.6      22 Sep 2020 09:16  Phos  3.0         TPro  7.2  /  Alb  2.1<L>  /  TBili  0.5  /  DBili  x   /  AST  23  /  ALT  29  /  AlkPhos  122<H>      PT/INR - ( 20 Sep 2020 14:47 )   PT: 17.0 sec;   INR: 1.48 ratio         PTT - ( 20 Sep 2020 14:47 )  PTT:27.1 sec  Urinalysis Basic - ( 20 Sep 2020 17:49 )    Color: Yellow / Appearance: Clear / S.005 / pH: x  Gluc: x / Ketone: Negative  / Bili: Negative / Urobili: Negative   Blood: x / Protein: 15 / Nitrite: Negative   Leuk Esterase: Negative / RBC: Negative /HPF / WBC 0-2   Sq Epi: x / Non Sq Epi: Few / Bacteria: Occasional       @ 21:56   <10,000 CFU/mL Normal Urogenital Kimi  --  --   @ 18:52   Numerous Escherichia coli  Few Enterococcus faecalis  --  --   @ 18:50   Few Escherichia coli Susceptibility to follow.  Moderate Enterococcus faecalis Susceptibility to follow.  --  --   @ 18:26   No growth to date.  --  --      RADIOLOGY & ADDITIONAL TESTS:  CT Abdomen and Pelvis w/ IV Cont (20): Extensive defects within the lower abdominal wall with foci of gas and inflammation. Findings worrisome for gas forming infection. oculations of fluid are noted within the abdominal wall.      Imaging Personally Reviewed: Yes               Patient is a 44y old  Female who presents with a chief complaint of Wound dehiscence s/p panniculectomy on      INTERVAL HPI/OVERNIGHT EVENTS: Pt seen and examined at bedside. No acute overnight events reported. Pt has no current complaints, is feeling stronger. Denies fevers, chills, HA, chest pain, SOB, abdominal pain, n/v/d/c.      T(C): 37.4 (20 @ 13:11), Max: 37.6 (20 @ 20:48)  HR: 96 (20 @ 13:11) (80 - 100)  BP: 104/72 (20 @ 13:11) (95/62 - 134/84)  RR: 17 (20 @ 13:11) (16 - 17)  SpO2: 98% (20 @ 13:11) (97% - 98%)    I&O's Summary  21 Sep 2020 07:  -  22 Sep 2020 07:00  --------------------------------------------------------  IN: 2160 mL / OUT: 0 mL / NET: 2160 mL    22 Sep 2020 07:  -  22 Sep 2020 14:42  --------------------------------------------------------  IN: 580 mL / OUT: 0 mL / NET: 580 mL      REVIEW OF SYSTEMS:    CONSTITUTIONAL: No fever, chills, or weakness   EYES: Dwight visual disturbances  RESPIRATORY: No cough, wheezing, chills or hemoptysis; No shortness of breath  CARDIOVASCULAR: No chest pain, palpitations, dizziness  GASTROINTESTINAL: No abdominal or epigastric pain. No nausea, vomiting. No diarrhea or constipation.  GENITOURINARY: No dysuria, frequency, hematuria, or incontinence  MUSCULOSKELETAL: No joint pain or swelling; No muscle, back, or extremity pain      PHYSICAL EXAM:  GENERAL: NAD  CHEST/LUNG: Clear to percussion bilaterally; No rales, rhonchi, wheezing, or rubs  HEART: Regular rate and rhythm; No murmurs, rubs, or gallops  ABDOMEN: Soft, Nontender, Nondistended; Bowel sounds present  EXTREMITIES:  2+ Peripheral Pulses, No clubbing, cyanosis, or edema  NERVOUS SYSTEM:  Alert & Oriented X3.  SKIN: Large lower transverse incision wound. No tenderness or crepitus. No malodor, purulence or drainage                   MEDICATIONS  (STANDING):  0.9% Sodium Chloride/ Betadine 1000 milliLiter(s) 1000 milliLiter(s) Irrigation <User Schedule>  aspirin  chewable 81 milliGRAM(s) Oral daily  atorvastatin 40 milliGRAM(s) Oral at bedtime  enoxaparin Injectable 40 milliGRAM(s) SubCutaneous at bedtime  metoprolol tartrate 50 milliGRAM(s) Oral two times a day  piperacillin/tazobactam IVPB.. 3.375 Gram(s) IV Intermittent every 8 hours    MEDICATIONS  (PRN):  acetaminophen   Tablet .. 650 milliGRAM(s) Oral every 6 hours PRN Mild Pain (1 - 3)  benzonatate 100 milliGRAM(s) Oral three times a day PRN Cough      LABS:                        8.2    11.44 )-----------( 644      ( 22 Sep 2020 09:16 )             25.6         139  |  107  |  7   ----------------------------<  139<H>  3.8   |  26  |  0.96    Ca    8.6      22 Sep 2020 09:16  Phos  3.0         TPro  7.2  /  Alb  2.1<L>  /  TBili  0.5  /  DBili  x   /  AST  23  /  ALT  29  /  AlkPhos  122<H>  09-22    PT/INR - ( 20 Sep 2020 14:47 )   PT: 17.0 sec;   INR: 1.48 ratio         PTT - ( 20 Sep 2020 14:47 )  PTT:27.1 sec  Urinalysis Basic - ( 20 Sep 2020 17:49 )    Color: Yellow / Appearance: Clear / S.005 / pH: x  Gluc: x / Ketone: Negative  / Bili: Negative / Urobili: Negative   Blood: x / Protein: 15 / Nitrite: Negative   Leuk Esterase: Negative / RBC: Negative /HPF / WBC 0-2   Sq Epi: x / Non Sq Epi: Few / Bacteria: Occasional       @ 21:56   <10,000 CFU/mL Normal Urogenital Kimi  --  --   @ 18:52   Numerous Escherichia coli  Few Enterococcus faecalis  --  --   @ 18:50   Few Escherichia coli Susceptibility to follow.  Moderate Enterococcus faecalis Susceptibility to follow.  --  --   @ 18:26   No growth to date.  --  --      RADIOLOGY & ADDITIONAL TESTS:  CT Abdomen and Pelvis w/ IV Cont (20): Extensive defects within the lower abdominal wall with foci of gas and inflammation. Findings worrisome for gas forming infection. oculations of fluid are noted within the abdominal wall.      Imaging Personally Reviewed: Yes               Patient is a 44y old  Female who presents with a chief complaint of Wound dehiscence s/p panniculectomy on      INTERVAL HPI/OVERNIGHT EVENTS: Pt seen and examined at bedside. No acute overnight events reported. Pt has no current complaints, is feeling stronger. Denies fevers, chills, HA, chest pain, SOB, abdominal pain, n/v/d/c.      T(C): 37.4 (20 @ 13:11), Max: 37.6 (20 @ 20:48)  HR: 96 (20 @ 13:11) (80 - 100)  BP: 104/72 (20 @ 13:11) (95/62 - 134/84)  RR: 17 (20 @ 13:11) (16 - 17)  SpO2: 98% (20 @ 13:11) (97% - 98%)    I&O's Summary  21 Sep 2020 07:  -  22 Sep 2020 07:00  --------------------------------------------------------  IN: 2160 mL / OUT: 0 mL / NET: 2160 mL    22 Sep 2020 07:  -  22 Sep 2020 14:42  --------------------------------------------------------  IN: 580 mL / OUT: 0 mL / NET: 580 mL      REVIEW OF SYSTEMS:    CONSTITUTIONAL: No fever, chills, or weakness   RESPIRATORY: No cough, wheezing, chills or hemoptysis; No shortness of breath  CARDIOVASCULAR: No chest pain, palpitations, dizziness  GASTROINTESTINAL: No abdominal or epigastric pain. No nausea, vomiting. No diarrhea or constipation.  GENITOURINARY: No dysuria, frequency, hematuria, or incontinence  MUSCULOSKELETAL: No joint pain or swelling; No muscle, back, or extremity pain      PHYSICAL EXAM:  GENERAL: NAD  CHEST/LUNG: Clear to percussion bilaterally; No rales, rhonchi, wheezing, or rubs  HEART: Regular rate and rhythm; No murmurs, rubs, or gallops  ABDOMEN: Soft, Nontender, Nondistended; Bowel sounds present  EXTREMITIES:  2+ Peripheral Pulses, No clubbing, cyanosis, or edema  NERVOUS SYSTEM:  Alert & Oriented X3.  SKIN: Large lower transverse incision wound. No tenderness or crepitus. No malodor, purulence or drainage                   MEDICATIONS  (STANDING):  0.9% Sodium Chloride/ Betadine 1000 milliLiter(s) 1000 milliLiter(s) Irrigation <User Schedule>  aspirin  chewable 81 milliGRAM(s) Oral daily  atorvastatin 40 milliGRAM(s) Oral at bedtime  enoxaparin Injectable 40 milliGRAM(s) SubCutaneous at bedtime  metoprolol tartrate 50 milliGRAM(s) Oral two times a day  piperacillin/tazobactam IVPB.. 3.375 Gram(s) IV Intermittent every 8 hours    MEDICATIONS  (PRN):  acetaminophen   Tablet .. 650 milliGRAM(s) Oral every 6 hours PRN Mild Pain (1 - 3)  benzonatate 100 milliGRAM(s) Oral three times a day PRN Cough      LABS:                        8.2    11.44 )-----------( 644      ( 22 Sep 2020 09:16 )             25.6         139  |  107  |  7   ----------------------------<  139<H>  3.8   |  26  |  0.96    Ca    8.6      22 Sep 2020 09:16  Phos  3.0         TPro  7.2  /  Alb  2.1<L>  /  TBili  0.5  /  DBili  x   /  AST  23  /  ALT  29  /  AlkPhos  122<H>  -    PT/INR - ( 20 Sep 2020 14:47 )   PT: 17.0 sec;   INR: 1.48 ratio         PTT - ( 20 Sep 2020 14:47 )  PTT:27.1 sec  Urinalysis Basic - ( 20 Sep 2020 17:49 )    Color: Yellow / Appearance: Clear / S.005 / pH: x  Gluc: x / Ketone: Negative  / Bili: Negative / Urobili: Negative   Blood: x / Protein: 15 / Nitrite: Negative   Leuk Esterase: Negative / RBC: Negative /HPF / WBC 0-2   Sq Epi: x / Non Sq Epi: Few / Bacteria: Occasional       @ 21:56   <10,000 CFU/mL Normal Urogenital Kimi  --  --   @ 18:52   Numerous Escherichia coli  Few Enterococcus faecalis  --  --   @ 18:50   Few Escherichia coli Susceptibility to follow.  Moderate Enterococcus faecalis Susceptibility to follow.  --  --   @ 18:26   No growth to date.  --  --      RADIOLOGY & ADDITIONAL TESTS:  CT Abdomen and Pelvis w/ IV Cont (20): Extensive defects within the lower abdominal wall with foci of gas and inflammation. Findings worrisome for gas forming infection. oculations of fluid are noted within the abdominal wall.      Imaging Personally Reviewed: Yes               Patient is a 44y old  Female who presents with a chief complaint of pain at abdominal surgical wound site.     INTERVAL HPI/OVERNIGHT EVENTS: Pt seen and examined at bedside. No acute overnight events reported. Pt has no current complaints, is feeling stronger. Denies fevers, chills, headache, chest pain, SOB, abdominal pain, nausea, vomiting.      T(C): 37.4 (20 @ 13:11), Max: 37.6 (20 @ 20:48)  HR: 96 (20 @ 13:11) (80 - 100)  BP: 104/72 (20 @ 13:11) (95/62 - 134/84)  RR: 17 (20 @ 13:11) (16 - 17)  SpO2: 98% (20 @ 13:11) (97% - 98%)    I&O's Summary  21 Sep 2020 07  -  22 Sep 2020 07:00  --------------------------------------------------------  IN: 2160 mL / OUT: 0 mL / NET: 2160 mL    22 Sep 2020 07:01  -  22 Sep 2020 14:42  --------------------------------------------------------  IN: 580 mL / OUT: 0 mL / NET: 580 mL      REVIEW OF SYSTEMS:    CONSTITUTIONAL: No fever, chills, or weakness   RESPIRATORY: No cough, wheezing, chills or hemoptysis; No shortness of breath  CARDIOVASCULAR: No chest pain, palpitations, dizziness  GASTROINTESTINAL: no abdominal or epigastric pain. States pain at abdominal wound is resolving; No nausea, vomiting. No diarrhea or constipation.  GENITOURINARY: No dysuria, frequency, hematuria, or incontinence  MUSCULOSKELETAL: No joint pain or swelling; No muscle, back, or extremity pain      PHYSICAL EXAM:  GENERAL: NAD  CHEST/LUNG: Clear to percussion bilaterally; No rales, rhonchi, wheezing, or rubs  HEART: Regular rate and rhythm; No murmurs, rubs, or gallops  ABDOMEN: Soft, Nontender, Nondistended; Bowel sounds present  EXTREMITIES:  2+ Peripheral Pulses, No clubbing, cyanosis, or edema  NERVOUS SYSTEM:  Alert & Oriented X3.  SKIN: Large lower transverse incision wound now with wound vac in place.                   MEDICATIONS  (STANDING):  0.9% Sodium Chloride/ Betadine 1000 milliLiter(s) 1000 milliLiter(s) Irrigation <User Schedule>  aspirin  chewable 81 milliGRAM(s) Oral daily  atorvastatin 40 milliGRAM(s) Oral at bedtime  enoxaparin Injectable 40 milliGRAM(s) SubCutaneous at bedtime  metoprolol tartrate 50 milliGRAM(s) Oral two times a day  piperacillin/tazobactam IVPB.. 3.375 Gram(s) IV Intermittent every 8 hours    MEDICATIONS  (PRN):  acetaminophen   Tablet .. 650 milliGRAM(s) Oral every 6 hours PRN Mild Pain (1 - 3)  benzonatate 100 milliGRAM(s) Oral three times a day PRN Cough      LABS:                        8.2    11.44 )-----------( 644      ( 22 Sep 2020 09:16 )             25.6         139  |  107  |  7   ----------------------------<  139<H>  3.8   |  26  |  0.96    Ca    8.6      22 Sep 2020 09:16  Phos  3.0         TPro  7.2  /  Alb  2.1<L>  /  TBili  0.5  /  DBili  x   /  AST  23  /  ALT  29  /  AlkPhos  122<H>      PT/INR - ( 20 Sep 2020 14:47 )   PT: 17.0 sec;   INR: 1.48 ratio         PTT - ( 20 Sep 2020 14:47 )  PTT:27.1 sec  Urinalysis Basic - ( 20 Sep 2020 17:49 )    Color: Yellow / Appearance: Clear / S.005 / pH: x  Gluc: x / Ketone: Negative  / Bili: Negative / Urobili: Negative   Blood: x / Protein: 15 / Nitrite: Negative   Leuk Esterase: Negative / RBC: Negative /HPF / WBC 0-2   Sq Epi: x / Non Sq Epi: Few / Bacteria: Occasional      09-20 @ 21:56   <10,000 CFU/mL Normal Urogenital Kimi  --  --   @ 18:52   Numerous Escherichia coli  Few Enterococcus faecalis  --  --   @ 18:50   Few Escherichia coli Susceptibility to follow.  Moderate Enterococcus faecalis Susceptibility to follow.  --  --   @ 18:26   No growth to date.  --  --      RADIOLOGY & ADDITIONAL TESTS:  CT Abdomen and Pelvis w/ IV Cont (20): Extensive defects within the lower abdominal wall with foci of gas and inflammation. Findings worrisome for gas forming infection. loculations of fluid are noted within the abdominal wall.      Imaging Personally Reviewed: Yes

## 2020-09-22 NOTE — PROGRESS NOTE ADULT - PROBLEM SELECTOR PLAN 1
Acute, s/p panniculectomy on 9/1  -CT abd/pelvis w/ IV cont significant for extensive defects w/in lower ab wall with foci of gas & inflammation, worrisome for gas forming infection, loculations of fluid are noted w/in ab wall  -Bedside debridement yesterday by plastic surgery, wound vac placed today  -At this time, no plans for OR   -Pt afebrile, no leukocytosis, HD stable  -Continue to monitor for s/s acute infectious process  -Wound cultures growing E.Coli and E. faecalis, f/u susceptibilities   -D/c Vanc, continue IV Zosyn for now; will adjust pending susceptibilities  -Blood and urine cultures NGTD  -MRSA and Staph Aureus PCR negative   -Pain well controlled, continue Tylenol PRN   -ID (Dr. Gomez) consulted, recs appreciated   -Plastic surgery, Dr. Ruiz, following, recs appreciated Acute, s/p panniculectomy on 9/1  -CT abd/pelvis w/ IV cont significant for extensive defects w/in lower abd wall with foci of gas & inflammation, worrisome for gas forming infection, loculations of fluid are noted w/in ab wall  -Bedside debridement yesterday by plastic surgery, wound vac placed today  -At this time, no plans for OR   -Pt afebrile, no leukocytosis, HD stable  -Wound cultures now growing ESBL E. Coli, E. faecalis (sens pending), and a new Pseudomonas (sens pending)   -changed antibiotics to meropenem to cover the ESBL E coli and Pseudomonas (discussed with ID)  -Blood and urine cultures NGTD  -MRSA and Staph Aureus PCR negative   -Pain well controlled, continue Tylenol PRN   -ID (Dr. Gomez) consulted, recs appreciated   -Plastic surgery, Dr. Ruiz, following, recs appreciated

## 2020-09-22 NOTE — CONSULT NOTE ADULT - REASON FOR ADMISSION
Wound dehiscence s/p panniculectomy on 9/1

## 2020-09-22 NOTE — CONSULT NOTE ADULT - CONSULT REASON
Abdominal wound, delayed wound healing.
Wound dehiscence s/p panniculectomy on 9/1
anemia
wound infection
Cardiac clearance

## 2020-09-22 NOTE — CONSULT NOTE ADULT - ASSESSMENT
[facility]  Chart Reviewed-Full Consult to follow for any immediate concerns please fell free to contact us directly at  [phone] and have us paged or text my cell # [phone]  Capo Gomez MD PhD  
44 year old female PMHx CAD (mLAD x1 MIGUEL ANGEL (2014), x3 stents 09/2019), HTN, prediabetes, hyperlipidemia adm with wound infection s/p panniculectomy on 9/1.     CT- Extensive defects within the lower abdominal wall with foci of gas and inflammation. Findings worrisome for gas forming infection.
45 y/o post panniculectomy 9/1/20 adm for wound dehisence and IV antibiotics  CBC w normocytic anemia, 9/1/20 Hgb 9/1/20, on adm this time Hgb 8.7 and subseq decr to 7.8 yesterday and today 8.2. Plts increased from 200k range to 600k range.  Clinically asx wrt heme findings.  Workup-  -B12, folate not deficient  -iron studies w high Ferritin and low everything else, c/w chronic disease inflammation  -review of adrianne blood morphology no sig atypia    -anemia due to inflammation, chronic disease, acute illness.  -continue to treat acute illness   -symptomatic management from Heme standpoint  -expect to improve as infection/inflammation improve.    discussed w pt, discussed w Medicine    will sign off for now, please call if any questions
Abdominal wound dehiscence with necrotic skin and wound infection.
44 year old female PMHx CAD (mLAD x1 MIGUEL ANGEL (2014), x3 stents 09/2019), HTN, prediabetes, hyperlipidemia, recent COVID-19 infection admitted for wound dehiscence and possible revision.    Obstructive CAD s/p PCI 9/ 2019   - Cardiologist Dr. Abdoul Lam from [facility]  - No acute changes on EKG compared to previous  - Echo 9/3/2020: trace MR, trace TR, LVEF 60-65%   - Continue ASA 81    HTN  - BP well controlled, continue home BP meds, monitor routine hemodynamics  - Continue metoprolol 50 mg BID    Anemia, normacytic  - Hg 7.8, baseline appears to be 12 on prior admissions, Consider workup for acute blood loss vs iron deficiency  - Transfuse >8 given cardiac history     HLD  - Continue atorvastatin 40 mg qhs        Pt has no history of MI, active CAD, ADHF or severe valvular disease and in the setting of anticipated low risk wound debridement procedure, patient is optimized from cardiovascular standpoint to proceed with planned procedure with routine hemodynamic monitoring.

## 2020-09-22 NOTE — CONSULT NOTE ADULT - SUBJECTIVE AND OBJECTIVE BOX
All records reviewed.    HPI:  45 y/o woman w hx CAD x3 stents 2019), HTN, prediabetes, hyperlipidemia presents to ED for pain of wound s/p panniculectomy on .   Was seem as outpatient one week before noted wound dehiscence and  eschar and advised admission for IV antibiotics.    ED course:  Vitals: T 99 HR 94 /74 RR 19 SpO2 97% on RA  Labs: WBC 11.83, RBC 3.04 H/H 8.9/27.1, Platelet 656, Neutrophil 8.18, Monocyte 1.1, PTT 27.1, PT/INR 17.0/1.48, Glu 132, Protein 8.4, Alb 2.4, Alk phos 141  CT abd/pelvis w/IV cont: Extensive defects within the lower abdominal wall with foci of gas and inflammation. Findings worrisome for gas forming infection. Loculations of fluid are noted within the abdominal wall.  CXR: no active disease  EKG: NSR vent rate 97 bpm, minimal voltage criteria for LVH  Given IV Zosyn x1, IV Vanco x1, IV NS 2.8 L bolus x1, Tylenol 650 mg PO x1 (20 Sep 2020 17:54)    Since admission, sl decr of H/H, yesterday Hgb 7.8, today incr to 8.2, plts 644  On 20 Hgb 9.7 MCV 92.2 plts 213    Pt reports feeling improved, wound vac still w some drainage.  No fam hx of anemia  Pt had hx sig bleed assoc w fibroids, resolved after hystectomy      PAST MEDICAL & SURGICAL HISTORY:  Obesity    Hypothyroid  Resolved    Coronary artery disease  s/p MIGUEL ANGEL x 3 (2019), MIGUEL ANGEL x1 ()    HLD (hyperlipidemia)    HTN (hypertension)    S/P coronary artery stent placement  mLAD x1 MIGUEL ANGEL (),  3 stents 2019    H/O total hysterectomy          Review of System:  no fatigue, malaise, anorexia, weight loss, SOB, DANIEL, CP    MEDICATIONS  (STANDING):  0.9% Sodium Chloride/ Betadine 1000 milliLiter(s) 1000 milliLiter(s) Irrigation <User Schedule>  aspirin  chewable 81 milliGRAM(s) Oral daily  atorvastatin 40 milliGRAM(s) Oral at bedtime  enoxaparin Injectable 40 milliGRAM(s) SubCutaneous at bedtime  metoprolol tartrate 50 milliGRAM(s) Oral two times a day  piperacillin/tazobactam IVPB.. 3.375 Gram(s) IV Intermittent every 8 hours    MEDICATIONS  (PRN):  acetaminophen   Tablet .. 650 milliGRAM(s) Oral every 6 hours PRN Mild Pain (1 - 3)  benzonatate 100 milliGRAM(s) Oral three times a day PRN Cough      Allergies    codeine (Unknown)    Intolerances        SOCIAL HISTORY:  No tobacco  rare Etoh    FAMILY HISTORY:  Family history of coronary artery disease younger than 40 years of age (Sibling)        Vital Signs Last 24 Hrs  T(C): 37.4 (22 Sep 2020 13:11), Max: 37.6 (21 Sep 2020 20:48)  T(F): 99.4 (22 Sep 2020 13:11), Max: 99.7 (21 Sep 2020 20:48)  HR: 96 (22 Sep 2020 13:11) (80 - 100)  BP: 104/72 (22 Sep 2020 13:11) (95/62 - 134/84)  BP(mean): --  RR: 17 (22 Sep 2020 13:11) (16 - 17)  SpO2: 98% (22 Sep 2020 13:11) (97% - 98%)    PHYSICAL EXAM:      General:well developed well nourished, in no acute distress  Eyes:sclera anicteric, pupils equal and reactive to light  ENMT:buccal mucosa moist, pharynx not injected  Neck:supple, trachea midline  Lungs:clear, no wheeze/rhonchi  Cardiovascular:regular rate and rhythm, S1 S2  Abdomen:sl distened but not tense, nontender, no organomegaly present, bowel sounds normal, Wound Vac intact RLQ  Neurological:alert and oriented x3, Cranial Nerves II-XII grossly intact  Skin:no increased ecchymosis/petechiae/purpura  Lymph Nodes:no palpable cervical/supraclavicular lymph nodes enlargements  Extremities:no cyanosis/clubbing/edema        LABS:                        8.2    11.44 )-----------( 644      ( 22 Sep 2020 09:16 )             25.6     09-22 @ 09:16  WBC11.44  RBC2.87 Hgb8.2 Hct25.6  MCV89.2  [id number]  Auto Suoauh42.4 Band-- Auto Lymph23.3 Atypical Lymph-- Reactive Lymph-- Auto Mono12.1 Auto Eos3.0 Auto Baso0.2         @ 08:51  WBC11.31  RBC2.67 Hgb7.8 Hct24.7  MCV92.5  [id number]  Auto Pnowqd82.0 Band-- Auto Lymph17.9 Atypical Lymph-- Reactive Lymph-- Auto Mono10.9 Auto Eos2.1 Auto Baso0.3         @ 14:47  WBC11.83  RBC3.04 Hgb8.9 Hct27.1  MCV89.1  [id number]  Auto Mdppqc94.1 Band-- Auto Lymph18.8 Atypical Lymph-- Reactive Lymph-- Auto Mono9.4 Auto Eos1.6 Auto Baso0.3              139  |  107  |  7   ----------------------------<  139<H>  3.8   |  26  |  0.96    Ca    8.6      22 Sep 2020 09:16  Phos  3.0         TPro  7.2  /  Alb  2.1<L>  /  TBili  0.5  /  DBili  x   /  AST  23  /  ALT  29  /  AlkPhos  122<H>   @ 14:47  PT17.0 INR1.48  PTT27.1    Urinalysis Basic - ( 20 Sep 2020 17:49 )  Folate, Serum: 18.6 ng/mL (20 @ 11:38) Vitamin B12, Serum: >2000 pg/mL (20 @ 11:38) Ferritin, Serum: 443 ng/mL (20 @ 11:38) Iron with Total Binding Capacity in AM (20 @ 11:26)   Iron - Total Binding Capacity.: 177 ug/dL   % Saturation, Iron: 11 %   Iron Total, Serum: 19 ug/dL   Unsaturated Iron Binding Capacity: 158 ug/dL   Color: Yellow / Appearance: Clear / S.005 / pH: x  Gluc: x / Ketone: Negative  / Bili: Negative / Urobili: Negative   Blood: x / Protein: 15 / Nitrite: Negative   Leuk Esterase: Negative / RBC: Negative /HPF / WBC 0-2   Sq Epi: x / Non Sq Epi: Few / Bacteria: Occasional        PERIPHERAL BLOOD SMEAR REVIEW:  RBC-normocytic, normochromic, no significant anisocytosis or poikilocytosis. No rouleaux/schistocytes or increased polychromasia.  WBC-normal in differential and morphology, no immature or abnormal cells seen.  Platelets-increased on smear, no platelets clumping or giant platelets.       RADIOLOGY & ADDITIONAL STUDIES: All records reviewed.    HPI:  43 y/o woman w hx CAD x3 stents 2019), HTN, prediabetes, hyperlipidemia presents to ED for pain of wound s/p panniculectomy on .   Was seem as outpatient one week before noted wound dehiscence and  eschar and advised admission for IV antibiotics.    ED course:  Vitals: T 99 HR 94 /74 RR 19 SpO2 97% on RA  Labs: WBC 11.83, RBC 3.04 H/H 8.9/27.1, Platelet 656, Neutrophil 8.18, Monocyte 1.1, PTT 27.1, PT/INR 17.0/1.48, Glu 132, Protein 8.4, Alb 2.4, Alk phos 141  CT abd/pelvis w/IV cont: Extensive defects within the lower abdominal wall with foci of gas and inflammation. Findings worrisome for gas forming infection. Loculations of fluid are noted within the abdominal wall.  CXR: no active disease  EKG: NSR vent rate 97 bpm, minimal voltage criteria for LVH  Given IV Zosyn x1, IV Vanco x1, IV NS 2.8 L bolus x1, Tylenol 650 mg PO x1 (20 Sep 2020 17:54)    Since admission, sl decr of H/H, yesterday Hgb 7.8, today incr to 8.2, plts 644  On 20 Hgb 9.7 MCV 92.2 plts 213    Pt reports feeling improved, wound vac still w some drainage.  No fam hx of anemia  Pt had hx sig bleed assoc w fibroids, resolved after hystectomy      PAST MEDICAL & SURGICAL HISTORY:  Obesity    Hypothyroid  Resolved    Coronary artery disease  s/p MIGUEL ANGEL x 3 (2019), MIGUEL ANGEL x1 ()    HLD (hyperlipidemia)    HTN (hypertension)    S/P coronary artery stent placement  mLAD x1 MIGUEL ANGEL (),  3 stents 2019    H/O total hysterectomy          Review of System:  no fatigue, malaise, anorexia, weight loss, SOB, DANIEL, CP    MEDICATIONS  (STANDING):  0.9% Sodium Chloride/ Betadine 1000 milliLiter(s) 1000 milliLiter(s) Irrigation <User Schedule>  aspirin  chewable 81 milliGRAM(s) Oral daily  atorvastatin 40 milliGRAM(s) Oral at bedtime  enoxaparin Injectable 40 milliGRAM(s) SubCutaneous at bedtime  metoprolol tartrate 50 milliGRAM(s) Oral two times a day  piperacillin/tazobactam IVPB.. 3.375 Gram(s) IV Intermittent every 8 hours    MEDICATIONS  (PRN):  acetaminophen   Tablet .. 650 milliGRAM(s) Oral every 6 hours PRN Mild Pain (1 - 3)  benzonatate 100 milliGRAM(s) Oral three times a day PRN Cough      Allergies    codeine (Unknown)    Intolerances        SOCIAL HISTORY:  No tobacco  rare Etoh    FAMILY HISTORY:  Family history of coronary artery disease younger than 40 years of age (Sibling)        Vital Signs Last 24 Hrs  T(C): 37.4 (22 Sep 2020 13:11), Max: 37.6 (21 Sep 2020 20:48)  T(F): 99.4 (22 Sep 2020 13:11), Max: 99.7 (21 Sep 2020 20:48)  HR: 96 (22 Sep 2020 13:11) (80 - 100)  BP: 104/72 (22 Sep 2020 13:11) (95/62 - 134/84)  BP(mean): --  RR: 17 (22 Sep 2020 13:11) (16 - 17)  SpO2: 98% (22 Sep 2020 13:11) (97% - 98%)    PHYSICAL EXAM:      General:well developed well nourished, in no acute distress  Eyes:sclera anicteric, pupils equal and reactive to light  ENMT:buccal mucosa moist, pharynx not injected  Neck:supple, trachea midline  Lungs:clear, no wheeze/rhonchi  Cardiovascular:regular rate and rhythm, S1 S2  Abdomen:sl distened but not tense, nontender, no organomegaly present, bowel sounds normal, Wound Vac intact RLQ  Neurological:alert and oriented x3, Cranial Nerves II-XII grossly intact  Skin:no increased ecchymosis/petechiae/purpura  Lymph Nodes:no palpable cervical/supraclavicular lymph nodes enlargements  Extremities:no cyanosis/clubbing/edema        LABS:                        8.2    11.44 )-----------( 644      ( 22 Sep 2020 09:16 )             25.6     09-22 @ 09:16  WBC11.44  RBC2.87 Hgb8.2 Hct25.6  MCV89.2  Tjpq884  Auto Wpnksc23.4 Band-- Auto Lymph23.3 Atypical Lymph-- Reactive Lymph-- Auto Mono12.1 Auto Eos3.0 Auto Baso0.2         @ 08:51  WBC11.31  RBC2.67 Hgb7.8 Hct24.7  MCV92.5  Zfkq761  Auto Ycrswh80.0 Band-- Auto Lymph17.9 Atypical Lymph-- Reactive Lymph-- Auto Mono10.9 Auto Eos2.1 Auto Baso0.3         @ 14:47  WBC11.83  RBC3.04 Hgb8.9 Hct27.1  MCV89.1  Inqi632  Auto Eovubo88.1 Band-- Auto Lymph18.8 Atypical Lymph-- Reactive Lymph-- Auto Mono9.4 Auto Eos1.6 Auto Baso0.3              139  |  107  |  7   ----------------------------<  139<H>  3.8   |  26  |  0.96    Ca    8.6      22 Sep 2020 09:16  Phos  3.0         TPro  7.2  /  Alb  2.1<L>  /  TBili  0.5  /  DBili  x   /  AST  23  /  ALT  29  /  AlkPhos  122<H>   @ 14:47  PT17.0 INR1.48  PTT27.1    Urinalysis Basic - ( 20 Sep 2020 17:49 )  Folate, Serum: 18.6 ng/mL (20 @ 11:38) Vitamin B12, Serum: >2000 pg/mL (20 @ 11:38) Ferritin, Serum: 443 ng/mL (20 @ 11:38) Iron with Total Binding Capacity in AM (20 @ 11:26)   Iron - Total Binding Capacity.: 177 ug/dL   % Saturation, Iron: 11 %   Iron Total, Serum: 19 ug/dL   Unsaturated Iron Binding Capacity: 158 ug/dL   Color: Yellow / Appearance: Clear / S.005 / pH: x  Gluc: x / Ketone: Negative  / Bili: Negative / Urobili: Negative   Blood: x / Protein: 15 / Nitrite: Negative   Leuk Esterase: Negative / RBC: Negative /HPF / WBC 0-2   Sq Epi: x / Non Sq Epi: Few / Bacteria: Occasional        PERIPHERAL BLOOD SMEAR REVIEW:  RBC-normocytic, normochromic, no significant anisocytosis or poikilocytosis. No rouleaux/schistocytes or increased polychromasia.  WBC-normal in differential and morphology, no immature or abnormal cells seen.  Platelets-increased on smear, no platelets clumping or giant platelets, occ large platelets present      RADIOLOGY & ADDITIONAL STUDIES:

## 2020-09-23 ENCOUNTER — TRANSCRIPTION ENCOUNTER (OUTPATIENT)
Age: 45
End: 2020-09-23

## 2020-09-23 LAB
-  AMIKACIN: SIGNIFICANT CHANGE UP
-  AZTREONAM: SIGNIFICANT CHANGE UP
-  CEFEPIME: SIGNIFICANT CHANGE UP
-  CEFTAZIDIME: SIGNIFICANT CHANGE UP
-  CIPROFLOXACIN: SIGNIFICANT CHANGE UP
-  GENTAMICIN: SIGNIFICANT CHANGE UP
-  IMIPENEM: SIGNIFICANT CHANGE UP
-  LEVOFLOXACIN: SIGNIFICANT CHANGE UP
-  MEROPENEM: SIGNIFICANT CHANGE UP
-  PIPERACILLIN/TAZOBACTAM: SIGNIFICANT CHANGE UP
-  TOBRAMYCIN: SIGNIFICANT CHANGE UP
ANION GAP SERPL CALC-SCNC: 9 MMOL/L — SIGNIFICANT CHANGE UP (ref 5–17)
BASOPHILS # BLD AUTO: 0.03 K/UL — SIGNIFICANT CHANGE UP (ref 0–0.2)
BASOPHILS NFR BLD AUTO: 0.3 % — SIGNIFICANT CHANGE UP (ref 0–2)
BUN SERPL-MCNC: 14 MG/DL — SIGNIFICANT CHANGE UP (ref 7–23)
CALCIUM SERPL-MCNC: 8.7 MG/DL — SIGNIFICANT CHANGE UP (ref 8.5–10.1)
CHLORIDE SERPL-SCNC: 107 MMOL/L — SIGNIFICANT CHANGE UP (ref 96–108)
CO2 SERPL-SCNC: 23 MMOL/L — SIGNIFICANT CHANGE UP (ref 22–31)
CREAT SERPL-MCNC: 0.98 MG/DL — SIGNIFICANT CHANGE UP (ref 0.5–1.3)
EOSINOPHIL # BLD AUTO: 0.28 K/UL — SIGNIFICANT CHANGE UP (ref 0–0.5)
EOSINOPHIL NFR BLD AUTO: 2.4 % — SIGNIFICANT CHANGE UP (ref 0–6)
GLUCOSE SERPL-MCNC: 118 MG/DL — HIGH (ref 70–99)
HCT VFR BLD CALC: 24.8 % — LOW (ref 34.5–45)
HGB BLD-MCNC: 8 G/DL — LOW (ref 11.5–15.5)
IMM GRANULOCYTES NFR BLD AUTO: 1.2 % — SIGNIFICANT CHANGE UP (ref 0–1.5)
LYMPHOCYTES # BLD AUTO: 2.67 K/UL — SIGNIFICANT CHANGE UP (ref 1–3.3)
LYMPHOCYTES # BLD AUTO: 22.5 % — SIGNIFICANT CHANGE UP (ref 13–44)
MAGNESIUM SERPL-MCNC: 2.3 MG/DL — SIGNIFICANT CHANGE UP (ref 1.6–2.6)
MCHC RBC-ENTMCNC: 28.6 PG — SIGNIFICANT CHANGE UP (ref 27–34)
MCHC RBC-ENTMCNC: 32.3 GM/DL — SIGNIFICANT CHANGE UP (ref 32–36)
MCV RBC AUTO: 88.6 FL — SIGNIFICANT CHANGE UP (ref 80–100)
METHOD TYPE: SIGNIFICANT CHANGE UP
MONOCYTES # BLD AUTO: 1.2 K/UL — HIGH (ref 0–0.9)
MONOCYTES NFR BLD AUTO: 10.1 % — SIGNIFICANT CHANGE UP (ref 2–14)
NEUTROPHILS # BLD AUTO: 7.54 K/UL — HIGH (ref 1.8–7.4)
NEUTROPHILS NFR BLD AUTO: 63.5 % — SIGNIFICANT CHANGE UP (ref 43–77)
NRBC # BLD: 0 /100 WBCS — SIGNIFICANT CHANGE UP (ref 0–0)
PHOSPHATE SERPL-MCNC: 3.9 MG/DL — SIGNIFICANT CHANGE UP (ref 2.5–4.5)
PLATELET # BLD AUTO: 601 K/UL — HIGH (ref 150–400)
POTASSIUM SERPL-MCNC: 4 MMOL/L — SIGNIFICANT CHANGE UP (ref 3.5–5.3)
POTASSIUM SERPL-SCNC: 4 MMOL/L — SIGNIFICANT CHANGE UP (ref 3.5–5.3)
RBC # BLD: 2.8 M/UL — LOW (ref 3.8–5.2)
RBC # FLD: 14.5 % — SIGNIFICANT CHANGE UP (ref 10.3–14.5)
SODIUM SERPL-SCNC: 139 MMOL/L — SIGNIFICANT CHANGE UP (ref 135–145)
WBC # BLD: 11.86 K/UL — HIGH (ref 3.8–10.5)
WBC # FLD AUTO: 11.86 K/UL — HIGH (ref 3.8–10.5)

## 2020-09-23 PROCEDURE — 99233 SBSQ HOSP IP/OBS HIGH 50: CPT

## 2020-09-23 PROCEDURE — 99232 SBSQ HOSP IP/OBS MODERATE 35: CPT

## 2020-09-23 PROCEDURE — 36573 INSJ PICC RS&I 5 YR+: CPT | Mod: 53

## 2020-09-23 RX ORDER — ASCORBIC ACID 60 MG
500 TABLET,CHEWABLE ORAL
Refills: 0 | Status: CANCELLED | OUTPATIENT
Start: 2020-09-23 | End: 2020-10-02

## 2020-09-23 RX ORDER — MULTIVIT-MIN/FERROUS GLUCONATE 9 MG/15 ML
1 LIQUID (ML) ORAL DAILY
Refills: 0 | Status: CANCELLED | OUTPATIENT
Start: 2020-09-23 | End: 2020-10-02

## 2020-09-23 RX ADMIN — Medication 100 MILLIGRAM(S): at 19:58

## 2020-09-23 RX ADMIN — MEROPENEM 100 MILLIGRAM(S): 1 INJECTION INTRAVENOUS at 11:12

## 2020-09-23 RX ADMIN — Medication 100 MILLIGRAM(S): at 11:14

## 2020-09-23 RX ADMIN — MEROPENEM 100 MILLIGRAM(S): 1 INJECTION INTRAVENOUS at 21:07

## 2020-09-23 RX ADMIN — MEROPENEM 100 MILLIGRAM(S): 1 INJECTION INTRAVENOUS at 01:13

## 2020-09-23 RX ADMIN — ATORVASTATIN CALCIUM 40 MILLIGRAM(S): 80 TABLET, FILM COATED ORAL at 21:08

## 2020-09-23 RX ADMIN — ENOXAPARIN SODIUM 40 MILLIGRAM(S): 100 INJECTION SUBCUTANEOUS at 21:07

## 2020-09-23 RX ADMIN — Medication 81 MILLIGRAM(S): at 11:14

## 2020-09-23 NOTE — PROGRESS NOTE ADULT - PROBLEM SELECTOR PLAN 4
-Continue home Metoprolol Tartrate 50mg PO BID  -Monitor routine hemodynamics  -DASH, consistent carb diet.  -Patient follows outpatient cardiologist Dr. Abdoul Lam from [facility] -Chronic, stable   -Continue home Metoprolol Tartrate 50mg PO BID  -Monitor routine hemodynamics  -DASH, consistent carb diet.  -Patient follows outpatient cardiologist Dr. Abdoul aLm from [facility] Chronic, stable   -Continue home Metoprolol Tartrate 50mg PO BID  -Monitor routine hemodynamics  -DASH, consistent carb diet.  -Patient follows outpatient cardiologist Dr. Abdoul Lam

## 2020-09-23 NOTE — PROGRESS NOTE ADULT - ASSESSMENT
44 year old female PMHx CAD (mLAD x1 MIGUEL ANGEL (2014), x3 stents 09/2019), HTN, prediabetes, hyperlipidemia adm with wound infection s/p panniculectomy on 9/1.     CT- Extensive defects within the lower abdominal wall with foci of gas and inflammation. Findings worrisome for gas forming infection.    Wound growing: Numerous Escherichia coli ESBL, Few Enterococcus faecalis, Rare Pseudomonas aeruginosa

## 2020-09-23 NOTE — PROGRESS NOTE ADULT - PROBLEM SELECTOR PLAN 7
Plan: History of prediabetes  -A1C: 6.1%  -DASH, consistent carb diet History of prediabetes  -A1C: 6.1%  -DASH, consistent carb diet

## 2020-09-23 NOTE — PROCEDURE NOTE - NSPROCDETAILS_GEN_ALL_CORE
ultrasound guidance/sterile technique, catheter placed/sterile dressing applied/supine position/location identified, draped/prepped, sterile technique used

## 2020-09-23 NOTE — DISCHARGE NOTE PROVIDER - NSDCCPCAREPLAN_GEN_ALL_CORE_FT
PRINCIPAL DISCHARGE DIAGNOSIS  Diagnosis: Wound dehiscence, surgical, initial encounter  Assessment and Plan of Treatment:       SECONDARY DISCHARGE DIAGNOSES  Diagnosis: Cellulitis of abdominal wall  Assessment and Plan of Treatment:      PRINCIPAL DISCHARGE DIAGNOSIS  Diagnosis: Wound dehiscence, surgical, initial encounter  Assessment and Plan of Treatment: Take the prescribed antibiotic medicine you are given as directed until it is gone. Take it even if you feel better. It treats the infection and stops it from returning. Not taking all the medicine can make future infections hard to treat. Keep the infected area clean. When possible, raise the infected area above the level of your heart. This helps keep swelling down. Talk with your healthcare provider if you are in pain. Ask what kind of over-the-counter medicine you can take for pain. Apply clean bandages as advised.  Wash your hands often to prevent spreading the infection. In the future, wash your hands before and after you touch cuts, scratches, or bandages. This will help prevent infection.   Call your healthcare provider immediately if you have any of the following: Difficulty or pain when moving the joints above or below the infected area, Discharge or pus draining from the area, rever of 100.4°F (38°C) or higher, or as directed by your healthcare provider, pain that gets worse in or around the infected site, redness that gets worse in or around the infected area, particularly if the area of redness expands to a wider area, shaking chills, swelling of the infected area, vomiting.        SECONDARY DISCHARGE DIAGNOSES  Diagnosis: Cellulitis of abdominal wall  Assessment and Plan of Treatment:      PRINCIPAL DISCHARGE DIAGNOSIS  Diagnosis: Wound dehiscence, surgical, initial encounter  Assessment and Plan of Treatment: Take the prescribed antibiotic medicine you are given as directed until it is gone. Take it even if you feel better. It treats the infection and stops it from  Not taking all the medicine can make future infections hard to treat. Keep the infected area clean. When possible, raise the infected area above the level of your heart. This helps keep swelling down. Talk with your healthcare provider if you are in pain. Ask what kind of over-the-counter medicine you can take for pain. Apply clean bandages as advised.  Wash your hands often to prevent spreading the infection. In the future, wash your hands before and after you touch cuts, scratches, or bandages. This will help prevent infection.   Call your healthcare provider immediately if you have any of the following: Difficulty or pain when moving the joints above or below the infected area, Discharge or pus draining from the area, rever of 100.4°F (38°C) or higher, or as directed by your healthcare provider, pain that gets worse in or around the infected site, redness that gets worse in or around the infected area, particularly if the area of redness expands to a wider area, shaking chills, swelling of the infected area, vomiting.  Wound Care: 1. Continue with negative pressure wound therapy  2. Shower before each dressing change  3. If wound vac not functioning, apply wet-to-dry dressing  Follow up with Plastics within 1 week of Surgery        SECONDARY DISCHARGE DIAGNOSES  Diagnosis: Cellulitis of abdominal wall  Assessment and Plan of Treatment: -You completed course of IV antibiotics.  -Follow up with Plastics within 1 week of discharge

## 2020-09-23 NOTE — DIETITIAN INITIAL EVALUATION ADULT. - PROBLEM SELECTOR PLAN 2
- Continue IV Vanc and IV Zosyn  - Monitor fever and WBC count  - ID (Dr. Gomez) consulted, f/u recs  - Plan as above

## 2020-09-23 NOTE — PROGRESS NOTE ADULT - ASSESSMENT
44 year old female PMHx CAD (mLAD x1 MIGUEL ANGEL (2014), x3 stents 09/2019), HTN, prediabetes, hyperlipidemia, recent COVID-19 infection admitted for wound dehiscence and possible revision.    Obstructive CAD s/p PCI 9/ 2019   - Cardiologist Dr. Abdoul Lam from [facility]  - No acute changes on EKG compared to previous  - Echo 9/3/2020: trace MR, trace TR, LVEF 60-65%   - Continue ASA 81    HTN  - Continue metoprolol 50 mg BID with hold parameters     Anemia  -work up as per primary team   - Transfuse >8 given cardiac history     HLD  - Continue atorvastatin 40 mg qhs     Cellulitis   -wound cellulitis now sp wound vac  -follow up id, wound, plastic surgery recs    Toshia Jesus FNP-C  Cardiology NP  [id number] [phone]

## 2020-09-23 NOTE — DIETITIAN INITIAL EVALUATION ADULT. - PROBLEM SELECTOR PLAN 8
History of hypothyroidism  - Patient states she has been stable off medications for years  - check TSH

## 2020-09-23 NOTE — PROGRESS NOTE ADULT - SUBJECTIVE AND OBJECTIVE BOX
S  Pt without complaints      PIC line inserted    WBC 11.86  H/H 8.0/24.8    O  Afebrile  VSS      Abdomen-pt still has induration and erythema though it has improved                     No crepitus or tenderness                     VAC in place-pt having irrigation with betadine and saline      A&P  Pt must continue receiving her inpatient VAC treatment with the Betadine and Saline-which she cannot undergo at home for the next several days-especially given her history of multiple stents.  The c&s results have recently been posted and the pt was begun on meropenem recently as well. In order to obviate her return to the hospital due to insufficient local wound care with the betadine irrigation, she should remain as an inpatient.  Once this therapy has concluded and her infection has resolved further (she still has induration and erythema), pt may be discharged. She will require a visiting nurse for her VAC as well          Per the Plastic Surgery standpoint, she should remain in the hospital and is NOT ready for discharge            D/W Dr Quezada

## 2020-09-23 NOTE — PROGRESS NOTE ADULT - SUBJECTIVE AND OBJECTIVE BOX
[facility] -- Zakia Barraza, Oskar, Maye, Matthias Oliva Savella  Office # [phone]      Follow Up:  cad    Subjective/Observations:   No events overnight resting comfortably in bed.  No complaints of chest pain, dyspnea, or palpitations reported. No signs of orthopnea or PND.      REVIEW OF SYSTEMS: All other review of systems is negative unless indicated above    PAST MEDICAL & SURGICAL HISTORY:  Obesity    Hypothyroid  Resolved    Coronary artery disease  s/p MIGUEL ANGEL x 3 (9/2019), MIGUEL ANGEL x1 (2014)    HLD (hyperlipidemia)    HTN (hypertension)    S/P coronary artery stent placement  mLAD x1 MIGUEL ANGEL (2014),  3 stents 09/2019    H/O total hysterectomy  2017        MEDICATIONS  (STANDING):  0.9% Sodium Chloride/ Betadine 1000 milliLiter(s) 1000 milliLiter(s) Irrigation <User Schedule>  aspirin  chewable 81 milliGRAM(s) Oral daily  atorvastatin 40 milliGRAM(s) Oral at bedtime  enoxaparin Injectable 40 milliGRAM(s) SubCutaneous at bedtime  meropenem  IVPB      meropenem  IVPB 1000 milliGRAM(s) IV Intermittent every 8 hours  metoprolol tartrate 50 milliGRAM(s) Oral two times a day    MEDICATIONS  (PRN):  acetaminophen   Tablet .. 650 milliGRAM(s) Oral every 6 hours PRN Mild Pain (1 - 3)  benzonatate 100 milliGRAM(s) Oral three times a day PRN Cough      Allergies    codeine (Unknown)    Intolerances        Vital Signs Last 24 Hrs  T(C): 36.9 (23 Sep 2020 05:01), Max: 37.4 (22 Sep 2020 13:11)  T(F): 98.5 (23 Sep 2020 05:01), Max: 99.4 (22 Sep 2020 13:11)  HR: 96 (23 Sep 2020 05:01) (96 - 105)  BP: 98/66 (23 Sep 2020 05:01) (98/66 - 131/85)  BP(mean): --  RR: 17 (23 Sep 2020 05:01) (17 - 17)  SpO2: 96% (23 Sep 2020 05:01) (93% - 98%)    I&O's Summary    22 Sep 2020 07:01  -  23 Sep 2020 07:00  --------------------------------------------------------  IN: 1770 mL / OUT: 0 mL / NET: 1770 mL          PHYSICAL EXAM:  TELE: not on tele  Constitutional: NAD, awake and alert, well-developed  HEENT: Moist Mucous Membranes, Anicteric  Pulmonary: Non-labored, breath sounds are clear bilaterally, No wheezing, crackles or rhonchi  Cardiovascular: Regular, S1 and S2 nl, No murmurs, rubs, gallops or clicks  Gastrointestinal: Bowel Sounds present, soft, nontender., wound vac   Lymph: No lymphadenopathy. No peripheral edema.  Skin: No visible rashes or ulcers.  Psych:  Mood & affect appropriate    LABS: All Labs Reviewed:                        8.0    11.86 )-----------( 601      ( 23 Sep 2020 07:56 )             24.8                         8.2    11.44 )-----------( 644      ( 22 Sep 2020 09:16 )             25.6                         7.8    11.31 )-----------( 651      ( 21 Sep 2020 08:51 )             24.7     23 Sep 2020 07:56    139    |  107    |  14     ----------------------------<  118    4.0     |  23     |  0.98   22 Sep 2020 09:16    139    |  107    |  7      ----------------------------<  139    3.8     |  26     |  0.96   21 Sep 2020 08:51    138    |  106    |  8      ----------------------------<  134    4.2     |  24     |  0.87     Ca    8.7        23 Sep 2020 07:56  Ca    8.6        22 Sep 2020 09:16  Ca    8.0        21 Sep 2020 08:51  Phos  3.9       23 Sep 2020 07:56  Phos  3.0       22 Sep 2020 09:16  Mg     2.3       23 Sep 2020 07:56    TPro  7.2    /  Alb  2.1    /  TBili  0.5    /  DBili  x      /  AST  23     /  ALT  29     /  AlkPhos  122    22 Sep 2020 09:16  TPro  6.7    /  Alb  1.9    /  TBili  0.5    /  DBili  x      /  AST  22     /  ALT  29     /  AlkPhos  107    21 Sep 2020 08:51  TPro  8.4    /  Alb  2.4    /  TBili  0.6    /  DBili  x      /  AST  32     /  ALT  37     /  AlkPhos  141    20 Sep 2020 14:47             ECG:  < from: 12 Lead ECG (09.20.20 @ 15:49) >    Ventricular Rate 97 BPM    Atrial Rate 97 BPM    P-R Interval 146 ms    QRS Duration 80 ms    Q-T Interval 392 ms    QTC Calculation(Bazett) 497 ms    P Axis 38 degrees    R Axis 0 degrees    T Axis -7 degrees    Diagnosis Line Normal sinus rhythm  Minimal voltage criteria for LVH, may be normal variant  Nonspecific T wave abnormality  Abnormal ECG    < end of copied text >    Echo:    Radiology:

## 2020-09-23 NOTE — DISCHARGE NOTE PROVIDER - CARE PROVIDER_API CALL
Sheree Anderson  PLASTIC SURGERY  [address] [place] [address]  Phone: [phone]  Fax: [phone]  Follow Up Time: 1 week   Sheree Anderson  PLASTIC SURGERY  [address] [place] [address]  Phone: [phone]  Fax: [phone]  Follow Up Time: 1 week    Wilfrido Lala)  Cardiovascular Disease; Internal Medicine  [address], [place] [id number]  Phone: [phone]  Fax: [phone]  Follow Up Time: Routine

## 2020-09-23 NOTE — PROGRESS NOTE ADULT - PROBLEM SELECTOR PLAN 8
-Per Pt, she has been off medications for years without issue  -TSH within normal limits Chronic stable   - Pt has been off medications for years without issue  -TSH within normal limits Chronic stable   -Pt has been off medications for years without issue  -TSH within normal limits

## 2020-09-23 NOTE — DIETITIAN INITIAL EVALUATION ADULT. - PROBLEM SELECTOR PLAN 1
- Admit to [facility]  - Patient afebrile, WBC 11.83  - F/u wound culture, obtained prior to administration of IV antibiotic   - Continue IV Zosyn and IV Vanco  - Pain regimen   - CT abd/pelvis w/IV cont: Extensive defects within the lower abdominal wall with foci of gas and inflammation. Findings worrisome for gas forming infection. Loculations of fluid are noted within the abdominal wall.  - ID (Dr. Gomez) consulted, f/u recs  - Cardiology (Madi baxter) consulted, f/u recs  - Plastic Surgery (Dr. Hameed) consulted, recs appreciated - IV abx and local wound care  - RN wound care team consulted, f/u recs  - Wound care consult, will sign out to day team to place consult

## 2020-09-23 NOTE — DISCHARGE NOTE PROVIDER - NSDCMRMEDTOKEN_GEN_ALL_CORE_FT
aspirin 81 mg oral tablet: 1 tab(s) orally once a day  atorvastatin 40 mg oral tablet: 1 tab(s) orally once a day  metoprolol tartrate 50 mg oral tablet: 1 tab(s) orally 2 times a day   acetaminophen 325 mg oral tablet: 2 tab(s) orally every 6 hours, As needed, Mild Pain (1 - 3)  ascorbic acid 500 mg oral tablet: 1 tab(s) orally 2 times a day  aspirin 81 mg oral tablet: 1 tab(s) orally once a day  atorvastatin 40 mg oral tablet: 1 tab(s) orally once a day  metoprolol tartrate 50 mg oral tablet: 1 tab(s) orally 2 times a day  Multiple Vitamins with Minerals oral tablet: 1 tab(s) orally once a day

## 2020-09-23 NOTE — PROGRESS NOTE ADULT - PROBLEM SELECTOR PLAN 5
Chronic, a/p LAD x1 MIGUEL ANGEL (2014), x3 stents (09/2019)  -Continue atorvastatin 40 mg PO qhs, ASA 81 mg PO daily   -TTE 9/3/2020: trace MR, trace TR, LVEF 60-65%   -Cardio ([facility]), following, recs appreciated

## 2020-09-23 NOTE — PROGRESS NOTE ADULT - PROBLEM SELECTOR PLAN 1
appearance very concerning for polymicrobial infection and evidenced per micro potentially involving anaerobes and noted growth of ESBL and pseudomonas so as discussed with Dr Marcus escalated to meropenem. With wound vac and no oral options we can look at placement of midline with anticipated:  Meropenem 1 gram IV q8 with last day 10/2 (10 days total of effective Rx)

## 2020-09-23 NOTE — DISCHARGE NOTE PROVIDER - HOSPITAL COURSE
FROM ADMISSION HPI: 44 year old female PMHx CAD (mLAD x1 MIGUEL ANGEL (2014), x3 stents 09/2019), HTN, prediabetes, hyperlipidemia presents to ED for pain of wound s/p panniculectomy on 9/1. Patient presented to plastic surgeon, Dr. Hameed's office with a wound dehiscence last week with an overlying dry eschar. Patient was advised to be admitted to the hospital for IV antibiotic treatment and wound care, however refused due to difficulties at home. Patient called Dr. Hameed's office earlier today c/o acute onset of pain. Patient was advised to come to the ED for further management. Denies chest pain, palpitations, dyspnea, headache, dizziness, n/v/d.     ED course:  Vitals: T 99 HR 94 /74 RR 19 SpO2 97% on RA  Labs: WBC 11.83, RBC 3.04 H/H 8.9/27.1, Platelet 656, Neutrophil 8.18, Monocyte 1.1, PTT 27.1, PT/INR 17.0/1.48, Glu 132, Protein 8.4, Alb 2.4, Alk phos 141  CT abd/pelvis w/IV cont: Extensive defects within the lower abdominal wall with foci of gas and inflammation. Findings worrisome for gas forming infection. Loculations of fluid are noted within the abdominal wall.  CXR: no active disease  EKG: NSR vent rate 97 bpm, minimal voltage criteria for LVH  Given IV Zosyn x1, IV Vanco x1, IV NS 2.8 L bolus x1, Tylenol 650 mg PO x1   ---  HOSPITAL COURSE:     Patient was medically optimized and improved clinically throughout hospital. Patient seen and examined on day of discharge:      Patient is medically stable and cleared for discharge to ____ with outpatient follow up.    ---  CONSULTANTS:   Infectious Disease: Dr. Gomez  Plastic Surgery: Dr. Jean   Cardiology: Dr. Schmitt  Wound Care: Dr. العلي  Heme/Onc: Dr. Blanc  ---  TIME SPENT:  The total amount of time spent reviewing the hospital notes, laboratory values, imaging findings, assessing/counseling the patient, discussing with consultant physicians, social work, nursing staff was -- minutes    ---  Primary care provider was made aware of plan for discharge:      [  ] NO     [  ] YES     FROM ADMISSION HPI: 44 year old female PMHx CAD (mLAD x1 MIGUEL ANGEL (2014), x3 stents 09/2019), HTN, prediabetes, hyperlipidemia presents to ED for pain of wound s/p panniculectomy on 9/1. Patient presented to plastic surgeon, Dr. Hameed's office with a wound dehiscence last week with an overlying dry eschar. Patient was advised to be admitted to the hospital for IV antibiotic treatment and wound care, however refused due to difficulties at home. Patient called Dr. Hameed's office earlier today c/o acute onset of pain. Patient was advised to come to the ED for further management. Denies chest pain, palpitations, dyspnea, headache, dizziness, n/v/d.     ED course:  Vitals: T 99 HR 94 /74 RR 19 SpO2 97% on RA  Labs: WBC 11.83, RBC 3.04 H/H 8.9/27.1, Platelet 656, Neutrophil 8.18, Monocyte 1.1, PTT 27.1, PT/INR 17.0/1.48, Glu 132, Protein 8.4, Alb 2.4, Alk phos 141  CT abd/pelvis w/IV cont: Extensive defects within the lower abdominal wall with foci of gas and inflammation. Findings worrisome for gas forming infection. Loculations of fluid are noted within the abdominal wall.  CXR: no active disease  EKG: NSR vent rate 97 bpm, minimal voltage criteria for LVH  Given IV Zosyn x1, IV Vanco x1, IV NS 2.8 L bolus x1, Tylenol 650 mg PO x1   ---  HOSPITAL COURSE:   Pt admitted for wound dehiscence/ cellulitis s/p panniculectomy on 9/1. CT abd/pelvis w/IV cont showed extensive defects within the lower abdominal wall with foci of gas and inflammation. Findings worrisome for gas forming infection. Loculations of fluid are noted within the abdominal cavity. Plastic Surgery, Dr. Lane, determine wound vac. ID, Dr. Gomez recommended Meropenem to cover  ESBL, pseudomonas and potentially   anaerobic. Place midline on 09/23/20, no reported complications. Hb on admission 7.8, without active bleeding. HemoDr. Blanc determine chronic anemia secondary to chronic diseases vs. acute inflammatory/ infectious process.   Patient was medically optimized and improved clinically throughout hospital. Patient seen and examined on day of discharge:      Patient is medically stable and cleared for discharge to home with outpatient follow up.    ---  CONSULTANTS:   Infectious Disease: Dr. Gomez  Plastic Surgery: Dr. Jean   Cardiology: Dr. Schmitt  Wound Care: Dr. العلي  Heme/Onc: Dr. Blanc  ---  TIME SPENT:  The total amount of time spent reviewing the hospital notes, laboratory values, imaging findings, assessing/counseling the patient, discussing with consultant physicians, social work, nursing staff was -- minutes    ---  Primary care provider was made aware of plan for discharge:      [  ] NO     [  ] YES     FROM ADMISSION HPI: 44 year old female PMHx CAD (mLAD x1 MIGUEL ANGEL (2014), x3 stents 09/2019), HTN, prediabetes, hyperlipidemia presents to ED for pain of wound s/p panniculectomy on 9/1. Patient presented to plastic surgeon, Dr. Hameed's office with a wound dehiscence last week with an overlying dry eschar. Patient was advised to be admitted to the hospital for IV antibiotic treatment and wound care, however refused due to difficulties at home. Patient called Dr. Hameed's office earlier today c/o acute onset of pain. Patient was advised to come to the ED for further management. Denies chest pain, palpitations, dyspnea, headache, dizziness, n/v/d.     ED course:  Vitals: T 99 HR 94 /74 RR 19 SpO2 97% on RA  Labs: WBC 11.83, RBC 3.04 H/H 8.9/27.1, Platelet 656, Neutrophil 8.18, Monocyte 1.1, PTT 27.1, PT/INR 17.0/1.48, Glu 132, Protein 8.4, Alb 2.4, Alk phos 141  CT abd/pelvis w/IV cont: Extensive defects within the lower abdominal wall with foci of gas and inflammation. Findings worrisome for gas forming infection. Loculations of fluid are noted within the abdominal wall.  CXR: no active disease  EKG: NSR vent rate 97 bpm, minimal voltage criteria for LVH  Given IV Zosyn x1, IV Vanco x1, IV NS 2.8 L bolus x1, Tylenol 650 mg PO x1   ---  HOSPITAL COURSE:   Pt admitted for wound dehiscence/ cellulitis s/p panniculectomy on 9/1. CT abd/pelvis w/IV cont showed extensive defects within the lower abdominal wall with foci of gas and inflammation. Findings worrisome for gas forming infection. Loculations of fluid are noted within the abdominal cavity. Plastic Surgery, Dr. Lane, determine wound vac. ID, Dr. Gomez recommended Meropenem to cover  ESBL, pseudomonas and potentially   anaerobic. Place midline on 09/23/20, no reported complications. Hb on admission 7.8, without active bleeding. HemoDr. Blanc determine chronic anemia secondary to chronic diseases vs. acute inflammatory/ infectious process.   Patient was medically optimized and improved clinically throughout hospital. Patient seen and examined on day of discharge:    ROS   CONSTITUTIONAL: denies fever, chills, fatigue, weakness  CARDIOVASCULAR: denies chest pain, chest pressure, palpitations  RESPIRATORY: denies shortness of breath, sputum production  GASTROINTESTINAL: denies nausea, vomiting, diarrhea, abdominal pain  GENITOURINARY: denies dysuria, discharge  NEUROLOGICAL: denies numbness, headache, focal weakness  MUSCULOSKELETAL: denies new joint pain, muscle aches  HEMATOLOGIC: denies gross bleeding, bruising  PSYCHIATRIC: denies recent changes in anxiety, depression      GENERAL: NAD, resting comfortably in bed   CHEST/LUNG: CTA b/L; No rales, rhonchi, wheezing  HEART: Regular rate and rhythm; +S1/S2. No murmurs, rubs, or gallops  ABDOMEN: Soft, Nontender, Nondistended; Bowel sounds present  EXTREMITIES:  2+ Peripheral Pulses, No clubbing, cyanosis, or edema  NERVOUS SYSTEM:  Alert & Oriented X3.  SKIN: Large lower transverse incision wound - wound vac in place    ---  CONSULTANTS:   Infectious Disease: Dr. Gomez  [facility]   Cardiology: Dr. Schmitt  Wound Care: Dr. العلي  Heme/Onc: Dr. Blanc  ---  TIME SPENT:  The total amount of time spent reviewing the hospital notes, laboratory values, imaging findings, assessing/counseling the patient, discussing with consultant physicians, social work, nursing staff was -- minutes    ---  Primary care provider was made aware of plan for discharge:      [  ] NO     [  ] YES     FROM ADMISSION HPI: 44 year old female PMHx CAD (mLAD x1 MIGUEL ANGEL (2014), x3 stents 09/2019), HTN, prediabetes, hyperlipidemia presents to ED for pain of wound s/p panniculectomy on 9/1. Patient presented to plastic surgeon, Dr. Hameed's office with a wound dehiscence last week with an overlying dry eschar. Patient was advised to be admitted to the hospital for IV antibiotic treatment and wound care, however refused due to difficulties at home. Patient called Dr. Hameed's office earlier today c/o acute onset of pain. Patient was advised to come to the ED for further management. Denies chest pain, palpitations, dyspnea, headache, dizziness, n/v/d.     ED course:  Vitals: T 99 HR 94 /74 RR 19 SpO2 97% on RA  Labs: WBC 11.83, RBC 3.04 H/H 8.9/27.1, Platelet 656, Neutrophil 8.18, Monocyte 1.1, PTT 27.1, PT/INR 17.0/1.48, Glu 132, Protein 8.4, Alb 2.4, Alk phos 141  CT abd/pelvis w/IV cont: Extensive defects within the lower abdominal wall with foci of gas and inflammation. Findings worrisome for gas forming infection. Loculations of fluid are noted within the abdominal wall.  CXR: no active disease  EKG: NSR vent rate 97 bpm, minimal voltage criteria for LVH  Given IV Zosyn x1, IV Vanco x1, IV NS 2.8 L bolus x1, Tylenol 650 mg PO x1   ---  HOSPITAL COURSE:   Pt admitted for wound dehiscence/ cellulitis s/p panniculectomy on 9/1. CT abd/pelvis w/IV cont showed extensive defects within the lower abdominal wall with foci of gas and inflammation. Findings worrisome for gas forming infection. Loculations of fluid are noted within the abdominal cavity. Plastic Surgery, Dr. Laen, determine wound vac. ID, Dr. Gomez recommended Meropenem to cover  ESBL, pseudomonas and potentially   anaerobic. Place midline on 09/23/20, no reported complications. Hb on admission 7.8, without active bleeding. HemoDr. Blanc determine chronic anemia secondary to chronic diseases vs. acute inflammatory/ infectious process. Cardiology, Dr. Schmitt, recommended continue home medications regimen and keep Hb greater than 8, patient received 1 PRBC on 09/24. Dr. Lane suggested to continue inpatient VAC and IV antibiotics given history of multiple stents  Patient was medically optimized and improved clinically throughout hospital. Patient seen and examined on day of discharge:    ---  CONSULTANTS:   Infectious Disease: Dr. Gomez  Plastic Surgery: Dr. Jean   Cardiology: Dr. Schmitt  Wound Care: Dr. العلي  Heme/Onc: Dr. Blanc  ---  TIME SPENT:  The total amount of time spent reviewing the hospital notes, laboratory values, imaging findings, assessing/counseling the patient, discussing with consultant physicians, social work, nursing staff was -- minutes    ---  Primary care provider was made aware of plan for discharge:      [  ] NO     [  ] YES     FROM ADMISSION HPI: 44 year old female PMHx CAD (mLAD x1 MIGUEL ANGEL (2014), x3 stents 09/2019), HTN, prediabetes, hyperlipidemia presents to ED for pain of wound s/p panniculectomy on 9/1. Patient presented to plastic surgeon, Dr. Hameed's office with a wound dehiscence last week with an overlying dry eschar. Patient was advised to be admitted to the hospital for IV antibiotic treatment and wound care, however refused due to difficulties at home. Patient called Dr. Hameed's office earlier today c/o acute onset of pain. Patient was advised to come to the ED for further management. Denies chest pain, palpitations, dyspnea, headache, dizziness, n/v/d.     ED course:  Vitals: T 99 HR 94 /74 RR 19 SpO2 97% on RA  Labs: WBC 11.83, RBC 3.04 H/H 8.9/27.1, Platelet 656, Neutrophil 8.18, Monocyte 1.1, PTT 27.1, PT/INR 17.0/1.48, Glu 132, Protein 8.4, Alb 2.4, Alk phos 141  CT abd/pelvis w/IV cont: Extensive defects within the lower abdominal wall with foci of gas and inflammation. Findings worrisome for gas forming infection. Loculations of fluid are noted within the abdominal wall.  CXR: no active disease  EKG: NSR vent rate 97 bpm, minimal voltage criteria for LVH  Given IV Zosyn x1, IV Vanco x1, IV NS 2.8 L bolus x1, Tylenol 650 mg PO x1   ---  HOSPITAL COURSE:   Pt admitted for wound dehiscence/ cellulitis s/p panniculectomy on 9/1. CT abd/pelvis w/IV cont showed extensive defects within the lower abdominal wall with foci of gas and inflammation. Findings worrisome for gas forming infection. Loculations of fluid are noted within the abdominal cavity. Plastic Surgery, Dr. Lane, determine wound vac. ID, Dr. Gomez recommended Meropenem to cover  ESBL, pseudomonas and potentially   anaerobic. Place midline on 09/23/20, no reported complications. Hb on admission 7.8, without active bleeding. HemoDr. Blanc determine chronic anemia secondary to chronic diseases vs. acute inflammatory/ infectious process. Cardiology, Dr. Schmitt, recommended continue home medications regimen and keep Hb greater than 8, patient received 1 PRBC on 09/24. Dr. Lane suggested to continue inpatient VAC and IV antibiotics given history of multiple stents ----  Patient was medically optimized and improved clinically throughout hospital. Patient seen and examined on day of discharge:    ---  CONSULTANTS:   Infectious Disease: Dr. Gomez  Plastic Surgery: Dr. Jean   Cardiology: Dr. Schmitt  Wound Care: Dr. العلي  Heme/Onc: Dr. Blanc  ---  TIME SPENT:  The total amount of time spent reviewing the hospital notes, laboratory values, imaging findings, assessing/counseling the patient, discussing with consultant physicians, social work, nursing staff was -- minutes    ---  Primary care provider was made aware of plan for discharge:      [  ] NO     [  ] YES     FROM ADMISSION HPI: 44 year old female PMHx CAD (mLAD x1 MIGUEL ANGEL (2014), x3 stents 09/2019), HTN, prediabetes, hyperlipidemia presents to ED for pain of wound s/p panniculectomy on 9/1. Patient presented to plastic surgeon, Dr. Hameed's office with a wound dehiscence last week with an overlying dry eschar. Patient was advised to be admitted to the hospital for IV antibiotic treatment and wound care, however refused due to difficulties at home. Patient called Dr. Hameed's office earlier today c/o acute onset of pain. Patient was advised to come to the ED for further management. Denies chest pain, palpitations, dyspnea, headache, dizziness, n/v/d.     ED course:  Vitals: T 99 HR 94 /74 RR 19 SpO2 97% on RA  Labs: WBC 11.83, RBC 3.04 H/H 8.9/27.1, Platelet 656, Neutrophil 8.18, Monocyte 1.1, PTT 27.1, PT/INR 17.0/1.48, Glu 132, Protein 8.4, Alb 2.4, Alk phos 141  CT abd/pelvis w/IV cont: Extensive defects within the lower abdominal wall with foci of gas and inflammation. Findings worrisome for gas forming infection. Loculations of fluid are noted within the abdominal wall.  CXR: no active disease  EKG: NSR vent rate 97 bpm, minimal voltage criteria for LVH  Given IV Zosyn x1, IV Vanco x1, IV NS 2.8 L bolus x1, Tylenol 650 mg PO x1   ---  HOSPITAL COURSE:   Pt admitted for wound dehiscence/ cellulitis s/p panniculectomy on 9/1. CT abd/pelvis w/IV cont showed extensive defects within the lower abdominal wall with foci of gas and inflammation. Findings worrisome for gas forming infection. Loculations of fluid are noted within the abdominal cavity. Plastic Surgery, Dr. Lane, determine wound vac. ID, Dr. Gomez recommended Meropenem to cover  ESBL, pseudomonas and potentially   anaerobic. Place midline on 09/23/20, no reported complications. Hb on admission 7.8, without active bleeding. Hemonc, Dr. Blanc determine chronic anemia secondary to chronic diseases vs. acute inflammatory/ infectious process. Cardiology, Dr. Schmitt, recommended continue home medications regimen and keep Hb greater than 8, patient received 1 PRBC on 09/24. Dr. Lane suggested to continue inpatient VAC and IV antibiotics. Last day of antibiotics on 10/2/20, patient to go home with home care services.  Patient was medically optimized and improved clinically throughout hospital.    ---  CONSULTANTS:   Infectious Disease: Dr. Gomez  Plastic Surgery: Dr. Jean   Cardiology: Dr. Schmitt  Wound Care: Dr. العلي  Heme/Onc: Dr. Blanc  ---  TIME SPENT:  The total amount of time spent reviewing the hospital notes, laboratory values, imaging findings, assessing/counseling the patient, discussing with consultant physicians, social work, nursing staff was 44 minutes by attending physician

## 2020-09-23 NOTE — PROGRESS NOTE ADULT - SUBJECTIVE AND OBJECTIVE BOX
[facility] DIVISION of INFECTIOUS DISEASE  Capo Gomez MD PhD, Cat Rodriguez MD, Andree Randall MD, Alicja Rizzo MD  and providing coverage with Liliana Workman MD and Isaac Loyola MD  Providing Infectious Disease Consultations at [facility], [facility], [facility]    Office# [phone] to schedule follow up appointments  Answering Service for urgent calls or New Consults [phone]  Cell# to text for urgent issues Capo Gomez [phone]     infectious diseases progress note:    LOUIE QUINTANILLA is a 44y y. o. Female patient    No concerning overnight events, pt is happy and reports she is feeling much better    Allergies    codeine (Unknown)    Intolerances        ANTIBIOTICS/RELEVANT:  antimicrobials  meropenem  IVPB      meropenem  IVPB 1000 milliGRAM(s) IV Intermittent every 8 hours    immunologic:    OTHER:  0.9% Sodium Chloride/ Betadine 1000 milliLiter(s) 1000 milliLiter(s) Irrigation <User Schedule>  acetaminophen   Tablet .. 650 milliGRAM(s) Oral every 6 hours PRN  aspirin  chewable 81 milliGRAM(s) Oral daily  atorvastatin 40 milliGRAM(s) Oral at bedtime  benzonatate 100 milliGRAM(s) Oral three times a day PRN  enoxaparin Injectable 40 milliGRAM(s) SubCutaneous at bedtime  metoprolol tartrate 50 milliGRAM(s) Oral two times a day      Objective:  Vital Signs Last 24 Hrs  T(C): 36.9 (23 Sep 2020 05:01), Max: 37.4 (22 Sep 2020 13:11)  T(F): 98.5 (23 Sep 2020 05:01), Max: 99.4 (22 Sep 2020 13:11)  HR: 96 (23 Sep 2020 05:01) (96 - 105)  BP: 98/66 (23 Sep 2020 05:01) (98/66 - 131/85)  BP(mean): --  RR: 17 (23 Sep 2020 05:01) (17 - 17)  SpO2: 96% (23 Sep 2020 05:01) (93% - 98%)    T(C): 36.9 (09-23-20 @ 05:01), Max: 37.6 (09-21-20 @ 20:48)  T(C): 36.9 (09-23-20 @ 05:01), Max: 37.6 (09-21-20 @ 20:48)  T(C): 36.9 (09-23-20 @ 05:01), Max: 37.6 (09-21-20 @ 20:48)    PHYSICAL EXAM:  HEENT: NC atraumatic  Neck: supple  Respiratory: no accessory muscle use, breathing comfortably  Cardiovascular: distant  Gastrointestinal: normal appearing, nondistended, wound vac in place  Extremities: no clubbing, no cyanosis,      LABS:                          8.0    11.86 )-----------( 601      ( 23 Sep 2020 07:56 )             24.8       11.86 09-23 @ 07:56  11.44 09-22 @ 09:16  11.31 09-21 @ 08:51  11.83 09-20 @ 14:47      09-23    139  |  107  |  14  ----------------------------<  118<H>  4.0   |  23  |  0.98    Ca    8.7      23 Sep 2020 07:56  Phos  3.9     09-23  Mg     2.3     09-23    TPro  7.2  /  Alb  2.1<L>  /  TBili  0.5  /  DBili  x   /  AST  23  /  ALT  29  /  AlkPhos  122<H>  09-22      Creatinine, Serum: 0.98 mg/dL (09-23-20 @ 07:56)  Creatinine, Serum: 0.96 mg/dL (09-22-20 @ 09:16)  Creatinine, Serum: 0.87 mg/dL (09-21-20 @ 08:51)  Creatinine, Serum: 0.83 mg/dL (09-20-20 @ 14:47)                INFLAMMATORY MARKERS  Auto Neutrophil #: 7.54 K/uL (09-23-20 @ 07:56)  Auto Lymphocyte #: 2.67 K/uL (09-23-20 @ 07:56)  Auto Neutrophil #: 6.93 K/uL (09-22-20 @ 09:16)  Auto Lymphocyte #: 2.66 K/uL (09-22-20 @ 09:16)  Auto Neutrophil #: 7.70 K/uL (09-21-20 @ 08:51)  Auto Lymphocyte #: 2.02 K/uL (09-21-20 @ 08:51)  Auto Neutrophil #: 8.18 K/uL (09-20-20 @ 14:47)  Auto Lymphocyte #: 2.22 K/uL (09-20-20 @ 14:47)    Lactate, Blood: 2.0 mmol/L (09-20-20 @ 14:47)    Auto Eosinophil #: 0.28 K/uL (09-23-20 @ 07:56)  Auto Eosinophil #: 0.34 K/uL (09-22-20 @ 09:16)  Auto Eosinophil #: 0.24 K/uL (09-21-20 @ 08:51)  Auto Eosinophil #: 0.19 K/uL (09-20-20 @ 14:47)      Ferritin, Serum: 443 ng/mL (09-22-20 @ 11:38)        Activated Partial Thromboplastin Time: 27.1 sec (09-20-20 @ 14:47)  INR: 1.48 ratio (09-20-20 @ 14:47)          MICROBIOLOGY:    MRSA/MSSA PCR (09.22.20 @ 03:50)    MRSA PCR Result.: NotDetec:     Staph Aureus PCR Result: NotDetec    COVID-19  Antibody - for prior infection screening (09.20.20 @ 23:34)    COVID-19 IgG Antibody Index: 32.70: Roche ECLIA Total AB (LUDMILA)  NOTE: This result index represents a total antibody measurement,  which  includes IgG, IgA, and IgM  Negative <= 0.99 Index  Positive >= 1.00 Index Index    COVID-19 IgG Antibody Interpretation: Positive:    Culture - Surgical Swab (09.20.20 @ 18:52)    -  Vancomycin: S 1    -  Ertapenem: S <=0.5    -  Gentamicin: S <=2    -  Imipenem: S <=1    -  Levofloxacin: R >4    -  Meropenem: S <=1    -  Piperacillin/Tazobactam: R <=8    -  Tetra/Doxy: R >8    -  Tobramycin: S <=2    -  Trimethoprim/Sulfamethoxazole: R >2/38    -  Amikacin: S <=16    -  Amoxicillin/Clavulanic Acid: S <=8/4    -  Ampicillin: R >16 These ampicillin results predict results for amoxicillin    -  Ampicillin: S <=2 Predicts results to ampicillin/sulbactam, amoxacillin-clavulanate and  piperacillin-tazobactam.    -  Ampicillin/Sulbactam: R <=4/2 Enterobacter, Citrobacter, and Serratia may develop resistance during prolonged therapy (3-4 days)    -  Aztreonam: R 16    -  Cefazolin: R >16 Enterobacter, Citrobacter, and Serratia may develop resistance during prolonged therapy (3-4 days)    -  Cefepime: R 8    -  Cefoxitin: S <=8    -  Ceftriaxone: R >32 Enterobacter, Citrobacter, and Serratia may develop resistance during prolonged therapy    -  Ciprofloxacin: R >2    Specimen Source: .Surgical Swab    Culture Results:   Numerous Escherichia coli ESBL  Few Enterococcus faecalis  Rare Pseudomonas aeruginosa    Organism Identification: Escherichia coli ESBL  Enterococcus faecalis    Organism: Escherichia coli ESBL    Organism: Enterococcus faecalis    Method Type: JERSON    Method Type: JERSON        RADIOLOGY & ADDITIONAL STUDIES:

## 2020-09-23 NOTE — DISCHARGE NOTE PROVIDER - NSDCHHHOMEBOUNDOTHER_GEN_ALL_CORE_FT
Keep up the great the great work at diet, exercise!  Mammogram due again in February  Continue vitamin D 2000 IU daily  Follow up 1 year for complete exam with fasting lab prior, sooner if needed.   wound vac

## 2020-09-23 NOTE — DIETITIAN INITIAL EVALUATION ADULT. - PROBLEM SELECTOR PLAN 7
Patient returned to office today with fecal sample positive for blood. PCP notified of result. History of prediabetes  - F/u A1c  - DASH, consistent carb diet

## 2020-09-23 NOTE — PROGRESS NOTE ADULT - PROBLEM SELECTOR PLAN 3
-Normocytic anemia, with elevated ferritic, and low TIBC, transferrin and iron %. likely 2/2 to chronic disease / acute inflammation/infection  -Continue to monitor H/H, transfuse if Hgb <7  -Heme/onc, Dr. Blanc, following, recs appreciated Acute vs acute on chronic  -Normocytic anemia, with elevated ferritic, and low TIBC, transferrin and iron %. likely 2/2 to chronic disease / acute inflammation  -Continue to monitor H/H, transfuse if Hgb <7  -Heme/onc, Dr. Blanc, following, recs appreciated

## 2020-09-23 NOTE — PROGRESS NOTE ADULT - SUBJECTIVE AND OBJECTIVE BOX
Patient is a 44y old  Female who presents with a chief complaint of Wound dehiscence s/p panniculectomy on 9/1 (23 Sep 2020 11:31)      HOSPITALIZATION DAY: 09/20/2020    Antibiotic: Merpenem                     Started on: 09/22/20                                       Ending on: 10/01/2020    INTERVAL/OVERNIGHT EVENTS:    Patient examined at beside. No acute events over night. As per nurse, staff. Patient refers felling  better. Patient is tolerating regular diet w/o nausea/vomiting/diarrhea/abdominal pain. Patient is voiding actively and last BM yesterday.  Patient denies chest pain, calf pain, abdominal pain, headaches, fever, chills, dysuria, hematuria, diarrhea, constipation, SOB, palpitations.     ROS     CONSTITUTIONAL: denies fever, chills  CARDIOVASCULAR: denies chest pain, chest pressure, palpitations  RESPIRATORY: denies shortness of breath, sputum production  GASTROINTESTINAL: denies nausea, vomiting, diarrhea, abdominal pain  GENITOURINARY: denies dysuria, discharge  PSYCHIATRIC: denies recent changes in anxiety, depression    PHYSICAL EXAM    GENERAL: NAD  CHEST/LUNG: Clear to percussion bilaterally; No rales, rhonchi, wheezing, or rubs  HEART: Regular rate and rhythm; No murmurs, rubs, or gallops  ABDOMEN: Soft, Nontender, Nondistended; Bowel sounds present  EXTREMITIES:  2+ Peripheral Pulses, No clubbing, cyanosis, or edema  NERVOUS SYSTEM:  Alert & Oriented X3.  SKIN: Large lower transverse incision wound now with wound vac in place        LABS                          8.0    11.86 )-----------( 601      ( 23 Sep 2020 07:56 )             24.8         09-23    139  |  107  |  14  ----------------------------<  118<H>  4.0   |  23  |  0.98    Ca    8.7      23 Sep 2020 07:56  Phos  3.9     09-23  Mg     2.3     09-23    TPro  7.2  /  Alb  2.1<L>  /  TBili  0.5  /  DBili  x   /  AST  23  /  ALT  29  /  AlkPhos  122<H>  09-22      LIVER FUNCTIONS - ( 22 Sep 2020 09:16 )  Alb: 2.1 g/dL / Pro: 7.2 g/dL / ALK PHOS: 122 U/L / ALT: 29 U/L / AST: 23 U/L / GGT: x             Culture - Urine (collected 20 Sep 2020 21:56)  Source: .Urine Clean Catch (Midstream)  Final Report (21 Sep 2020 17:09):  <10,000 CFU/mL Normal Urogenital Kimi    Culture - Surgical Swab (collected 20 Sep 2020 18:52)  Source: .Surgical Swab  Preliminary Report (22 Sep 2020 18:13):  Numerous Escherichia coli ESBL  Few Enterococcus faecalis  Rare Pseudomonas aeruginosa  Organism: Escherichia coli ESBL  Enterococcus faecalis (22 Sep 2020 18:12)  Organism: Enterococcus faecalis (22 Sep 2020 18:12)  Organism: Escherichia coli ESBL (22 Sep 2020 17:07)    Culture - Abscess with Gram Stain (collected 20 Sep 2020 18:50)  Source: .Abscess abdomen surgical site  Final Report (22 Sep 2020 18:11):  Few Escherichia coli ESBL  Moderate Enterococcus faecalis  Rare Pseudomonas aeruginosa  Organism: Escherichia coli ESBL  Enterococcus faecalis (22 Sep 2020 18:11)  Organism: Enterococcus faecalis (22 Sep 2020 18:11)  Organism: Escherichia coli ESBL (22 Sep 2020 16:39)    Culture - Blood (collected 20 Sep 2020 18:26)  Source: .Blood Blood-Peripheral  Preliminary Report (21 Sep 2020 19:01):      Culture - Blood (collected 20 Sep 2020 18:26)  Source: .Blood Blood-Peripheral  Preliminary Report (21 Sep 2020 19:01):      MEDICATIONS  (STANDING):    0.9% Sodium Chloride/ Betadine 1000 milliLiter(s) 1000 milliLiter(s) Irrigation <User Schedule>  aspirin  chewable 81 milliGRAM(s) Oral daily  atorvastatin 40 milliGRAM(s) Oral at bedtime  enoxaparin Injectable 40 milliGRAM(s) SubCutaneous at bedtime  meropenem  IVPB      meropenem  IVPB 1000 milliGRAM(s) IV Intermittent every 8 hours  metoprolol tartrate 50 milliGRAM(s) Oral two times a day    MEDICATIONS  (PRN):  acetaminophen   Tablet .. 650 milliGRAM(s) Oral every 6 hours PRN Mild Pain (1 - 3)  benzonatate 100 milliGRAM(s) Oral three times a day PRN Cough       Patient is a 44y old  Female who presents with a chief complaint of Wound dehiscence s/p panniculectomy on 9/1 (23 Sep 2020 11:31)      HOSPITALIZATION DAY: 09/20/2020    Antibiotic: Merpenem                     Started on: 09/22/20                                       Ending on: 10/01/2020    INTERVAL/OVERNIGHT EVENTS:    Patient examined at beside. No acute events over night. As per nurse, staff. Patient refers felling  better. Patient is tolerating regular diet w/o nausea/vomiting/diarrhea/abdominal pain. Patient is voiding actively and last BM yesterday.  Patient denies chest pain, calf pain, abdominal pain, headaches, fever, chills, dysuria, hematuria, diarrhea, constipation, SOB, palpitations.     ROS     CONSTITUTIONAL: denies fever, chills  CARDIOVASCULAR: denies chest pain, chest pressure, palpitations  RESPIRATORY: denies shortness of breath, sputum production  GASTROINTESTINAL: denies nausea, vomiting, diarrhea, abdominal pain  GENITOURINARY: denies dysuria, discharge  PSYCHIATRIC: denies recent changes in anxiety, depression    PHYSICAL EXAM    Vital Signs Last 24 Hrs  T(C): 36.4 (23 Sep 2020 13:13), Max: 37.3 (22 Sep 2020 20:45)  T(F): 97.6 (23 Sep 2020 13:13), Max: 99.1 (22 Sep 2020 20:45)  HR: 99 (23 Sep 2020 13:13) (96 - 105)  BP: 112/78 (23 Sep 2020 13:13) (98/66 - 131/85)  RR: 18 (23 Sep 2020 13:13) (17 - 18)  SpO2: 98% (23 Sep 2020 13:13) (93% - 98%)    GENERAL: NAD  CHEST/LUNG: Clear to percussion bilaterally; No rales, rhonchi, wheezing, or rubs  HEART: Regular rate and rhythm; No murmurs, rubs, or gallops  ABDOMEN: Soft, Nontender, Nondistended; Bowel sounds present  EXTREMITIES:  2+ Peripheral Pulses, No clubbing, cyanosis, or edema  NERVOUS SYSTEM:  Alert & Oriented X3.  SKIN: Large lower transverse incision wound now with wound vac in place        LABS                          8.0    11.86 )-----------( 601      ( 23 Sep 2020 07:56 )             24.8         09-23    139  |  107  |  14  ----------------------------<  118<H>  4.0   |  23  |  0.98    Ca    8.7      23 Sep 2020 07:56  Phos  3.9     09-23  Mg     2.3     09-23    TPro  7.2  /  Alb  2.1<L>  /  TBili  0.5  /  DBili  x   /  AST  23  /  ALT  29  /  AlkPhos  122<H>  09-22      LIVER FUNCTIONS - ( 22 Sep 2020 09:16 )  Alb: 2.1 g/dL / Pro: 7.2 g/dL / ALK PHOS: 122 U/L / ALT: 29 U/L / AST: 23 U/L / GGT: x             Culture - Urine (collected 20 Sep 2020 21:56)  Source: .Urine Clean Catch (Midstream)  Final Report (21 Sep 2020 17:09):  <10,000 CFU/mL Normal Urogenital Kimi    Culture - Surgical Swab (collected 20 Sep 2020 18:52)  Source: .Surgical Swab  Preliminary Report (22 Sep 2020 18:13):  Numerous Escherichia coli ESBL  Few Enterococcus faecalis  Rare Pseudomonas aeruginosa  Organism: Escherichia coli ESBL  Enterococcus faecalis (22 Sep 2020 18:12)  Organism: Enterococcus faecalis (22 Sep 2020 18:12)  Organism: Escherichia coli ESBL (22 Sep 2020 17:07)    Culture - Abscess with Gram Stain (collected 20 Sep 2020 18:50)  Source: .Abscess abdomen surgical site  Final Report (22 Sep 2020 18:11):  Few Escherichia coli ESBL  Moderate Enterococcus faecalis  Rare Pseudomonas aeruginosa  Organism: Escherichia coli ESBL  Enterococcus faecalis (22 Sep 2020 18:11)  Organism: Enterococcus faecalis (22 Sep 2020 18:11)  Organism: Escherichia coli ESBL (22 Sep 2020 16:39)    Culture - Blood (collected 20 Sep 2020 18:26)  Source: .Blood Blood-Peripheral  Preliminary Report (21 Sep 2020 19:01):      Culture - Blood (collected 20 Sep 2020 18:26)  Source: .Blood Blood-Peripheral  Preliminary Report (21 Sep 2020 19:01):      MEDICATIONS  (STANDING):    0.9% Sodium Chloride/ Betadine 1000 milliLiter(s) 1000 milliLiter(s) Irrigation <User Schedule>  aspirin  chewable 81 milliGRAM(s) Oral daily  atorvastatin 40 milliGRAM(s) Oral at bedtime  enoxaparin Injectable 40 milliGRAM(s) SubCutaneous at bedtime  meropenem  IVPB      meropenem  IVPB 1000 milliGRAM(s) IV Intermittent every 8 hours  metoprolol tartrate 50 milliGRAM(s) Oral two times a day    MEDICATIONS  (PRN):  acetaminophen   Tablet .. 650 milliGRAM(s) Oral every 6 hours PRN Mild Pain (1 - 3)  benzonatate 100 milliGRAM(s) Oral three times a day PRN Cough       Patient is a 44y old  Female who presents with a chief complaint of Wound dehiscence s/p panniculectomy on 9/1    HOSPITALIZATION DAY: 09/20/2020    Antibiotic: Merpenem                     Started on: 09/22/20                                       Ending on: 10/01/2020    INTERVAL/OVERNIGHT EVENTS:    Patient examined at beside. No acute events over night reported. Patient reports feeling better overall. Patient is tolerating regular diet. Patient denies fever, chills, headaches, chest pain, palpitations, SOB, abdominal pain, diarrhea, constipation.    ROS     CONSTITUTIONAL: denies fever, chills  CARDIOVASCULAR: denies chest pain, chest pressure, palpitations  RESPIRATORY: denies shortness of breath, sputum production  GASTROINTESTINAL: denies nausea, vomiting, diarrhea, abdominal pain  GENITOURINARY: denies dysuria, discharge  PSYCHIATRIC: denies recent changes in anxiety, depression    PHYSICAL EXAM    Vital Signs Last 24 Hrs  T(C): 36.4 (23 Sep 2020 13:13), Max: 37.3 (22 Sep 2020 20:45)  T(F): 97.6 (23 Sep 2020 13:13), Max: 99.1 (22 Sep 2020 20:45)  HR: 99 (23 Sep 2020 13:13) (96 - 105)  BP: 112/78 (23 Sep 2020 13:13) (98/66 - 131/85)  RR: 18 (23 Sep 2020 13:13) (17 - 18)  SpO2: 98% (23 Sep 2020 13:13) (93% - 98%)    GENERAL: NAD, resting comfortably in bed   CHEST/LUNG: CTA b/L; No rales, rhonchi, wheezing  HEART: Regular rate and rhythm; +S1/S2. No murmurs, rubs, or gallops  ABDOMEN: Soft, Nontender, Nondistended; Bowel sounds present  EXTREMITIES:  2+ Peripheral Pulses, No clubbing, cyanosis, or edema  NERVOUS SYSTEM:  Alert & Oriented X3.  SKIN: Large lower transverse incision wound - wound vac in place      LABS                          8.0    11.86 )-----------( 601      ( 23 Sep 2020 07:56 )             24.8         09-23    139  |  107  |  14  ----------------------------<  118<H>  4.0   |  23  |  0.98    Ca    8.7      23 Sep 2020 07:56  Phos  3.9     09-23  Mg     2.3     09-23    TPro  7.2  /  Alb  2.1<L>  /  TBili  0.5  /  DBili  x   /  AST  23  /  ALT  29  /  AlkPhos  122<H>  09-22      LIVER FUNCTIONS - ( 22 Sep 2020 09:16 )  Alb: 2.1 g/dL / Pro: 7.2 g/dL / ALK PHOS: 122 U/L / ALT: 29 U/L / AST: 23 U/L / GGT: x             Culture - Urine (collected 20 Sep 2020 21:56)  Source: .Urine Clean Catch (Midstream)  Final Report (21 Sep 2020 17:09):  <10,000 CFU/mL Normal Urogenital Kimi    Culture - Surgical Swab (collected 20 Sep 2020 18:52)  Source: .Surgical Swab  Preliminary Report (22 Sep 2020 18:13):  Numerous Escherichia coli ESBL  Few Enterococcus faecalis  Rare Pseudomonas aeruginosa  Organism: Escherichia coli ESBL  Enterococcus faecalis (22 Sep 2020 18:12)  Organism: Enterococcus faecalis (22 Sep 2020 18:12)  Organism: Escherichia coli ESBL (22 Sep 2020 17:07)    Culture - Abscess with Gram Stain (collected 20 Sep 2020 18:50)  Source: .Abscess abdomen surgical site  Final Report (22 Sep 2020 18:11):  Few Escherichia coli ESBL  Moderate Enterococcus faecalis  Rare Pseudomonas aeruginosa  Organism: Escherichia coli ESBL  Enterococcus faecalis (22 Sep 2020 18:11)  Organism: Enterococcus faecalis (22 Sep 2020 18:11)  Organism: Escherichia coli ESBL (22 Sep 2020 16:39)    Culture - Blood (collected 20 Sep 2020 18:26)  Source: .Blood Blood-Peripheral  Preliminary Report (21 Sep 2020 19:01):      Culture - Blood (collected 20 Sep 2020 18:26)  Source: .Blood Blood-Peripheral  Preliminary Report (21 Sep 2020 19:01):      MEDICATIONS  (STANDING):    0.9% Sodium Chloride/ Betadine 1000 milliLiter(s) 1000 milliLiter(s) Irrigation <User Schedule>  aspirin  chewable 81 milliGRAM(s) Oral daily  atorvastatin 40 milliGRAM(s) Oral at bedtime  enoxaparin Injectable 40 milliGRAM(s) SubCutaneous at bedtime  meropenem  IVPB      meropenem  IVPB 1000 milliGRAM(s) IV Intermittent every 8 hours  metoprolol tartrate 50 milliGRAM(s) Oral two times a day    MEDICATIONS  (PRN):  acetaminophen   Tablet .. 650 milliGRAM(s) Oral every 6 hours PRN Mild Pain (1 - 3)  benzonatate 100 milliGRAM(s) Oral three times a day PRN Cough

## 2020-09-23 NOTE — DIETITIAN INITIAL EVALUATION ADULT. - OTHER INFO
45 y/o female adm with surgical wound dehiscence (abdominal wall). PMH obesity, hypothyroid, CAD s/p Stents x 3, HLD, HTN, pre DM. Pt s/p panniculectomy 9/1/2020. Pt currently with wound vac. Pt reports appetite is very good. Pt tolerating meals well. Pt is consuming Isaiah BID. No questions re: diet at this time.

## 2020-09-23 NOTE — DIETITIAN INITIAL EVALUATION ADULT. - PROBLEM SELECTOR PLAN 5
mLAD x1 MIGUEL ANGEL (2014), x3 stents 09/2019  - Continue home Atorvastatin 80 mg PO qhs, ASA 81 mg PO qd  - Patient states she was taken off Brilinta last week by Dr. Lam, since it has been a year since stent  - Echo 9/3/2020: trace MR, trace TR, LVEF 60-65%   - Last angiogram was done in sep 2019 when she got DESx3   - Last stress test was done in 2019 - wnl

## 2020-09-23 NOTE — DISCHARGE NOTE PROVIDER - PROVIDER TOKENS
PROVIDER:[TOKEN:[[id number]],FOLLOWUP:[1 week]] PROVIDER:[TOKEN:[[id number]],FOLLOWUP:[1 week]],PROVIDER:[TOKEN:[[id number]],FOLLOWUP:[Routine]]

## 2020-09-23 NOTE — PROGRESS NOTE ADULT - PROBLEM SELECTOR PLAN 1
Acute, s/p panniculectomy on 9/1  -CT abd/pelvis w/ IV cont significant for extensive defects w/in lower abd wall with foci of gas & inflammation, worrisome for gas forming infection, loculations of fluid are noted within ab wall  -Patient afebrile with leukocytosis secondary to infection/ inflammation process.   -Continue wound vac, Plastic surgery following, Dr. Ruiz, recs appreciated   -Continue Meropenem (09/22/2020) to cover ESBL, pseudomonas and potentially anaerobes, following ID recommendation, Dr. Gomez   -Continue Tylenol PRN for mild pain Acute, s/p panniculectomy on 9/1  -CT abd/pelvis w/ IV cont significant for extensive defects w/in lower abd wall with foci of gas & inflammation, worrisome for gas forming infection, loculations of fluid are noted within ab wall  -Patient afebrile with leukocytosis secondary to infection/ inflammation process.   -Continue wound vac, Plastic surgery following, Dr. Ruiz, recs appreciated   -Continue Meropenem (09/22/2020) to cover ESBL, pseudomonas and potentially anaerobes, following ID recommendation, Dr. Gomez   -Pain regimen: Tylenol 650 mg for mild pain, Toradol for moderate pain and Morphine for sever pain------------------ Acute, s/p panniculectomy on 9/1  -CT abd/pelvis w/ IV cont significant for extensive defects w/in lower abd wall with foci of gas & inflammation, worrisome for gas forming infection, loculations of fluid are noted within ab wall  -Patient afebrile with leukocytosis secondary to infection/ inflammation process.   -Continue wound vac, Plastic surgery following, Dr. Ruiz, recs appreciated   -Continue Meropenem (09/22/2020) to cover ESBL, pseudomonas and potentially anaerobes, following ID recommendation, Dr. Gomez   - Today PICC line place for long term antibiotics   -Pain regimen: Tylenol 650 mg for mild pain, Toradol for moderate pain and Morphine for sever pain------------------ Acute, s/p panniculectomy on 9/1  -CT abd/pelvis w/ IV cont significant for extensive defects w/in lower abd wall with foci of gas & inflammation, worrisome for gas forming infection, loculations of fluid are noted within ab wall  -Patient afebrile with mild leukocytosis likely 2/2 inflammatory process  -Continue wound vac, Plastic surgery following, Dr. Ruiz, recs appreciated   -Continue Meropenem (course completing 10/1) to cover ESBL, pseudomonas and potentially anaerobes, following ID recommendation, Dr. Gomez   -PICC line placed 9/23   -Pain well controlled with Tylenol, continue

## 2020-09-23 NOTE — PROGRESS NOTE ADULT - PROBLEM SELECTOR PLAN 6
-Continue atorvastatin 40 mg PO QHS -Chronic   -Continue atorvastatin 40 mg PO QHS Chronic   -Continue atorvastatin 40 mg PO QHS

## 2020-09-23 NOTE — PROGRESS NOTE ADULT - ASSESSMENT
45yo F w/ PMHx of CAD (mLAD x1 MIGUEL ANGEL in 2014, x3 stents 09/2019), HTN, prediabetes, hyperlipidemia presents to ED for pain of wound s/p panniculectomy on 9/1 admitted for cellulitis and partial wound dehiscence with infection. 45yo F w/ PMHx of CAD (mLAD x1 MIGUEL ANGEL in 2014, x3 stents 09/2019), HTN, prediabetes, hyperlipidemia presents to ED for pain of wound s/p panniculectomy on 9/1 admitted for cellulitis and partial wound dehiscence with infection. Patient w/o pain. Patient on wound vac and meropenem. 43yo F w/ PMHx of CAD (mLAD x1 MIGUEL ANGEL in 2014, x3 stents 09/2019), HTN, prediabetes, hyperlipidemia presents to ED for pain of wound s/p panniculectomy on 9/1 admitted for cellulitis and partial wound dehiscence with infection. Today placed PICC line without  complications 45yo F w/ PMHx of CAD (mLAD x1 MIGUEL ANGEL in 2014, x3 stents 09/2019), HTN, prediabetes, hyperlipidemia presents to ED for pain of wound s/p panniculectomy on 9/1 admitted for cellulitis and partial wound dehiscence with infection. PICC line placed 9/23, Pt tolerated without complications

## 2020-09-23 NOTE — DIETITIAN INITIAL EVALUATION ADULT. - PROBLEM SELECTOR PLAN 4
Chronic, stable  - Continue home Metoprolol Tartrate 50 mg PO BID  - Patient was taken off Losartan on last admission to [facility]  - Monitor routine hemodynamics  - Patient follows outpatient cardiologist Dr. Abdoul Lam from [facility]

## 2020-09-24 LAB
ANION GAP SERPL CALC-SCNC: 8 MMOL/L — SIGNIFICANT CHANGE UP (ref 5–17)
BASOPHILS # BLD AUTO: 0.02 K/UL — SIGNIFICANT CHANGE UP (ref 0–0.2)
BASOPHILS NFR BLD AUTO: 0.2 % — SIGNIFICANT CHANGE UP (ref 0–2)
BUN SERPL-MCNC: 16 MG/DL — SIGNIFICANT CHANGE UP (ref 7–23)
CALCIUM SERPL-MCNC: 9.2 MG/DL — SIGNIFICANT CHANGE UP (ref 8.5–10.1)
CHLORIDE SERPL-SCNC: 105 MMOL/L — SIGNIFICANT CHANGE UP (ref 96–108)
CO2 SERPL-SCNC: 26 MMOL/L — SIGNIFICANT CHANGE UP (ref 22–31)
CREAT SERPL-MCNC: 0.86 MG/DL — SIGNIFICANT CHANGE UP (ref 0.5–1.3)
EOSINOPHIL # BLD AUTO: 0.29 K/UL — SIGNIFICANT CHANGE UP (ref 0–0.5)
EOSINOPHIL NFR BLD AUTO: 2.6 % — SIGNIFICANT CHANGE UP (ref 0–6)
GLUCOSE SERPL-MCNC: 119 MG/DL — HIGH (ref 70–99)
HCT VFR BLD CALC: 23.4 % — LOW (ref 34.5–45)
HCT VFR BLD CALC: 24.8 % — LOW (ref 34.5–45)
HGB BLD-MCNC: 7.6 G/DL — LOW (ref 11.5–15.5)
HGB BLD-MCNC: 7.9 G/DL — LOW (ref 11.5–15.5)
IMM GRANULOCYTES NFR BLD AUTO: 1 % — SIGNIFICANT CHANGE UP (ref 0–1.5)
LYMPHOCYTES # BLD AUTO: 2.6 K/UL — SIGNIFICANT CHANGE UP (ref 1–3.3)
LYMPHOCYTES # BLD AUTO: 23.1 % — SIGNIFICANT CHANGE UP (ref 13–44)
MCHC RBC-ENTMCNC: 28.5 PG — SIGNIFICANT CHANGE UP (ref 27–34)
MCHC RBC-ENTMCNC: 32.5 GM/DL — SIGNIFICANT CHANGE UP (ref 32–36)
MCV RBC AUTO: 87.6 FL — SIGNIFICANT CHANGE UP (ref 80–100)
MONOCYTES # BLD AUTO: 1.16 K/UL — HIGH (ref 0–0.9)
MONOCYTES NFR BLD AUTO: 10.3 % — SIGNIFICANT CHANGE UP (ref 2–14)
NEUTROPHILS # BLD AUTO: 7.09 K/UL — SIGNIFICANT CHANGE UP (ref 1.8–7.4)
NEUTROPHILS NFR BLD AUTO: 62.8 % — SIGNIFICANT CHANGE UP (ref 43–77)
NRBC # BLD: 0 /100 WBCS — SIGNIFICANT CHANGE UP (ref 0–0)
PLATELET # BLD AUTO: 541 K/UL — HIGH (ref 150–400)
POTASSIUM SERPL-MCNC: 4 MMOL/L — SIGNIFICANT CHANGE UP (ref 3.5–5.3)
POTASSIUM SERPL-SCNC: 4 MMOL/L — SIGNIFICANT CHANGE UP (ref 3.5–5.3)
RBC # BLD: 2.67 M/UL — LOW (ref 3.8–5.2)
RBC # FLD: 14.6 % — HIGH (ref 10.3–14.5)
SODIUM SERPL-SCNC: 139 MMOL/L — SIGNIFICANT CHANGE UP (ref 135–145)
WBC # BLD: 11.27 K/UL — HIGH (ref 3.8–10.5)
WBC # FLD AUTO: 11.27 K/UL — HIGH (ref 3.8–10.5)

## 2020-09-24 PROCEDURE — 99233 SBSQ HOSP IP/OBS HIGH 50: CPT

## 2020-09-24 PROCEDURE — 99232 SBSQ HOSP IP/OBS MODERATE 35: CPT

## 2020-09-24 RX ORDER — CHLORHEXIDINE GLUCONATE 213 G/1000ML
1 SOLUTION TOPICAL EVERY 12 HOURS
Refills: 0 | Status: DISCONTINUED | OUTPATIENT
Start: 2020-09-24 | End: 2020-10-02

## 2020-09-24 RX ADMIN — CHLORHEXIDINE GLUCONATE 1 APPLICATION(S): 213 SOLUTION TOPICAL at 18:00

## 2020-09-24 RX ADMIN — Medication 100 MILLIGRAM(S): at 20:31

## 2020-09-24 RX ADMIN — Medication 50 MILLIGRAM(S): at 20:31

## 2020-09-24 RX ADMIN — Medication 81 MILLIGRAM(S): at 13:15

## 2020-09-24 RX ADMIN — MEROPENEM 100 MILLIGRAM(S): 1 INJECTION INTRAVENOUS at 14:00

## 2020-09-24 RX ADMIN — Medication 100 MILLIGRAM(S): at 02:05

## 2020-09-24 RX ADMIN — Medication 50 MILLIGRAM(S): at 05:37

## 2020-09-24 RX ADMIN — MEROPENEM 100 MILLIGRAM(S): 1 INJECTION INTRAVENOUS at 05:37

## 2020-09-24 RX ADMIN — ENOXAPARIN SODIUM 40 MILLIGRAM(S): 100 INJECTION SUBCUTANEOUS at 21:40

## 2020-09-24 RX ADMIN — MEROPENEM 100 MILLIGRAM(S): 1 INJECTION INTRAVENOUS at 21:40

## 2020-09-24 RX ADMIN — ATORVASTATIN CALCIUM 40 MILLIGRAM(S): 80 TABLET, FILM COATED ORAL at 21:40

## 2020-09-24 NOTE — PROGRESS NOTE ADULT - ASSESSMENT
44 year old female PMHx CAD (mLAD x1 MIGUEL ANGEL (2014), x3 stents 09/2019), HTN, prediabetes, hyperlipidemia, recent COVID-19 infection admitted for wound dehiscence and possible revision.    Obstructive CAD s/p PCI 9/ 2019   - Cardiologist Dr. Abdoul Lam from [facility]  - No acute changes on EKG compared to previous  - Echo 9/3/2020: trace MR, trace TR, LVEF 60-65%   - Continue ASA 81 and lopressor 50mg po bid and high intensity statin lipitor 40mg po qhs    HTN  - Continue metoprolol 50 mg BID with hold parameters     Anemia  -work up as per primary team   -remains anemic to 7.9  -transfuse to keep HGB greater than 8 given CAD    HLD  - Continue atorvastatin 40 mg qhs     Cellulitis   -wound cellulitis now sp wound vac  -follow up id, wound, plastic surgery recs

## 2020-09-24 NOTE — PROGRESS NOTE ADULT - SUBJECTIVE AND OBJECTIVE BOX
[facility] DIVISION of INFECTIOUS DISEASE  Capo Gomez MD PhD, Cat Rodriguez MD, Andree Randall MD, Alicja Rizzo MD  and providing coverage with Liliana Workman MD and Isaac Loyola MD  Providing Infectious Disease Consultations at [facility], [facility], [facility]'s    Office# [phone] to schedule follow up appointments  Answering Service for urgent calls or New Consults [phone]  Cell# to text for urgent issues Capo Gomez [phone]     infectious diseases progress note:    LOUIE QUINTANILLA is a 44y y. o. Female patient    No concerning overnight events    Allergies    codeine (Unknown)    Intolerances        ANTIBIOTICS/RELEVANT:  antimicrobials  meropenem  IVPB      meropenem  IVPB 1000 milliGRAM(s) IV Intermittent every 8 hours    immunologic:    OTHER:  0.9% Sodium Chloride/ Betadine 1000 milliLiter(s) 1000 milliLiter(s) Irrigation <User Schedule>  acetaminophen   Tablet .. 650 milliGRAM(s) Oral every 6 hours PRN  aspirin  chewable 81 milliGRAM(s) Oral daily  atorvastatin 40 milliGRAM(s) Oral at bedtime  benzonatate 100 milliGRAM(s) Oral three times a day PRN  chlorhexidine 2% Cloths 1 Application(s) Topical every 12 hours  enoxaparin Injectable 40 milliGRAM(s) SubCutaneous at bedtime  metoprolol tartrate 50 milliGRAM(s) Oral two times a day      Objective:  Vital Signs Last 24 Hrs  T(C): 36.4 (24 Sep 2020 05:03), Max: 37 (23 Sep 2020 20:27)  T(F): 97.5 (24 Sep 2020 05:03), Max: 98.6 (23 Sep 2020 20:27)  HR: 99 (24 Sep 2020 05:03) (99 - 104)  BP: 119/85 (24 Sep 2020 05:03) (112/78 - 119/85)  BP(mean): --  RR: 18 (24 Sep 2020 05:03) (18 - 18)  SpO2: 98% (24 Sep 2020 05:03) (95% - 98%)    T(C): 36.4 (09-24-20 @ 05:03), Max: 37.4 (09-22-20 @ 13:11)  T(C): 36.4 (09-24-20 @ 05:03), Max: 37.6 (09-21-20 @ 20:48)  T(C): 36.4 (09-24-20 @ 05:03), Max: 37.6 (09-21-20 @ 20:48)    PHYSICAL EXAM:  HEENT: NC atraumatic  Neck: supple  Respiratory: no accessory muscle use, breathing comfortably  Cardiovascular: distant  Gastrointestinal: normal appearing, nondistended, wound vac in place  Extremities: no clubbing, no cyanosis,      LABS:                          7.9    x     )-----------( x        ( 24 Sep 2020 09:56 )             24.8       11.27 09-24 @ 07:34  11.86 09-23 @ 07:56  11.44 09-22 @ 09:16  11.31 09-21 @ 08:51  11.83 09-20 @ 14:47      09-24    139  |  105  |  16  ----------------------------<  119<H>  4.0   |  26  |  0.86    Ca    9.2      24 Sep 2020 07:34  Phos  3.9     09-23  Mg     2.3     09-23        Creatinine, Serum: 0.86 mg/dL (09-24-20 @ 07:34)  Creatinine, Serum: 0.98 mg/dL (09-23-20 @ 07:56)  Creatinine, Serum: 0.96 mg/dL (09-22-20 @ 09:16)  Creatinine, Serum: 0.87 mg/dL (09-21-20 @ 08:51)  Creatinine, Serum: 0.83 mg/dL (09-20-20 @ 14:47)                INFLAMMATORY MARKERS  Auto Neutrophil #: 7.09 K/uL (09-24-20 @ 07:34)  Auto Lymphocyte #: 2.60 K/uL (09-24-20 @ 07:34)  Auto Neutrophil #: 7.54 K/uL (09-23-20 @ 07:56)  Auto Lymphocyte #: 2.67 K/uL (09-23-20 @ 07:56)  Auto Neutrophil #: 6.93 K/uL (09-22-20 @ 09:16)  Auto Lymphocyte #: 2.66 K/uL (09-22-20 @ 09:16)  Auto Neutrophil #: 7.70 K/uL (09-21-20 @ 08:51)  Auto Lymphocyte #: 2.02 K/uL (09-21-20 @ 08:51)  Auto Neutrophil #: 8.18 K/uL (09-20-20 @ 14:47)  Auto Lymphocyte #: 2.22 K/uL (09-20-20 @ 14:47)    Lactate, Blood: 2.0 mmol/L (09-20-20 @ 14:47)    Auto Eosinophil #: 0.29 K/uL (09-24-20 @ 07:34)  Auto Eosinophil #: 0.28 K/uL (09-23-20 @ 07:56)  Auto Eosinophil #: 0.34 K/uL (09-22-20 @ 09:16)  Auto Eosinophil #: 0.24 K/uL (09-21-20 @ 08:51)  Auto Eosinophil #: 0.19 K/uL (09-20-20 @ 14:47)                  Ferritin, Serum: 443 ng/mL (09-22-20 @ 11:38)        Activated Partial Thromboplastin Time: 27.1 sec (09-20-20 @ 14:47)  INR: 1.48 ratio (09-20-20 @ 14:47)          MICROBIOLOGY:              RADIOLOGY & ADDITIONAL STUDIES:

## 2020-09-24 NOTE — PROGRESS NOTE ADULT - PROBLEM SELECTOR PLAN 1
with wound vac and other issues looking at staying in hospital for full course of Rx  Meropenem 1 gram IV q8 with last day 10/2 (10 days total of effective Rx)

## 2020-09-24 NOTE — PROGRESS NOTE ADULT - PROBLEM SELECTOR PLAN 3
Chronic, stable   -Continue home Metoprolol Tartrate 50mg PO BID  -Monitor routine hemodynamics  -DASH, consistent carb diet.  -Patient follows outpatient cardiologist Dr. Abdoul Lam.

## 2020-09-24 NOTE — ADVANCED PRACTICE NURSE CONSULT - ASSESSMENT
Negative Pressure wound Therapy (NPWT) veraflo intermittent leak per RN last half hour. Leakage noted on left side of wound reinforced. patient comfortable denies pain/discomfort. Dressing due to be changed tomorrow.

## 2020-09-24 NOTE — PROGRESS NOTE ADULT - ASSESSMENT
45yo F w/ PMHx of CAD (mLAD x1 MIGUEL ANGEL in 2014, x3 stents 09/2019), HTN, prediabetes, hyperlipidemia presents to ED for pain of wound s/p panniculectomy on 9/1 admitted for cellulitis and partial wound dehiscence with infection. PICC line placed 9/23, Pt tolerated without complications. Patient c/o dry cough at night 45yo F w/ PMHx of CAD (mLAD x1 MIGUEL ANGEL in 2014, x3 stents 09/2019), HTN, prediabetes, hyperlipidemia presents to ED for pain of wound s/p panniculectomy on 9/1 admitted for cellulitis and partial wound dehiscence with infection. PICC line placed 9/23, Pt tolerated without complications. Patient c/o dry cough at night. 45yo F w/ PMHx of CAD (mLAD x1 MIGUEL ANGEL in 2014, x3 stents 09/2019), HTN, prediabetes, hyperlipidemia presents to ED for pain of wound s/p panniculectomy on 9/1 admitted for cellulitis and partial wound dehiscence with infection. PICC line placed 9/23, Pt tolerated without complications. Patient c/o dry cough at night, ordered CXR. 45yo F w/ PMHx of CAD (mLAD x1 MIGUEL ANGEL in 2014, x3 stents 09/2019), HTN, prediabetes, hyperlipidemia presents to ED for pain of wound s/p panniculectomy on 9/1 admitted for cellulitis and partial wound dehiscence with infection. PICC line placed 9/23, Pt tolerated without complications. Wound vac in place.

## 2020-09-24 NOTE — PROGRESS NOTE ADULT - SUBJECTIVE AND OBJECTIVE BOX
FOLLOW UP:  s/p abdominoplasty wound dehiscense  SUBJECTIVE/OBSERVATIONS:  Patient seen and examined with no complaints, sitting at the edge of bed. Abdominal wound vac in place with large amount of dark brown drainage  OVERNIGHT EVENTS:   No overnight events  TELE EVENTS:   sinus to sinus tachycardia on flow sheet  Vital Signs Last 24 Hrs  T(C): 36.4 (24 Sep 2020 05:03), Max: 37 (23 Sep 2020 20:27)  T(F): 97.5 (24 Sep 2020 05:03), Max: 98.6 (23 Sep 2020 20:27)  HR: 99 (24 Sep 2020 05:03) (99 - 104)  BP: 119/85 (24 Sep 2020 05:03) (112/78 - 119/85)  BP(mean): --  RR: 18 (24 Sep 2020 05:03) (18 - 18)  SpO2: 98% (24 Sep 2020 05:03) (95% - 98%)  I&O's Summary    23 Sep 2020 07:01  -  24 Sep 2020 07:00  --------------------------------------------------------  IN: 50 mL / OUT: 0 mL / NET: 50 mL      MEDICATIONS:  aspirin  chewable 81 milliGRAM(s) Oral daily  enoxaparin Injectable 40 milliGRAM(s) SubCutaneous at bedtime  metoprolol tartrate 50 milliGRAM(s) Oral two times a day  meropenem  IVPB      meropenem  IVPB 1000 milliGRAM(s) IV Intermittent every 8 hours  benzonatate 100 milliGRAM(s) Oral three times a day PRN  acetaminophen   Tablet .. 650 milliGRAM(s) Oral every 6 hours PRN  atorvastatin 40 milliGRAM(s) Oral at bedtime  chlorhexidine 2% Cloths 1 Application(s) Topical every 12 hours      REVIEW OF SYSTEMS:  Complete 10point ROS negative.    PHYSICAL EXAM:  General: NAD  Cardiovascular: Normal S1 S2, No JVD, No murmurs, No edema  Respiratory: Lungs clear to auscultation	  Gastrointestinal: wound vac on abdomen Soft, Non-tender, + BS	  Skin: warm and dry, No rashes, No ecchymoses, No cyanosis	  Extremities: Normal range of motion, No clubbing, cyanosis or edema  Vascular: Peripheral pulses palpable 2+ bilaterally    LABS:	 	    CBC Full  -  ( 24 Sep 2020 09:56 )  WBC Count : x  Hemoglobin : 7.9 g/dL  Hematocrit : 24.8 %  Platelet Count - Automated : x  Mean Cell Volume : x  Mean Cell Hemoglobin : x  Mean Cell Hemoglobin Concentration : x  Auto Neutrophil # : x  Auto Lymphocyte # : x  Auto Monocyte # : x  Auto Eosinophil # : x  Auto Basophil # : x  Auto Neutrophil % : x  Auto Lymphocyte % : x  Auto Monocyte % : x  Auto Eosinophil % : x  Auto Basophil % : x    09-24    139  |  105  |  16  ----------------------------<  119<H>  4.0   |  26  |  0.86  09-23    139  |  107  |  14  ----------------------------<  118<H>  4.0   |  23  |  0.98    Ca    9.2      24 Sep 2020 07:34  Ca    8.7      23 Sep 2020 07:56  Phos  3.9     09-23  Mg     2.3     09-23            	  < from: 12 Lead ECG (09.20.20 @ 15:49) >    Ventricular Rate 97 BPM    Atrial Rate 97 BPM    P-R Interval 146 ms    QRS Duration 80 ms    Q-T Interval 392 ms    QTC Calculation(Bazett) 497 ms    P Axis 38 degrees    R Axis 0 degrees    T Axis -7 degrees    Diagnosis Line Normal sinus rhythm  Minimal voltage criteria for LVH, may be normal variant  Nonspecific T wave abnormality  Abnormal ECG  Confirmed     < end of copied text >  < from: TTE Echo Complete w/o Contrast w/ Doppler (09.03.20 @ 12:56) >  BSERVATIONS:  Technically difficult and limited study  Mitral Valve: Thickened leaflets, trace physiologic MR.  Aortic Valve/Aorta: normal trileaflet aortic valve.  Tricuspid Valve: normal with trace TR.  Pulmonic Valve: normal  Left Atrium: normal  Right Atrium: normal  Left Ventricle: normal LV size and systolic function, estimated LVEF of 60-65%.  Right Ventricle: Grossly normal size and systolic function.  Pericardium/Pleura: normal, no significant pericardial effusion.      Conclusion:  Technically difficult and limited study  Normal left ventricular internal dimensions and systolic function, estimated LVEF of 60-65%.  Grossly normal RV size and systolic function.  Normal trileaflet aortic valve, without AI.  Trace physiologic MR and TR.  No significant pericardial effusion.        < end of copied text >   FOLLOW UP:  s/p abdominoplasty wound dehiscense    SUBJECTIVE/OBSERVATIONS:  Patient seen and examined with no complaints, sitting at the edge of bed. Abdominal wound vac in place with large amount of dark brown drainage    OVERNIGHT EVENTS:   No overnight events  TELE EVENTS:   sinus to sinus tachycardia on flow sheet  Vital Signs Last 24 Hrs  T(C): 36.4 (24 Sep 2020 05:03), Max: 37 (23 Sep 2020 20:27)  T(F): 97.5 (24 Sep 2020 05:03), Max: 98.6 (23 Sep 2020 20:27)  HR: 99 (24 Sep 2020 05:03) (99 - 104)  BP: 119/85 (24 Sep 2020 05:03) (112/78 - 119/85)  BP(mean): --  RR: 18 (24 Sep 2020 05:03) (18 - 18)  SpO2: 98% (24 Sep 2020 05:03) (95% - 98%)  I&O's Summary    23 Sep 2020 07:01  -  24 Sep 2020 07:00  --------------------------------------------------------  IN: 50 mL / OUT: 0 mL / NET: 50 mL      MEDICATIONS:  aspirin  chewable 81 milliGRAM(s) Oral daily  enoxaparin Injectable 40 milliGRAM(s) SubCutaneous at bedtime  metoprolol tartrate 50 milliGRAM(s) Oral two times a day  meropenem  IVPB      meropenem  IVPB 1000 milliGRAM(s) IV Intermittent every 8 hours  benzonatate 100 milliGRAM(s) Oral three times a day PRN  acetaminophen   Tablet .. 650 milliGRAM(s) Oral every 6 hours PRN  atorvastatin 40 milliGRAM(s) Oral at bedtime  chlorhexidine 2% Cloths 1 Application(s) Topical every 12 hours      REVIEW OF SYSTEMS:  Complete 10point ROS negative.    PHYSICAL EXAM:  General: NAD  HEENT MMM anicteric  Cardiovascular: Normal S1 S2, No JVD, No murmurs, No edema  Respiratory: Lungs clear to auscultation	  Gastrointestinal: wound vac on abdomen Soft, Non-tender, + BS	  Skin: warm and dry, No rashes, No ecchymoses, No cyanosis	  Extremities: Normal range of motion, No clubbing, cyanosis or edema  Vascular: Peripheral pulses palpable 2+ bilaterally    LABS:	 	    CBC Full  -  ( 24 Sep 2020 09:56 )  WBC Count : x  Hemoglobin : 7.9 g/dL  Hematocrit : 24.8 %  Platelet Count - Automated : x  Mean Cell Volume : x  Mean Cell Hemoglobin : x  Mean Cell Hemoglobin Concentration : x  Auto Neutrophil # : x  Auto Lymphocyte # : x  Auto Monocyte # : x  Auto Eosinophil # : x  Auto Basophil # : x  Auto Neutrophil % : x  Auto Lymphocyte % : x  Auto Monocyte % : x  Auto Eosinophil % : x  Auto Basophil % : x    09-24    139  |  105  |  16  ----------------------------<  119<H>  4.0   |  26  |  0.86  09-23    139  |  107  |  14  ----------------------------<  118<H>  4.0   |  23  |  0.98    Ca    9.2      24 Sep 2020 07:34  Ca    8.7      23 Sep 2020 07:56  Phos  3.9     09-23  Mg     2.3     09-23            	  < from: 12 Lead ECG (09.20.20 @ 15:49) >    Ventricular Rate 97 BPM    Atrial Rate 97 BPM    P-R Interval 146 ms    QRS Duration 80 ms    Q-T Interval 392 ms    QTC Calculation(Bazett) 497 ms    P Axis 38 degrees    R Axis 0 degrees    T Axis -7 degrees    Diagnosis Line Normal sinus rhythm  Minimal voltage criteria for LVH, may be normal variant  Nonspecific T wave abnormality  Abnormal ECG  Confirmed     < end of copied text >  < from: TTE Echo Complete w/o Contrast w/ Doppler (09.03.20 @ 12:56) >  BSERVATIONS:  Technically difficult and limited study  Mitral Valve: Thickened leaflets, trace physiologic MR.  Aortic Valve/Aorta: normal trileaflet aortic valve.  Tricuspid Valve: normal with trace TR.  Pulmonic Valve: normal  Left Atrium: normal  Right Atrium: normal  Left Ventricle: normal LV size and systolic function, estimated LVEF of 60-65%.  Right Ventricle: Grossly normal size and systolic function.  Pericardium/Pleura: normal, no significant pericardial effusion.      Conclusion:  Technically difficult and limited study  Normal left ventricular internal dimensions and systolic function, estimated LVEF of 60-65%.  Grossly normal RV size and systolic function.  Normal trileaflet aortic valve, without AI.  Trace physiologic MR and TR.  No significant pericardial effusion.        < end of copied text >

## 2020-09-24 NOTE — PROGRESS NOTE ADULT - PROBLEM SELECTOR PLAN 1
-CT abd/pelvis w/ IV cont significant for extensive defects w/in lower abd wall with foci of gas & inflammation, worrisome for gas forming infection, loculations of fluid are noted within ab wall  -Patient HD stable, afebrile with mild leukocytosis secondary to inflammatory/infectious process.   -Continue wound vac, Plastic surgery following, Dr. Ruiz, recs appreciated   -Continue Meropenem (course completing 10/1) to cover ESBL, pseudomonas and potentially anaerobes, following ID recommendation, Dr. Gomez   -Pain well controlled with Tylenol, continue. -CT abd/pelvis w/ IV cont significant for extensive defects w/in lower abd wall with foci of gas & inflammation, worrisome for gas forming infection, loculations of fluid are noted within ab wall  -Patient HD stable, afebrile with mild leukocytosis secondary to inflammatory/infectious process.   -Continue wound vac, Plastic surgery following, Dr. Ruiz, recs appreciated   -Continue Meropenem (course completing 10/1) to cover ESBL, pseudomonas and potentially anaerobes, following ID recommendation, Dr. Gomez   - Pt will stay at hospital to continue wound vac.......................  -Pain well controlled with Tylenol, continue. -CT abd/pelvis w/ IV cont significant for extensive defects w/in lower abd wall with foci of gas & inflammation, worrisome for gas forming infection, loculations of fluid are noted within ab wall  -Patient HD stable, afebrile with mild leukocytosis secondary to inflammatory/infectious process.   -Continue wound vac, Plastic surgery following, Dr. Ruiz, recs appreciated   -Continue Meropenem (course completing 10/1) to cover ESBL, pseudomonas and potentially anaerobes, following ID recommendation, Dr. Gomez   -Pt will stay at hospital given her comorbidities and due the needs of wound vac.   -Pain well controlled with Tylenol, continue. -CT abd/pelvis w/ IV cont significant for extensive defects w/in lower abd wall with foci of gas & inflammation, worrisome for gas forming infection, loculations of fluid are noted within ab wall  -Patient remains afebrile with mild leukocytosis stable  -MRSA and MSSA PCR negative   -Wound cultures +ESBL, few enterococcus faecalis, rare pseudomonas  -Continue Meropenem IV (through 10/1) following ID recommendation, Dr. Gomez   -Continue wound vac, Plastic surgery following, Dr. Ruiz, recs appreciated   -Pt will stay at hospital given her comorbidities and due the needs of wound vac.   -Pain well controlled with Tylenol, continue PRN

## 2020-09-24 NOTE — PROGRESS NOTE ADULT - PROBLEM SELECTOR PLAN 6
History of prediabetes  -A1C: 6.1%  -DASH, consistent carb diet. History of prediabetes  -A1C: 6.1%  -Blood glucose well controlled  -DASH, consistent carb diet

## 2020-09-24 NOTE — PROGRESS NOTE ADULT - SUBJECTIVE AND OBJECTIVE BOX
Patient is a 44y old  Female who presents with a chief complaint of Wound dehiscence s/p panniculectomy on 9/1    HOSPITALIZATION DAY: 09/20/2020    Antibiotic: Merpenem                     Started on: 09/22/20                                       Ending on: 10/01/2020    INTERVAL/OVERNIGHT EVENTS:    Patient examined at beside. No acute events over night reported, as per nursing staff. Patient reports feeling better overall. Patient reports intermittent dry cough, that is worse at night. Patients denies phlegm production, SOB, night sweats, hemoptysis.   Patient is tolerating regular diet w/o nausea/vomiting/diarrhea/abdominal pain. Patient is voiding actively and last BM yesterday.  Patient denies chest pain, calf pain, abdominal pain, headaches, fever, chills, dysuria, hematuria, diarrhea, constipation.     ROS   CONSTITUTIONAL: denies fever, chills, fatigue.   CARDIOVASCULAR: denies chest pain, chest pressure, palpitations  RESPIRATORY: denies shortness of breath. Patient admits dry cough   GASTROINTESTINAL: denies nausea, vomiting, diarrhea, abdominal pain  GENITOURINARY: denies dysuria, discharge  PSYCHIATRIC: denies recent changes in anxiety, depression    PHYSICAL EXAM  Vital Signs Last 24 Hrs  T(C): 36.4 (23 Sep 2020 13:13), Max: 37.3 (22 Sep 2020 20:45)  T(F): 97.6 (23 Sep 2020 13:13), Max: 99.1 (22 Sep 2020 20:45)  HR: 99 (23 Sep 2020 13:13) (96 - 105)  BP: 112/78 (23 Sep 2020 13:13) (98/66 - 131/85)  RR: 18 (23 Sep 2020 13:13) (17 - 18)  SpO2: 98% (23 Sep 2020 13:13) (93% - 98%)    GENERAL: NAD, resting comfortably in bed   CHEST/LUNG: CTA b/L; No rales, rhonchi, wheezing  HEART: Regular rate and rhythm; +S1/S2. No murmurs, rubs, or gallops  ABDOMEN: Soft, Nontender, Nondistended; Bowel sounds present  EXTREMITIES:  2+ Peripheral Pulses, No clubbing, cyanosis, or edema  NERVOUS SYSTEM:  Alert & Oriented X3.  SKIN: Large lower transverse incision wound - wound vac in place      LABS                               7.6    11.27 )-----------( 541      ( 24 Sep 2020 07:34 )             23.4     CBC Full  -  ( 24 Sep 2020 07:34 )  WBC Count : 11.27 K/uL  RBC Count : 2.67 M/uL  Hemoglobin : 7.6 g/dL  Hematocrit : 23.4 %  Platelet Count - Automated : 541 K/uL  Mean Cell Volume : 87.6 fl  Mean Cell Hemoglobin : 28.5 pg  Mean Cell Hemoglobin Concentration : 32.5 gm/dL  Auto Neutrophil # : 7.09 K/uL  Auto Lymphocyte # : 2.60 K/uL  Auto Monocyte # : 1.16 K/uL  Auto Eosinophil # : 0.29 K/uL  Auto Basophil # : 0.02 K/uL  Auto Neutrophil % : 62.8 %  Auto Lymphocyte % : 23.1 %  Auto Monocyte % : 10.3 %  Auto Eosinophil % : 2.6 %  Auto Basophil % : 0.2 %    09-24    139  |  105  |  16  ----------------------------<  119<H>  4.0   |  26  |  0.86    Ca    9.2      24 Sep 2020 07:34  Phos  3.9     09-23  Mg     2.3     09-23    TPro  7.2  /  Alb  2.1<L>  /  TBili  0.5  /  DBili  x   /  AST  23  /  ALT  29  /  AlkPhos  122<H>  09-22    Culture - Surgical Swab (collected 20 Sep 2020 18:52)  Source: .Surgical Swab  Preliminary Report (22 Sep 2020 18:13):  Numerous Escherichia coli ESBL  Few Enterococcus faecalis  Rare Pseudomonas aeruginosa  Organism: Escherichia coli ESBL  Enterococcus faecalis (22 Sep 2020 18:12)  Organism: Enterococcus faecalis (22 Sep 2020 18:12)  Organism: Escherichia coli ESBL (22 Sep 2020 17:07)    Culture - Abscess with Gram Stain (collected 20 Sep 2020 18:50)  Source: .Abscess abdomen surgical site  Final Report (22 Sep 2020 18:11):  Few Escherichia coli ESBL  Moderate Enterococcus faecalis  Rare Pseudomonas aeruginosa  Organism: Escherichia coli ESBL  Enterococcus faecalis (22 Sep 2020 18:11)  Organism: Enterococcus faecalis (22 Sep 2020 18:11)  Organism: Escherichia coli ESBL (22 Sep 2020 16:39)      MEDICATIONS  (STANDING):    0.9% Sodium Chloride/ Betadine 1000 milliLiter(s) 1000 milliLiter(s) Irrigation <User Schedule>  aspirin  chewable 81 milliGRAM(s) Oral daily  atorvastatin 40 milliGRAM(s) Oral at bedtime  chlorhexidine 2% Cloths 1 Application(s) Topical every 12 hours  enoxaparin Injectable 40 milliGRAM(s) SubCutaneous at bedtime  meropenem  IVPB 1000 milliGRAM(s) IV Intermittent every 8 hours  metoprolol tartrate 50 milliGRAM(s) Oral two times a day    MEDICATIONS  (PRN):  acetaminophen   Tablet .. 650 milliGRAM(s) Oral every 6 hours PRN Mild Pain (1 - 3)  benzonatate 100 milliGRAM(s) Oral three times a day PRN Cough         Patient is a 44y old  Female who presents with a chief complaint of Wound dehiscence s/p panniculectomy on 9/1    HOSPITALIZATION DAY: 09/20/2020    Antibiotic: Merpenem                     Started on: 09/22/20                                       Ending on: 10/01/2020    INTERVAL/OVERNIGHT EVENTS:    Patient examined at beside. No acute events over night reported, as per nursing staff. Patient reports feeling better overall. Patient reports intermittent dry cough, that is worse at night. Patients denies phlegm production, SOB, night sweats, hemoptysis.   Patient is tolerating regular diet w/o nausea/vomiting/diarrhea/abdominal pain. Patient is voiding actively and last BM yesterday.  Patient denies chest pain, calf pain, abdominal pain, headaches, fever, chills, dysuria, hematuria, diarrhea, constipation.     ROS   CONSTITUTIONAL: denies fever, chills, fatigue.   CARDIOVASCULAR: denies chest pain, chest pressure, palpitations  RESPIRATORY: denies shortness of breath. Patient admits dry cough   GASTROINTESTINAL: denies nausea, vomiting, diarrhea, abdominal pain  GENITOURINARY: denies dysuria, discharge  PSYCHIATRIC: denies recent changes in anxiety, depression    PHYSICAL EXAM  Vital Signs Last 24 Hrs  T(C): 36.4 (23 Sep 2020 13:13), Max: 37.3 (22 Sep 2020 20:45)  T(F): 97.6 (23 Sep 2020 13:13), Max: 99.1 (22 Sep 2020 20:45)  HR: 99 (23 Sep 2020 13:13) (96 - 105)  BP: 112/78 (23 Sep 2020 13:13) (98/66 - 131/85)  RR: 18 (23 Sep 2020 13:13) (17 - 18)  SpO2: 98% (23 Sep 2020 13:13) (93% - 98%)    GENERAL: NAD, resting comfortably in bed   CHEST/LUNG: CTA b/L; No rales, rhonchi, wheezing  HEART: Regular rate and rhythm; +S1/S2. No murmurs, rubs, or gallops  ABDOMEN: Soft, Nontender, Nondistended; Bowel sounds present  EXTREMITIES:  2+ Peripheral Pulses, No clubbing, cyanosis, or edema  NERVOUS SYSTEM:  Alert & Oriented X3.  SKIN: Large lower transverse incision wound - wound vac in place      LABS                               7.6    11.27 )-----------( 541      ( 24 Sep 2020 07:34 )             23.4     CBC Full  -  ( 24 Sep 2020 07:34 )  WBC Count : 11.27 K/uL  RBC Count : 2.67 M/uL  Hemoglobin : 7.6 g/dL  Hematocrit : 23.4 %  Platelet Count - Automated : 541 K/uL  Mean Cell Volume : 87.6 fl  Mean Cell Hemoglobin : 28.5 pg  Mean Cell Hemoglobin Concentration : 32.5 gm/dL  Auto Neutrophil # : 7.09 K/uL  Auto Lymphocyte # : 2.60 K/uL  Auto Monocyte # : 1.16 K/uL  Auto Eosinophil # : 0.29 K/uL  Auto Basophil # : 0.02 K/uL  Auto Neutrophil % : 62.8 %  Auto Lymphocyte % : 23.1 %  Auto Monocyte % : 10.3 %  Auto Eosinophil % : 2.6 %  Auto Basophil % : 0.2 %    H&H: 7.9 - 24.8    09-24    139  |  105  |  16  ----------------------------<  119<H>  4.0   |  26  |  0.86    Ca    9.2      24 Sep 2020 07:34  Phos  3.9     09-23  Mg     2.3     09-23    TPro  7.2  /  Alb  2.1<L>  /  TBili  0.5  /  DBili  x   /  AST  23  /  ALT  29  /  AlkPhos  122<H>  09-22    Culture - Surgical Swab (collected 20 Sep 2020 18:52)  Source: .Surgical Swab  Preliminary Report (22 Sep 2020 18:13):  Numerous Escherichia coli ESBL  Few Enterococcus faecalis  Rare Pseudomonas aeruginosa  Organism: Escherichia coli ESBL  Enterococcus faecalis (22 Sep 2020 18:12)  Organism: Enterococcus faecalis (22 Sep 2020 18:12)  Organism: Escherichia coli ESBL (22 Sep 2020 17:07)    Culture - Abscess with Gram Stain (collected 20 Sep 2020 18:50)  Source: .Abscess abdomen surgical site  Final Report (22 Sep 2020 18:11):  Few Escherichia coli ESBL  Moderate Enterococcus faecalis  Rare Pseudomonas aeruginosa  Organism: Escherichia coli ESBL  Enterococcus faecalis (22 Sep 2020 18:11)  Organism: Enterococcus faecalis (22 Sep 2020 18:11)  Organism: Escherichia coli ESBL (22 Sep 2020 16:39)      MEDICATIONS  (STANDING):    0.9% Sodium Chloride/ Betadine 1000 milliLiter(s) 1000 milliLiter(s) Irrigation <User Schedule>  aspirin  chewable 81 milliGRAM(s) Oral daily  atorvastatin 40 milliGRAM(s) Oral at bedtime  chlorhexidine 2% Cloths 1 Application(s) Topical every 12 hours  enoxaparin Injectable 40 milliGRAM(s) SubCutaneous at bedtime  meropenem  IVPB 1000 milliGRAM(s) IV Intermittent every 8 hours  metoprolol tartrate 50 milliGRAM(s) Oral two times a day    MEDICATIONS  (PRN):  acetaminophen   Tablet .. 650 milliGRAM(s) Oral every 6 hours PRN Mild Pain (1 - 3)  benzonatate 100 milliGRAM(s) Oral three times a day PRN Cough         Patient is a 44y old  Female who presents with a chief complaint of Wound dehiscence s/p panniculectomy on 9/1    INTERVAL/OVERNIGHT EVENTS:Patient examined at beside. No acute events over night reported. Patient reports feeling better overall. Patient reports persistent intermittent dry cough, that is worse at night, not worsening overall. Denies fevers, chills, HA, chest pain, SOB, n/v.    ROS   CONSTITUTIONAL: denies fever, chills, fatigue.   CARDIOVASCULAR: denies chest pain, chest pressure, palpitations  RESPIRATORY: denies shortness of breath. Patient admits dry cough   GASTROINTESTINAL: denies nausea, vomiting, diarrhea, abdominal pain  GENITOURINARY: denies dysuria, discharge  PSYCHIATRIC: denies recent changes in anxiety, depression    PHYSICAL EXAM  Vital Signs Last 24 Hrs  T(C): 36.4 (23 Sep 2020 13:13), Max: 37.3 (22 Sep 2020 20:45)  T(F): 97.6 (23 Sep 2020 13:13), Max: 99.1 (22 Sep 2020 20:45)  HR: 99 (23 Sep 2020 13:13) (96 - 105)  BP: 112/78 (23 Sep 2020 13:13) (98/66 - 131/85)  RR: 18 (23 Sep 2020 13:13) (17 - 18)  SpO2: 98% (23 Sep 2020 13:13) (93% - 98%)    GENERAL: NAD, resting comfortably in bed   CHEST/LUNG: CTA b/L; No rales, rhonchi, wheezing  HEART: Regular rate and rhythm; +S1/S2. No murmurs, rubs, or gallops  ABDOMEN: Soft, Nontender, Nondistended; Bowel sounds present  EXTREMITIES:  2+ Peripheral Pulses, No clubbing, cyanosis, or edema  NERVOUS SYSTEM:  Alert & Oriented X3.  SKIN: Large lower transverse incision wound - wound vac in place      LABS                               7.6    11.27 )-----------( 541      ( 24 Sep 2020 07:34 )             23.4     CBC Full  -  ( 24 Sep 2020 07:34 )  WBC Count : 11.27 K/uL  RBC Count : 2.67 M/uL  Hemoglobin : 7.6 g/dL  Hematocrit : 23.4 %  Platelet Count - Automated : 541 K/uL  Mean Cell Volume : 87.6 fl  Mean Cell Hemoglobin : 28.5 pg  Mean Cell Hemoglobin Concentration : 32.5 gm/dL  Auto Neutrophil # : 7.09 K/uL  Auto Lymphocyte # : 2.60 K/uL  Auto Monocyte # : 1.16 K/uL  Auto Eosinophil # : 0.29 K/uL  Auto Basophil # : 0.02 K/uL  Auto Neutrophil % : 62.8 %  Auto Lymphocyte % : 23.1 %  Auto Monocyte % : 10.3 %  Auto Eosinophil % : 2.6 %  Auto Basophil % : 0.2 %    H&H: 7.9 - 24.8    09-24    139  |  105  |  16  ----------------------------<  119<H>  4.0   |  26  |  0.86    Ca    9.2      24 Sep 2020 07:34  Phos  3.9     09-23  Mg     2.3     09-23    TPro  7.2  /  Alb  2.1<L>  /  TBili  0.5  /  DBili  x   /  AST  23  /  ALT  29  /  AlkPhos  122<H>  09-22    Culture - Surgical Swab (collected 20 Sep 2020 18:52)  Source: .Surgical Swab  Preliminary Report (22 Sep 2020 18:13):  Numerous Escherichia coli ESBL  Few Enterococcus faecalis  Rare Pseudomonas aeruginosa  Organism: Escherichia coli ESBL  Enterococcus faecalis (22 Sep 2020 18:12)  Organism: Enterococcus faecalis (22 Sep 2020 18:12)  Organism: Escherichia coli ESBL (22 Sep 2020 17:07)    Culture - Abscess with Gram Stain (collected 20 Sep 2020 18:50)  Source: .Abscess abdomen surgical site  Final Report (22 Sep 2020 18:11):  Few Escherichia coli ESBL  Moderate Enterococcus faecalis  Rare Pseudomonas aeruginosa  Organism: Escherichia coli ESBL  Enterococcus faecalis (22 Sep 2020 18:11)  Organism: Enterococcus faecalis (22 Sep 2020 18:11)  Organism: Escherichia coli ESBL (22 Sep 2020 16:39)      MEDICATIONS  (STANDING):    0.9% Sodium Chloride/ Betadine 1000 milliLiter(s) 1000 milliLiter(s) Irrigation <User Schedule>  aspirin  chewable 81 milliGRAM(s) Oral daily  atorvastatin 40 milliGRAM(s) Oral at bedtime  chlorhexidine 2% Cloths 1 Application(s) Topical every 12 hours  enoxaparin Injectable 40 milliGRAM(s) SubCutaneous at bedtime  meropenem  IVPB 1000 milliGRAM(s) IV Intermittent every 8 hours  metoprolol tartrate 50 milliGRAM(s) Oral two times a day    MEDICATIONS  (PRN):  acetaminophen   Tablet .. 650 milliGRAM(s) Oral every 6 hours PRN Mild Pain (1 - 3)  benzonatate 100 milliGRAM(s) Oral three times a day PRN Cough

## 2020-09-24 NOTE — PROGRESS NOTE ADULT - SUBJECTIVE AND OBJECTIVE BOX
S  Pt without complaints      Receiving a unit of PRBC's    O  Afebrile  VSS      Abdomen-improved erythema and induration  Non-tender  No malodor                      VAC in place-with irrigation of betadine and saline    A&P CPM   S  Pt without complaints      Receiving a unit of PRBC's    O  Temp 99.4  VSS      Abdomen-improved erythema and induration  Non-tender  No malodor                      VAC in place-with irrigation of betadine and saline    A&P CPM

## 2020-09-24 NOTE — PROGRESS NOTE ADULT - PROBLEM SELECTOR PLAN 4
Chronic, a/p LAD x1 MIGUEL ANGEL (2014), x3 stents (09/2019)  -Continue atorvastatin 40 mg PO qhs, ASA 81 mg PO daily   -TTE 9/3/2020: trace MR, trace TR, LVEF 60-65%   -Cardio ([facility]), following, recs appreciated.

## 2020-09-24 NOTE — PROGRESS NOTE ADULT - PROBLEM SELECTOR PLAN 2
Acute vs acute on chronic  -Normocytic anemia, with elevated ferritic, and low TIBC, transferrin and iron %. likely 2/2 to chronic disease / acute inflammation  -Noted bloody fluids in the tube drainage tubing and pump.  -Ordered H&H, pending results    -Continue to monitor H/H, transfuse if Hgb <7  -Heme/onc, Dr. Blanc, following, recs appreciated. Acute vs acute on chronic  -Normocytic anemia, with elevated ferritic, and low TIBC, transferrin and iron %. likely 2/2 to chronic disease / acute inflammation  -Noted bloody fluids in the tube drainage tubing and pump.  -Today Hb 7.6, ordered H&H, f/up results.   -Continue to monitor H/H, transfuse if Hgb <7  -Heme/onc, Dr. Blanc, following, recs appreciated. Acute vs acute on chronic  -Normocytic anemia, with elevated ferritic, and low TIBC, transferrin and iron %. likely 2/2 to chronic disease / acute inflammation  -Noted bloody fluids in the tube drainage tubing and pump.  -Today Hb 7.9.   -Continue to monitor H/H, transfuse if Hgb <7  -Heme/onc, Dr. Blanc, following, recs appreciated. Acute vs acute on chronic  -Noted bloody fluids in the tube drainage tubing and pump  -Hb 7.6, repeat 7.9 today; given blood noted in drainage tubing, will give 1U PRBC  -F/u AM H/H  -Anemia panel consistent with AOCD vs acute inflammation  -Heme/onc, Dr. Blanc, following, recs appreciated.

## 2020-09-25 LAB
ANION GAP SERPL CALC-SCNC: 5 MMOL/L — SIGNIFICANT CHANGE UP (ref 5–17)
BASOPHILS # BLD AUTO: 0.03 K/UL — SIGNIFICANT CHANGE UP (ref 0–0.2)
BASOPHILS NFR BLD AUTO: 0.3 % — SIGNIFICANT CHANGE UP (ref 0–2)
BUN SERPL-MCNC: 19 MG/DL — SIGNIFICANT CHANGE UP (ref 7–23)
CALCIUM SERPL-MCNC: 8.6 MG/DL — SIGNIFICANT CHANGE UP (ref 8.5–10.1)
CHLORIDE SERPL-SCNC: 107 MMOL/L — SIGNIFICANT CHANGE UP (ref 96–108)
CO2 SERPL-SCNC: 28 MMOL/L — SIGNIFICANT CHANGE UP (ref 22–31)
CREAT SERPL-MCNC: 0.91 MG/DL — SIGNIFICANT CHANGE UP (ref 0.5–1.3)
CULTURE RESULTS: SIGNIFICANT CHANGE UP
EOSINOPHIL # BLD AUTO: 0.31 K/UL — SIGNIFICANT CHANGE UP (ref 0–0.5)
EOSINOPHIL NFR BLD AUTO: 2.7 % — SIGNIFICANT CHANGE UP (ref 0–6)
GLUCOSE SERPL-MCNC: 110 MG/DL — HIGH (ref 70–99)
HCT VFR BLD CALC: 26.3 % — LOW (ref 34.5–45)
HGB BLD-MCNC: 8.8 G/DL — LOW (ref 11.5–15.5)
IMM GRANULOCYTES NFR BLD AUTO: 0.7 % — SIGNIFICANT CHANGE UP (ref 0–1.5)
LYMPHOCYTES # BLD AUTO: 28.1 % — SIGNIFICANT CHANGE UP (ref 13–44)
LYMPHOCYTES # BLD AUTO: 3.21 K/UL — SIGNIFICANT CHANGE UP (ref 1–3.3)
MCHC RBC-ENTMCNC: 29.3 PG — SIGNIFICANT CHANGE UP (ref 27–34)
MCHC RBC-ENTMCNC: 33.5 GM/DL — SIGNIFICANT CHANGE UP (ref 32–36)
MCV RBC AUTO: 87.7 FL — SIGNIFICANT CHANGE UP (ref 80–100)
MONOCYTES # BLD AUTO: 1.11 K/UL — HIGH (ref 0–0.9)
MONOCYTES NFR BLD AUTO: 9.7 % — SIGNIFICANT CHANGE UP (ref 2–14)
NEUTROPHILS # BLD AUTO: 6.7 K/UL — SIGNIFICANT CHANGE UP (ref 1.8–7.4)
NEUTROPHILS NFR BLD AUTO: 58.5 % — SIGNIFICANT CHANGE UP (ref 43–77)
NRBC # BLD: 0 /100 WBCS — SIGNIFICANT CHANGE UP (ref 0–0)
PLATELET # BLD AUTO: 514 K/UL — HIGH (ref 150–400)
POTASSIUM SERPL-MCNC: 4 MMOL/L — SIGNIFICANT CHANGE UP (ref 3.5–5.3)
POTASSIUM SERPL-SCNC: 4 MMOL/L — SIGNIFICANT CHANGE UP (ref 3.5–5.3)
RBC # BLD: 3 M/UL — LOW (ref 3.8–5.2)
RBC # FLD: 14.5 % — SIGNIFICANT CHANGE UP (ref 10.3–14.5)
SODIUM SERPL-SCNC: 140 MMOL/L — SIGNIFICANT CHANGE UP (ref 135–145)
SPECIMEN SOURCE: SIGNIFICANT CHANGE UP
SPECIMEN SOURCE: SIGNIFICANT CHANGE UP
WBC # BLD: 11.44 K/UL — HIGH (ref 3.8–10.5)
WBC # FLD AUTO: 11.44 K/UL — HIGH (ref 3.8–10.5)

## 2020-09-25 PROCEDURE — 99232 SBSQ HOSP IP/OBS MODERATE 35: CPT

## 2020-09-25 PROCEDURE — 99233 SBSQ HOSP IP/OBS HIGH 50: CPT

## 2020-09-25 RX ADMIN — Medication 100 MILLIGRAM(S): at 20:09

## 2020-09-25 RX ADMIN — ATORVASTATIN CALCIUM 40 MILLIGRAM(S): 80 TABLET, FILM COATED ORAL at 21:37

## 2020-09-25 RX ADMIN — CHLORHEXIDINE GLUCONATE 1 APPLICATION(S): 213 SOLUTION TOPICAL at 05:03

## 2020-09-25 RX ADMIN — MEROPENEM 100 MILLIGRAM(S): 1 INJECTION INTRAVENOUS at 05:03

## 2020-09-25 RX ADMIN — Medication 100 MILLIGRAM(S): at 11:39

## 2020-09-25 RX ADMIN — MEROPENEM 100 MILLIGRAM(S): 1 INJECTION INTRAVENOUS at 21:37

## 2020-09-25 RX ADMIN — Medication 50 MILLIGRAM(S): at 05:03

## 2020-09-25 RX ADMIN — MEROPENEM 100 MILLIGRAM(S): 1 INJECTION INTRAVENOUS at 13:24

## 2020-09-25 RX ADMIN — Medication 50 MILLIGRAM(S): at 17:32

## 2020-09-25 RX ADMIN — ENOXAPARIN SODIUM 40 MILLIGRAM(S): 100 INJECTION SUBCUTANEOUS at 21:37

## 2020-09-25 RX ADMIN — CHLORHEXIDINE GLUCONATE 1 APPLICATION(S): 213 SOLUTION TOPICAL at 17:32

## 2020-09-25 RX ADMIN — Medication 81 MILLIGRAM(S): at 11:32

## 2020-09-25 NOTE — PROGRESS NOTE ADULT - PROBLEM SELECTOR PLAN 2
Acute vs acute on chronic  -Noted bloody fluids in the tube drainage tubing and pump  -Hb 7.6, repeat 7.9 today; given blood noted in drainage tubing, will give 1U PRBC  -F/u AM H/H  -Anemia panel consistent with AOCD vs acute inflammation  -Heme/onc, Dr. Blanc, following, recs appreciated. Acute vs acute on chronic  -Noted bloody fluids in the tube drainage tubing and pump  -s/p 1 PRBC on 09/24, hb today 8.8.   -Continue monitor Hb   -Heme/onc, Dr. Blanc, following, recs appreciated. Acute vs acute on chronic  -Hb today 8.8 s/p 1 PRBC on 09/24  -Continue monitor Hb   -Heme/onc, Dr. Blanc, following, recs. appreciated.

## 2020-09-25 NOTE — PROGRESS NOTE ADULT - ASSESSMENT
43yo F w/ PMHx of CAD (mLAD x1 MIGUEL ANGEL in 2014, x3 stents 09/2019), HTN, prediabetes, hyperlipidemia presents to ED for pain of wound s/p panniculectomy on 9/1 admitted for cellulitis and partial wound dehiscence with infection. PICC line placed 9/23, Pt tolerated without complications. Wound vac in place. 45yo F w/ PMHx of CAD (mLAD x1 MIGUEL ANGEL in 2014, x3 stents 09/2019), HTN, prediabetes, hyperlipidemia presents to ED for pain of wound s/p panniculectomy on 9/1 admitted for cellulitis and partial wound dehiscence with infection. PICC line placed 9/23, Pt tolerated without complications. Wound vac in place. No acute events overnight.

## 2020-09-25 NOTE — PROGRESS NOTE ADULT - SUBJECTIVE AND OBJECTIVE BOX
[facility] DIVISION of INFECTIOUS DISEASE  Capo Gomez MD PhD, Cat Rodriguez MD, Andree Randall MD, Alicja Rizzo MD  and providing coverage with Liliana Workman MD and Isaac Loyola MD  Providing Infectious Disease Consultations at [facility], [facility], [facility]'s    Office# [phone] to schedule follow up appointments  Answering Service for urgent calls or New Consults [phone]  Cell# to text for urgent issues Capo Gomez [phone]     infectious diseases progress note:    LOUIE QUINTANILLA is a 44y y. o. Female patient    No concerning overnight events    Allergies    codeine (Unknown)    Intolerances        ANTIBIOTICS/RELEVANT:  antimicrobials  meropenem  IVPB      meropenem  IVPB 1000 milliGRAM(s) IV Intermittent every 8 hours    immunologic:    OTHER:  0.9% Sodium Chloride/ Betadine 1000 milliLiter(s) 1000 milliLiter(s) Irrigation <User Schedule>  acetaminophen   Tablet .. 650 milliGRAM(s) Oral every 6 hours PRN  aspirin  chewable 81 milliGRAM(s) Oral daily  atorvastatin 40 milliGRAM(s) Oral at bedtime  benzonatate 100 milliGRAM(s) Oral three times a day PRN  chlorhexidine 2% Cloths 1 Application(s) Topical every 12 hours  enoxaparin Injectable 40 milliGRAM(s) SubCutaneous at bedtime  metoprolol tartrate 50 milliGRAM(s) Oral two times a day      Objective:  Vital Signs Last 24 Hrs  T(C): 37.1 (25 Sep 2020 05:01), Max: 37.4 (24 Sep 2020 18:10)  T(F): 98.8 (25 Sep 2020 05:01), Max: 99.4 (24 Sep 2020 18:10)  HR: 82 (25 Sep 2020 05:01) (82 - 110)  BP: 112/76 (25 Sep 2020 05:01) (111/54 - 121/85)  BP(mean): --  RR: 18 (25 Sep 2020 05:01) (17 - 19)  SpO2: 97% (25 Sep 2020 05:01) (95% - 99%)    T(C): 37.1 (09-25-20 @ 05:01), Max: 37.4 (09-24-20 @ 18:10)  T(C): 37.1 (09-25-20 @ 05:01), Max: 37.4 (09-22-20 @ 13:11)  T(C): 37.1 (09-25-20 @ 05:01), Max: 37.6 (09-21-20 @ 20:48)    PHYSICAL EXAM:  HEENT: NC atraumatic  Neck: supple  Respiratory: no accessory muscle use, breathing comfortably  Cardiovascular: distant  Gastrointestinal: normal appearing, nondistended  Extremities: no clubbing, no cyanosis,      LABS:                          8.8    11.44 )-----------( 514      ( 25 Sep 2020 08:15 )             26.3       11.44 09-25 @ 08:15  11.27 09-24 @ 07:34  11.86 09-23 @ 07:56  11.44 09-22 @ 09:16  11.31 09-21 @ 08:51  11.83 09-20 @ 14:47      09-25    140  |  107  |  19  ----------------------------<  110<H>  4.0   |  28  |  0.91    Ca    8.6      25 Sep 2020 08:15        Creatinine, Serum: 0.91 mg/dL (09-25-20 @ 08:15)  Creatinine, Serum: 0.86 mg/dL (09-24-20 @ 07:34)  Creatinine, Serum: 0.98 mg/dL (09-23-20 @ 07:56)  Creatinine, Serum: 0.96 mg/dL (09-22-20 @ 09:16)  Creatinine, Serum: 0.87 mg/dL (09-21-20 @ 08:51)  Creatinine, Serum: 0.83 mg/dL (09-20-20 @ 14:47)                INFLAMMATORY MARKERS  Auto Neutrophil #: 6.70 K/uL (09-25-20 @ 08:15)  Auto Lymphocyte #: 3.21 K/uL (09-25-20 @ 08:15)  Auto Neutrophil #: 7.09 K/uL (09-24-20 @ 07:34)  Auto Lymphocyte #: 2.60 K/uL (09-24-20 @ 07:34)  Auto Neutrophil #: 7.54 K/uL (09-23-20 @ 07:56)  Auto Lymphocyte #: 2.67 K/uL (09-23-20 @ 07:56)  Auto Neutrophil #: 6.93 K/uL (09-22-20 @ 09:16)  Auto Lymphocyte #: 2.66 K/uL (09-22-20 @ 09:16)  Auto Neutrophil #: 7.70 K/uL (09-21-20 @ 08:51)  Auto Lymphocyte #: 2.02 K/uL (09-21-20 @ 08:51)  Auto Neutrophil #: 8.18 K/uL (09-20-20 @ 14:47)  Auto Lymphocyte #: 2.22 K/uL (09-20-20 @ 14:47)    Lactate, Blood: 2.0 mmol/L (09-20-20 @ 14:47)    Auto Eosinophil #: 0.31 K/uL (09-25-20 @ 08:15)  Auto Eosinophil #: 0.29 K/uL (09-24-20 @ 07:34)  Auto Eosinophil #: 0.28 K/uL (09-23-20 @ 07:56)  Auto Eosinophil #: 0.34 K/uL (09-22-20 @ 09:16)  Auto Eosinophil #: 0.24 K/uL (09-21-20 @ 08:51)  Auto Eosinophil #: 0.19 K/uL (09-20-20 @ 14:47)                  Ferritin, Serum: 443 ng/mL (09-22-20 @ 11:38)        Activated Partial Thromboplastin Time: 27.1 sec (09-20-20 @ 14:47)  INR: 1.48 ratio (09-20-20 @ 14:47)          MICROBIOLOGY:    Culture - Surgical Swab (09.20.20 @ 18:52)    -  Amoxicillin/Clavulanic Acid: S <=8/4    -  Ampicillin: R >16 These ampicillin results predict results for amoxicillin    -  Ampicillin: S <=2 Predicts results to ampicillin/sulbactam, amoxacillin-clavulanate and  piperacillin-tazobactam.    -  Ampicillin/Sulbactam: R <=4/2 Enterobacter, Citrobacter, and Serratia may develop resistance during prolonged therapy (3-4 days)    -  Aztreonam: R 16    -  Aztreonam: S <=4    -  Amikacin: S <=16    -  Amikacin: S <=16    -  Cefazolin: R >16 Enterobacter, Citrobacter, and Serratia may develop resistance during prolonged therapy (3-4 days)    -  Cefepime: R 8    -  Cefepime: S <=2    -  Cefoxitin: S <=8    -  Ceftazidime: S <=1    -  Ceftriaxone: R >32 Enterobacter, Citrobacter, and Serratia may develop resistance during prolonged therapy    -  Ciprofloxacin: R >2    -  Ciprofloxacin: S 0.5    -  Ertapenem: S <=0.5    -  Gentamicin: S <=2    -  Gentamicin: S <=2    -  Imipenem: S <=1    -  Imipenem: S <=1    -  Levofloxacin: R >4    -  Levofloxacin: S 2    -  Meropenem: S <=1    -  Meropenem: S <=1    -  Piperacillin/Tazobactam: R <=8    -  Piperacillin/Tazobactam: S <=8    -  Tetra/Doxy: R >8    -  Tobramycin: S <=2    -  Tobramycin: S <=2    -  Trimethoprim/Sulfamethoxazole: R >2/38    -  Vancomycin: S 1    Specimen Source: .Surgical Swab    Culture Results:   Numerous Escherichia coli ESBL  Few Enterococcus faecalis  Rare Pseudomonas aeruginosa    Organism Identification: Escherichia coli ESBL  Enterococcus faecalis  Pseudomonas aeruginosa    Organism: Escherichia coli ESBL    Organism: Pseudomonas aeruginosa    Organism: Enterococcus faecalis    Method Type: JERSON    Method Type: JERSON    Method Type: JERSON        RADIOLOGY & ADDITIONAL STUDIES:

## 2020-09-25 NOTE — PROGRESS NOTE ADULT - PROBLEM SELECTOR PLAN 1
-CT abd/pelvis w/ IV cont significant for extensive defects w/in lower abd wall with foci of gas & inflammation, worrisome for gas forming infection, loculations of fluid are noted within ab wall  -Patient remains afebrile with mild leukocytosis stable  -MRSA and MSSA PCR negative   -Wound cultures +ESBL, few enterococcus faecalis, rare pseudomonas  -Continue Meropenem IV (through 10/1) following ID recommendation, Dr. Gomez   -Continue wound vac, Plastic surgery following, Dr. Ruiz, recs appreciated   -Pt will stay at hospital given her comorbidities and due the needs of wound vac.   -Pain well controlled with Tylenol, continue PRN -CT abd/pelvis w/ IV cont significant for extensive defects w/in lower abd wall with foci of gas & inflammation, worrisome for gas forming infection, loculations of fluid are noted within ab wall  -MRSA and MSSA PCR negative   -Wound cultures +ESBL, few enterococcus faecalis, rare pseudomonas  -Continue Meropenem IV (through 10/1) following ID recommendation, Dr. Gomez   -Continue wound vac, Plastic surgery following, Dr. Ruiz, recs appreciated    -Pain well controlled with Tylenol, continue PRN

## 2020-09-25 NOTE — PROGRESS NOTE ADULT - SUBJECTIVE AND OBJECTIVE BOX
Patient is a 44y old  Female who presents with a chief complaint of Wound dehiscence s/p panniculectomy on 9/1    INTERVAL/OVERNIGHT EVENTS: Patient examined at beside. No acute events over night reported, as per nursing staff. Patient refers felling better. Patient is tolerating regular diet w/o nausea/vomiting/diarrhea/abdominal pain. Patient is voiding actively and last BM  today at morning was soft.  Patient denies chest pain, calf pain, abdominal pain, headaches, fever, chills, dysuria, hematuria, diarrhea, constipation, SOB, palpitations. Rest of ROS not contributory except for above      ROS   CONSTITUTIONAL: denies fever, chills, fatigue.   CARDIOVASCULAR: denies chest pain, chest pressure, palpitations  RESPIRATORY: denies shortness of breath.   GASTROINTESTINAL: denies nausea, vomiting, abdominal pain  GENITOURINARY: denies dysuria, discharge  PSYCHIATRIC: denies recent changes in anxiety, depression    PHYSICAL EXAM    T(C): 37.1 (25 Sep 2020 05:01), Max: 37.4 (24 Sep 2020 18:10)  T(F): 98.8 (25 Sep 2020 05:01), Max: 99.4 (24 Sep 2020 18:10)  HR: 82 (25 Sep 2020 05:01) (82 - 110)  BP: 112/76 (25 Sep 2020 05:01) (111/54 - 121/85)  RR: 18 (25 Sep 2020 05:01) (17 - 19)  SpO2: 97% (25 Sep 2020 05:01) (95% - 99%)    GENERAL: NAD, resting comfortably in bed   CHEST/LUNG: CTA b/L; No rales, rhonchi, wheezing  HEART: Regular rate and rhythm; +S1/S2. No murmurs, rubs, or gallops  ABDOMEN: Soft, Nontender, Nondistended; Bowel sounds present  EXTREMITIES:  2+ Peripheral Pulses, No clubbing, cyanosis, or edema  NERVOUS SYSTEM:  Alert & Oriented X3.  SKIN: Large lower transverse incision wound - wound vac in place      LABS                                            8.8    11.44 )-----------( 514      ( 25 Sep 2020 08:15 )             26.3   CBC Full  -  ( 25 Sep 2020 08:15 )  WBC Count : 11.44 K/uL  RBC Count : 3.00 M/uL  Hemoglobin : 8.8 g/dL  Hematocrit : 26.3 %  Platelet Count - Automated : 514 K/uL  Mean Cell Volume : 87.7 fl  Mean Cell Hemoglobin : 29.3 pg  Mean Cell Hemoglobin Concentration : 33.5 gm/dL  Auto Neutrophil # : 6.70 K/uL  Auto Lymphocyte # : 3.21 K/uL  Auto Monocyte # : 1.11 K/uL  Auto Eosinophil # : 0.31 K/uL  Auto Basophil # : 0.03 K/uL  Auto Neutrophil % : 58.5 %  Auto Lymphocyte % : 28.1 %  Auto Monocyte % : 9.7 %  Auto Eosinophil % : 2.7 %  Auto Basophil % : 0.3 %    09-25    140  |  107  |  19  ----------------------------<  110<H>  4.0   |  28  |  0.91    Ca    8.6      25 Sep 2020 08:15        Culture - Surgical Swab (collected 20 Sep 2020 18:52)  Source: .Surgical Swab  Preliminary Report (22 Sep 2020 18:13):  Numerous Escherichia coli ESBL  Few Enterococcus faecalis  Rare Pseudomonas aeruginosa  Organism: Escherichia coli ESBL  Enterococcus faecalis (22 Sep 2020 18:12)  Organism: Enterococcus faecalis (22 Sep 2020 18:12)  Organism: Escherichia coli ESBL (22 Sep 2020 17:07)    Culture - Abscess with Gram Stain (collected 20 Sep 2020 18:50)  Source: .Abscess abdomen surgical site  Final Report (22 Sep 2020 18:11):  Few Escherichia coli ESBL  Moderate Enterococcus faecalis  Rare Pseudomonas aeruginosa  Organism: Escherichia coli ESBL  Enterococcus faecalis (22 Sep 2020 18:11)  Organism: Enterococcus faecalis (22 Sep 2020 18:11)  Organism: Escherichia coli ESBL (22 Sep 2020 16:39)      MEDICATIONS  (STANDING):    0.9% Sodium Chloride/ Betadine 1000 milliLiter(s) 1000 milliLiter(s) Irrigation <User Schedule>  aspirin  chewable 81 milliGRAM(s) Oral daily  atorvastatin 40 milliGRAM(s) Oral at bedtime  chlorhexidine 2% Cloths 1 Application(s) Topical every 12 hours  enoxaparin Injectable 40 milliGRAM(s) SubCutaneous at bedtime  meropenem  IVPB 1000 milliGRAM(s) IV Intermittent every 8 hours  metoprolol tartrate 50 milliGRAM(s) Oral two times a day    MEDICATIONS  (PRN):  acetaminophen   Tablet .. 650 milliGRAM(s) Oral every 6 hours PRN Mild Pain (1 - 3)  benzonatate 100 milliGRAM(s) Oral three times a day PRN Cough

## 2020-09-25 NOTE — ADVANCED PRACTICE NURSE CONSULT - ASSESSMENT
Veraflo NPWT dressing changed 28 x 5 x 4 abdominal dehiscence at surgical site. 90% red granulation tissue, patient has pain with dressing change 5/10 of 10/10 scale refused any pain medication, no odor, superior periwound soft necrotic tissue approximately 10 x 4 will need some debridement:  inferior wound tissue dark softening and likely to resolve:     Veraflo negative pressure wound therapy (NPWT) applied continuous therapy 125mmHg, 50mL NS/betadine 1:1 mixture instilled q2h change 3 times weekly

## 2020-09-25 NOTE — PROGRESS NOTE ADULT - SUBJECTIVE AND OBJECTIVE BOX
[facility] -- Zakia Barraza, Oskar, Maye, Pj, Spike Rizzo Goodger  Office # [phone]    Follow Up:    abdominoplasty wound dehiscense, young with CAD  Subjective/Observations:     Patient seen and examined. She has no chest pain and no SOB. She is doing well. Wound vac dressing changed this am. Wound vac continues to have drainage.    REVIEW OF SYSTEMS: All other review of systems is negative unless indicated above  PAST MEDICAL & SURGICAL HISTORY:  Obesity    Hypothyroid  Resolved    Coronary artery disease  s/p MIGUEL ANGEL x 3 (9/2019), MIGUEL ANGEL x1 (2014)    HLD (hyperlipidemia)    HTN (hypertension)    S/P coronary artery stent placement  mLAD x1 MIGUEL ANGEL (2014),  3 stents 09/2019    H/O total hysterectomy  2017      MEDICATIONS  (STANDING):  0.9% Sodium Chloride/ Betadine 1000 milliLiter(s) 1000 milliLiter(s) Irrigation <User Schedule>  aspirin  chewable 81 milliGRAM(s) Oral daily  atorvastatin 40 milliGRAM(s) Oral at bedtime  chlorhexidine 2% Cloths 1 Application(s) Topical every 12 hours  enoxaparin Injectable 40 milliGRAM(s) SubCutaneous at bedtime  meropenem  IVPB      meropenem  IVPB 1000 milliGRAM(s) IV Intermittent every 8 hours  metoprolol tartrate 50 milliGRAM(s) Oral two times a day    MEDICATIONS  (PRN):  acetaminophen   Tablet .. 650 milliGRAM(s) Oral every 6 hours PRN Mild Pain (1 - 3)  benzonatate 100 milliGRAM(s) Oral three times a day PRN Cough    Allergies    codeine (Unknown)    Intolerances      Vital Signs Last 24 Hrs  T(C): 37.1 (25 Sep 2020 05:01), Max: 37.4 (24 Sep 2020 18:10)  T(F): 98.8 (25 Sep 2020 05:01), Max: 99.4 (24 Sep 2020 18:10)  HR: 82 (25 Sep 2020 05:01) (82 - 110)  BP: 112/76 (25 Sep 2020 05:01) (111/54 - 121/85)  BP(mean): --  RR: 18 (25 Sep 2020 05:01) (17 - 19)  SpO2: 97% (25 Sep 2020 05:01) (95% - 99%)  I&O's Summary    24 Sep 2020 07:01  -  25 Sep 2020 07:00  --------------------------------------------------------  IN: 437 mL / OUT: 500 mL / NET: -63 mL        PHYSICAL EXAM:  TELE:   Constitutional: NAD, awake and alert, well-developed  HEENT: Moist Mucous Membranes, Anicteric  Pulmonary: Non-labored, breath sounds are clear bilaterally, No wheezing, rales or rhonchi  Cardiovascular: Regular, S1 and S2, No murmurs, rubs, gallops or clicks  Gastrointestinal: Bowel Sounds present, soft, nontender.   Lymph: No peripheral edema. No lymphadenopathy.  Skin: No visible rashes or ulcers.  Psych:  Mood & affect appropriate  LABS: All Labs Reviewed:                        8.8    11.44 )-----------( 514      ( 25 Sep 2020 08:15 )             26.3                         7.9    x     )-----------( x        ( 24 Sep 2020 09:56 )             24.8                         7.6    11.27 )-----------( 541      ( 24 Sep 2020 07:34 )             23.4     25 Sep 2020 08:15    140    |  107    |  19     ----------------------------<  110    4.0     |  28     |  0.91   24 Sep 2020 07:34    139    |  105    |  16     ----------------------------<  119    4.0     |  26     |  0.86   23 Sep 2020 07:56    139    |  107    |  14     ----------------------------<  118    4.0     |  23     |  0.98     Ca    8.6        25 Sep 2020 08:15  Ca    9.2        24 Sep 2020 07:34  Ca    8.7        23 Sep 2020 07:56  Phos  3.9       23 Sep 2020 07:56  Mg     2.3       23 Sep 2020 07:56          	  < from: 12 Lead ECG (09.20.20 @ 15:49) >    Ventricular Rate 97 BPM    Atrial Rate 97 BPM    P-R Interval 146 ms    QRS Duration 80 ms    Q-T Interval 392 ms    QTC Calculation(Bazett) 497 ms    P Axis 38 degrees    R Axis 0 degrees    T Axis -7 degrees    Diagnosis Line Normal sinus rhythm  Minimal voltage criteria for LVH, may be normal variant  Nonspecific T wave abnormality  Abnormal ECG  Confirmed     < end of copied text >  < from: TTE Echo Complete w/o Contrast w/ Doppler (09.03.20 @ 12:56) >  BSERVATIONS:  Technically difficult and limited study  Mitral Valve: Thickened leaflets, trace physiologic MR.  Aortic Valve/Aorta: normal trileaflet aortic valve.  Tricuspid Valve: normal with trace TR.  Pulmonic Valve: normal  Left Atrium: normal  Right Atrium: normal  Left Ventricle: normal LV size and systolic function, estimated LVEF of 60-65%.  Right Ventricle: Grossly normal size and systolic function.  Pericardium/Pleura: normal, no significant pericardial effusion.      Conclusion:  Technically difficult and limited study  Normal left ventricular internal dimensions and systolic function, estimated LVEF of 60-65%.  Grossly normal RV size and systolic function.  Normal trileaflet aortic valve, without AI.  Trace physiologic MR and TR.  No significant pericardial effusion.        < end of copied text >

## 2020-09-26 LAB
ALBUMIN SERPL ELPH-MCNC: 2.1 G/DL — LOW (ref 3.3–5)
ALP SERPL-CCNC: 110 U/L — SIGNIFICANT CHANGE UP (ref 40–120)
ALT FLD-CCNC: 29 U/L — SIGNIFICANT CHANGE UP (ref 12–78)
ANION GAP SERPL CALC-SCNC: 8 MMOL/L — SIGNIFICANT CHANGE UP (ref 5–17)
AST SERPL-CCNC: 25 U/L — SIGNIFICANT CHANGE UP (ref 15–37)
BASOPHILS # BLD AUTO: 0.04 K/UL — SIGNIFICANT CHANGE UP (ref 0–0.2)
BASOPHILS NFR BLD AUTO: 0.3 % — SIGNIFICANT CHANGE UP (ref 0–2)
BILIRUB SERPL-MCNC: 0.4 MG/DL — SIGNIFICANT CHANGE UP (ref 0.2–1.2)
BUN SERPL-MCNC: 17 MG/DL — SIGNIFICANT CHANGE UP (ref 7–23)
CALCIUM SERPL-MCNC: 9.1 MG/DL — SIGNIFICANT CHANGE UP (ref 8.5–10.1)
CHLORIDE SERPL-SCNC: 105 MMOL/L — SIGNIFICANT CHANGE UP (ref 96–108)
CO2 SERPL-SCNC: 26 MMOL/L — SIGNIFICANT CHANGE UP (ref 22–31)
CREAT SERPL-MCNC: 0.87 MG/DL — SIGNIFICANT CHANGE UP (ref 0.5–1.3)
EOSINOPHIL # BLD AUTO: 0.33 K/UL — SIGNIFICANT CHANGE UP (ref 0–0.5)
EOSINOPHIL NFR BLD AUTO: 2.8 % — SIGNIFICANT CHANGE UP (ref 0–6)
GLUCOSE SERPL-MCNC: 116 MG/DL — HIGH (ref 70–99)
HCT VFR BLD CALC: 28.1 % — LOW (ref 34.5–45)
HGB BLD-MCNC: 9.2 G/DL — LOW (ref 11.5–15.5)
IMM GRANULOCYTES NFR BLD AUTO: 0.7 % — SIGNIFICANT CHANGE UP (ref 0–1.5)
LYMPHOCYTES # BLD AUTO: 2.68 K/UL — SIGNIFICANT CHANGE UP (ref 1–3.3)
LYMPHOCYTES # BLD AUTO: 22.7 % — SIGNIFICANT CHANGE UP (ref 13–44)
MCHC RBC-ENTMCNC: 28.9 PG — SIGNIFICANT CHANGE UP (ref 27–34)
MCHC RBC-ENTMCNC: 32.7 GM/DL — SIGNIFICANT CHANGE UP (ref 32–36)
MCV RBC AUTO: 88.4 FL — SIGNIFICANT CHANGE UP (ref 80–100)
MONOCYTES # BLD AUTO: 1.05 K/UL — HIGH (ref 0–0.9)
MONOCYTES NFR BLD AUTO: 8.9 % — SIGNIFICANT CHANGE UP (ref 2–14)
NEUTROPHILS # BLD AUTO: 7.61 K/UL — HIGH (ref 1.8–7.4)
NEUTROPHILS NFR BLD AUTO: 64.6 % — SIGNIFICANT CHANGE UP (ref 43–77)
NRBC # BLD: 0 /100 WBCS — SIGNIFICANT CHANGE UP (ref 0–0)
PLATELET # BLD AUTO: 533 K/UL — HIGH (ref 150–400)
POTASSIUM SERPL-MCNC: 4.2 MMOL/L — SIGNIFICANT CHANGE UP (ref 3.5–5.3)
POTASSIUM SERPL-SCNC: 4.2 MMOL/L — SIGNIFICANT CHANGE UP (ref 3.5–5.3)
PROT SERPL-MCNC: 7.2 G/DL — SIGNIFICANT CHANGE UP (ref 6–8.3)
RBC # BLD: 3.18 M/UL — LOW (ref 3.8–5.2)
RBC # FLD: 14.5 % — SIGNIFICANT CHANGE UP (ref 10.3–14.5)
SODIUM SERPL-SCNC: 139 MMOL/L — SIGNIFICANT CHANGE UP (ref 135–145)
WBC # BLD: 11.79 K/UL — HIGH (ref 3.8–10.5)
WBC # FLD AUTO: 11.79 K/UL — HIGH (ref 3.8–10.5)

## 2020-09-26 PROCEDURE — 99233 SBSQ HOSP IP/OBS HIGH 50: CPT

## 2020-09-26 PROCEDURE — 99232 SBSQ HOSP IP/OBS MODERATE 35: CPT

## 2020-09-26 RX ADMIN — CHLORHEXIDINE GLUCONATE 1 APPLICATION(S): 213 SOLUTION TOPICAL at 05:55

## 2020-09-26 RX ADMIN — MEROPENEM 100 MILLIGRAM(S): 1 INJECTION INTRAVENOUS at 14:12

## 2020-09-26 RX ADMIN — MEROPENEM 100 MILLIGRAM(S): 1 INJECTION INTRAVENOUS at 21:51

## 2020-09-26 RX ADMIN — Medication 81 MILLIGRAM(S): at 11:42

## 2020-09-26 RX ADMIN — Medication 50 MILLIGRAM(S): at 05:55

## 2020-09-26 RX ADMIN — Medication 100 MILLIGRAM(S): at 19:47

## 2020-09-26 RX ADMIN — ENOXAPARIN SODIUM 40 MILLIGRAM(S): 100 INJECTION SUBCUTANEOUS at 21:51

## 2020-09-26 RX ADMIN — MEROPENEM 100 MILLIGRAM(S): 1 INJECTION INTRAVENOUS at 05:55

## 2020-09-26 RX ADMIN — Medication 100 MILLIGRAM(S): at 11:42

## 2020-09-26 RX ADMIN — ATORVASTATIN CALCIUM 40 MILLIGRAM(S): 80 TABLET, FILM COATED ORAL at 21:51

## 2020-09-26 RX ADMIN — CHLORHEXIDINE GLUCONATE 1 APPLICATION(S): 213 SOLUTION TOPICAL at 17:27

## 2020-09-26 NOTE — PROGRESS NOTE ADULT - PROBLEM SELECTOR PLAN 4
Chronic, stable  -S/p LAD x1 MIGUEL ANGEL (2014), x3 stents (09/2019)  -Continue atorvastatin 40 mg PO qhs, ASA 81 mg PO daily   -TTE 9/3/2020: trace MR, trace TR, LVEF 60-65%   -Cardio ([facility]), following, recs appreciated

## 2020-09-26 NOTE — PROGRESS NOTE ADULT - SUBJECTIVE AND OBJECTIVE BOX
[facility] DIVISION of INFECTIOUS DISEASE  Capo Gomez MD PhD, Cat Rodriguez MD, Andree Randall MD, Alicja Rizzo MD  and providing coverage with Liliana Workman MD and Isaac Loyola MD  Providing Infectious Disease Consultations at [facility], [facility], [facility]'s    Office# [phone] to schedule follow up appointments  Answering Service for urgent calls or New Consults [phone]  Cell# to text for urgent issues Capo Gomez [phone]     infectious diseases progress note:    LOUIE QUINTANILLA is a 44y y. o. Female patient    No concerning overnight events    Allergies    codeine (Unknown)    Intolerances        ANTIBIOTICS/RELEVANT:  antimicrobials  meropenem  IVPB      meropenem  IVPB 1000 milliGRAM(s) IV Intermittent every 8 hours    immunologic:    OTHER:  0.9% Sodium Chloride/ Betadine 1000 milliLiter(s) 1000 milliLiter(s) Irrigation <User Schedule>  acetaminophen   Tablet .. 650 milliGRAM(s) Oral every 6 hours PRN  aspirin  chewable 81 milliGRAM(s) Oral daily  atorvastatin 40 milliGRAM(s) Oral at bedtime  benzonatate 100 milliGRAM(s) Oral three times a day PRN  chlorhexidine 2% Cloths 1 Application(s) Topical every 12 hours  enoxaparin Injectable 40 milliGRAM(s) SubCutaneous at bedtime  metoprolol tartrate 50 milliGRAM(s) Oral two times a day      Objective:  Vital Signs Last 24 Hrs  T(C): 37.1 (26 Sep 2020 05:17), Max: 37.1 (26 Sep 2020 05:17)  T(F): 98.8 (26 Sep 2020 05:17), Max: 98.8 (26 Sep 2020 05:17)  HR: 94 (26 Sep 2020 05:17) (90 - 94)  BP: 117/79 (26 Sep 2020 05:17) (117/79 - 131/73)  BP(mean): --  RR: 17 (26 Sep 2020 05:17) (16 - 18)  SpO2: 96% (26 Sep 2020 05:17) (96% - 98%)    T(C): 37.1 (09-26-20 @ 05:17), Max: 37.4 (09-24-20 @ 18:10)  T(C): 37.1 (09-26-20 @ 05:17), Max: 37.4 (09-24-20 @ 18:10)  T(C): 37.1 (09-26-20 @ 05:17), Max: 37.4 (09-22-20 @ 13:11)    PHYSICAL EXAM:  HEENT: NC atraumatic  Neck: supple  Respiratory: no accessory muscle use, breathing comfortably  Cardiovascular: distant  Gastrointestinal: normal appearing, nondistended  Extremities: no clubbing, no cyanosis,      LABS:                          9.2    11.79 )-----------( 533      ( 26 Sep 2020 08:07 )             28.1       11.79 09-26 @ 08:07  11.44 09-25 @ 08:15  11.27 09-24 @ 07:34  11.86 09-23 @ 07:56  11.44 09-22 @ 09:16  11.31 09-21 @ 08:51  11.83 09-20 @ 14:47      09-26    139  |  105  |  17  ----------------------------<  116<H>  4.2   |  26  |  0.87    Ca    9.1      26 Sep 2020 08:07    TPro  7.2  /  Alb  2.1<L>  /  TBili  0.4  /  DBili  x   /  AST  25  /  ALT  29  /  AlkPhos  110  09-26      Creatinine, Serum: 0.87 mg/dL (09-26-20 @ 08:07)  Creatinine, Serum: 0.91 mg/dL (09-25-20 @ 08:15)  Creatinine, Serum: 0.86 mg/dL (09-24-20 @ 07:34)  Creatinine, Serum: 0.98 mg/dL (09-23-20 @ 07:56)  Creatinine, Serum: 0.96 mg/dL (09-22-20 @ 09:16)  Creatinine, Serum: 0.87 mg/dL (09-21-20 @ 08:51)  Creatinine, Serum: 0.83 mg/dL (09-20-20 @ 14:47)                INFLAMMATORY MARKERS  Auto Neutrophil #: 7.61 K/uL (09-26-20 @ 08:07)  Auto Lymphocyte #: 2.68 K/uL (09-26-20 @ 08:07)  Auto Neutrophil #: 6.70 K/uL (09-25-20 @ 08:15)  Auto Lymphocyte #: 3.21 K/uL (09-25-20 @ 08:15)  Auto Neutrophil #: 7.09 K/uL (09-24-20 @ 07:34)  Auto Lymphocyte #: 2.60 K/uL (09-24-20 @ 07:34)  Auto Neutrophil #: 7.54 K/uL (09-23-20 @ 07:56)  Auto Lymphocyte #: 2.67 K/uL (09-23-20 @ 07:56)  Auto Neutrophil #: 6.93 K/uL (09-22-20 @ 09:16)  Auto Lymphocyte #: 2.66 K/uL (09-22-20 @ 09:16)  Auto Neutrophil #: 7.70 K/uL (09-21-20 @ 08:51)  Auto Lymphocyte #: 2.02 K/uL (09-21-20 @ 08:51)  Auto Neutrophil #: 8.18 K/uL (09-20-20 @ 14:47)  Auto Lymphocyte #: 2.22 K/uL (09-20-20 @ 14:47)    Lactate, Blood: 2.0 mmol/L (09-20-20 @ 14:47)    Auto Eosinophil #: 0.33 K/uL (09-26-20 @ 08:07)  Auto Eosinophil #: 0.31 K/uL (09-25-20 @ 08:15)  Auto Eosinophil #: 0.29 K/uL (09-24-20 @ 07:34)  Auto Eosinophil #: 0.28 K/uL (09-23-20 @ 07:56)  Auto Eosinophil #: 0.34 K/uL (09-22-20 @ 09:16)  Auto Eosinophil #: 0.24 K/uL (09-21-20 @ 08:51)  Auto Eosinophil #: 0.19 K/uL (09-20-20 @ 14:47)                  Ferritin, Serum: 443 ng/mL (09-22-20 @ 11:38)        Activated Partial Thromboplastin Time: 27.1 sec (09-20-20 @ 14:47)  INR: 1.48 ratio (09-20-20 @ 14:47)          MICROBIOLOGY:              RADIOLOGY & ADDITIONAL STUDIES:

## 2020-09-26 NOTE — PROGRESS NOTE ADULT - ASSESSMENT
44 year old female PMHx CAD (mLAD x1 MIGUEL ANGEL (2014), x3 stents 09/2019), HTN, prediabetes, hyperlipidemia, recent COVID-19 infection admitted for wound dehiscence and possible revision.    Obstructive CAD s/p PCI 9/ 2019   - Cardiologist Dr. Abdoul Lam from [facility]  - No acute changes on EKG compared to previous  - Echo 9/3/2020: trace MR, trace TR, LVEF 60-65%   - Continue ASA 81 and lopressor 50mg po bid and high intensity statin lipitor 40mg po qhs    HTN and Hemodynamics  - BP: 117/79 (09-26-20 @ 05:17) (117/79 - 131/73)  -heart rate slightly elevated in setting of infection and anemia  - Continue current dose of metoprolol 50 mg BID with hold parameters     Anemia  -work up as per primary team   -s/p 1 unit of PRBC   -transfuse to keep HGB greater than 8 given CAD    HLD  - Continue atorvastatin 40 mg qhs     Cellulitis   -wound cellulitis now sp wound vac  -follow up id, wound, plastic surgery recs    Mack May DNP, ANP-c  Cardiology   Spectra #[phone]  [phone]

## 2020-09-26 NOTE — PROGRESS NOTE ADULT - SUBJECTIVE AND OBJECTIVE BOX
S  Pt w/o complaints     She denies fever, chill, lightheadedness    WBC 11.79  H/H 9.2/28.1    O Afebril;e  VSS     Abdomen-erythema much improved                     improved induration                     no tenderness to palpation                     no malodor  VAC in place with betadine/saline irrigation    A&P Discussed wound management with Mitzi Franco-wound granulating         Will change VAC dressing on Monday with Mitzi and debride any non-viable tissue then         CPM

## 2020-09-26 NOTE — PROGRESS NOTE ADULT - SUBJECTIVE AND OBJECTIVE BOX
Patient is a 45 y/o female who presents with a chief complaint of Wound dehiscence s/p panniculectomy on 9/1  ----  INTERVAL HPI/OVERNIGHT EVENTS: Pt seen and evaluated at the bedside. No acute overnight events reported. Pt resting comfortably in bed. Pleasant, denies pain or new complaints. Reports regular BMs. Denies fevers, chills, HA, SOB, cough, chest pain, palpitations, abdominal pain, n/v/d/c.    ----  PAST MEDICAL & SURGICAL HISTORY:  Obesity    Hypothyroid  Resolved    Coronary artery disease  s/p MIGUEL ANGEL x 3 (9/2019), MIGUEL ANGEL x1 (2014)    HLD (hyperlipidemia)    HTN (hypertension)    S/P coronary artery stent placement  mLAD x1 MIGUEL ANGEL (2014),  3 stents 09/2019    H/O total hysterectomy  2017    FAMILY HISTORY:  Family history of coronary artery disease younger than 40 years of age (Sibling)        Allergies    codeine (Unknown)    Intolerances    ----  REVIEW OF SYSTEMS:  CONSTITUTIONAL: denies fever, chills,   HEENT: denies visual disturbances   SKIN: denies new lesions, rash  CARDIOVASCULAR: denies chest pain, chest pressure, palpitations  RESPIRATORY: denies shortness of breath, cough  GASTROINTESTINAL: denies nausea, vomiting, diarrhea, constipation, abdominal pain  GENITOURINARY: denies dysuria  NEUROLOGICAL: denies numbness, headache, focal weakness  MUSCULOSKELETAL: denies new joint pain, muscle aches  HEMATOLOGIC: denies gross bleeding  LYMPHATICS: denies extremity swelling  PSYCHIATRIC: denies recent changes in anxiety, depression    All other ROS negative unless stated above    ----  PHYSICAL EXAM:  GENERAL: patient appears well, NAD, appropriately interactive  EYES: sclera clear, no exudates  ENMT: moist mucous membranes  NECK: supple, soft  LUNGS: CTA b/l, good inspiratory effort, no wheezing, rhonchi or rales   HEART: soft S1/S2, RRR, no murmurs noted, no edema b/l LE  GASTROINTESTINAL: abdomen soft, NTND, normoactive bowel sounds throughout. +wound vac to RLQ, +lower abdominal incision dressing noted dry, clean, intact  INTEGUMENT: Warm, well perfused  MUSCULOSKELETAL: no clubbing or cyanosis  NEUROLOGIC: AA&O x3, motor strength grossly intact, no obvious sensory deficits   PSYCHIATRIC: mood is good, affect is congruent with mood, linear and logical thought process  HEME/LYMPH: no obvious ecchymosis     T(C): 37.1 (09-26-20 @ 05:17), Max: 37.1 (09-26-20 @ 05:17)  HR: 94 (09-26-20 @ 05:17) (90 - 94)  BP: 117/79 (09-26-20 @ 05:17) (117/79 - 131/73)  RR: 17 (09-26-20 @ 05:17) (16 - 18)  SpO2: 96% (09-26-20 @ 05:17) (96% - 98%)  Wt(kg): --    ----  I&O's Summary    25 Sep 2020 07:01  -  26 Sep 2020 07:00  --------------------------------------------------------  IN: 50 mL / OUT: 500 mL / NET: -450 mL      LABS:                        9.2    11.79 )-----------( 533      ( 26 Sep 2020 08:07 )             28.1     09-26    139  |  105  |  17  ----------------------------<  116<H>  4.2   |  26  |  0.87    Ca    9.1      26 Sep 2020 08:07    TPro  7.2  /  Alb  2.1<L>  /  TBili  0.4  /  DBili  x   /  AST  25  /  ALT  29  /  AlkPhos  110  09-26        CAPILLARY BLOOD GLUCOSE      ----  Personally reviewed:  Vital sign trends: [ x ] yes    [  ] no     [  ] n/a  Laboratory results: [ x ] yes    [  ] no     [  ] n/a  Radiology results: [  ] yes    [  ] no     [ x ] n/a  Culture results: [ x ] yes    [  ] no     [  ] n/a  Consultant recommendations: [ x ] yes    [  ] no     [  ] n/a         Patient is a 43 y/o female who presents with a chief complaint of Wound dehiscence s/p panniculectomy on 9/1  ----  INTERVAL HPI/OVERNIGHT EVENTS: Pt seen and evaluated at the bedside. No acute overnight events reported. Pt resting comfortably in bed. Pleasant, denies pain or new complaints. Reports regular BMs. Denies fevers, chills, HA, SOB, cough, chest pain, palpitations, abdominal pain, n/v/d/c.    ----  PAST MEDICAL & SURGICAL HISTORY:  Obesity    Hypothyroid  Resolved    Coronary artery disease  s/p MIGUEL ANGEL x 3 (9/2019), MIGUEL ANGEL x1 (2014)    HLD (hyperlipidemia)    HTN (hypertension)    S/P coronary artery stent placement  mLAD x1 MIGEUL ANGEL (2014),  3 stents 09/2019    H/O total hysterectomy  2017    FAMILY HISTORY:  Family history of coronary artery disease younger than 40 years of age (Sibling)        Allergies    codeine (Unknown)    Intolerances    ----  REVIEW OF SYSTEMS:  CONSTITUTIONAL: denies fever, chills  HEENT: denies visual disturbances   SKIN: denies new lesions, rash  CARDIOVASCULAR: denies chest pain, chest pressure, palpitations  RESPIRATORY: denies shortness of breath, cough  GASTROINTESTINAL: denies nausea, vomiting, diarrhea, constipation, abdominal pain  GENITOURINARY: denies dysuria  NEUROLOGICAL: denies numbness, headache, focal weakness  MUSCULOSKELETAL: denies new joint pain, muscle aches  HEMATOLOGIC: denies gross bleeding  LYMPHATICS: denies extremity swelling  PSYCHIATRIC: denies recent changes in anxiety, depression    All other ROS negative unless stated above    ----  PHYSICAL EXAM:  GENERAL: patient appears well, NAD, appropriately interactive  EYES: sclera clear, no exudates  ENMT: moist mucous membranes  NECK: supple, soft  LUNGS: CTA b/l, good inspiratory effort, no wheezing, rhonchi or rales   HEART: soft S1/S2, RRR, no murmurs noted, no edema b/l LE  GASTROINTESTINAL: abdomen soft, NTND, normoactive bowel sounds throughout. +wound vac to RLQ, +lower abdominal incision dressing noted dry, clean, intact  INTEGUMENT: Warm, well perfused  MUSCULOSKELETAL: no clubbing or cyanosis  NEUROLOGIC: AA&O x3, motor strength grossly intact, no obvious sensory deficits   PSYCHIATRIC: mood is good, affect is congruent with mood, linear and logical thought process  HEME/LYMPH: no obvious ecchymosis     T(C): 37.1 (09-26-20 @ 05:17), Max: 37.1 (09-26-20 @ 05:17)  HR: 94 (09-26-20 @ 05:17) (90 - 94)  BP: 117/79 (09-26-20 @ 05:17) (117/79 - 131/73)  RR: 17 (09-26-20 @ 05:17) (16 - 18)  SpO2: 96% (09-26-20 @ 05:17) (96% - 98%)  Wt(kg): --    ----  I&O's Summary    25 Sep 2020 07:01  -  26 Sep 2020 07:00  --------------------------------------------------------  IN: 50 mL / OUT: 500 mL / NET: -450 mL      LABS:                        9.2    11.79 )-----------( 533      ( 26 Sep 2020 08:07 )             28.1     09-26    139  |  105  |  17  ----------------------------<  116<H>  4.2   |  26  |  0.87    Ca    9.1      26 Sep 2020 08:07    TPro  7.2  /  Alb  2.1<L>  /  TBili  0.4  /  DBili  x   /  AST  25  /  ALT  29  /  AlkPhos  110  09-26        CAPILLARY BLOOD GLUCOSE      ----  Personally reviewed:  Vital sign trends: [ x ] yes    [  ] no     [  ] n/a  Laboratory results: [ x ] yes    [  ] no     [  ] n/a  Radiology results: [  ] yes    [  ] no     [ x ] n/a  Culture results: [ x ] yes    [  ] no     [  ] n/a  Consultant recommendations: [ x ] yes    [  ] no     [  ] n/a

## 2020-09-26 NOTE — PROGRESS NOTE ADULT - SUBJECTIVE AND OBJECTIVE BOX
[facility] -- Zakia Barraza, Oskar, Maye, Matthias Oliva Savella  Office # [phone]      Follow Up:    abdominoplasty wound dehiscences young with CAD  Subjective/Observations:   No events overnight resting comfortably in bed.  No complaints of chest pain, dyspnea, or palpitations reported. No signs of orthopnea or PND.     REVIEW OF SYSTEMS: All other review of systems is negative unless indicated above    PAST MEDICAL & SURGICAL HISTORY:  Obesity    Hypothyroid  Resolved    Coronary artery disease  s/p MIGUEL ANGEL x 3 (9/2019), MIGUEL ANGEL x1 (2014)    HLD (hyperlipidemia)    HTN (hypertension)    S/P coronary artery stent placement  mLAD x1 MIGUEL ANGEL (2014),  3 stents 09/2019    H/O total hysterectomy  2017        MEDICATIONS  (STANDING):  0.9% Sodium Chloride/ Betadine 1000 milliLiter(s) 1000 milliLiter(s) Irrigation <User Schedule>  aspirin  chewable 81 milliGRAM(s) Oral daily  atorvastatin 40 milliGRAM(s) Oral at bedtime  chlorhexidine 2% Cloths 1 Application(s) Topical every 12 hours  enoxaparin Injectable 40 milliGRAM(s) SubCutaneous at bedtime  meropenem  IVPB 1000 milliGRAM(s) IV Intermittent every 8 hours  meropenem  IVPB      metoprolol tartrate 50 milliGRAM(s) Oral two times a day    MEDICATIONS  (PRN):  acetaminophen   Tablet .. 650 milliGRAM(s) Oral every 6 hours PRN Mild Pain (1 - 3)  benzonatate 100 milliGRAM(s) Oral three times a day PRN Cough      Allergies    codeine (Unknown)    Intolerances        Vital Signs Last 24 Hrs  T(C): 37.1 (26 Sep 2020 05:17), Max: 37.1 (26 Sep 2020 05:17)  T(F): 98.8 (26 Sep 2020 05:17), Max: 98.8 (26 Sep 2020 05:17)  HR: 94 (26 Sep 2020 05:17) (90 - 94)  BP: 117/79 (26 Sep 2020 05:17) (117/79 - 131/73)  BP(mean): --  RR: 17 (26 Sep 2020 05:17) (16 - 18)  SpO2: 96% (26 Sep 2020 05:17) (96% - 98%)    I&O's Summary    25 Sep 2020 07:01  -  26 Sep 2020 07:00  --------------------------------------------------------  IN: 50 mL / OUT: 500 mL / NET: -450 mL          PHYSICAL EXAM:  TELE: Off tele   Constitutional: NAD, awake and alert, well-developed  HEENT: Moist Mucous Membranes, Anicteric  Pulmonary: Non-labored, breath sounds are clear bilaterally, No wheezing, crackles or rhonchi  Cardiovascular: Regular, S1 and S2 nl, No murmurs, rubs, gallops or clicks  Gastrointestinal: Bowel Sounds present, soft, nontender.   Lymph: No lymphadenopathy. No peripheral edema.  Skin: No visible rashes or ulcers.  Psych:  Mood & affect appropriate    LABS: All Labs Reviewed:                        9.2    11.79 )-----------( 533      ( 26 Sep 2020 08:07 )             28.1                         8.8    11.44 )-----------( 514      ( 25 Sep 2020 08:15 )             26.3                         7.9    x     )-----------( x        ( 24 Sep 2020 09:56 )             24.8     26 Sep 2020 08:07    139    |  105    |  17     ----------------------------<  116    4.2     |  26     |  0.87   25 Sep 2020 08:15    140    |  107    |  19     ----------------------------<  110    4.0     |  28     |  0.91   24 Sep 2020 07:34    139    |  105    |  16     ----------------------------<  119    4.0     |  26     |  0.86     Ca    9.1        26 Sep 2020 08:07  Ca    8.6        25 Sep 2020 08:15  Ca    9.2        24 Sep 2020 07:34    TPro  7.2    /  Alb  2.1    /  TBili  0.4    /  DBili  x      /  AST  25     /  ALT  29     /  AlkPhos  110    26 Sep 2020 08:07      < from: 12 Lead ECG (09.20.20 @ 15:49) >  entricular Rate 97 BPM    Atrial Rate 97 BPM    P-R Interval 146 ms    QRS Duration 80 ms    Q-T Interval 392 ms    QTC Calculation(Bazett) 497 ms    P Axis 38 degrees    R Axis 0 degrees    T Axis -7 degrees    Diagnosis Line Normal sinus rhythm  Minimal voltage criteria for LVH, may be normal variant  Nonspecific T wave abnormality  Abnormal ECG  Confirmed by Mack Schmitt MD ([id number]) on 9/21/2020 3:58:47 PM    < end of copied text >  `< from: TTE Echo Complete w/o Contrast w/ Doppler (09.03.20 @ 12:56) >  EXAM:  ECHO TTE WO CON COMP W DOPP         PROCEDURE DATE:  09/03/2020        INTERPRETATION:  INDICATION: Hypertensive heart disease    Blood Pressure 112/72    Height 125.4cm     Weight 99.8 kg       BSA 1.9 sq m    Dimensions:  LA 3.0       Normal Values: 2.0 - 4.0 cm  Ao 2.8        Normal Values: 2.0 - 3.8 cm  SEPTUM 1.0       Normal Values: 0.6 - 1.2 cm  PWT 1.1       Normal Values: 0.6 - 1.1 cm  LVIDd 4.7         Normal Values: 3.0 - 5.6 cm  LVIDs 3.0         Normal Values: 1.8 - 4.0 cm      OBSERVATIONS:  Technically difficult and limited study  Mitral Valve: Thickened leaflets, trace physiologic MR.  Aortic Valve/Aorta: normal trileaflet aortic valve.  Tricuspid Valve: normal with trace TR.  Pulmonic Valve: normal  Left Atrium: normal  Right Atrium: normal  Left Ventricle: normal LV size and systolic function, estimated LVEF of 60-65%.  Right Ventricle: Grossly normal size and systolic function.  Pericardium/Pleura: normal, no significant pericardial effusion.      Conclusion:  Technically difficult and limited study  Normal left ventricular internal dimensions and systolic function, estimated LVEF of 60-65%.  Grossly normal RV size and systolic function.  Normal trileaflet aortic valve, without AI.  Trace physiologic MR and TR.  No significant pericardial effusion.              BHUPENDRA LY   This document has been electronically signed. Sep  4 2020  7:40AM    < end of copied text >  < from: Xray Chest 1 View-PORTABLE IMMEDIATE (09.20.20 @ 15:25) >  EXAM:  XR CHEST PORTABLE IMMED 1V                            PROCEDURE DATE:  09/20/2020          INTERPRETATION:  Sepsis.    AP chest. Prior 4/10/2020. Normal heart mediastinum. No pleural-parenchymal abnormality. Calcific bursitis right shoulder.    Impression: No active disease            DORI PATRICK M.D., ATTENDING RADIOLOGIST  This document has been electronically signed. Sep 20 2020  3:30PM    < end of copied text >

## 2020-09-26 NOTE — PROGRESS NOTE ADULT - PROBLEM SELECTOR PLAN 3
Chronic, BP well controlled   -Continue home metoprolol  50mg PO BID  -Monitor routine hemodynamics  -DASH, consistent carb diet.  -Patient follows outpatient cardiologist Dr. Abdoul Lam

## 2020-09-26 NOTE — PROGRESS NOTE ADULT - ASSESSMENT
45yo F w/ PMHx of CAD (mLAD x1 MIGUEL ANGEL in 2014, x3 stents 09/2019), HTN, prediabetes, hyperlipidemia presents to ED for pain of wound s/p panniculectomy on 9/1 admitted for cellulitis and partial wound dehiscence with infection. PICC line placed 9/23, Pt tolerated without complications. Wound vac in place.

## 2020-09-26 NOTE — PROGRESS NOTE ADULT - PROBLEM SELECTOR PLAN 1
CT abd/pelvis w/ IV cont significant for extensive defects w/in lower abd wall with foci of gas & inflammation, worrisome for gas forming infection, loculations of fluid are noted within ab wall  -Plan to change wound vac dressing on Monday with debridement of nonviable tissue with plastic surgery   -Continue wound vac with betadine/saline irrigation  -Wound cultures +ESBL, few enterococcus faecalis, rare pseudomonas  -Blood cxs negative   -Continue Meropenem IV (through 10/1) following ID recommendation, Dr. Gomez   -Plastic surgery following, Dr. Ruiz, recs appreciated    -Pain well controlled with Tylenol, continue PRN

## 2020-09-26 NOTE — PROVIDER CONTACT NOTE (CRITICAL VALUE NOTIFICATION) - SITUATION
Surgical swab and Abscess culture  with E. coli ESBL, few enterococcus, faecalis and rare pseudomonas, aeruginosa

## 2020-09-27 DIAGNOSIS — R05 COUGH: ICD-10-CM

## 2020-09-27 LAB
ANION GAP SERPL CALC-SCNC: 8 MMOL/L — SIGNIFICANT CHANGE UP (ref 5–17)
BASOPHILS # BLD AUTO: 0.04 K/UL — SIGNIFICANT CHANGE UP (ref 0–0.2)
BASOPHILS NFR BLD AUTO: 0.3 % — SIGNIFICANT CHANGE UP (ref 0–2)
BUN SERPL-MCNC: 22 MG/DL — SIGNIFICANT CHANGE UP (ref 7–23)
CALCIUM SERPL-MCNC: 8.8 MG/DL — SIGNIFICANT CHANGE UP (ref 8.5–10.1)
CHLORIDE SERPL-SCNC: 106 MMOL/L — SIGNIFICANT CHANGE UP (ref 96–108)
CHOLEST SERPL-MCNC: 126 MG/DL — SIGNIFICANT CHANGE UP (ref 10–199)
CO2 SERPL-SCNC: 26 MMOL/L — SIGNIFICANT CHANGE UP (ref 22–31)
CREAT SERPL-MCNC: 0.84 MG/DL — SIGNIFICANT CHANGE UP (ref 0.5–1.3)
EOSINOPHIL # BLD AUTO: 0.36 K/UL — SIGNIFICANT CHANGE UP (ref 0–0.5)
EOSINOPHIL NFR BLD AUTO: 2.8 % — SIGNIFICANT CHANGE UP (ref 0–6)
GLUCOSE SERPL-MCNC: 116 MG/DL — HIGH (ref 70–99)
HCT VFR BLD CALC: 27.9 % — LOW (ref 34.5–45)
HDLC SERPL-MCNC: 31 MG/DL — LOW
HGB BLD-MCNC: 8.8 G/DL — LOW (ref 11.5–15.5)
IMM GRANULOCYTES NFR BLD AUTO: 0.9 % — SIGNIFICANT CHANGE UP (ref 0–1.5)
LIPID PNL WITH DIRECT LDL SERPL: 71 MG/DL — SIGNIFICANT CHANGE UP
LYMPHOCYTES # BLD AUTO: 26.2 % — SIGNIFICANT CHANGE UP (ref 13–44)
LYMPHOCYTES # BLD AUTO: 3.34 K/UL — HIGH (ref 1–3.3)
MCHC RBC-ENTMCNC: 28.4 PG — SIGNIFICANT CHANGE UP (ref 27–34)
MCHC RBC-ENTMCNC: 31.5 GM/DL — LOW (ref 32–36)
MCV RBC AUTO: 90 FL — SIGNIFICANT CHANGE UP (ref 80–100)
MONOCYTES # BLD AUTO: 1.11 K/UL — HIGH (ref 0–0.9)
MONOCYTES NFR BLD AUTO: 8.7 % — SIGNIFICANT CHANGE UP (ref 2–14)
NEUTROPHILS # BLD AUTO: 7.8 K/UL — HIGH (ref 1.8–7.4)
NEUTROPHILS NFR BLD AUTO: 61.1 % — SIGNIFICANT CHANGE UP (ref 43–77)
NRBC # BLD: 0 /100 WBCS — SIGNIFICANT CHANGE UP (ref 0–0)
PLATELET # BLD AUTO: 532 K/UL — HIGH (ref 150–400)
POTASSIUM SERPL-MCNC: 4.1 MMOL/L — SIGNIFICANT CHANGE UP (ref 3.5–5.3)
POTASSIUM SERPL-SCNC: 4.1 MMOL/L — SIGNIFICANT CHANGE UP (ref 3.5–5.3)
RBC # BLD: 3.1 M/UL — LOW (ref 3.8–5.2)
RBC # FLD: 14.3 % — SIGNIFICANT CHANGE UP (ref 10.3–14.5)
SODIUM SERPL-SCNC: 140 MMOL/L — SIGNIFICANT CHANGE UP (ref 135–145)
TOTAL CHOLESTEROL/HDL RATIO MEASUREMENT: 4.1 RATIO — SIGNIFICANT CHANGE UP (ref 3.3–7.1)
TRIGL SERPL-MCNC: 122 MG/DL — SIGNIFICANT CHANGE UP (ref 10–149)
WBC # BLD: 12.76 K/UL — HIGH (ref 3.8–10.5)
WBC # FLD AUTO: 12.76 K/UL — HIGH (ref 3.8–10.5)

## 2020-09-27 PROCEDURE — 99232 SBSQ HOSP IP/OBS MODERATE 35: CPT

## 2020-09-27 PROCEDURE — 99233 SBSQ HOSP IP/OBS HIGH 50: CPT

## 2020-09-27 RX ADMIN — CHLORHEXIDINE GLUCONATE 1 APPLICATION(S): 213 SOLUTION TOPICAL at 18:11

## 2020-09-27 RX ADMIN — ATORVASTATIN CALCIUM 40 MILLIGRAM(S): 80 TABLET, FILM COATED ORAL at 21:06

## 2020-09-27 RX ADMIN — MEROPENEM 100 MILLIGRAM(S): 1 INJECTION INTRAVENOUS at 14:49

## 2020-09-27 RX ADMIN — CHLORHEXIDINE GLUCONATE 1 APPLICATION(S): 213 SOLUTION TOPICAL at 05:28

## 2020-09-27 RX ADMIN — MEROPENEM 100 MILLIGRAM(S): 1 INJECTION INTRAVENOUS at 05:28

## 2020-09-27 RX ADMIN — Medication 100 MILLIGRAM(S): at 15:27

## 2020-09-27 RX ADMIN — Medication 100 MILLIGRAM(S): at 12:21

## 2020-09-27 RX ADMIN — Medication 81 MILLIGRAM(S): at 12:21

## 2020-09-27 RX ADMIN — MEROPENEM 100 MILLIGRAM(S): 1 INJECTION INTRAVENOUS at 21:06

## 2020-09-27 RX ADMIN — Medication 50 MILLIGRAM(S): at 05:28

## 2020-09-27 RX ADMIN — ENOXAPARIN SODIUM 40 MILLIGRAM(S): 100 INJECTION SUBCUTANEOUS at 21:06

## 2020-09-27 NOTE — PROGRESS NOTE ADULT - PROBLEM SELECTOR PLAN 2
Acute vs acute on chronic  -H&H stable  -Continue to monitor H/H, s/s active bleed   -Transfuse if Hb < 8  -Heme/onc, Dr. Blanc, following, recs appreciated.

## 2020-09-27 NOTE — PROGRESS NOTE ADULT - SUBJECTIVE AND OBJECTIVE BOX
[facility] DIVISION of INFECTIOUS DISEASE  Capo Gomez MD PhD, Cat Rodriguez MD, Andree Randall MD, Alicja Rizzo MD  and providing coverage with Liliana Workman MD and Isaac Loyola MD  Providing Infectious Disease Consultations at [facility], [facility], [facility]    Office# [phone] to schedule follow up appointments  Answering Service for urgent calls or New Consults [phone]  Cell# to text for urgent issues Capo Gomez [phone]     infectious diseases progress note:    LOUIE QUINTANILLA is a 44y y. o. Female patient    No concerning overnight events    Allergies    codeine (Unknown)    Intolerances        ANTIBIOTICS/RELEVANT:  antimicrobials  meropenem  IVPB      meropenem  IVPB 1000 milliGRAM(s) IV Intermittent every 8 hours    immunologic:    OTHER:  0.9% Sodium Chloride/ Betadine 1000 milliLiter(s) 1000 milliLiter(s) Irrigation <User Schedule>  acetaminophen   Tablet .. 650 milliGRAM(s) Oral every 6 hours PRN  aspirin  chewable 81 milliGRAM(s) Oral daily  atorvastatin 40 milliGRAM(s) Oral at bedtime  benzonatate 100 milliGRAM(s) Oral three times a day PRN  chlorhexidine 2% Cloths 1 Application(s) Topical every 12 hours  enoxaparin Injectable 40 milliGRAM(s) SubCutaneous at bedtime  guaiFENesin   Syrup  (Sugar-Free) 100 milliGRAM(s) Oral every 6 hours PRN  metoprolol tartrate 50 milliGRAM(s) Oral two times a day      Objective:  Vital Signs Last 24 Hrs  T(C): 36.7 (27 Sep 2020 05:16), Max: 36.7 (26 Sep 2020 21:01)  T(F): 98.1 (27 Sep 2020 05:16), Max: 98.1 (27 Sep 2020 05:16)  HR: 100 (27 Sep 2020 05:16) (100 - 100)  BP: 114/80 (27 Sep 2020 05:16) (110/77 - 114/80)  BP(mean): --  RR: 18 (27 Sep 2020 05:16) (18 - 18)  SpO2: 98% (27 Sep 2020 05:16) (97% - 98%)    T(C): 36.7 (09-27-20 @ 05:16), Max: 37.1 (09-26-20 @ 05:17)  T(C): 36.7 (09-27-20 @ 05:16), Max: 37.4 (09-24-20 @ 18:10)  T(C): 36.7 (09-27-20 @ 05:16), Max: 37.4 (09-24-20 @ 18:10)    PHYSICAL EXAM:  HEENT: NC atraumatic  Neck: supple  Respiratory: no accessory muscle use, breathing comfortably  Cardiovascular: distant  Gastrointestinal: normal appearing, nondistended  Extremities: no clubbing, no cyanosis,      LABS:                          8.8    12.76 )-----------( 532      ( 27 Sep 2020 07:21 )             27.9       12.76 09-27 @ 07:21  11.79 09-26 @ 08:07  11.44 09-25 @ 08:15  11.27 09-24 @ 07:34  11.86 09-23 @ 07:56  11.44 09-22 @ 09:16  11.31 09-21 @ 08:51  11.83 09-20 @ 14:47      09-27    140  |  106  |  22  ----------------------------<  116<H>  4.1   |  26  |  0.84    Ca    8.8      27 Sep 2020 07:21    TPro  7.2  /  Alb  2.1<L>  /  TBili  0.4  /  DBili  x   /  AST  25  /  ALT  29  /  AlkPhos  110  09-26      Creatinine, Serum: 0.84 mg/dL (09-27-20 @ 07:21)  Creatinine, Serum: 0.87 mg/dL (09-26-20 @ 08:07)  Creatinine, Serum: 0.91 mg/dL (09-25-20 @ 08:15)  Creatinine, Serum: 0.86 mg/dL (09-24-20 @ 07:34)  Creatinine, Serum: 0.98 mg/dL (09-23-20 @ 07:56)  Creatinine, Serum: 0.96 mg/dL (09-22-20 @ 09:16)  Creatinine, Serum: 0.87 mg/dL (09-21-20 @ 08:51)  Creatinine, Serum: 0.83 mg/dL (09-20-20 @ 14:47)                INFLAMMATORY MARKERS  Auto Neutrophil #: 7.80 K/uL (09-27-20 @ 07:21)  Auto Lymphocyte #: 3.34 K/uL (09-27-20 @ 07:21)  Auto Neutrophil #: 7.61 K/uL (09-26-20 @ 08:07)  Auto Lymphocyte #: 2.68 K/uL (09-26-20 @ 08:07)  Auto Neutrophil #: 6.70 K/uL (09-25-20 @ 08:15)  Auto Lymphocyte #: 3.21 K/uL (09-25-20 @ 08:15)  Auto Neutrophil #: 7.09 K/uL (09-24-20 @ 07:34)  Auto Lymphocyte #: 2.60 K/uL (09-24-20 @ 07:34)  Auto Neutrophil #: 7.54 K/uL (09-23-20 @ 07:56)  Auto Lymphocyte #: 2.67 K/uL (09-23-20 @ 07:56)  Auto Neutrophil #: 6.93 K/uL (09-22-20 @ 09:16)  Auto Lymphocyte #: 2.66 K/uL (09-22-20 @ 09:16)  Auto Neutrophil #: 7.70 K/uL (09-21-20 @ 08:51)  Auto Lymphocyte #: 2.02 K/uL (09-21-20 @ 08:51)  Auto Neutrophil #: 8.18 K/uL (09-20-20 @ 14:47)  Auto Lymphocyte #: 2.22 K/uL (09-20-20 @ 14:47)    Lactate, Blood: 2.0 mmol/L (09-20-20 @ 14:47)    Auto Eosinophil #: 0.36 K/uL (09-27-20 @ 07:21)  Auto Eosinophil #: 0.33 K/uL (09-26-20 @ 08:07)  Auto Eosinophil #: 0.31 K/uL (09-25-20 @ 08:15)  Auto Eosinophil #: 0.29 K/uL (09-24-20 @ 07:34)  Auto Eosinophil #: 0.28 K/uL (09-23-20 @ 07:56)  Auto Eosinophil #: 0.34 K/uL (09-22-20 @ 09:16)  Auto Eosinophil #: 0.24 K/uL (09-21-20 @ 08:51)  Auto Eosinophil #: 0.19 K/uL (09-20-20 @ 14:47)                  Ferritin, Serum: 443 ng/mL (09-22-20 @ 11:38)        Activated Partial Thromboplastin Time: 27.1 sec (09-20-20 @ 14:47)  INR: 1.48 ratio (09-20-20 @ 14:47)          MICROBIOLOGY:              RADIOLOGY & ADDITIONAL STUDIES:

## 2020-09-27 NOTE — PROGRESS NOTE ADULT - SUBJECTIVE AND OBJECTIVE BOX
[facility] -- Zakia Barraza, Oskar, Maye, Matthias Oliva Savella  Office # [phone]      Follow Up:    abdominoplasty wound dehiscences young with CAD    Subjective/Observations:   No events overnight resting comfortably in bed.  No complaints of chest pain, dyspnea, or palpitations reported. No signs of orthopnea or PND.     REVIEW OF SYSTEMS: All other review of systems is negative unless indicated above    PAST MEDICAL & SURGICAL HISTORY:  Obesity    Hypothyroid  Resolved    Coronary artery disease  s/p MIGUEL ANGEL x 3 (9/2019), MIGUEL ANGEL x1 (2014)    HLD (hyperlipidemia)    HTN (hypertension)    S/P coronary artery stent placement  mLAD x1 MIGUEL ANGEL (2014),  3 stents 09/2019    H/O total hysterectomy  2017        MEDICATIONS  (STANDING):  0.9% Sodium Chloride/ Betadine 1000 milliLiter(s) 1000 milliLiter(s) Irrigation <User Schedule>  aspirin  chewable 81 milliGRAM(s) Oral daily  atorvastatin 40 milliGRAM(s) Oral at bedtime  chlorhexidine 2% Cloths 1 Application(s) Topical every 12 hours  enoxaparin Injectable 40 milliGRAM(s) SubCutaneous at bedtime  meropenem  IVPB      meropenem  IVPB 1000 milliGRAM(s) IV Intermittent every 8 hours  metoprolol tartrate 50 milliGRAM(s) Oral two times a day    MEDICATIONS  (PRN):  acetaminophen   Tablet .. 650 milliGRAM(s) Oral every 6 hours PRN Mild Pain (1 - 3)  benzonatate 100 milliGRAM(s) Oral three times a day PRN Cough      Allergies    codeine (Unknown)    Intolerances        Vital Signs Last 24 Hrs  T(C): 36.7 (27 Sep 2020 05:16), Max: 36.8 (26 Sep 2020 13:29)  T(F): 98.1 (27 Sep 2020 05:16), Max: 98.2 (26 Sep 2020 13:29)  HR: 100 (27 Sep 2020 05:16) (89 - 100)  BP: 114/80 (27 Sep 2020 05:16) (96/66 - 114/80)  BP(mean): --  RR: 18 (27 Sep 2020 05:16) (18 - 18)  SpO2: 98% (27 Sep 2020 05:16) (97% - 98%)    I&O's Summary    26 Sep 2020 07:01  -  27 Sep 2020 07:00  --------------------------------------------------------  IN: 50 mL / OUT: 0 mL / NET: 50 mL          PHYSICAL EXAM:  TELE: Off tele   Constitutional: NAD, awake and alert, well-developed  HEENT: Moist Mucous Membranes, Anicteric  Pulmonary: Non-labored, breath sounds are clear bilaterally, No wheezing, crackles or rhonchi  Cardiovascular: Regular, S1 and S2 nl, No murmurs, rubs, gallops or clicks  Gastrointestinal: Bowel Sounds present, soft, nontender.   Lymph: No lymphadenopathy. No peripheral edema.  Skin: No visible rashes or ulcers.  Psych:  Mood & affect appropriate    LABS: All Labs Reviewed:                        8.8    12.76 )-----------( 532      ( 27 Sep 2020 07:21 )             27.9                         9.2    11.79 )-----------( 533      ( 26 Sep 2020 08:07 )             28.1                         8.8    11.44 )-----------( 514      ( 25 Sep 2020 08:15 )             26.3     27 Sep 2020 07:21    140    |  106    |  22     ----------------------------<  116    4.1     |  26     |  0.84   26 Sep 2020 08:07    139    |  105    |  17     ----------------------------<  116    4.2     |  26     |  0.87   25 Sep 2020 08:15    140    |  107    |  19     ----------------------------<  110    4.0     |  28     |  0.91     Ca    8.8        27 Sep 2020 07:21  Ca    9.1        26 Sep 2020 08:07  Ca    8.6        25 Sep 2020 08:15    TPro  7.2    /  Alb  2.1    /  TBili  0.4    /  DBili  x      /  AST  25     /  ALT  29     /  AlkPhos  110    26 Sep 2020 08:07

## 2020-09-27 NOTE — PROGRESS NOTE ADULT - PROBLEM SELECTOR PLAN 1
- CT abd/pelvis w/ IV cont significant for extensive defects w/in lower abd wall with foci of gas & inflammation, worrisome for gas forming infection, loculations of fluid are noted within ab wall  -Patient afebrile, hemodynamic stable.   -Plan to change wound vac dressing on Monday with debridement of nonviable tissue with plastic surgery   -Continue wound vac with betadine/saline irrigation  -Continue Meropenem IV (through 10/1) following ID recommendation, Dr. Gomez   -Wound cultures +ESBL, few enterococcus faecalis, rare pseudomonas  -Blood cxs negative   -Plastic surgery following, Dr. Ruiz, recs appreciated    -Pain well controlled with Tylenol, continue PRN

## 2020-09-27 NOTE — PROGRESS NOTE ADULT - ASSESSMENT
45yo F w/ PMHx of CAD (mLAD x1 MIGUEL ANGEL in 2014, x3 stents 09/2019), HTN, prediabetes, hyperlipidemia presents to ED for pain of wound s/p panniculectomy on 9/1 admitted for cellulitis and partial wound dehiscence with infection. PICC line placed 9/23, Pt tolerated without complications. Patient c/o dry intermittent cough that is worse at night 43yo F w/ PMHx of CAD (mLAD x1 MIGUEL ANGEL in 2014, x3 stents 09/2019), HTN, prediabetes, hyperlipidemia presents to ED for pain of wound s/p panniculectomy on 9/1 admitted for cellulitis and partial wound dehiscence with infection. PICC line placed 9/23, Pt tolerated without complications. Patient c/o dry intermittent cough that is worse at night

## 2020-09-27 NOTE — PROGRESS NOTE ADULT - ASSESSMENT
44 year old female PMHx CAD (mLAD x1 MIGUEL ANGEL (2014), x3 stents 09/2019), HTN, prediabetes, hyperlipidemia, recent COVID-19 infection admitted for wound dehiscence and possible revision.    Obstructive CAD s/p PCI 9/ 2019   - Cardiologist Dr. Abdoul Lam from [facility]  - No acute changes on EKG compared to previous  - Echo 9/3/2020: trace MR, trace TR, LVEF 60-65%   - Continue ASA 81 and lopressor 50mg po bid and high intensity statin lipitor 40mg po qhs    HTN and Hemodynamics  - BP: 114/80 (09-27-20 @ 05:16) (96/66 - 114/80)  -heart rate slightly elevated in setting of infection and anemia  - Continue current dose of metoprolol 50 mg BID with hold parameters     Anemia  -work up as per primary team   -s/p 1 unit of PRBC   -transfuse to keep HGB greater than 8 given CAD    HLD  - Continue atorvastatin 40 mg qhs     Cellulitis   -wound cellulitis now sp wound vac  -follow up id, wound, plastic surgery recs    Mack May DNP, ANP-c  Cardiology   Spectra #[phone]  [phone] 44 year old female PMHx CAD (mLAD x1 MIGUEL ANGEL (2014), x3 stents 09/2019), HTN, prediabetes, hyperlipidemia, recent COVID-19 infection admitted for wound dehiscence and possible revision.    Obstructive CAD s/p PCI 9/ 2019   - Cardiologist Dr. Abdoul Lam from [facility]  - No acute changes on EKG compared to previous  - Echo 9/3/2020: trace MR, trace TR, LVEF 60-65%   - Continue ASA 81 and lopressor 50mg po bid and high intensity statin lipitor 40mg po qhs    HTN and Hemodynamics  - BP: 114/80 (09-27-20 @ 05:16) (96/66 - 114/80)  - heart rate slightly elevated in setting of infection and anemia  - Continue current dose of metoprolol 50 mg BID with hold parameters     Anemia  - work up as per primary team   - s/p 1 unit of PRBC   - transfuse to keep HGB greater than 8 given CAD    HLD  - Continue atorvastatin 40 mg qhs     Cellulitis   - wound cellulitis now sp wound vac  - follow up id, wound, plastic surgery recs    Mack May DNP, ANP-c  Cardiology   Spectra #[id number]  [phone]

## 2020-09-27 NOTE — PROGRESS NOTE ADULT - PROBLEM SELECTOR PLAN 3
-Pt with dry cough mostly at night   -CXR on admission; showed, normal heart mediastinum. No pleural-parenchymal abnormality  -Will do supportive care with Robitussin   - Incentive spirometer. Acute, Pt with dry cough, mostly at night   -CXR on admission negative for acute findings   -Add Robitussin and incentive spirometer  -Cont Tessalon Perles

## 2020-09-27 NOTE — PROGRESS NOTE ADULT - SUBJECTIVE AND OBJECTIVE BOX
Patient is a 44y old  Female who presents with a chief complaint of Wound dehiscence s/p panniculectomy on 9/1       INTERVAL/OVERNIGHT EVENTS:  Patient examined at beside. No acute events over night as per nursing staff. Patient refers dry intermittent cough, that is worse at night. Patient is tolerating regular diet w/o nausea/vomiting/diarrhea/abdominal pain. Patient is voiding actively and las BM yesterday. Patient is. Patient denies chest pain, calf pain, abdominal pain, headaches, fever, chills, dysuria, hematuria, diarrhea, constipation, SOB, palpitations.    ROS   CONSTITUTIONAL: denies fever, chills, fatigue, weakness  CARDIOVASCULAR: denies chest pain, chest pressure, palpitations  RESPIRATORY: denies shortness of breath. Admits dry cough.   GASTROINTESTINAL: denies nausea, vomiting, diarrhea, abdominal pain  GENITOURINARY: denies dysuria, discharge  NEUROLOGICAL: denies numbness, headache    Vital Signs Last 24 Hrs  T(C): 36.7 (27 Sep 2020 05:16), Max: 36.8 (26 Sep 2020 13:29)  T(F): 98.1 (27 Sep 2020 05:16), Max: 98.2 (26 Sep 2020 13:29)  HR: 100 (27 Sep 2020 05:16) (89 - 100)  BP: 114/80 (27 Sep 2020 05:16) (96/66 - 114/80)  RR: 18 (27 Sep 2020 05:16) (18 - 18)  SpO2: 98% (27 Sep 2020 05:16) (97% - 98%)    Physical Exam   GENERAL: NAD, resting comfortably in bed   CHEST/LUNG: CTA b/L; No rales, rhonchi, wheezing.   HEART: Regular rate and rhythm; +S1/S2. No murmurs, rubs, or gallops  ABDOMEN: Soft, Nontender, Nondistended; Bowel sounds present  EXTREMITIES:  2+ Peripheral Pulses, No clubbing, cyanosis, or edema  NERVOUS SYSTEM:  Alert & Oriented X3.  SKIN: Large lower transverse incision wound - wound vac in place      LABS                         8.8    12.76 )-----------( 532      ( 27 Sep 2020 07:21 )             27.9         09-27    140  |  106  |  22  ----------------------------<  116<H>  4.1   |  26  |  0.84    Ca    8.8      27 Sep 2020 07:21    TPro  7.2  /  Alb  2.1<L>  /  TBili  0.4  /  DBili  x   /  AST  25  /  ALT  29  /  AlkPhos  110  09-26  LIVER FUNCTIONS - ( 26 Sep 2020 08:07 )  Alb: 2.1 g/dL / Pro: 7.2 g/dL / ALK PHOS: 110 U/L / ALT: 29 U/L / AST: 25 U/L / GGT: x             MEDICATIONS  (STANDING):  0.9% Sodium Chloride/ Betadine 1000 milliLiter(s) 1000 milliLiter(s) Irrigation <User Schedule>  aspirin  chewable 81 milliGRAM(s) Oral daily  atorvastatin 40 milliGRAM(s) Oral at bedtime  chlorhexidine 2% Cloths 1 Application(s) Topical every 12 hours  enoxaparin Injectable 40 milliGRAM(s) SubCutaneous at bedtime  meropenem  IVPB      meropenem  IVPB 1000 milliGRAM(s) IV Intermittent every 8 hours  metoprolol tartrate 50 milliGRAM(s) Oral two times a day    MEDICATIONS  (PRN):  acetaminophen   Tablet .. 650 milliGRAM(s) Oral every 6 hours PRN Mild Pain (1 - 3)  benzonatate 100 milliGRAM(s) Oral three times a day PRN Cough             Patient is a 44y old  Female who presents with a chief complaint of Wound dehiscence s/p panniculectomy on 9/1     INTERVAL/OVERNIGHT EVENTS:  Patient seen and examined at beside. No acute events overnight reported. Patient refers dry intermittent cough, that is worse at night. Patient is tolerating regular diet w/o nausea/vomiting/diarrhea/abdominal pain, last BM yesterday. Pt denies fevers, chills, HA, chest pain, palpitations, SOB.    ROS   CONSTITUTIONAL: denies fever, chills, fatigue, weakness  CARDIOVASCULAR: denies chest pain, chest pressure, palpitations  RESPIRATORY: denies shortness of breath. Admits dry cough.   GASTROINTESTINAL: denies nausea, vomiting, diarrhea, abdominal pain  GENITOURINARY: denies dysuria, discharge  NEUROLOGICAL: denies numbness, headache    Vital Signs Last 24 Hrs  T(C): 36.7 (27 Sep 2020 05:16), Max: 36.8 (26 Sep 2020 13:29)  T(F): 98.1 (27 Sep 2020 05:16), Max: 98.2 (26 Sep 2020 13:29)  HR: 100 (27 Sep 2020 05:16) (89 - 100)  BP: 114/80 (27 Sep 2020 05:16) (96/66 - 114/80)  RR: 18 (27 Sep 2020 05:16) (18 - 18)  SpO2: 98% (27 Sep 2020 05:16) (97% - 98%)    Physical Exam   GENERAL: NAD, resting comfortably in bed   CHEST/LUNG: CTA b/L; No rales, rhonchi, wheezing.   HEART: Regular rate and rhythm; +S1/S2. No murmurs, rubs, or gallops  ABDOMEN: Soft, Nontender, Nondistended; Bowel sounds present  EXTREMITIES:  2+ Peripheral Pulses, No clubbing, cyanosis, or edema  NERVOUS SYSTEM:  Alert & Oriented X3.  SKIN: Large lower transverse incision wound - wound vac in place      LABS                         8.8    12.76 )-----------( 532      ( 27 Sep 2020 07:21 )             27.9         09-27    140  |  106  |  22  ----------------------------<  116<H>  4.1   |  26  |  0.84    Ca    8.8      27 Sep 2020 07:21    TPro  7.2  /  Alb  2.1<L>  /  TBili  0.4  /  DBili  x   /  AST  25  /  ALT  29  /  AlkPhos  110  09-26  LIVER FUNCTIONS - ( 26 Sep 2020 08:07 )  Alb: 2.1 g/dL / Pro: 7.2 g/dL / ALK PHOS: 110 U/L / ALT: 29 U/L / AST: 25 U/L / GGT: x             MEDICATIONS  (STANDING):  0.9% Sodium Chloride/ Betadine 1000 milliLiter(s) 1000 milliLiter(s) Irrigation <User Schedule>  aspirin  chewable 81 milliGRAM(s) Oral daily  atorvastatin 40 milliGRAM(s) Oral at bedtime  chlorhexidine 2% Cloths 1 Application(s) Topical every 12 hours  enoxaparin Injectable 40 milliGRAM(s) SubCutaneous at bedtime  meropenem  IVPB      meropenem  IVPB 1000 milliGRAM(s) IV Intermittent every 8 hours  metoprolol tartrate 50 milliGRAM(s) Oral two times a day    MEDICATIONS  (PRN):  acetaminophen   Tablet .. 650 milliGRAM(s) Oral every 6 hours PRN Mild Pain (1 - 3)  benzonatate 100 milliGRAM(s) Oral three times a day PRN Cough

## 2020-09-28 LAB
ANION GAP SERPL CALC-SCNC: 8 MMOL/L — SIGNIFICANT CHANGE UP (ref 5–17)
BASOPHILS # BLD AUTO: 0.05 K/UL — SIGNIFICANT CHANGE UP (ref 0–0.2)
BASOPHILS NFR BLD AUTO: 0.4 % — SIGNIFICANT CHANGE UP (ref 0–2)
BUN SERPL-MCNC: 20 MG/DL — SIGNIFICANT CHANGE UP (ref 7–23)
CALCIUM SERPL-MCNC: 9 MG/DL — SIGNIFICANT CHANGE UP (ref 8.5–10.1)
CHLORIDE SERPL-SCNC: 104 MMOL/L — SIGNIFICANT CHANGE UP (ref 96–108)
CO2 SERPL-SCNC: 27 MMOL/L — SIGNIFICANT CHANGE UP (ref 22–31)
CREAT SERPL-MCNC: 0.93 MG/DL — SIGNIFICANT CHANGE UP (ref 0.5–1.3)
EOSINOPHIL # BLD AUTO: 0.35 K/UL — SIGNIFICANT CHANGE UP (ref 0–0.5)
EOSINOPHIL NFR BLD AUTO: 2.7 % — SIGNIFICANT CHANGE UP (ref 0–6)
GLUCOSE SERPL-MCNC: 119 MG/DL — HIGH (ref 70–99)
HCT VFR BLD CALC: 31.6 % — LOW (ref 34.5–45)
HGB BLD-MCNC: 10.1 G/DL — LOW (ref 11.5–15.5)
IMM GRANULOCYTES NFR BLD AUTO: 0.8 % — SIGNIFICANT CHANGE UP (ref 0–1.5)
LYMPHOCYTES # BLD AUTO: 28.6 % — SIGNIFICANT CHANGE UP (ref 13–44)
LYMPHOCYTES # BLD AUTO: 3.72 K/UL — HIGH (ref 1–3.3)
MCHC RBC-ENTMCNC: 28.5 PG — SIGNIFICANT CHANGE UP (ref 27–34)
MCHC RBC-ENTMCNC: 32 GM/DL — SIGNIFICANT CHANGE UP (ref 32–36)
MCV RBC AUTO: 89.3 FL — SIGNIFICANT CHANGE UP (ref 80–100)
MONOCYTES # BLD AUTO: 0.95 K/UL — HIGH (ref 0–0.9)
MONOCYTES NFR BLD AUTO: 7.3 % — SIGNIFICANT CHANGE UP (ref 2–14)
NEUTROPHILS # BLD AUTO: 7.84 K/UL — HIGH (ref 1.8–7.4)
NEUTROPHILS NFR BLD AUTO: 60.2 % — SIGNIFICANT CHANGE UP (ref 43–77)
NRBC # BLD: 0 /100 WBCS — SIGNIFICANT CHANGE UP (ref 0–0)
PLATELET # BLD AUTO: 617 K/UL — HIGH (ref 150–400)
POTASSIUM SERPL-MCNC: 4.3 MMOL/L — SIGNIFICANT CHANGE UP (ref 3.5–5.3)
POTASSIUM SERPL-SCNC: 4.3 MMOL/L — SIGNIFICANT CHANGE UP (ref 3.5–5.3)
RBC # BLD: 3.54 M/UL — LOW (ref 3.8–5.2)
RBC # FLD: 14.5 % — SIGNIFICANT CHANGE UP (ref 10.3–14.5)
SODIUM SERPL-SCNC: 139 MMOL/L — SIGNIFICANT CHANGE UP (ref 135–145)
WBC # BLD: 13.01 K/UL — HIGH (ref 3.8–10.5)
WBC # FLD AUTO: 13.01 K/UL — HIGH (ref 3.8–10.5)

## 2020-09-28 PROCEDURE — 99233 SBSQ HOSP IP/OBS HIGH 50: CPT

## 2020-09-28 PROCEDURE — 99232 SBSQ HOSP IP/OBS MODERATE 35: CPT

## 2020-09-28 RX ADMIN — MEROPENEM 100 MILLIGRAM(S): 1 INJECTION INTRAVENOUS at 13:46

## 2020-09-28 RX ADMIN — Medication 650 MILLIGRAM(S): at 07:39

## 2020-09-28 RX ADMIN — Medication 650 MILLIGRAM(S): at 08:35

## 2020-09-28 RX ADMIN — MEROPENEM 100 MILLIGRAM(S): 1 INJECTION INTRAVENOUS at 05:15

## 2020-09-28 RX ADMIN — ENOXAPARIN SODIUM 40 MILLIGRAM(S): 100 INJECTION SUBCUTANEOUS at 21:50

## 2020-09-28 RX ADMIN — Medication 81 MILLIGRAM(S): at 11:03

## 2020-09-28 RX ADMIN — MEROPENEM 100 MILLIGRAM(S): 1 INJECTION INTRAVENOUS at 21:50

## 2020-09-28 RX ADMIN — ATORVASTATIN CALCIUM 40 MILLIGRAM(S): 80 TABLET, FILM COATED ORAL at 21:51

## 2020-09-28 RX ADMIN — CHLORHEXIDINE GLUCONATE 1 APPLICATION(S): 213 SOLUTION TOPICAL at 05:15

## 2020-09-28 RX ADMIN — CHLORHEXIDINE GLUCONATE 1 APPLICATION(S): 213 SOLUTION TOPICAL at 18:36

## 2020-09-28 RX ADMIN — Medication 50 MILLIGRAM(S): at 18:36

## 2020-09-28 RX ADMIN — Medication 50 MILLIGRAM(S): at 05:15

## 2020-09-28 NOTE — PROGRESS NOTE ADULT - SUBJECTIVE AND OBJECTIVE BOX
Patient is a 44y old  Female who presents with a chief complaint of Wound dehiscence s/p panniculectomy on 9/1 (28 Sep 2020 10:25)      INTERVAL HPI/OVERNIGHT EVENTS: Patient seen and examined at bedside. No overnight events occurred. Patient has no complaints at this time. Denies fevers, chills, headache, lightheadedness, chest pain, dyspnea, abdominal pain, n/v/d/c.    MEDICATIONS  (STANDING):  0.9% Sodium Chloride/ Betadine 1000 milliLiter(s) 1000 milliLiter(s) Irrigation <User Schedule>  aspirin  chewable 81 milliGRAM(s) Oral daily  atorvastatin 40 milliGRAM(s) Oral at bedtime  chlorhexidine 2% Cloths 1 Application(s) Topical every 12 hours  enoxaparin Injectable 40 milliGRAM(s) SubCutaneous at bedtime  meropenem  IVPB      meropenem  IVPB 1000 milliGRAM(s) IV Intermittent every 8 hours  metoprolol tartrate 50 milliGRAM(s) Oral two times a day    MEDICATIONS  (PRN):  acetaminophen   Tablet .. 650 milliGRAM(s) Oral every 6 hours PRN Mild Pain (1 - 3)  benzonatate 100 milliGRAM(s) Oral three times a day PRN Cough  guaiFENesin   Syrup  (Sugar-Free) 100 milliGRAM(s) Oral every 6 hours PRN Cough not relieved by Tessalon Perles      Allergies    codeine (Unknown)    Intolerances        REVIEW OF SYSTEMS:  CONSTITUTIONAL: No fever or chills  HEENT:  No headache, no sore throat  RESPIRATORY: No cough, wheezing, or shortness of breath  CARDIOVASCULAR: No chest pain, palpitations  GASTROINTESTINAL: No abd pain, nausea, vomiting, or diarrhea  GENITOURINARY: No dysuria, frequency, or hematuria  NEUROLOGICAL: no focal weakness or dizziness  MUSCULOSKELETAL: no myalgias     Vital Signs Last 24 Hrs  T(C): 36.8 (28 Sep 2020 05:20), Max: 36.9 (27 Sep 2020 20:42)  T(F): 98.2 (28 Sep 2020 05:20), Max: 98.4 (27 Sep 2020 20:42)  HR: 97 (28 Sep 2020 05:20) (92 - 98)  BP: 111/74 (28 Sep 2020 05:20) (106/70 - 130/90)  BP(mean): --  RR: 18 (28 Sep 2020 05:20) (17 - 18)  SpO2: 93% (28 Sep 2020 05:20) (93% - 100%)    PHYSICAL EXAM:  GENERAL: NAD  HEENT:  anicteric, moist mucous membranes  CHEST/LUNG:  CTA b/l, no rales, wheezes, or rhonchi  HEART:  RRR, S1, S2  ABDOMEN:  BS+, soft, nontender, nondistended  EXTREMITIES: no edema, cyanosis, or calf tenderness  NERVOUS SYSTEM: answers questions and follows commands appropriately    LABS:                        10.1   13.01 )-----------( 617      ( 28 Sep 2020 08:06 )             31.6     CBC Full  -  ( 28 Sep 2020 08:06 )  WBC Count : 13.01 K/uL  Hemoglobin : 10.1 g/dL  Hematocrit : 31.6 %  Platelet Count - Automated : 617 K/uL  Mean Cell Volume : 89.3 fl  Mean Cell Hemoglobin : 28.5 pg  Mean Cell Hemoglobin Concentration : 32.0 gm/dL  Auto Neutrophil # : 7.84 K/uL  Auto Lymphocyte # : 3.72 K/uL  Auto Monocyte # : 0.95 K/uL  Auto Eosinophil # : 0.35 K/uL  Auto Basophil # : 0.05 K/uL  Auto Neutrophil % : 60.2 %  Auto Lymphocyte % : 28.6 %  Auto Monocyte % : 7.3 %  Auto Eosinophil % : 2.7 %  Auto Basophil % : 0.4 %    28 Sep 2020 08:06    139    |  104    |  20     ----------------------------<  119    4.3     |  27     |  0.93     Ca    9.0        28 Sep 2020 08:06          CAPILLARY BLOOD GLUCOSE              RADIOLOGY & ADDITIONAL TESTS:    Personally reviewed.     Consultant(s) Notes Reviewed:  [x] YES  [ ] NO

## 2020-09-28 NOTE — PROGRESS NOTE ADULT - ASSESSMENT
44 year old female PMHx CAD (mLAD x1 MIGUEL ANGEL (2014), x3 stents 09/2019), HTN, prediabetes, hyperlipidemia, recent COVID-19 infection admitted for wound dehiscence and possible revision.    Obstructive CAD s/p PCI 9/ 2019   - Cardiologist Dr. Abdoul [facility]  - No acute changes on EKG compared to previous  - Echo 9/3/2020: trace MR, trace TR, LVEF 60-65%   - Continue ASA 81 and lopressor 50mg po bid and high intensity statin lipitor 40mg po qhs    HTN and Hemodynamics  - Stable  - heart rate slightly elevated in setting of infection and anemia  - Continue current dose of metoprolol 50 mg BID with hold parameters     Anemia  - work up as per primary team   - s/p 1 unit of PRBC   - transfuse to keep HGB greater than 8 given CAD    HLD  - Continue atorvastatin 40 mg qhs     Cellulitis   - wound cellulitis now sp wound vac  - follow up id, wound, plastic surgery recs    Mack May DNP, ANP-c  Cardiology   Spectra #[id number]  [phone]

## 2020-09-28 NOTE — ADVANCED PRACTICE NURSE CONSULT - ASSESSMENT
Veraflo NPWT dressing changed 90% red granulation tissue, patient premedicated with tylenol, no odor, superior periwound soft necrotic tissue debrided by surgeon today patient tolerated procedure without discomfort/pain:     Veraflo negative pressure wound therapy (NPWT) applied continuous therapy 125mmHg, 50mL patient tolerated dressing change with minor discomfort/pain

## 2020-09-28 NOTE — PROGRESS NOTE ADULT - PROBLEM SELECTOR PLAN 1
- CT abd/pelvis w/ IV cont significant for extensive defects w/in lower abd wall with foci of gas & inflammation, worrisome for gas forming infection, loculations of fluid are noted within ab wall  -Patient afebrile, hemodynamic stable.   -Wound vac dressing changed today with plastic surgery   -Continue wound vac with betadine/saline irrigation  -Continue Meropenem IV (through 10/2) following ID recommendation, Dr. Gomez   -Wound cultures +ESBL, few enterococcus faecalis, rare pseudomonas  -Blood cxs negative   -Plastic surgery following, Dr. Ruiz, recs appreciated    -Pain well controlled with Tylenol, continue PRN

## 2020-09-28 NOTE — PROGRESS NOTE ADULT - SUBJECTIVE AND OBJECTIVE BOX
[facility] DIVISION of INFECTIOUS DISEASE  Capo Gomez MD PhD, Cat Rodriguez MD, nAdree Randall MD, Alicja Rizzo MD  and providing coverage with Liliana Workman MD and Isaac Loyola MD  Providing Infectious Disease Consultations at [facility], [facility], [facility]'s    Office# [phone] to schedule follow up appointments  Answering Service for urgent calls or New Consults [phone]  Cell# to text for urgent issues Capo Gomez [phone]     infectious diseases progress note:    LOUIE QUINTANILLA is a 44y y. o. Female patient    No concerning overnight events, pt up and walking    Allergies    codeine (Unknown)    Intolerances        ANTIBIOTICS/RELEVANT:  antimicrobials  meropenem  IVPB      meropenem  IVPB 1000 milliGRAM(s) IV Intermittent every 8 hours    immunologic:    OTHER:  0.9% Sodium Chloride/ Betadine 1000 milliLiter(s) 1000 milliLiter(s) Irrigation <User Schedule>  acetaminophen   Tablet .. 650 milliGRAM(s) Oral every 6 hours PRN  aspirin  chewable 81 milliGRAM(s) Oral daily  atorvastatin 40 milliGRAM(s) Oral at bedtime  benzonatate 100 milliGRAM(s) Oral three times a day PRN  chlorhexidine 2% Cloths 1 Application(s) Topical every 12 hours  enoxaparin Injectable 40 milliGRAM(s) SubCutaneous at bedtime  guaiFENesin   Syrup  (Sugar-Free) 100 milliGRAM(s) Oral every 6 hours PRN  metoprolol tartrate 50 milliGRAM(s) Oral two times a day      Objective:  Vital Signs Last 24 Hrs  T(C): 36.8 (28 Sep 2020 05:20), Max: 36.9 (27 Sep 2020 20:42)  T(F): 98.2 (28 Sep 2020 05:20), Max: 98.4 (27 Sep 2020 20:42)  HR: 97 (28 Sep 2020 05:20) (92 - 98)  BP: 111/74 (28 Sep 2020 05:20) (106/70 - 130/90)  BP(mean): --  RR: 18 (28 Sep 2020 05:20) (17 - 18)  SpO2: 93% (28 Sep 2020 05:20) (93% - 100%)    T(C): 36.8 (09-28-20 @ 05:20), Max: 36.9 (09-27-20 @ 20:42)  T(C): 36.8 (09-28-20 @ 05:20), Max: 37.1 (09-26-20 @ 05:17)  T(C): 36.8 (09-28-20 @ 05:20), Max: 37.4 (09-24-20 @ 18:10)    PHYSICAL EXAM:  HEENT: NC atraumatic  Neck: supple  Respiratory: no accessory muscle use, breathing comfortably  Cardiovascular: distant  Gastrointestinal: normal appearing, nondistended  Extremities: no clubbing, no cyanosis,      LABS:                          10.1   13.01 )-----------( 617      ( 28 Sep 2020 08:06 )             31.6       13.01 09-28 @ 08:06  12.76 09-27 @ 07:21  11.79 09-26 @ 08:07  11.44 09-25 @ 08:15  11.27 09-24 @ 07:34  11.86 09-23 @ 07:56  11.44 09-22 @ 09:16      09-28    139  |  104  |  20  ----------------------------<  119<H>  4.3   |  27  |  0.93    Ca    9.0      28 Sep 2020 08:06        Creatinine, Serum: 0.93 mg/dL (09-28-20 @ 08:06)  Creatinine, Serum: 0.84 mg/dL (09-27-20 @ 07:21)  Creatinine, Serum: 0.87 mg/dL (09-26-20 @ 08:07)  Creatinine, Serum: 0.91 mg/dL (09-25-20 @ 08:15)  Creatinine, Serum: 0.86 mg/dL (09-24-20 @ 07:34)  Creatinine, Serum: 0.98 mg/dL (09-23-20 @ 07:56)  Creatinine, Serum: 0.96 mg/dL (09-22-20 @ 09:16)                INFLAMMATORY MARKERS  Auto Neutrophil #: 7.84 K/uL (09-28-20 @ 08:06)  Auto Lymphocyte #: 3.72 K/uL (09-28-20 @ 08:06)  Auto Neutrophil #: 7.80 K/uL (09-27-20 @ 07:21)  Auto Lymphocyte #: 3.34 K/uL (09-27-20 @ 07:21)  Auto Neutrophil #: 7.61 K/uL (09-26-20 @ 08:07)  Auto Lymphocyte #: 2.68 K/uL (09-26-20 @ 08:07)  Auto Neutrophil #: 6.70 K/uL (09-25-20 @ 08:15)  Auto Lymphocyte #: 3.21 K/uL (09-25-20 @ 08:15)  Auto Neutrophil #: 7.09 K/uL (09-24-20 @ 07:34)  Auto Lymphocyte #: 2.60 K/uL (09-24-20 @ 07:34)  Auto Neutrophil #: 7.54 K/uL (09-23-20 @ 07:56)  Auto Lymphocyte #: 2.67 K/uL (09-23-20 @ 07:56)  Auto Neutrophil #: 6.93 K/uL (09-22-20 @ 09:16)  Auto Lymphocyte #: 2.66 K/uL (09-22-20 @ 09:16)  Auto Neutrophil #: 7.70 K/uL (09-21-20 @ 08:51)  Auto Lymphocyte #: 2.02 K/uL (09-21-20 @ 08:51)  Auto Neutrophil #: 8.18 K/uL (09-20-20 @ 14:47)  Auto Lymphocyte #: 2.22 K/uL (09-20-20 @ 14:47)    Lactate, Blood: 2.0 mmol/L (09-20-20 @ 14:47)    Auto Eosinophil #: 0.35 K/uL (09-28-20 @ 08:06)  Auto Eosinophil #: 0.36 K/uL (09-27-20 @ 07:21)  Auto Eosinophil #: 0.33 K/uL (09-26-20 @ 08:07)  Auto Eosinophil #: 0.31 K/uL (09-25-20 @ 08:15)  Auto Eosinophil #: 0.29 K/uL (09-24-20 @ 07:34)  Auto Eosinophil #: 0.28 K/uL (09-23-20 @ 07:56)  Auto Eosinophil #: 0.34 K/uL (09-22-20 @ 09:16)  Auto Eosinophil #: 0.24 K/uL (09-21-20 @ 08:51)  Auto Eosinophil #: 0.19 K/uL (09-20-20 @ 14:47)                  Ferritin, Serum: 443 ng/mL (09-22-20 @ 11:38)        Activated Partial Thromboplastin Time: 27.1 sec (09-20-20 @ 14:47)  INR: 1.48 ratio (09-20-20 @ 14:47)          MICROBIOLOGY:              RADIOLOGY & ADDITIONAL STUDIES:

## 2020-09-28 NOTE — PROGRESS NOTE ADULT - PROBLEM SELECTOR PLAN 3
Acute, Pt with dry cough, mostly at night   -CXR on admission negative for acute findings   -Add Robitussin and incentive spirometer  -Cont Tessalon Perles

## 2020-09-28 NOTE — PROGRESS NOTE ADULT - SUBJECTIVE AND OBJECTIVE BOX
[facility] -- Zakia Barraza, Oskar, Maye, Matthias Oliva Savella  Office # [phone]      Follow Up:    CAD  Subjective/Observations:   No events overnight resting comfortably in bed.  No complaints of chest pain, dyspnea, or palpitations reported. No signs of orthopnea or PND.     REVIEW OF SYSTEMS: All other review of systems is negative unless indicated above    PAST MEDICAL & SURGICAL HISTORY:  Obesity    Hypothyroid  Resolved    Coronary artery disease  s/p MIGUEL ANGEL x 3 (9/2019), MIGUEL ANGEL x1 (2014)    HLD (hyperlipidemia)    HTN (hypertension)    S/P coronary artery stent placement  mLAD x1 MIGUEL ANGEL (2014),  3 stents 09/2019    H/O total hysterectomy  2017        MEDICATIONS  (STANDING):  0.9% Sodium Chloride/ Betadine 1000 milliLiter(s) 1000 milliLiter(s) Irrigation <User Schedule>  aspirin  chewable 81 milliGRAM(s) Oral daily  atorvastatin 40 milliGRAM(s) Oral at bedtime  chlorhexidine 2% Cloths 1 Application(s) Topical every 12 hours  enoxaparin Injectable 40 milliGRAM(s) SubCutaneous at bedtime  meropenem  IVPB      meropenem  IVPB 1000 milliGRAM(s) IV Intermittent every 8 hours  metoprolol tartrate 50 milliGRAM(s) Oral two times a day    MEDICATIONS  (PRN):  acetaminophen   Tablet .. 650 milliGRAM(s) Oral every 6 hours PRN Mild Pain (1 - 3)  benzonatate 100 milliGRAM(s) Oral three times a day PRN Cough  guaiFENesin   Syrup  (Sugar-Free) 100 milliGRAM(s) Oral every 6 hours PRN Cough not relieved by Tessalon Perles      Allergies    codeine (Unknown)    Intolerances        Vital Signs Last 24 Hrs  T(C): 36.8 (28 Sep 2020 05:20), Max: 36.9 (27 Sep 2020 20:42)  T(F): 98.2 (28 Sep 2020 05:20), Max: 98.4 (27 Sep 2020 20:42)  HR: 97 (28 Sep 2020 05:20) (92 - 98)  BP: 111/74 (28 Sep 2020 05:20) (106/70 - 130/90)  BP(mean): --  RR: 18 (28 Sep 2020 05:20) (17 - 18)  SpO2: 93% (28 Sep 2020 05:20) (93% - 100%)    I&O's Summary    27 Sep 2020 07:01  -  28 Sep 2020 07:00  --------------------------------------------------------  IN: 100 mL / OUT: 0 mL / NET: 100 mL          PHYSICAL EXAM:  TELE: Off tele   Constitutional: NAD, awake and alert, well-developed  HEENT: Moist Mucous Membranes, Anicteric  Pulmonary: Non-labored, breath sounds are clear bilaterally, No wheezing, crackles or rhonchi  Cardiovascular: Regular, S1 and S2 nl, No murmurs, rubs, gallops or clicks  Gastrointestinal: Bowel Sounds present, soft, nontender.   Lymph: No lymphadenopathy. No peripheral edema.  Skin: No visible rashes or ulcers.  Psych:  Mood & affect appropriate    LABS: All Labs Reviewed:                        10.1 13.01 )-----------( 617      ( 28 Sep 2020 08:06 )             31.6                         8.8    12.76 )-----------( 532      ( 27 Sep 2020 07:21 )             27.9                         9.2    11.79 )-----------( 533      ( 26 Sep 2020 08:07 )             28.1     28 Sep 2020 08:06    139    |  104    |  20     ----------------------------<  119    4.3     |  27     |  0.93   27 Sep 2020 07:21    140    |  106    |  22     ----------------------------<  116    4.1     |  26     |  0.84   26 Sep 2020 08:07    139    |  105    |  17     ----------------------------<  116    4.2     |  26     |  0.87     Ca    9.0        28 Sep 2020 08:06  Ca    8.8        27 Sep 2020 07:21  Ca    9.1        26 Sep 2020 08:07    TPro  7.2    /  Alb  2.1    /  TBili  0.4    /  DBili  x      /  AST  25     /  ALT  29     /  AlkPhos  110    26 Sep 2020 08:07                         [facility] -- Zakia Barraza, Oskar, Maye, Matthias Oliva Savella  Office # [phone]      Follow Up:    CAD  Subjective/Observations:   No events overnight resting comfortably in bed.  No complaints of chest pain, dyspnea, or palpitations reported. No signs of orthopnea or PND.     REVIEW OF SYSTEMS: All other review of systems is negative unless indicated above    PAST MEDICAL & SURGICAL HISTORY:  Obesity    Hypothyroid  Resolved    Coronary artery disease  s/p MIGUEL ANGEL x 3 (9/2019), MIGUEL ANGEL x1 (2014)    HLD (hyperlipidemia)    HTN (hypertension)    S/P coronary artery stent placement  mLAD x1 MIGUEL ANGEL (2014),  3 stents 09/2019    H/O total hysterectomy  2017        MEDICATIONS  (STANDING):  0.9% Sodium Chloride/ Betadine 1000 milliLiter(s) 1000 milliLiter(s) Irrigation <User Schedule>  aspirin  chewable 81 milliGRAM(s) Oral daily  atorvastatin 40 milliGRAM(s) Oral at bedtime  chlorhexidine 2% Cloths 1 Application(s) Topical every 12 hours  enoxaparin Injectable 40 milliGRAM(s) SubCutaneous at bedtime  meropenem  IVPB      meropenem  IVPB 1000 milliGRAM(s) IV Intermittent every 8 hours  metoprolol tartrate 50 milliGRAM(s) Oral two times a day    MEDICATIONS  (PRN):  acetaminophen   Tablet .. 650 milliGRAM(s) Oral every 6 hours PRN Mild Pain (1 - 3)  benzonatate 100 milliGRAM(s) Oral three times a day PRN Cough  guaiFENesin   Syrup  (Sugar-Free) 100 milliGRAM(s) Oral every 6 hours PRN Cough not relieved by Tessalon Perles      Allergies    codeine (Unknown)    Intolerances        Vital Signs Last 24 Hrs  T(C): 36.8 (28 Sep 2020 05:20), Max: 36.9 (27 Sep 2020 20:42)  T(F): 98.2 (28 Sep 2020 05:20), Max: 98.4 (27 Sep 2020 20:42)  HR: 97 (28 Sep 2020 05:20) (92 - 98)  BP: 111/74 (28 Sep 2020 05:20) (106/70 - 130/90)  BP(mean): --  RR: 18 (28 Sep 2020 05:20) (17 - 18)  SpO2: 93% (28 Sep 2020 05:20) (93% - 100%)    I&O's Summary    27 Sep 2020 07:01  -  28 Sep 2020 07:00  --------------------------------------------------------  IN: 100 mL / OUT: 0 mL / NET: 100 mL          PHYSICAL EXAM:  TELE: Off tele   Constitutional: NAD, awake    HEENT: Moist Mucous Membranes, Anicteric  Pulmonary: Decreased breath sounds b/l. No rales, crackles or wheeze appreciated.   Cardiovascular: Regular, S1 and S2 nl, No murmurs, rubs, gallops or clicks  Gastrointestinal: Bowel Sounds present, soft, nontender.   Lymph: No lymphadenopathy. No peripheral edema.  Skin: No visible rashes or ulcers.  Psych:  Mood & affect appropriate    LABS: All Labs Reviewed:                        10.1 13.01 )-----------( 617      ( 28 Sep 2020 08:06 )             31.6                         8.8    12.76 )-----------( 532      ( 27 Sep 2020 07:21 )             27.9                         9.2    11.79 )-----------( 533      ( 26 Sep 2020 08:07 )             28.1     28 Sep 2020 08:06    139    |  104    |  20     ----------------------------<  119    4.3     |  27     |  0.93   27 Sep 2020 07:21    140    |  106    |  22     ----------------------------<  116    4.1     |  26     |  0.84   26 Sep 2020 08:07    139    |  105    |  17     ----------------------------<  116    4.2     |  26     |  0.87     Ca    9.0        28 Sep 2020 08:06  Ca    8.8        27 Sep 2020 07:21  Ca    9.1        26 Sep 2020 08:07    TPro  7.2    /  Alb  2.1    /  TBili  0.4    /  DBili  x      /  AST  25     /  ALT  29     /  AlkPhos  110    26 Sep 2020 08:07

## 2020-09-28 NOTE — PROGRESS NOTE ADULT - ASSESSMENT
43yo F w/ PMHx of CAD (mLAD x1 MIGUEL ANGEL in 2014, x3 stents 09/2019), HTN, prediabetes, hyperlipidemia presents to ED for pain of wound s/p panniculectomy on 9/1 admitted for cellulitis and partial wound dehiscence with infection. PICC line placed 9/23, Pt tolerated without complications. Patient c/o dry intermittent cough that is worse at night, improved.

## 2020-09-29 LAB
ALBUMIN SERPL ELPH-MCNC: 2.3 G/DL — LOW (ref 3.3–5)
ALP SERPL-CCNC: 114 U/L — SIGNIFICANT CHANGE UP (ref 40–120)
ALT FLD-CCNC: 35 U/L — SIGNIFICANT CHANGE UP (ref 12–78)
ANION GAP SERPL CALC-SCNC: 8 MMOL/L — SIGNIFICANT CHANGE UP (ref 5–17)
AST SERPL-CCNC: 32 U/L — SIGNIFICANT CHANGE UP (ref 15–37)
BASOPHILS # BLD AUTO: 0.06 K/UL — SIGNIFICANT CHANGE UP (ref 0–0.2)
BASOPHILS NFR BLD AUTO: 0.5 % — SIGNIFICANT CHANGE UP (ref 0–2)
BILIRUB SERPL-MCNC: 0.5 MG/DL — SIGNIFICANT CHANGE UP (ref 0.2–1.2)
BUN SERPL-MCNC: 20 MG/DL — SIGNIFICANT CHANGE UP (ref 7–23)
CALCIUM SERPL-MCNC: 8.8 MG/DL — SIGNIFICANT CHANGE UP (ref 8.5–10.1)
CHLORIDE SERPL-SCNC: 105 MMOL/L — SIGNIFICANT CHANGE UP (ref 96–108)
CO2 SERPL-SCNC: 26 MMOL/L — SIGNIFICANT CHANGE UP (ref 22–31)
CREAT SERPL-MCNC: 0.92 MG/DL — SIGNIFICANT CHANGE UP (ref 0.5–1.3)
EOSINOPHIL # BLD AUTO: 0.36 K/UL — SIGNIFICANT CHANGE UP (ref 0–0.5)
EOSINOPHIL NFR BLD AUTO: 2.8 % — SIGNIFICANT CHANGE UP (ref 0–6)
GLUCOSE SERPL-MCNC: 104 MG/DL — HIGH (ref 70–99)
HCT VFR BLD CALC: 29.8 % — LOW (ref 34.5–45)
HGB BLD-MCNC: 9.7 G/DL — LOW (ref 11.5–15.5)
IMM GRANULOCYTES NFR BLD AUTO: 0.5 % — SIGNIFICANT CHANGE UP (ref 0–1.5)
LYMPHOCYTES # BLD AUTO: 2.76 K/UL — SIGNIFICANT CHANGE UP (ref 1–3.3)
LYMPHOCYTES # BLD AUTO: 21.5 % — SIGNIFICANT CHANGE UP (ref 13–44)
MCHC RBC-ENTMCNC: 29 PG — SIGNIFICANT CHANGE UP (ref 27–34)
MCHC RBC-ENTMCNC: 32.6 GM/DL — SIGNIFICANT CHANGE UP (ref 32–36)
MCV RBC AUTO: 89 FL — SIGNIFICANT CHANGE UP (ref 80–100)
MONOCYTES # BLD AUTO: 0.97 K/UL — HIGH (ref 0–0.9)
MONOCYTES NFR BLD AUTO: 7.6 % — SIGNIFICANT CHANGE UP (ref 2–14)
NEUTROPHILS # BLD AUTO: 8.6 K/UL — HIGH (ref 1.8–7.4)
NEUTROPHILS NFR BLD AUTO: 67.1 % — SIGNIFICANT CHANGE UP (ref 43–77)
NRBC # BLD: 0 /100 WBCS — SIGNIFICANT CHANGE UP (ref 0–0)
PLATELET # BLD AUTO: 559 K/UL — HIGH (ref 150–400)
POTASSIUM SERPL-MCNC: 4.5 MMOL/L — SIGNIFICANT CHANGE UP (ref 3.5–5.3)
POTASSIUM SERPL-SCNC: 4.5 MMOL/L — SIGNIFICANT CHANGE UP (ref 3.5–5.3)
PROT SERPL-MCNC: 7.6 G/DL — SIGNIFICANT CHANGE UP (ref 6–8.3)
RBC # BLD: 3.35 M/UL — LOW (ref 3.8–5.2)
RBC # FLD: 14.2 % — SIGNIFICANT CHANGE UP (ref 10.3–14.5)
SODIUM SERPL-SCNC: 139 MMOL/L — SIGNIFICANT CHANGE UP (ref 135–145)
WBC # BLD: 12.82 K/UL — HIGH (ref 3.8–10.5)
WBC # FLD AUTO: 12.82 K/UL — HIGH (ref 3.8–10.5)

## 2020-09-29 PROCEDURE — 99233 SBSQ HOSP IP/OBS HIGH 50: CPT

## 2020-09-29 PROCEDURE — 99232 SBSQ HOSP IP/OBS MODERATE 35: CPT

## 2020-09-29 RX ADMIN — MEROPENEM 100 MILLIGRAM(S): 1 INJECTION INTRAVENOUS at 14:20

## 2020-09-29 RX ADMIN — MEROPENEM 100 MILLIGRAM(S): 1 INJECTION INTRAVENOUS at 21:22

## 2020-09-29 RX ADMIN — Medication 50 MILLIGRAM(S): at 06:16

## 2020-09-29 RX ADMIN — ENOXAPARIN SODIUM 40 MILLIGRAM(S): 100 INJECTION SUBCUTANEOUS at 21:22

## 2020-09-29 RX ADMIN — MEROPENEM 100 MILLIGRAM(S): 1 INJECTION INTRAVENOUS at 06:15

## 2020-09-29 RX ADMIN — CHLORHEXIDINE GLUCONATE 1 APPLICATION(S): 213 SOLUTION TOPICAL at 17:29

## 2020-09-29 RX ADMIN — Medication 81 MILLIGRAM(S): at 11:08

## 2020-09-29 RX ADMIN — Medication 50 MILLIGRAM(S): at 17:29

## 2020-09-29 RX ADMIN — ATORVASTATIN CALCIUM 40 MILLIGRAM(S): 80 TABLET, FILM COATED ORAL at 21:22

## 2020-09-29 RX ADMIN — CHLORHEXIDINE GLUCONATE 1 APPLICATION(S): 213 SOLUTION TOPICAL at 06:15

## 2020-09-29 RX ADMIN — Medication 100 MILLIGRAM(S): at 21:25

## 2020-09-29 NOTE — PROGRESS NOTE ADULT - SUBJECTIVE AND OBJECTIVE BOX
S   Pt w/o complaints       No fever, chills         WBC 12.82       H/H 9.7/29.8    O  Afebrile  VSS       Abdomen-soft  Non-tender       Erythema continues to resolve       Induration improved       No malodor or drainage    A&P Pt doing well         Continue current management

## 2020-09-29 NOTE — PROGRESS NOTE ADULT - SUBJECTIVE AND OBJECTIVE BOX
[facility] -- Zakia Barraza, Oskar, Maye, Matthias Oliva, Juju Lala: Office # [phone]    Follow Up:  CAD    Subjective/Observations: Patient seen and examined. Patient awake and alert, resting comfortably in bed. No complaints of chest pain, SOB, LE edema, cough. No signs of orthopnea or PND.    REVIEW OF SYSTEMS: All review of systems is negative for eye, ENT, GI, , allergic, dermatologic, musculoskeletal and neurologic except as described above    PAST MEDICAL & SURGICAL HISTORY:  Obesity    Hypothyroid  Resolved    Coronary artery disease  s/p MIGUEL ANGEL x 3 (9/2019), MIGUEL ANGEL x1 (2014)    HLD (hyperlipidemia)    HTN (hypertension)    S/P coronary artery stent placement  mLAD x1 MIGUEL ANGEL (2014),  3 stents 09/2019    H/O total hysterectomy  2017    MEDICATIONS  (STANDING):  0.9% Sodium Chloride/ Betadine 1000 milliLiter(s) 1000 milliLiter(s) Irrigation <User Schedule>  aspirin  chewable 81 milliGRAM(s) Oral daily  atorvastatin 40 milliGRAM(s) Oral at bedtime  chlorhexidine 2% Cloths 1 Application(s) Topical every 12 hours  enoxaparin Injectable 40 milliGRAM(s) SubCutaneous at bedtime  meropenem  IVPB 1000 milliGRAM(s) IV Intermittent every 8 hours  meropenem  IVPB      metoprolol tartrate 50 milliGRAM(s) Oral two times a day    MEDICATIONS  (PRN):  acetaminophen   Tablet .. 650 milliGRAM(s) Oral every 6 hours PRN Mild Pain (1 - 3)  benzonatate 100 milliGRAM(s) Oral three times a day PRN Cough  guaiFENesin   Syrup  (Sugar-Free) 100 milliGRAM(s) Oral every 6 hours PRN Cough not relieved by Tessalon Perles    Allergies  codeine (Unknown)    Vital Signs Last 24 Hrs  T(C): 36.8 (29 Sep 2020 12:50), Max: 36.8 (29 Sep 2020 12:50)  T(F): 98.2 (29 Sep 2020 12:50), Max: 98.2 (29 Sep 2020 12:50)  HR: 89 (29 Sep 2020 12:50) (83 - 110)  BP: 119/83 (29 Sep 2020 12:50) (103/71 - 123/88)  BP(mean): --  RR: 18 (29 Sep 2020 12:50) (17 - 18)  SpO2: 95% (29 Sep 2020 12:50) (94% - 99%)    I&O's Summary    28 Sep 2020 07:01  -  29 Sep 2020 07:00  --------------------------------------------------------  IN: 50 mL / OUT: 400 mL / NET: -350 mL    TELE:   PHYSICAL EXAM:  Appearance: NAD, no distress, alert, Well developed   HEENT: Moist Mucous Membranes, Anicteric  Cardiovascular: Regular rate and rhythm, Normal S1 S2, No JVD, No murmurs, No rubs, gallops or clicks  Respiratory: Non-labored, Clear to auscultation, No rales, No rhonchi, No wheezing.   Gastrointestinal:  Soft, Non-tender, + BS  Neurologic: Non-focal  Skin: Warm and dry, No visible rashes or ulcers, No ecchymosis, No cyanosis  Musculoskeletal: No clubbing, No cyanosis, No joint swelling/tenderness  Psychiatry: Mood & affect appropriate  Lymph: No peripheral edema.     LABS: All Labs Reviewed:                        9.7    12.82 )-----------( 559      ( 29 Sep 2020 09:02 )             29.8                         10.1   13.01 )-----------( 617      ( 28 Sep 2020 08:06 )             31.6                         8.8    12.76 )-----------( 532      ( 27 Sep 2020 07:21 )             27.9     29 Sep 2020 09:02    139    |  105    |  20     ----------------------------<  104    4.5     |  26     |  0.92   28 Sep 2020 08:06    139    |  104    |  20     ----------------------------<  119    4.3     |  27     |  0.93   27 Sep 2020 07:21    140    |  106    |  22     ----------------------------<  116    4.1     |  26     |  0.84     Ca    8.8        29 Sep 2020 09:02  Ca    9.0        28 Sep 2020 08:06  Ca    8.8        27 Sep 2020 07:21    TPro  7.6    /  Alb  2.3    /  TBili  0.5    /  DBili  x      /  AST  32     /  ALT  35     /  AlkPhos  114    29 Sep 2020 09:02  Cholesterol, Serum: 126 mg/dL (09-27-20 @ 11:11)  HDL Cholesterol, Serum: 31 mg/dL (09-27-20 @ 11:11)  Triglycerides, Serum: 122 mg/dL (09-27-20 @ 11:11)    12 Lead ECG:   Ventricular Rate 97 BPM  Atrial Rate 97 BPM  P-R Interval 146 ms  QRS Duration 80 ms  Q-T Interval 392 ms  QTC Calculation(Bazett) 497 ms  P Axis 38 degrees  R Axis 0 degrees  T Axis -7 degrees  Diagnosis Line Normal sinus rhythm  Minimal voltage criteria for LVH, may be normal variant  Nonspecific T wave abnormality  Abnormal ECG  Confirmed by Mack Schmitt MD ([id number]) on 9/21/2020 3:58:47 PM (09-20-20 @ 15:49)    < from: TTE Echo Complete w/o Contrast w/ Doppler (09.03.20 @ 12:56) >  Dimensions:  LA 3.0       Normal Values: 2.0 - 4.0 cm  Ao 2.8        Normal Values: 2.0 - 3.8 cm  SEPTUM 1.0       Normal Values: 0.6 - 1.2 cm  PWT 1.1       Normal Values: 0.6 - 1.1 cm  LVIDd 4.7         Normal Values: 3.0 - 5.6 cm  LVIDs 3.0         Normal Values: 1.8 - 4.0 cm    OBSERVATIONS:  Technically difficult and limited study  Mitral Valve: Thickened leaflets, trace physiologic MR.  Aortic Valve/Aorta: normal trileaflet aortic valve.  Tricuspid Valve: normal with trace TR.  Pulmonic Valve: normal  Left Atrium: normal  Right Atrium: normal  Left Ventricle: normal LV size and systolic function, estimated LVEF of 60-65%.  Right Ventricle: Grossly normal size and systolic function.  Pericardium/Pleura: normal, no significant pericardial effusion.    Conclusion:  Technically difficult and limited study  Normal left ventricular internal dimensions and systolic function, estimated LVEF of 60-65%.  Grossly normal RV size and systolic function.  Normal trileaflet aortic valve, without AI.  Trace physiologic MR and TR.  No significant pericardial effusion.    < end of copied text >    < from: Cardiac Cath Lab - Adult (08.30.19 @ 10:28) >  VENTRICLES: Global left ventricular function was normal. EF estimated was  55 %.  CORONARY VESSELS: The coronary circulation is right dominant.  LM:   --  LM:Normal.  LAD:   --  Ostial LAD: There was a 30 % stenosis.  --  Proximal LAD: There was a discrete 80 % stenosis in the distal third of  the vessel segment. There was ELIAS grade 3 flow through the vessel (brisk  flow).  --  Mid LAD: There was a 0 % stenosis at the site of a prior stent, in the  proximal third of the vessel segment.  CX:   --  Proximal circumflex: There was a discrete 30 % stenosis.  --  OM1: There was a 80 % stenosis.  RCA:   --  RCA: Angiography showed minor luminal irregularities with no  flow limiting lesions.  --  Mid RCA: There was a tubular 30 % stenosis in the proximal third of the  vessel segment.  --  Distal RCA: There was a discrete 20 % stenosis in the distal third of  the vessel segment.  --  RPDA: There was a discrete 50 % stenosis in the middle third of the  vessel segment. There was ELIAS grade 3 flow through the vessel (brisk  flow).  COMPLICATIONS: There were no complications.  SUMMARY:  1ST LESION INTERVENTIONS: A successful balloon angioplasty with stentwas  performed on the 80 % lesion in the proximal LAD. Following intervention  there was a 1 % residual stenosis.  2ND LESION INTERVENTIONS: A successful balloon angioplasty with stent was  performed on the 80 % lesion in the 1st obtuse marginal. Following  intervention there was a 1 % residual stenosis.  DIAGNOSTIC RECOMMENDATIONS: PCI of proximal LAD and OM1 lesions for  treatment of unstable angina.  INTERVENTIONAL RECOMMENDATIONS: Continue aspirin, 81 mg, PO, daily.  Increase ticagrelor to 90mg twice daily. Patient management should  include aggressive medical therapy.  < end of copied text >    < from: Xray Chest 1 View-PORTABLE IMMEDIATE (09.20.20 @ 15:25) >  AP chest. Prior 4/10/2020. Normal heart mediastinum. No pleural-parenchymal abnormality. Calcific bursitis right shoulder.  Impression: No active disease  < end of copied text >

## 2020-09-29 NOTE — PROGRESS NOTE ADULT - SUBJECTIVE AND OBJECTIVE BOX
[facility] DIVISION of INFECTIOUS DISEASE  Capo Gomez MD PhD, Cat Rodriguez MD, Andree Randall MD, Alicja Rizzo MD  and providing coverage with Liliana Workman MD and Isaac Loyola MD  Providing Infectious Disease Consultations at [facility], [facility], [facility]'s    Office# [phone] to schedule follow up appointments  Answering Service for urgent calls or New Consults [phone]  Cell# to text for urgent issues Capo Gomez [phone]     infectious diseases progress note:    LOUIE QUINTANILLA is a 44y y. o. Female patient    No concerning overnight events    Allergies    codeine (Unknown)    Intolerances        ANTIBIOTICS/RELEVANT:  antimicrobials  meropenem  IVPB      meropenem  IVPB 1000 milliGRAM(s) IV Intermittent every 8 hours    immunologic:    OTHER:  0.9% Sodium Chloride/ Betadine 1000 milliLiter(s) 1000 milliLiter(s) Irrigation <User Schedule>  acetaminophen   Tablet .. 650 milliGRAM(s) Oral every 6 hours PRN  aspirin  chewable 81 milliGRAM(s) Oral daily  atorvastatin 40 milliGRAM(s) Oral at bedtime  benzonatate 100 milliGRAM(s) Oral three times a day PRN  chlorhexidine 2% Cloths 1 Application(s) Topical every 12 hours  enoxaparin Injectable 40 milliGRAM(s) SubCutaneous at bedtime  guaiFENesin   Syrup  (Sugar-Free) 100 milliGRAM(s) Oral every 6 hours PRN  metoprolol tartrate 50 milliGRAM(s) Oral two times a day      Objective:  Vital Signs Last 24 Hrs  T(C): 36.5 (29 Sep 2020 05:10), Max: 36.7 (28 Sep 2020 22:10)  T(F): 97.7 (29 Sep 2020 05:10), Max: 98 (28 Sep 2020 22:10)  HR: 83 (29 Sep 2020 05:10) (83 - 110)  BP: 115/81 (29 Sep 2020 05:10) (103/71 - 123/88)  BP(mean): --  RR: 18 (29 Sep 2020 05:10) (17 - 18)  SpO2: 99% (29 Sep 2020 05:10) (94% - 99%)    T(C): 36.5 (09-29-20 @ 05:10), Max: 36.9 (09-27-20 @ 20:42)  T(C): 36.5 (09-29-20 @ 05:10), Max: 36.9 (09-27-20 @ 20:42)  T(C): 36.5 (09-29-20 @ 05:10), Max: 37.1 (09-26-20 @ 05:17)    PHYSICAL EXAM:  HEENT: NC atraumatic  Neck: supple  Respiratory: no accessory muscle use, breathing comfortably  Cardiovascular: distant  Gastrointestinal: normal appearing, nondistended  Extremities: no clubbing, no cyanosis,      LABS:                          9.7    12.82 )-----------( 559      ( 29 Sep 2020 09:02 )             29.8       12.82 09-29 @ 09:02  13.01 09-28 @ 08:06  12.76 09-27 @ 07:21  11.79 09-26 @ 08:07  11.44 09-25 @ 08:15  11.27 09-24 @ 07:34  11.86 09-23 @ 07:56      09-29    x   |  x   |  20  ----------------------------<  104<H>  x    |  26  |  0.92    Ca    8.8      29 Sep 2020 09:02    TPro  x   /  Alb  2.3<L>  /  TBili  x   /  DBili  x   /  AST  32  /  ALT  35  /  AlkPhos  x   09-29      Creatinine, Serum: 0.92 mg/dL (09-29-20 @ 09:02)  Creatinine, Serum: 0.93 mg/dL (09-28-20 @ 08:06)  Creatinine, Serum: 0.84 mg/dL (09-27-20 @ 07:21)  Creatinine, Serum: 0.87 mg/dL (09-26-20 @ 08:07)  Creatinine, Serum: 0.91 mg/dL (09-25-20 @ 08:15)  Creatinine, Serum: 0.86 mg/dL (09-24-20 @ 07:34)  Creatinine, Serum: 0.98 mg/dL (09-23-20 @ 07:56)                INFLAMMATORY MARKERS  Auto Neutrophil #: 8.60 K/uL (09-29-20 @ 09:02)  Auto Lymphocyte #: 2.76 K/uL (09-29-20 @ 09:02)  Auto Neutrophil #: 7.84 K/uL (09-28-20 @ 08:06)  Auto Lymphocyte #: 3.72 K/uL (09-28-20 @ 08:06)  Auto Neutrophil #: 7.80 K/uL (09-27-20 @ 07:21)  Auto Lymphocyte #: 3.34 K/uL (09-27-20 @ 07:21)  Auto Neutrophil #: 7.61 K/uL (09-26-20 @ 08:07)  Auto Lymphocyte #: 2.68 K/uL (09-26-20 @ 08:07)  Auto Neutrophil #: 6.70 K/uL (09-25-20 @ 08:15)  Auto Lymphocyte #: 3.21 K/uL (09-25-20 @ 08:15)  Auto Neutrophil #: 7.09 K/uL (09-24-20 @ 07:34)  Auto Lymphocyte #: 2.60 K/uL (09-24-20 @ 07:34)  Auto Neutrophil #: 7.54 K/uL (09-23-20 @ 07:56)  Auto Lymphocyte #: 2.67 K/uL (09-23-20 @ 07:56)  Auto Neutrophil #: 6.93 K/uL (09-22-20 @ 09:16)  Auto Lymphocyte #: 2.66 K/uL (09-22-20 @ 09:16)  Auto Neutrophil #: 7.70 K/uL (09-21-20 @ 08:51)  Auto Lymphocyte #: 2.02 K/uL (09-21-20 @ 08:51)  Auto Neutrophil #: 8.18 K/uL (09-20-20 @ 14:47)  Auto Lymphocyte #: 2.22 K/uL (09-20-20 @ 14:47)    Lactate, Blood: 2.0 mmol/L (09-20-20 @ 14:47)    Auto Eosinophil #: 0.36 K/uL (09-29-20 @ 09:02)  Auto Eosinophil #: 0.35 K/uL (09-28-20 @ 08:06)  Auto Eosinophil #: 0.36 K/uL (09-27-20 @ 07:21)  Auto Eosinophil #: 0.33 K/uL (09-26-20 @ 08:07)  Auto Eosinophil #: 0.31 K/uL (09-25-20 @ 08:15)  Auto Eosinophil #: 0.29 K/uL (09-24-20 @ 07:34)  Auto Eosinophil #: 0.28 K/uL (09-23-20 @ 07:56)  Auto Eosinophil #: 0.34 K/uL (09-22-20 @ 09:16)  Auto Eosinophil #: 0.24 K/uL (09-21-20 @ 08:51)  Auto Eosinophil #: 0.19 K/uL (09-20-20 @ 14:47)                  Ferritin, Serum: 443 ng/mL (09-22-20 @ 11:38)        Activated Partial Thromboplastin Time: 27.1 sec (09-20-20 @ 14:47)  INR: 1.48 ratio (09-20-20 @ 14:47)          MICROBIOLOGY:              RADIOLOGY & ADDITIONAL STUDIES:

## 2020-09-29 NOTE — PROGRESS NOTE ADULT - PROBLEM SELECTOR PLAN 1
- CT abd/pelvis w/ IV cont significant for extensive defects w/in lower abd wall with foci of gas & inflammation, worrisome for gas forming infection, loculations of fluid are noted within ab wall  -Patient afebrile, hemodynamic stable.   -Wound vac dressing changed today with plastic surgery   -Continue wound vac with betadine/saline irrigation  -Continue Meropenem IV (through 10/2) following ID recommendation, Dr. Gomez   -Wound cultures +ESBL, few enterococcus faecalis, rare pseudomonas  -Blood cxs negative   -Plastic surgery following, Dr. Ruiz, recs appreciated    -Pain well controlled with Tylenol, continue PRN  -Plan for DC Friday 10/2 after completion of Abx

## 2020-09-29 NOTE — PROGRESS NOTE ADULT - ASSESSMENT
44 year old female PMHx CAD (mLAD x1 MIGUEL ANGEL (2014), x3 stents 09/2019), HTN, prediabetes, hyperlipidemia, recent COVID-19 infection admitted for wound dehiscence and possible revision.    Obstructive CAD s/p PCI 9/ 2019   - Cardiologist Dr. Abdoul Lam from [facility]  - No acute changes on EKG compared to previous  - Echo 9/3/2020: trace MR, trace TR, LVEF 60-65%   - Continue ASA 81 and lopressor 50mg po bid and high intensity statin Lipitor 40mg po qhs    HTN and Hemodynamics  - BP: 119/83 (09-29-20 @ 12:50) (103/71 - 123/88)  - HR: 89 (09-29 @ 12:50) (83 - 110)  - heart rate slightly elevated in setting of infection and anemia  - Continue current dose of metoprolol 50 mg BID with hold parameters     Anemia  - Hemoglobin <--9.7, <--10.1, <--8.8  - Hematocrit <--29.8, <--31.6, <--27.9  - work up as per primary team   - s/p 1 unit of PRBC   - transfuse to keep HGB greater than 8 given CAD    HLD  - Continue atorvastatin 40 mg qhs     Cellulitis   - wound cellulitis now sp wound vac  - follow up id, wound, plastic surgery recs    - Monitor and replete lytes, keep K>4, Mg>2.  - All other medical needs as per primary team.  - Other cardiovascular workup will depend on clinical course.  - Will continue to follow.    Ramón Trujillo, MS FNP, AGACNP  Nurse Practitioner- Cardiology   Spectra #[phone]

## 2020-09-29 NOTE — PROGRESS NOTE ADULT - SUBJECTIVE AND OBJECTIVE BOX
Patient is a 44y old  Female who presents with a chief complaint of Wound dehiscence s/p panniculectomy on 9/1 (29 Sep 2020 09:42)      INTERVAL HPI/OVERNIGHT EVENTS: Patient seen and examined at bedside. No overnight events occurred. Patient has no complaints at this time. Denies fevers, chills, headache, lightheadedness, chest pain, dyspnea, abdominal pain, n/v/d/c.    MEDICATIONS  (STANDING):  0.9% Sodium Chloride/ Betadine 1000 milliLiter(s) 1000 milliLiter(s) Irrigation <User Schedule>  aspirin  chewable 81 milliGRAM(s) Oral daily  atorvastatin 40 milliGRAM(s) Oral at bedtime  chlorhexidine 2% Cloths 1 Application(s) Topical every 12 hours  enoxaparin Injectable 40 milliGRAM(s) SubCutaneous at bedtime  meropenem  IVPB 1000 milliGRAM(s) IV Intermittent every 8 hours  meropenem  IVPB      metoprolol tartrate 50 milliGRAM(s) Oral two times a day    MEDICATIONS  (PRN):  acetaminophen   Tablet .. 650 milliGRAM(s) Oral every 6 hours PRN Mild Pain (1 - 3)  benzonatate 100 milliGRAM(s) Oral three times a day PRN Cough  guaiFENesin   Syrup  (Sugar-Free) 100 milliGRAM(s) Oral every 6 hours PRN Cough not relieved by Tessalon Perles      Allergies    codeine (Unknown)    Intolerances        REVIEW OF SYSTEMS:  CONSTITUTIONAL: No fever or chills  HEENT:  No headache, no sore throat  RESPIRATORY: No cough, wheezing, or shortness of breath  CARDIOVASCULAR: No chest pain, palpitations  GASTROINTESTINAL: No abd pain, nausea, vomiting, or diarrhea  GENITOURINARY: No dysuria, frequency, or hematuria  NEUROLOGICAL: no focal weakness or dizziness  MUSCULOSKELETAL: no myalgias     Vital Signs Last 24 Hrs  T(C): 36.5 (29 Sep 2020 05:10), Max: 36.7 (28 Sep 2020 22:10)  T(F): 97.7 (29 Sep 2020 05:10), Max: 98 (28 Sep 2020 22:10)  HR: 83 (29 Sep 2020 05:10) (83 - 110)  BP: 115/81 (29 Sep 2020 05:10) (103/71 - 123/88)  BP(mean): --  RR: 18 (29 Sep 2020 05:10) (17 - 18)  SpO2: 99% (29 Sep 2020 05:10) (94% - 99%)    PHYSICAL EXAM:  GENERAL: NAD  HEENT:  anicteric, moist mucous membranes  CHEST/LUNG:  CTA b/l, no rales, wheezes, or rhonchi  HEART:  RRR, S1, S2  ABDOMEN:  BS+, soft, nontender, nondistended  EXTREMITIES: no edema, cyanosis, or calf tenderness  NERVOUS SYSTEM: answers questions and follows commands appropriately    LABS:                        9.7    12.82 )-----------( 559      ( 29 Sep 2020 09:02 )             29.8     CBC Full  -  ( 29 Sep 2020 09:02 )  WBC Count : 12.82 K/uL  Hemoglobin : 9.7 g/dL  Hematocrit : 29.8 %  Platelet Count - Automated : 559 K/uL  Mean Cell Volume : 89.0 fl  Mean Cell Hemoglobin : 29.0 pg  Mean Cell Hemoglobin Concentration : 32.6 gm/dL  Auto Neutrophil # : 8.60 K/uL  Auto Lymphocyte # : 2.76 K/uL  Auto Monocyte # : 0.97 K/uL  Auto Eosinophil # : 0.36 K/uL  Auto Basophil # : 0.06 K/uL  Auto Neutrophil % : 67.1 %  Auto Lymphocyte % : 21.5 %  Auto Monocyte % : 7.6 %  Auto Eosinophil % : 2.8 %  Auto Basophil % : 0.5 %    29 Sep 2020 09:02    139    |  105    |  20     ----------------------------<  104    4.5     |  26     |  0.92     Ca    8.8        29 Sep 2020 09:02    TPro  7.6    /  Alb  2.3    /  TBili  0.5    /  DBili  x      /  AST  32     /  ALT  35     /  AlkPhos  114    29 Sep 2020 09:02        CAPILLARY BLOOD GLUCOSE              RADIOLOGY & ADDITIONAL TESTS:    Personally reviewed.     Consultant(s) Notes Reviewed:  [x] YES  [ ] NO

## 2020-09-29 NOTE — CHART NOTE - NSCHARTNOTEFT_GEN_A_CORE
Assessment: 43 y/o female adm with wound dehiscence s/p panniculectomy on 9/1. Pt visited at bedside this am. Pt eating well, tolerating meals. Consuming protein sources and Isaiah BID.     Factors impacting intake: [ x] none [ ] nausea  [ ] vomiting [ ] diarrhea [ ] constipation  [ ]chewing problems [ ] swallowing issues  [ ] other:     Diet Presciption: Diet, DASH/TLC:   Sodium & Cholesterol Restricted  Consistent Carbohydrate {Evening Snack}  Isaiah(7 Gm Arginine/7 Gm Glut/1.2 Gm HMB     Qty per Day:  2 (09-21-20 @ 20:51)    Intake: %    Current Weight: adm 199.9#  % Weight Change    Pertinent Medications: MEDICATIONS  (STANDING):  0.9% Sodium Chloride/ Betadine 1000 milliLiter(s) 1000 milliLiter(s) Irrigation <User Schedule>  aspirin  chewable 81 milliGRAM(s) Oral daily  atorvastatin 40 milliGRAM(s) Oral at bedtime  chlorhexidine 2% Cloths 1 Application(s) Topical every 12 hours  enoxaparin Injectable 40 milliGRAM(s) SubCutaneous at bedtime  meropenem  IVPB      meropenem  IVPB 1000 milliGRAM(s) IV Intermittent every 8 hours  metoprolol tartrate 50 milliGRAM(s) Oral two times a day    MEDICATIONS  (PRN):  acetaminophen   Tablet .. 650 milliGRAM(s) Oral every 6 hours PRN Mild Pain (1 - 3)  benzonatate 100 milliGRAM(s) Oral three times a day PRN Cough  guaiFENesin   Syrup  (Sugar-Free) 100 milliGRAM(s) Oral every 6 hours PRN Cough not relieved by Tessalon Perles    Pertinent Labs: 09-29 Na139 mmol/L Glu 104 mg/dL<H> K+ 4.5 mmol/L Cr  0.92 mg/dL BUN 20 mg/dL 09-23 Phos 3.9 mg/dL 09-29 Alb 2.3 g/dL<L> 09-27 Chol 126 mg/dL LDL 71 mg/dL HDL 31 mg/dL<L> Trig 122 mg/dL     CAPILLARY BLOOD GLUCOSE        Skin: wound vac to abd     Estimated Needs:   [x ] no change since previous assessment  [ ] recalculated:     Previous Nutrition Diagnosis:   [ ] Inadequate Energy Intake [ ]Inadequate Oral Intake [ ] Excessive Energy Intake   [ ] Underweight [x ] Increased Nutrient Needs [ ] Overweight/Obesity   [ ] Altered GI Function [ ] Unintended Weight Loss [ ] Food & Nutrition Related Knowledge Deficit [ ] Malnutrition     Nutrition Diagnosis is [x ] ongoing  [ ] resolved [ ] not applicable     New Nutrition Diagnosis: [ x] not applicable       Interventions:   Recommend  [ ] Change Diet To:  [ ] Nutrition Supplement  [ ] Nutrition Support  [x ] Other:     Monitoring and Evaluation:   [x ] PO intake [ x ] Tolerance to diet prescription [ x ] weights [ x ] labs[ x ] follow up per protocol  [ ] other:

## 2020-09-30 LAB
ALBUMIN SERPL ELPH-MCNC: 2.4 G/DL — LOW (ref 3.3–5)
ALP SERPL-CCNC: 118 U/L — SIGNIFICANT CHANGE UP (ref 40–120)
ALT FLD-CCNC: 36 U/L — SIGNIFICANT CHANGE UP (ref 12–78)
ANION GAP SERPL CALC-SCNC: 7 MMOL/L — SIGNIFICANT CHANGE UP (ref 5–17)
AST SERPL-CCNC: 31 U/L — SIGNIFICANT CHANGE UP (ref 15–37)
BASOPHILS # BLD AUTO: 0.05 K/UL — SIGNIFICANT CHANGE UP (ref 0–0.2)
BASOPHILS NFR BLD AUTO: 0.4 % — SIGNIFICANT CHANGE UP (ref 0–2)
BILIRUB SERPL-MCNC: 0.5 MG/DL — SIGNIFICANT CHANGE UP (ref 0.2–1.2)
BUN SERPL-MCNC: 25 MG/DL — HIGH (ref 7–23)
CALCIUM SERPL-MCNC: 8.6 MG/DL — SIGNIFICANT CHANGE UP (ref 8.5–10.1)
CHLORIDE SERPL-SCNC: 104 MMOL/L — SIGNIFICANT CHANGE UP (ref 96–108)
CO2 SERPL-SCNC: 29 MMOL/L — SIGNIFICANT CHANGE UP (ref 22–31)
CREAT SERPL-MCNC: 0.94 MG/DL — SIGNIFICANT CHANGE UP (ref 0.5–1.3)
EOSINOPHIL # BLD AUTO: 0.38 K/UL — SIGNIFICANT CHANGE UP (ref 0–0.5)
EOSINOPHIL NFR BLD AUTO: 3 % — SIGNIFICANT CHANGE UP (ref 0–6)
GLUCOSE SERPL-MCNC: 104 MG/DL — HIGH (ref 70–99)
HCT VFR BLD CALC: 29.1 % — LOW (ref 34.5–45)
HGB BLD-MCNC: 9.5 G/DL — LOW (ref 11.5–15.5)
IMM GRANULOCYTES NFR BLD AUTO: 0.6 % — SIGNIFICANT CHANGE UP (ref 0–1.5)
LYMPHOCYTES # BLD AUTO: 2.86 K/UL — SIGNIFICANT CHANGE UP (ref 1–3.3)
LYMPHOCYTES # BLD AUTO: 22.2 % — SIGNIFICANT CHANGE UP (ref 13–44)
MCHC RBC-ENTMCNC: 28.8 PG — SIGNIFICANT CHANGE UP (ref 27–34)
MCHC RBC-ENTMCNC: 32.6 GM/DL — SIGNIFICANT CHANGE UP (ref 32–36)
MCV RBC AUTO: 88.2 FL — SIGNIFICANT CHANGE UP (ref 80–100)
MONOCYTES # BLD AUTO: 0.92 K/UL — HIGH (ref 0–0.9)
MONOCYTES NFR BLD AUTO: 7.1 % — SIGNIFICANT CHANGE UP (ref 2–14)
NEUTROPHILS # BLD AUTO: 8.58 K/UL — HIGH (ref 1.8–7.4)
NEUTROPHILS NFR BLD AUTO: 66.7 % — SIGNIFICANT CHANGE UP (ref 43–77)
NRBC # BLD: 0 /100 WBCS — SIGNIFICANT CHANGE UP (ref 0–0)
PLATELET # BLD AUTO: 534 K/UL — HIGH (ref 150–400)
POTASSIUM SERPL-MCNC: 4.3 MMOL/L — SIGNIFICANT CHANGE UP (ref 3.5–5.3)
POTASSIUM SERPL-SCNC: 4.3 MMOL/L — SIGNIFICANT CHANGE UP (ref 3.5–5.3)
PROT SERPL-MCNC: 7.6 G/DL — SIGNIFICANT CHANGE UP (ref 6–8.3)
RBC # BLD: 3.3 M/UL — LOW (ref 3.8–5.2)
RBC # FLD: 14.4 % — SIGNIFICANT CHANGE UP (ref 10.3–14.5)
SODIUM SERPL-SCNC: 140 MMOL/L — SIGNIFICANT CHANGE UP (ref 135–145)
WBC # BLD: 12.87 K/UL — HIGH (ref 3.8–10.5)
WBC # FLD AUTO: 12.87 K/UL — HIGH (ref 3.8–10.5)

## 2020-09-30 PROCEDURE — 99232 SBSQ HOSP IP/OBS MODERATE 35: CPT | Mod: GC

## 2020-09-30 PROCEDURE — 99232 SBSQ HOSP IP/OBS MODERATE 35: CPT

## 2020-09-30 RX ADMIN — MEROPENEM 100 MILLIGRAM(S): 1 INJECTION INTRAVENOUS at 13:30

## 2020-09-30 RX ADMIN — ATORVASTATIN CALCIUM 40 MILLIGRAM(S): 80 TABLET, FILM COATED ORAL at 21:10

## 2020-09-30 RX ADMIN — MEROPENEM 100 MILLIGRAM(S): 1 INJECTION INTRAVENOUS at 05:11

## 2020-09-30 RX ADMIN — Medication 81 MILLIGRAM(S): at 11:22

## 2020-09-30 RX ADMIN — Medication 100 MILLIGRAM(S): at 20:22

## 2020-09-30 RX ADMIN — ENOXAPARIN SODIUM 40 MILLIGRAM(S): 100 INJECTION SUBCUTANEOUS at 21:10

## 2020-09-30 RX ADMIN — CHLORHEXIDINE GLUCONATE 1 APPLICATION(S): 213 SOLUTION TOPICAL at 16:59

## 2020-09-30 RX ADMIN — CHLORHEXIDINE GLUCONATE 1 APPLICATION(S): 213 SOLUTION TOPICAL at 05:11

## 2020-09-30 RX ADMIN — MEROPENEM 100 MILLIGRAM(S): 1 INJECTION INTRAVENOUS at 21:09

## 2020-09-30 RX ADMIN — Medication 50 MILLIGRAM(S): at 05:11

## 2020-09-30 NOTE — PROGRESS NOTE ADULT - ASSESSMENT
45yo F w/ PMHx of CAD (mLAD x1 MIGUEL ANGEL in 2014, x3 stents 09/2019), HTN, prediabetes, hyperlipidemia presents to ED for pain of wound s/p panniculectomy on 9/1 admitted for cellulitis and partial wound dehiscence with infection. PICC line placed 9/23, Pt tolerated without complications. Patient c/o dry intermittent cough that is worse at night, improved.

## 2020-09-30 NOTE — PROGRESS NOTE ADULT - ASSESSMENT
44 year old female PMHx CAD (mLAD x1 MIGUEL ANGEL (2014), x3 stents 09/2019), HTN, prediabetes, hyperlipidemia, recent COVID-19 infection admitted for wound dehiscence and possible revision.    Obstructive CAD s/p PCI 9/ 2019   - Cardiologist Dr. Abdoul Lam from [facility]  - No acute changes on EKG compared to previous  - Echo 9/3/2020: trace MR, trace TR, LVEF 60-65%   - Continue ASA 81 and lopressor 50mg po bid and high intensity statin Lipitor 40mg po qhs    HTN and Hemodynamics  - BP: 110/75 (09-30-20 @ 05:32) (108/74 - 127/85)  - HR: 84 (09-30 @ 05:32) (84 - 100)  - heart rate slightly elevated in setting of infection and anemia  - Continue current dose of metoprolol 50 mg BID with hold parameters     Anemia  - Hemoglobin <--9.5, <--9.7, <--10.1  - Hematocrit <--29.1, <--29.8, <--31.6  - work up as per primary team   - s/p 1 unit of PRBC   - transfuse to keep HGB greater than 8 given CAD    HLD  - Continue atorvastatin 40 mg qhs     Cellulitis   - wound cellulitis now sp wound vac  - follow up id, wound, plastic surgery recs    - Patient stable from cardiac stand point.   - Monitor and replete lytes, keep K>4, Mg>2.  - All other medical needs as per primary team.  - Other cardiovascular workup will depend on clinical course.  - Will continue to follow.    Ramón Trujillo, MS FNP, AGACNP  Nurse Practitioner- Cardiology   Spectra #[phone]

## 2020-09-30 NOTE — PROGRESS NOTE ADULT - SUBJECTIVE AND OBJECTIVE BOX
S  Pt without complaints        WBC  12.87      H/H    9.5/29.1    O Afebrle VSS  Tmax 99.1       Abdomen-benign exam  Non-tender without drainage or malodor                      Resolving induration      A&P Pt doing well on current management         D/C Planning for VNA/VAC at home         CPM

## 2020-09-30 NOTE — PROGRESS NOTE ADULT - SUBJECTIVE AND OBJECTIVE BOX
[facility] -- Zakia Barraza, Oskar, Maye, Matthias Oliva, Juju Lala: Office # [phone]    Follow Up:  CAD    Subjective/Observations: Patient seen and examined. Patient awake and alert, resting comfortably in bed. No complaints of chest pain, SOB, LE edema, cough. No signs of orthopnea or PND    REVIEW OF SYSTEMS: All review of systems is negative for eye, ENT, GI, , allergic, dermatologic, musculoskeletal and neurologic except as described above    PAST MEDICAL & SURGICAL HISTORY:  Obesity    Hypothyroid  Resolved    Coronary artery disease  s/p MIGUEL ANGEL x 3 (9/2019), MIGUEL ANGEL x1 (2014)    HLD (hyperlipidemia)    HTN (hypertension)    S/P coronary artery stent placement  mLAD x1 MIGUEL ANGEL (2014),  3 stents 09/2019    H/O total hysterectomy  2017    MEDICATIONS  (STANDING):  0.9% Sodium Chloride/ Betadine 1000 milliLiter(s) 1000 milliLiter(s) Irrigation <User Schedule>  aspirin  chewable 81 milliGRAM(s) Oral daily  atorvastatin 40 milliGRAM(s) Oral at bedtime  chlorhexidine 2% Cloths 1 Application(s) Topical every 12 hours  enoxaparin Injectable 40 milliGRAM(s) SubCutaneous at bedtime  meropenem  IVPB 1000 milliGRAM(s) IV Intermittent every 8 hours  meropenem  IVPB      metoprolol tartrate 50 milliGRAM(s) Oral two times a day    MEDICATIONS  (PRN):  acetaminophen   Tablet .. 650 milliGRAM(s) Oral every 6 hours PRN Mild Pain (1 - 3)  benzonatate 100 milliGRAM(s) Oral three times a day PRN Cough  guaiFENesin   Syrup  (Sugar-Free) 100 milliGRAM(s) Oral every 6 hours PRN Cough not relieved by Tessalon Perles    Allergies  codeine (Unknown)    Vital Signs Last 24 Hrs  T(C): 36.9 (30 Sep 2020 05:32), Max: 37.3 (29 Sep 2020 20:26)  T(F): 98.4 (30 Sep 2020 05:32), Max: 99.1 (29 Sep 2020 20:26)  HR: 84 (30 Sep 2020 05:32) (84 - 100)  BP: 110/75 (30 Sep 2020 05:32) (108/74 - 127/85)  BP(mean): --  RR: 18 (30 Sep 2020 05:32) (18 - 18)  SpO2: 98% (30 Sep 2020 05:32) (95% - 98%)    I&O's Summary    29 Sep 2020 07:01  -  30 Sep 2020 07:00  --------------------------------------------------------  IN: 50 mL / OUT: 200 mL / NET: -150 mL    TELE: Not on telemetry   PHYSICAL EXAM:  Appearance: NAD, no distress, alert, Well developed   HEENT: Moist Mucous Membranes, Anicteric  Cardiovascular: Regular rate and rhythm, Normal S1 S2, No JVD, No murmurs, No rubs, gallops or clicks  Respiratory: Non-labored, Clear to auscultation, No rales, No rhonchi, No wheezing.   Gastrointestinal:  Soft, Non-tender, + BS  Neurologic: Non-focal  Skin: Warm and dry, No visible rashes or ulcers, No ecchymosis, No cyanosis  Musculoskeletal: No clubbing, No cyanosis, No joint swelling/tenderness  Psychiatry: Mood & affect appropriate  Lymph: No peripheral edema.     LABS: All Labs Reviewed:                        9.5    12.87 )-----------( 534      ( 30 Sep 2020 08:40 )             29.1                         9.7    12.82 )-----------( 559      ( 29 Sep 2020 09:02 )             29.8                         10.1   13.01 )-----------( 617      ( 28 Sep 2020 08:06 )             31.6     30 Sep 2020 08:40    140    |  104    |  25     ----------------------------<  104    4.3     |  29     |  0.94   29 Sep 2020 09:02    139    |  105    |  20     ----------------------------<  104    4.5     |  26     |  0.92   28 Sep 2020 08:06    139    |  104    |  20     ----------------------------<  119    4.3     |  27     |  0.93     Ca    8.6        30 Sep 2020 08:40  Ca    8.8        29 Sep 2020 09:02  Ca    9.0        28 Sep 2020 08:06    TPro  7.6    /  Alb  2.4    /  TBili  0.5    /  DBili  x      /  AST  31     /  ALT  36     /  AlkPhos  118    30 Sep 2020 08:40  TPro  7.6    /  Alb  2.3    /  TBili  0.5    /  DBili  x      /  AST  32     /  ALT  35     /  AlkPhos  114    29 Sep 2020 09:02  Cholesterol, Serum: 126 mg/dL (09-27-20 @ 11:11)  HDL Cholesterol, Serum: 31 mg/dL (09-27-20 @ 11:11)  Triglycerides, Serum: 122 mg/dL (09-27-20 @ 11:11)    12 Lead ECG:   Ventricular Rate 97 BPM  Atrial Rate 97 BPM  P-R Interval 146 ms  QRS Duration 80 ms  Q-T Interval 392 ms  QTC Calculation(Bazett) 497 ms  P Axis 38 degrees  R Axis 0 degrees  T Axis -7 degrees  Diagnosis Line Normal sinus rhythm  Minimal voltage criteria for LVH, may be normal variant  Nonspecific T wave abnormality  Abnormal ECG  Confirmed by Mack Schmitt MD ([id number]) on 9/21/2020 3:58:47 PM (09-20-20 @ 15:49)    < from: TTE Echo Complete w/o Contrast w/ Doppler (09.03.20 @ 12:56) >  Dimensions:  LA 3.0       Normal Values: 2.0 - 4.0 cm  Ao 2.8        Normal Values: 2.0 - 3.8 cm  SEPTUM 1.0       Normal Values: 0.6 - 1.2 cm  PWT 1.1       Normal Values: 0.6 - 1.1 cm  LVIDd 4.7         Normal Values: 3.0 - 5.6 cm  LVIDs 3.0         Normal Values: 1.8 - 4.0 cm    OBSERVATIONS:  Technically difficult and limited study  Mitral Valve: Thickened leaflets, trace physiologic MR.  Aortic Valve/Aorta: normal trileaflet aortic valve.  Tricuspid Valve: normal with trace TR.  Pulmonic Valve: normal  Left Atrium: normal  Right Atrium: normal  Left Ventricle: normal LV size and systolic function, estimated LVEF of 60-65%.  Right Ventricle: Grossly normal size and systolic function.  Pericardium/Pleura: normal, no significant pericardial effusion.    Conclusion:  Technically difficult and limited study  Normal left ventricular internal dimensions and systolic function, estimated LVEF of 60-65%.  Grossly normal RV size and systolic function.  Normal trileaflet aortic valve, without AI.  Trace physiologic MR and TR.  No significant pericardial effusion.    < end of copied text >    < from: Cardiac Cath Lab - Adult (08.30.19 @ 10:28) >  VENTRICLES: Global left ventricular function was normal. EF estimated was  55 %.  CORONARY VESSELS: The coronary circulation is right dominant.  LM:   --  LM:Normal.  LAD:   --  Ostial LAD: There was a 30 % stenosis.  --  Proximal LAD: There was a discrete 80 % stenosis in the distal third of  the vessel segment. There was ELIAS grade 3 flow through the vessel (brisk  flow).  --  Mid LAD: There was a 0 % stenosis at the site of a prior stent, in the  proximal third of the vessel segment.  CX:   --  Proximal circumflex: There was a discrete 30 % stenosis.  --  OM1: There was a 80 % stenosis.  RCA:   --  RCA: Angiography showed minor luminal irregularities with no  flow limiting lesions.  --  Mid RCA: There was a tubular 30 % stenosis in the proximal third of the  vessel segment.  --  Distal RCA: There was a discrete 20 % stenosis in the distal third of  the vessel segment.  --  RPDA: There was a discrete 50 % stenosis in the middle third of the  vessel segment. There was ELIAS grade 3 flow through the vessel (brisk  flow).  COMPLICATIONS: There were no complications.  SUMMARY:  1ST LESION INTERVENTIONS: A successful balloon angioplasty with stentwas  performed on the 80 % lesion in the proximal LAD. Following intervention  there was a 1 % residual stenosis.  2ND LESION INTERVENTIONS: A successful balloon angioplasty with stent was  performed on the 80 % lesion in the 1st obtuse marginal. Following  intervention there was a 1 % residual stenosis.  DIAGNOSTIC RECOMMENDATIONS: PCI of proximal LAD and OM1 lesions for  treatment of unstable angina.  INTERVENTIONAL RECOMMENDATIONS: Continue aspirin, 81 mg, PO, daily.  Increase ticagrelor to 90mg twice daily. Patient management should  include aggressive medical therapy.  < end of copied text >    < from: Xray Chest 1 View-PORTABLE IMMEDIATE (09.20.20 @ 15:25) >  AP chest. Prior 4/10/2020. Normal heart mediastinum. No pleural-parenchymal abnormality. Calcific bursitis right shoulder.  Impression: No active disease  < end of copied text >

## 2020-09-30 NOTE — PROGRESS NOTE ADULT - PROBLEM SELECTOR PLAN 4
per medicine  Thank you for consulting us and involving us in the management of this most interesting and challenging case.     Please call us at [phone] or text me directly on my cell#[phone] with any concerns or further questions.

## 2020-09-30 NOTE — PROGRESS NOTE ADULT - PROBLEM SELECTOR PLAN 1
- CT abd/pelvis w/ IV cont significant for extensive defects w/in lower abd wall with foci of gas & inflammation, worrisome for gas forming infection, loculations of fluid are noted within ab wall  -Patient afebrile, hemodynamic stable.   -Wound vac dressing changed 9/29 with plastic surgery   -Continue wound vac with saline irrigation  -Continue Meropenem IV (through 10/2) following ID recommendation, Dr. Gomez   -Wound cultures +ESBL, few enterococcus faecalis, rare pseudomonas  -Blood cxs negative   -Plastic surgery following, Dr. Ruiz, recs appreciated    -Pain well controlled with Tylenol, continue PRN  -Plan for DC Friday 10/2 after completion of Abx

## 2020-09-30 NOTE — PROGRESS NOTE ADULT - SUBJECTIVE AND OBJECTIVE BOX
[facility] DIVISION of INFECTIOUS DISEASE  Capo Gomez MD PhD, Cat Rodriguez MD, Andree Randall MD, Alicja Rizzo MD  and providing coverage with Liliana Workman MD and Isaac Loyola MD  Providing Infectious Disease Consultations at [facility], [facility], [facility]'s    Office# [phone] to schedule follow up appointments  Answering Service for urgent calls or New Consults [phone]  Cell# to text for urgent issues Capo Gomez [phone]     infectious diseases progress note:    LOUIE QUINTANILLA is a 44y y. o. Female patient    No concerning overnight events    Allergies    codeine (Unknown)    Intolerances        ANTIBIOTICS/RELEVANT:  antimicrobials  meropenem  IVPB      meropenem  IVPB 1000 milliGRAM(s) IV Intermittent every 8 hours    immunologic:    OTHER:  acetaminophen   Tablet .. 650 milliGRAM(s) Oral every 6 hours PRN  aspirin  chewable 81 milliGRAM(s) Oral daily  atorvastatin 40 milliGRAM(s) Oral at bedtime  benzonatate 100 milliGRAM(s) Oral three times a day PRN  chlorhexidine 2% Cloths 1 Application(s) Topical every 12 hours  enoxaparin Injectable 40 milliGRAM(s) SubCutaneous at bedtime  guaiFENesin   Syrup  (Sugar-Free) 100 milliGRAM(s) Oral every 6 hours PRN  metoprolol tartrate 50 milliGRAM(s) Oral two times a day      Objective:  Vital Signs Last 24 Hrs  T(C): 36.9 (30 Sep 2020 05:32), Max: 37.3 (29 Sep 2020 20:26)  T(F): 98.4 (30 Sep 2020 05:32), Max: 99.1 (29 Sep 2020 20:26)  HR: 84 (30 Sep 2020 05:32) (84 - 100)  BP: 110/75 (30 Sep 2020 05:32) (108/74 - 127/85)  BP(mean): --  RR: 18 (30 Sep 2020 05:32) (18 - 18)  SpO2: 98% (30 Sep 2020 05:32) (95% - 98%)    T(C): 36.9 (09-30-20 @ 05:32), Max: 37.3 (09-29-20 @ 20:26)  T(C): 36.9 (09-30-20 @ 05:32), Max: 37.3 (09-29-20 @ 20:26)  T(C): 36.9 (09-30-20 @ 05:32), Max: 37.3 (09-29-20 @ 20:26)    PHYSICAL EXAM:  HEENT: NC atraumatic  Neck: supple  Respiratory: no accessory muscle use, breathing comfortably  Cardiovascular: distant  Gastrointestinal: normal appearing, nondistended  Extremities: no clubbing, no cyanosis,      LABS:                          9.5    12.87 )-----------( 534      ( 30 Sep 2020 08:40 )             29.1       12.87 09-30 @ 08:40  12.82 09-29 @ 09:02  13.01 09-28 @ 08:06  12.76 09-27 @ 07:21  11.79 09-26 @ 08:07  11.44 09-25 @ 08:15  11.27 09-24 @ 07:34      09-30    140  |  104  |  25<H>  ----------------------------<  104<H>  4.3   |  29  |  0.94    Ca    8.6      30 Sep 2020 08:40    TPro  7.6  /  Alb  2.4<L>  /  TBili  0.5  /  DBili  x   /  AST  31  /  ALT  36  /  AlkPhos  118  09-30      Creatinine, Serum: 0.94 mg/dL (09-30-20 @ 08:40)  Creatinine, Serum: 0.92 mg/dL (09-29-20 @ 09:02)  Creatinine, Serum: 0.93 mg/dL (09-28-20 @ 08:06)  Creatinine, Serum: 0.84 mg/dL (09-27-20 @ 07:21)  Creatinine, Serum: 0.87 mg/dL (09-26-20 @ 08:07)  Creatinine, Serum: 0.91 mg/dL (09-25-20 @ 08:15)  Creatinine, Serum: 0.86 mg/dL (09-24-20 @ 07:34)                INFLAMMATORY MARKERS  Auto Neutrophil #: 8.58 K/uL (09-30-20 @ 08:40)  Auto Lymphocyte #: 2.86 K/uL (09-30-20 @ 08:40)  Auto Neutrophil #: 8.60 K/uL (09-29-20 @ 09:02)  Auto Lymphocyte #: 2.76 K/uL (09-29-20 @ 09:02)  Auto Neutrophil #: 7.84 K/uL (09-28-20 @ 08:06)  Auto Lymphocyte #: 3.72 K/uL (09-28-20 @ 08:06)  Auto Neutrophil #: 7.80 K/uL (09-27-20 @ 07:21)  Auto Lymphocyte #: 3.34 K/uL (09-27-20 @ 07:21)  Auto Neutrophil #: 7.61 K/uL (09-26-20 @ 08:07)  Auto Lymphocyte #: 2.68 K/uL (09-26-20 @ 08:07)  Auto Neutrophil #: 6.70 K/uL (09-25-20 @ 08:15)  Auto Lymphocyte #: 3.21 K/uL (09-25-20 @ 08:15)  Auto Neutrophil #: 7.09 K/uL (09-24-20 @ 07:34)  Auto Lymphocyte #: 2.60 K/uL (09-24-20 @ 07:34)  Auto Neutrophil #: 7.54 K/uL (09-23-20 @ 07:56)  Auto Lymphocyte #: 2.67 K/uL (09-23-20 @ 07:56)  Auto Neutrophil #: 6.93 K/uL (09-22-20 @ 09:16)  Auto Lymphocyte #: 2.66 K/uL (09-22-20 @ 09:16)  Auto Neutrophil #: 7.70 K/uL (09-21-20 @ 08:51)  Auto Lymphocyte #: 2.02 K/uL (09-21-20 @ 08:51)  Auto Neutrophil #: 8.18 K/uL (09-20-20 @ 14:47)  Auto Lymphocyte #: 2.22 K/uL (09-20-20 @ 14:47)    Lactate, Blood: 2.0 mmol/L (09-20-20 @ 14:47)    Auto Eosinophil #: 0.38 K/uL (09-30-20 @ 08:40)  Auto Eosinophil #: 0.36 K/uL (09-29-20 @ 09:02)  Auto Eosinophil #: 0.35 K/uL (09-28-20 @ 08:06)  Auto Eosinophil #: 0.36 K/uL (09-27-20 @ 07:21)  Auto Eosinophil #: 0.33 K/uL (09-26-20 @ 08:07)  Auto Eosinophil #: 0.31 K/uL (09-25-20 @ 08:15)  Auto Eosinophil #: 0.29 K/uL (09-24-20 @ 07:34)  Auto Eosinophil #: 0.28 K/uL (09-23-20 @ 07:56)  Auto Eosinophil #: 0.34 K/uL (09-22-20 @ 09:16)  Auto Eosinophil #: 0.24 K/uL (09-21-20 @ 08:51)  Auto Eosinophil #: 0.19 K/uL (09-20-20 @ 14:47)                  Ferritin, Serum: 443 ng/mL (09-22-20 @ 11:38)        Activated Partial Thromboplastin Time: 27.1 sec (09-20-20 @ 14:47)  INR: 1.48 ratio (09-20-20 @ 14:47)          MICROBIOLOGY:              RADIOLOGY & ADDITIONAL STUDIES:

## 2020-09-30 NOTE — PROGRESS NOTE ADULT - SUBJECTIVE AND OBJECTIVE BOX
Patient is a 44y old  Female who presents with a chief complaint of Wound dehiscence s/p panniculectomy on 9/1 (30 Sep 2020 11:42)      INTERVAL HPI/OVERNIGHT EVENTS: Patient seen and examined at bedside. No overnight events occurred. Patient has no complaints at this time. Denies fevers, chills, headache, lightheadedness, chest pain, dyspnea, abdominal pain, n/v/d/c.    MEDICATIONS  (STANDING):  aspirin  chewable 81 milliGRAM(s) Oral daily  atorvastatin 40 milliGRAM(s) Oral at bedtime  chlorhexidine 2% Cloths 1 Application(s) Topical every 12 hours  enoxaparin Injectable 40 milliGRAM(s) SubCutaneous at bedtime  meropenem  IVPB      meropenem  IVPB 1000 milliGRAM(s) IV Intermittent every 8 hours  metoprolol tartrate 50 milliGRAM(s) Oral two times a day    MEDICATIONS  (PRN):  acetaminophen   Tablet .. 650 milliGRAM(s) Oral every 6 hours PRN Mild Pain (1 - 3)  benzonatate 100 milliGRAM(s) Oral three times a day PRN Cough  guaiFENesin   Syrup  (Sugar-Free) 100 milliGRAM(s) Oral every 6 hours PRN Cough not relieved by Tessalon Perles      Allergies    codeine (Unknown)    Intolerances        REVIEW OF SYSTEMS:  CONSTITUTIONAL: No fever or chills  HEENT:  No headache, no sore throat  RESPIRATORY: No cough, wheezing, or shortness of breath  CARDIOVASCULAR: No chest pain, palpitations  GASTROINTESTINAL: No abd pain, nausea, vomiting, or diarrhea  GENITOURINARY: No dysuria, frequency, or hematuria  NEUROLOGICAL: no focal weakness or dizziness  MUSCULOSKELETAL: no myalgias     Vital Signs Last 24 Hrs  T(C): 36.9 (30 Sep 2020 05:32), Max: 37.3 (29 Sep 2020 20:26)  T(F): 98.4 (30 Sep 2020 05:32), Max: 99.1 (29 Sep 2020 20:26)  HR: 84 (30 Sep 2020 05:32) (84 - 100)  BP: 110/75 (30 Sep 2020 05:32) (108/74 - 127/85)  BP(mean): --  RR: 18 (30 Sep 2020 05:32) (18 - 18)  SpO2: 98% (30 Sep 2020 05:32) (95% - 98%)    PHYSICAL EXAM:  GENERAL: NAD  HEENT:  anicteric, moist mucous membranes  CHEST/LUNG:  CTA b/l, no rales, wheezes, or rhonchi  HEART:  RRR, S1, S2  ABDOMEN:  BS+, soft, nontender, nondistended  EXTREMITIES: no edema, cyanosis, or calf tenderness  NERVOUS SYSTEM: answers questions and follows commands appropriately    LABS:                        9.5    12.87 )-----------( 534      ( 30 Sep 2020 08:40 )             29.1     CBC Full  -  ( 30 Sep 2020 08:40 )  WBC Count : 12.87 K/uL  Hemoglobin : 9.5 g/dL  Hematocrit : 29.1 %  Platelet Count - Automated : 534 K/uL  Mean Cell Volume : 88.2 fl  Mean Cell Hemoglobin : 28.8 pg  Mean Cell Hemoglobin Concentration : 32.6 gm/dL  Auto Neutrophil # : 8.58 K/uL  Auto Lymphocyte # : 2.86 K/uL  Auto Monocyte # : 0.92 K/uL  Auto Eosinophil # : 0.38 K/uL  Auto Basophil # : 0.05 K/uL  Auto Neutrophil % : 66.7 %  Auto Lymphocyte % : 22.2 %  Auto Monocyte % : 7.1 %  Auto Eosinophil % : 3.0 %  Auto Basophil % : 0.4 %    30 Sep 2020 08:40    140    |  104    |  25     ----------------------------<  104    4.3     |  29     |  0.94     Ca    8.6        30 Sep 2020 08:40    TPro  7.6    /  Alb  2.4    /  TBili  0.5    /  DBili  x      /  AST  31     /  ALT  36     /  AlkPhos  118    30 Sep 2020 08:40        CAPILLARY BLOOD GLUCOSE              RADIOLOGY & ADDITIONAL TESTS:    Personally reviewed.     Consultant(s) Notes Reviewed:  [x] YES  [ ] NO

## 2020-10-01 LAB
ALBUMIN SERPL ELPH-MCNC: 2.5 G/DL — LOW (ref 3.3–5)
ALP SERPL-CCNC: 120 U/L — SIGNIFICANT CHANGE UP (ref 40–120)
ALT FLD-CCNC: 31 U/L — SIGNIFICANT CHANGE UP (ref 12–78)
ANION GAP SERPL CALC-SCNC: 8 MMOL/L — SIGNIFICANT CHANGE UP (ref 5–17)
AST SERPL-CCNC: 26 U/L — SIGNIFICANT CHANGE UP (ref 15–37)
BASOPHILS # BLD AUTO: 0.06 K/UL — SIGNIFICANT CHANGE UP (ref 0–0.2)
BASOPHILS NFR BLD AUTO: 0.5 % — SIGNIFICANT CHANGE UP (ref 0–2)
BILIRUB SERPL-MCNC: 0.4 MG/DL — SIGNIFICANT CHANGE UP (ref 0.2–1.2)
BUN SERPL-MCNC: 27 MG/DL — HIGH (ref 7–23)
CALCIUM SERPL-MCNC: 9.2 MG/DL — SIGNIFICANT CHANGE UP (ref 8.5–10.1)
CHLORIDE SERPL-SCNC: 105 MMOL/L — SIGNIFICANT CHANGE UP (ref 96–108)
CO2 SERPL-SCNC: 27 MMOL/L — SIGNIFICANT CHANGE UP (ref 22–31)
CREAT SERPL-MCNC: 0.86 MG/DL — SIGNIFICANT CHANGE UP (ref 0.5–1.3)
EOSINOPHIL # BLD AUTO: 0.32 K/UL — SIGNIFICANT CHANGE UP (ref 0–0.5)
EOSINOPHIL NFR BLD AUTO: 2.4 % — SIGNIFICANT CHANGE UP (ref 0–6)
GLUCOSE SERPL-MCNC: 111 MG/DL — HIGH (ref 70–99)
HCT VFR BLD CALC: 30.7 % — LOW (ref 34.5–45)
HGB BLD-MCNC: 9.6 G/DL — LOW (ref 11.5–15.5)
IMM GRANULOCYTES NFR BLD AUTO: 0.4 % — SIGNIFICANT CHANGE UP (ref 0–1.5)
LYMPHOCYTES # BLD AUTO: 26.1 % — SIGNIFICANT CHANGE UP (ref 13–44)
LYMPHOCYTES # BLD AUTO: 3.46 K/UL — HIGH (ref 1–3.3)
MCHC RBC-ENTMCNC: 28.1 PG — SIGNIFICANT CHANGE UP (ref 27–34)
MCHC RBC-ENTMCNC: 31.3 GM/DL — LOW (ref 32–36)
MCV RBC AUTO: 89.8 FL — SIGNIFICANT CHANGE UP (ref 80–100)
MONOCYTES # BLD AUTO: 0.78 K/UL — SIGNIFICANT CHANGE UP (ref 0–0.9)
MONOCYTES NFR BLD AUTO: 5.9 % — SIGNIFICANT CHANGE UP (ref 2–14)
NEUTROPHILS # BLD AUTO: 8.59 K/UL — HIGH (ref 1.8–7.4)
NEUTROPHILS NFR BLD AUTO: 64.7 % — SIGNIFICANT CHANGE UP (ref 43–77)
NRBC # BLD: 0 /100 WBCS — SIGNIFICANT CHANGE UP (ref 0–0)
PLATELET # BLD AUTO: 585 K/UL — HIGH (ref 150–400)
POTASSIUM SERPL-MCNC: 4 MMOL/L — SIGNIFICANT CHANGE UP (ref 3.5–5.3)
POTASSIUM SERPL-SCNC: 4 MMOL/L — SIGNIFICANT CHANGE UP (ref 3.5–5.3)
PROT SERPL-MCNC: 8 G/DL — SIGNIFICANT CHANGE UP (ref 6–8.3)
RBC # BLD: 3.42 M/UL — LOW (ref 3.8–5.2)
RBC # FLD: 14.2 % — SIGNIFICANT CHANGE UP (ref 10.3–14.5)
SODIUM SERPL-SCNC: 140 MMOL/L — SIGNIFICANT CHANGE UP (ref 135–145)
WBC # BLD: 13.26 K/UL — HIGH (ref 3.8–10.5)
WBC # FLD AUTO: 13.26 K/UL — HIGH (ref 3.8–10.5)

## 2020-10-01 PROCEDURE — 99232 SBSQ HOSP IP/OBS MODERATE 35: CPT

## 2020-10-01 PROCEDURE — 99232 SBSQ HOSP IP/OBS MODERATE 35: CPT | Mod: GC

## 2020-10-01 RX ADMIN — MEROPENEM 100 MILLIGRAM(S): 1 INJECTION INTRAVENOUS at 05:33

## 2020-10-01 RX ADMIN — ENOXAPARIN SODIUM 40 MILLIGRAM(S): 100 INJECTION SUBCUTANEOUS at 21:17

## 2020-10-01 RX ADMIN — MEROPENEM 100 MILLIGRAM(S): 1 INJECTION INTRAVENOUS at 21:18

## 2020-10-01 RX ADMIN — ATORVASTATIN CALCIUM 40 MILLIGRAM(S): 80 TABLET, FILM COATED ORAL at 21:17

## 2020-10-01 RX ADMIN — Medication 50 MILLIGRAM(S): at 05:33

## 2020-10-01 RX ADMIN — CHLORHEXIDINE GLUCONATE 1 APPLICATION(S): 213 SOLUTION TOPICAL at 05:34

## 2020-10-01 RX ADMIN — Medication 100 MILLIGRAM(S): at 21:17

## 2020-10-01 RX ADMIN — MEROPENEM 100 MILLIGRAM(S): 1 INJECTION INTRAVENOUS at 13:10

## 2020-10-01 RX ADMIN — CHLORHEXIDINE GLUCONATE 1 APPLICATION(S): 213 SOLUTION TOPICAL at 17:13

## 2020-10-01 RX ADMIN — Medication 81 MILLIGRAM(S): at 11:36

## 2020-10-01 NOTE — PROGRESS NOTE ADULT - PROBLEM SELECTOR PROBLEM 3
Anemia
Anemia
Cough
HTN (hypertension)
Leukocytosis, unspecified type
Leukocytosis, unspecified type
Anemia
Cough
HTN (hypertension)
Leukocytosis, unspecified type
HTN (hypertension)
Cough

## 2020-10-01 NOTE — PROGRESS NOTE ADULT - PROBLEM SELECTOR PLAN 1
- CT abd/pelvis w/ IV cont significant for extensive defects w/in lower abd wall with foci of gas & inflammation, worrisome for gas forming infection, loculations of fluid are noted within ab wall  -Patient afebrile, hemodynamic stable.   -Wound vac dressing changed 9/29 with plastic surgery   -Continue wound vac with saline irrigation  -Continue Meropenem IV (through 10/2) following ID recommendation, Dr. Gomez   -Wound cultures +ESBL, few enterococcus faecalis, rare pseudomonas  -Blood cxs negative   -Plastic surgery following, Dr. Ruiz, recs appreciated    -Pain well controlled with Tylenol, continue PRN  -Plan for DC Friday 10/2 after completion of Abx, will go home with VNA/VAC per Plastics

## 2020-10-01 NOTE — PROGRESS NOTE ADULT - PROBLEM SELECTOR PROBLEM 1
Wound dehiscence, surgical, initial encounter

## 2020-10-01 NOTE — PROGRESS NOTE ADULT - ASSESSMENT
44 year old female PMHx CAD (mLAD x1 MIGUEL ANGEL (2014), x3 stents 09/2019), HTN, prediabetes, hyperlipidemia, recent COVID-19 infection admitted for wound dehiscence and possible revision.    Obstructive CAD s/p PCI 9/ 2019   - Cardiologist Dr. Abdoul Lam from [facility]  - No acute changes on EKG compared to previous  - Echo 9/3/2020: trace MR, trace TR, LVEF 60-65%   - Continue ASA 81 and lopressor 50mg po bid and high intensity statin Lipitor 40mg po qhs    HTN   - BP: 121/85 (10-01-20 @ 05:24) (98/68 - 121/85)  - heart rate slightly elevated in setting of infection and anemia  - Continue current dose of metoprolol 50 mg BID with hold parameters     Anemia  -stable  - work up as per primary team   - s/p 1 unit of PRBC   - transfuse to keep HGB greater than 8 given CAD    HLD  - Continue atorvastatin 40 mg qhs     Cellulitis   - wound cellulitis now sp wound vac  - follow up id, wound, plastic surgery recs    - Patient stable from cardiac stand point.   - Monitor and replete lytes, keep K>4, Mg>2.  - All other medical needs as per primary team.  - Other cardiovascular workup will depend on clinical course.  - Will continue to follow.    Mack May DNP, ANP-c  Cardiology   Spectra #[phone]

## 2020-10-01 NOTE — PROGRESS NOTE ADULT - PROBLEM SELECTOR PROBLEM 6
HLD (hyperlipidemia)
Prediabetes
HLD (hyperlipidemia)
Prediabetes
HLD (hyperlipidemia)
Prediabetes

## 2020-10-01 NOTE — PROGRESS NOTE ADULT - SUBJECTIVE AND OBJECTIVE BOX
[facility] -- Zakia Barraza, Oskar, Maye, Matthias Oliva Savella  Office # [phone]      Follow Up:    CAD  Subjective/Observations:   No events overnight resting comfortably in bed.  No complaints of chest pain, dyspnea, or palpitations reported. No signs of orthopnea or PND.     REVIEW OF SYSTEMS: All other review of systems is negative unless indicated above    PAST MEDICAL & SURGICAL HISTORY:  Obesity    Hypothyroid  Resolved    Coronary artery disease  s/p MIGUEL ANGEL x 3 (9/2019), MIGUEL ANGEL x1 (2014)    HLD (hyperlipidemia)    HTN (hypertension)    S/P coronary artery stent placement  mLAD x1 MIGUEL ANGEL (2014),  3 stents 09/2019    H/O total hysterectomy  2017        MEDICATIONS  (STANDING):  aspirin  chewable 81 milliGRAM(s) Oral daily  atorvastatin 40 milliGRAM(s) Oral at bedtime  chlorhexidine 2% Cloths 1 Application(s) Topical every 12 hours  enoxaparin Injectable 40 milliGRAM(s) SubCutaneous at bedtime  meropenem  IVPB 1000 milliGRAM(s) IV Intermittent every 8 hours  meropenem  IVPB      metoprolol tartrate 50 milliGRAM(s) Oral two times a day    MEDICATIONS  (PRN):  acetaminophen   Tablet .. 650 milliGRAM(s) Oral every 6 hours PRN Mild Pain (1 - 3)  benzonatate 100 milliGRAM(s) Oral three times a day PRN Cough  guaiFENesin   Syrup  (Sugar-Free) 100 milliGRAM(s) Oral every 6 hours PRN Cough not relieved by Tessalon Perles      Allergies    codeine (Unknown)    Intolerances        Vital Signs Last 24 Hrs  T(C): 36.4 (01 Oct 2020 05:24), Max: 36.9 (30 Sep 2020 20:58)  T(F): 97.6 (01 Oct 2020 05:24), Max: 98.4 (30 Sep 2020 20:58)  HR: 98 (01 Oct 2020 05:24) (90 - 100)  BP: 121/85 (01 Oct 2020 05:24) (98/68 - 121/85)  BP(mean): --  RR: 18 (01 Oct 2020 05:24) (18 - 18)  SpO2: 97% (01 Oct 2020 05:24) (96% - 98%)    I&O's Summary    30 Sep 2020 07:01  -  01 Oct 2020 07:00  --------------------------------------------------------  IN: 50 mL / OUT: 0 mL / NET: 50 mL          PHYSICAL EXAM:  TELE: Off tele   Constitutional: NAD, awake and alert, well-developed  HEENT: Moist Mucous Membranes, Anicteric  Pulmonary: Non-labored, breath sounds are clear bilaterally, No wheezing, crackles or rhonchi  Cardiovascular: Regular, S1 and S2 nl, No murmurs, rubs, gallops or clicks  Gastrointestinal: Bowel Sounds present, soft, nontender.   Lymph: No lymphadenopathy. No peripheral edema.  Skin: No visible rashes or ulcers.  Psych:  Mood & affect appropriate    LABS: All Labs Reviewed:                        9.6    13.26 )-----------( 585      ( 01 Oct 2020 08:41 )             30.7                         9.5    12.87 )-----------( 534      ( 30 Sep 2020 08:40 )             29.1                         9.7    12.82 )-----------( 559      ( 29 Sep 2020 09:02 )             29.8     01 Oct 2020 08:41    140    |  105    |  27     ----------------------------<  111    4.0     |  27     |  0.86   30 Sep 2020 08:40    140    |  104    |  25     ----------------------------<  104    4.3     |  29     |  0.94   29 Sep 2020 09:02    139    |  105    |  20     ----------------------------<  104    4.5     |  26     |  0.92     Ca    9.2        01 Oct 2020 08:41  Ca    8.6        30 Sep 2020 08:40  Ca    8.8        29 Sep 2020 09:02    TPro  8.0    /  Alb  2.5    /  TBili  0.4    /  DBili  x      /  AST  26     /  ALT  31     /  AlkPhos  120    01 Oct 2020 08:41  TPro  7.6    /  Alb  2.4    /  TBili  0.5    /  DBili  x      /  AST  31     /  ALT  36     /  AlkPhos  118    30 Sep 2020 08:40  TPro  7.6    /  Alb  2.3    /  TBili  0.5    /  DBili  x      /  AST  32     /  ALT  35     /  AlkPhos  114    29 Sep 2020 09:02      12 Lead ECG:   Ventricular Rate 97 BPM  Atrial Rate 97 BPM  P-R Interval 146 ms  QRS Duration 80 ms  Q-T Interval 392 ms  QTC Calculation(Bazett) 497 ms  P Axis 38 degrees  R Axis 0 degrees  T Axis -7 degrees  Diagnosis Line Normal sinus rhythm  Minimal voltage criteria for LVH, may be normal variant  Nonspecific T wave abnormality  Abnormal ECG  Confirmed by Mack Schmitt MD ([id number]) on 9/21/2020 3:58:47 PM (09-20-20 @ 15:49)    < from: TTE Echo Complete w/o Contrast w/ Doppler (09.03.20 @ 12:56) >  Dimensions:  LA 3.0       Normal Values: 2.0 - 4.0 cm  Ao 2.8        Normal Values: 2.0 - 3.8 cm  SEPTUM 1.0       Normal Values: 0.6 - 1.2 cm  PWT 1.1       Normal Values: 0.6 - 1.1 cm  LVIDd 4.7         Normal Values: 3.0 - 5.6 cm  LVIDs 3.0         Normal Values: 1.8 - 4.0 cm    OBSERVATIONS:  Technically difficult and limited study  Mitral Valve: Thickened leaflets, trace physiologic MR.  Aortic Valve/Aorta: normal trileaflet aortic valve.  Tricuspid Valve: normal with trace TR.  Pulmonic Valve: normal  Left Atrium: normal  Right Atrium: normal  Left Ventricle: normal LV size and systolic function, estimated LVEF of 60-65%.  Right Ventricle: Grossly normal size and systolic function.  Pericardium/Pleura: normal, no significant pericardial effusion.    Conclusion:  Technically difficult and limited study  Normal left ventricular internal dimensions and systolic function, estimated LVEF of 60-65%.  Grossly normal RV size and systolic function.  Normal trileaflet aortic valve, without AI.  Trace physiologic MR and TR.  No significant pericardial effusion.    < end of copied text >    < from: Cardiac Cath Lab - Adult (08.30.19 @ 10:28) >  VENTRICLES: Global left ventricular function was normal. EF estimated was  55 %.  CORONARY VESSELS: The coronary circulation is right dominant.  LM:   --  LM:Normal.  LAD:   --  Ostial LAD: There was a 30 % stenosis.  --  Proximal LAD: There was a discrete 80 % stenosis in the distal third of  the vessel segment. There was ELIAS grade 3 flow through the vessel (brisk  flow).  --  Mid LAD: There was a 0 % stenosis at the site of a prior stent, in the  proximal third of the vessel segment.  CX:   --  Proximal circumflex: There was a discrete 30 % stenosis.  --  OM1: There was a 80 % stenosis.  RCA:   --  RCA: Angiography showed minor luminal irregularities with no  flow limiting lesions.  --  Mid RCA: There was a tubular 30 % stenosis in the proximal third of the  vessel segment.  --  Distal RCA: There was a discrete 20 % stenosis in the distal third of  the vessel segment.  --  RPDA: There was a discrete 50 % stenosis in the middle third of the  vessel segment. There was ELIAS grade 3 flow through the vessel (brisk  flow).  COMPLICATIONS: There were no complications.  SUMMARY:  1ST LESION INTERVENTIONS: A successful balloon angioplasty with stentwas  performed on the 80 % lesion in the proximal LAD. Following intervention  there was a 1 % residual stenosis.  2ND LESION INTERVENTIONS: A successful balloon angioplasty with stent was  performed on the 80 % lesion in the 1st obtuse marginal. Following  intervention there was a 1 % residual stenosis.  DIAGNOSTIC RECOMMENDATIONS: PCI of proximal LAD and OM1 lesions for  treatment of unstable angina.  INTERVENTIONAL RECOMMENDATIONS: Continue aspirin, 81 mg, PO, daily.  Increase ticagrelor to 90mg twice daily. Patient management should  include aggressive medical therapy.  < end of copied text >    < from: Xray Chest 1 View-PORTABLE IMMEDIATE (09.20.20 @ 15:25) >  AP chest. Prior 4/10/2020. Normal heart mediastinum. No pleural-parenchymal abnormality. Calcific bursitis right shoulder.  Impression: No active disease  < end of copied text >

## 2020-10-01 NOTE — PROGRESS NOTE ADULT - PROBLEM SELECTOR PROBLEM 4
HTN (hypertension)
HTN (hypertension)
Coronary artery disease
HTN (hypertension)
HTN (hypertension)
Obesity
Obesity
Coronary artery disease
HTN (hypertension)
Obesity
Coronary artery disease

## 2020-10-01 NOTE — PROGRESS NOTE ADULT - PROBLEM SELECTOR PROBLEM 7
Hypothyroid
Prediabetes
Hypothyroid
Prediabetes
Hypothyroid
Prediabetes

## 2020-10-01 NOTE — PROGRESS NOTE ADULT - PROBLEM SELECTOR PROBLEM 5
Coronary artery disease
HLD (hyperlipidemia)
Coronary artery disease
HLD (hyperlipidemia)
Coronary artery disease
HLD (hyperlipidemia)

## 2020-10-01 NOTE — PROGRESS NOTE ADULT - PROBLEM SELECTOR PROBLEM 8
Hypothyroid
Need for prophylactic measure
Need for prophylactic measure
Hypothyroid
Need for prophylactic measure

## 2020-10-01 NOTE — PROGRESS NOTE ADULT - PROBLEM SELECTOR PROBLEM 2
Cellulitis of abdominal wall
Anemia
Cellulitis of abdominal wall
Anemia
Cellulitis of abdominal wall
Anemia
Anemia

## 2020-10-01 NOTE — PROGRESS NOTE ADULT - SUBJECTIVE AND OBJECTIVE BOX
Patient is a 44y old  Female who presents with a chief complaint of Wound dehiscence s/p panniculectomy on 9/1 (30 Sep 2020 20:10)      INTERVAL HPI/OVERNIGHT EVENTS: Patient seen and examined at bedside. No overnight events occurred. Patient has no complaints at this time. Denies fevers, chills, headache, lightheadedness, chest pain, dyspnea, abdominal pain, n/v/d/c.    MEDICATIONS  (STANDING):  aspirin  chewable 81 milliGRAM(s) Oral daily  atorvastatin 40 milliGRAM(s) Oral at bedtime  chlorhexidine 2% Cloths 1 Application(s) Topical every 12 hours  enoxaparin Injectable 40 milliGRAM(s) SubCutaneous at bedtime  meropenem  IVPB 1000 milliGRAM(s) IV Intermittent every 8 hours  meropenem  IVPB      metoprolol tartrate 50 milliGRAM(s) Oral two times a day    MEDICATIONS  (PRN):  acetaminophen   Tablet .. 650 milliGRAM(s) Oral every 6 hours PRN Mild Pain (1 - 3)  benzonatate 100 milliGRAM(s) Oral three times a day PRN Cough  guaiFENesin   Syrup  (Sugar-Free) 100 milliGRAM(s) Oral every 6 hours PRN Cough not relieved by Tessalon Perles      Allergies    codeine (Unknown)    Intolerances        REVIEW OF SYSTEMS:  CONSTITUTIONAL: No fever or chills  HEENT:  No headache, no sore throat  RESPIRATORY: No cough, wheezing, or shortness of breath  CARDIOVASCULAR: No chest pain, palpitations  GASTROINTESTINAL: No abd pain, nausea, vomiting, or diarrhea  GENITOURINARY: No dysuria, frequency, or hematuria  NEUROLOGICAL: no focal weakness or dizziness  MUSCULOSKELETAL: no myalgias     Vital Signs Last 24 Hrs  T(C): 36.4 (01 Oct 2020 05:24), Max: 36.9 (30 Sep 2020 20:58)  T(F): 97.6 (01 Oct 2020 05:24), Max: 98.4 (30 Sep 2020 20:58)  HR: 98 (01 Oct 2020 05:24) (90 - 100)  BP: 121/85 (01 Oct 2020 05:24) (98/68 - 121/85)  BP(mean): --  RR: 18 (01 Oct 2020 05:24) (18 - 18)  SpO2: 97% (01 Oct 2020 05:24) (96% - 98%)    PHYSICAL EXAM:  GENERAL: NAD  HEENT:  anicteric, moist mucous membranes  CHEST/LUNG:  CTA b/l, no rales, wheezes, or rhonchi  HEART:  RRR, S1, S2  ABDOMEN:  BS+, soft, nontender, nondistended  EXTREMITIES: no edema, cyanosis, or calf tenderness  NERVOUS SYSTEM: answers questions and follows commands appropriately    LABS:                        9.5    12.87 )-----------( 534      ( 30 Sep 2020 08:40 )             29.1     CBC Full  -  ( 30 Sep 2020 08:40 )  WBC Count : 12.87 K/uL  Hemoglobin : 9.5 g/dL  Hematocrit : 29.1 %  Platelet Count - Automated : 534 K/uL  Mean Cell Volume : 88.2 fl  Mean Cell Hemoglobin : 28.8 pg  Mean Cell Hemoglobin Concentration : 32.6 gm/dL  Auto Neutrophil # : 8.58 K/uL  Auto Lymphocyte # : 2.86 K/uL  Auto Monocyte # : 0.92 K/uL  Auto Eosinophil # : 0.38 K/uL  Auto Basophil # : 0.05 K/uL  Auto Neutrophil % : 66.7 %  Auto Lymphocyte % : 22.2 %  Auto Monocyte % : 7.1 %  Auto Eosinophil % : 3.0 %  Auto Basophil % : 0.4 %    30 Sep 2020 08:40    140    |  104    |  25     ----------------------------<  104    4.3     |  29     |  0.94     Ca    8.6        30 Sep 2020 08:40    TPro  7.6    /  Alb  2.4    /  TBili  0.5    /  DBili  x      /  AST  31     /  ALT  36     /  AlkPhos  118    30 Sep 2020 08:40        CAPILLARY BLOOD GLUCOSE              RADIOLOGY & ADDITIONAL TESTS:    Personally reviewed.     Consultant(s) Notes Reviewed:  [x] YES  [ ] NO

## 2020-10-01 NOTE — PROGRESS NOTE ADULT - NSHPATTENDINGPLANDISCUSS_GEN_ALL_CORE
Patient, RN, Residents
patient, RN, Resident team
patient, RN, Resident team, CM
Patient, Consultants, RN, Residents, /
Patient, Residents, RN, SW/CM
Patient, RN, Residents, SW/CM, Consultants
Patient, RN, Residents
pt, consultants, nursing
pt, consultants, nursing
Patient, Consultants, RN, SW/CM, Residents

## 2020-10-02 ENCOUNTER — TRANSCRIPTION ENCOUNTER (OUTPATIENT)
Age: 45
End: 2020-10-02

## 2020-10-02 VITALS
OXYGEN SATURATION: 100 % | TEMPERATURE: 98 F | RESPIRATION RATE: 18 BRPM | SYSTOLIC BLOOD PRESSURE: 121 MMHG | HEART RATE: 93 BPM | DIASTOLIC BLOOD PRESSURE: 88 MMHG

## 2020-10-02 LAB
ALBUMIN SERPL ELPH-MCNC: 2.3 G/DL — LOW (ref 3.3–5)
ALP SERPL-CCNC: 112 U/L — SIGNIFICANT CHANGE UP (ref 40–120)
ALT FLD-CCNC: 27 U/L — SIGNIFICANT CHANGE UP (ref 12–78)
ANION GAP SERPL CALC-SCNC: 6 MMOL/L — SIGNIFICANT CHANGE UP (ref 5–17)
AST SERPL-CCNC: 25 U/L — SIGNIFICANT CHANGE UP (ref 15–37)
BILIRUB SERPL-MCNC: 0.3 MG/DL — SIGNIFICANT CHANGE UP (ref 0.2–1.2)
BUN SERPL-MCNC: 27 MG/DL — HIGH (ref 7–23)
CALCIUM SERPL-MCNC: 8.3 MG/DL — LOW (ref 8.5–10.1)
CHLORIDE SERPL-SCNC: 107 MMOL/L — SIGNIFICANT CHANGE UP (ref 96–108)
CO2 SERPL-SCNC: 27 MMOL/L — SIGNIFICANT CHANGE UP (ref 22–31)
CREAT SERPL-MCNC: 0.92 MG/DL — SIGNIFICANT CHANGE UP (ref 0.5–1.3)
GLUCOSE SERPL-MCNC: 115 MG/DL — HIGH (ref 70–99)
POTASSIUM SERPL-MCNC: 4 MMOL/L — SIGNIFICANT CHANGE UP (ref 3.5–5.3)
POTASSIUM SERPL-SCNC: 4 MMOL/L — SIGNIFICANT CHANGE UP (ref 3.5–5.3)
PROT SERPL-MCNC: 7.3 G/DL — SIGNIFICANT CHANGE UP (ref 6–8.3)
SODIUM SERPL-SCNC: 140 MMOL/L — SIGNIFICANT CHANGE UP (ref 135–145)

## 2020-10-02 PROCEDURE — 99239 HOSP IP/OBS DSCHRG MGMT >30: CPT | Mod: GC

## 2020-10-02 PROCEDURE — 99232 SBSQ HOSP IP/OBS MODERATE 35: CPT

## 2020-10-02 RX ORDER — ACETAMINOPHEN 500 MG
2 TABLET ORAL
Qty: 0 | Refills: 0 | DISCHARGE
Start: 2020-10-02

## 2020-10-02 RX ORDER — MULTIVIT-MIN/FERROUS GLUCONATE 9 MG/15 ML
1 LIQUID (ML) ORAL
Qty: 0 | Refills: 0 | DISCHARGE
Start: 2020-10-02

## 2020-10-02 RX ORDER — ASCORBIC ACID 60 MG
1 TABLET,CHEWABLE ORAL
Qty: 0 | Refills: 0 | DISCHARGE
Start: 2020-10-02

## 2020-10-02 RX ADMIN — Medication 50 MILLIGRAM(S): at 05:49

## 2020-10-02 RX ADMIN — CHLORHEXIDINE GLUCONATE 1 APPLICATION(S): 213 SOLUTION TOPICAL at 05:48

## 2020-10-02 RX ADMIN — Medication 81 MILLIGRAM(S): at 11:01

## 2020-10-02 RX ADMIN — MEROPENEM 100 MILLIGRAM(S): 1 INJECTION INTRAVENOUS at 05:49

## 2020-10-02 RX ADMIN — MEROPENEM 100 MILLIGRAM(S): 1 INJECTION INTRAVENOUS at 13:39

## 2020-10-02 NOTE — PROGRESS NOTE ADULT - ATTENDING COMMENTS
Chart reviewed    Patient seen and examined    Agree with plan as outlined above
I personally saw and examined the patient in detail.  I have spoken to the above provider regarding the assessment and plan of care.  I reviewed the above assessment and plan of care, and agree.  I have made changes in the body of the note where appropriate.
I personally saw and examined the patient in detail.  I have spoken to the above provider regarding the assessment and plan of care.  I reviewed the above assessment and plan of care, and agree.  I have made changes in the body of the note where appropriate.
Seen/examined. agree with above.
Seen/examined. agree with above.
The patient was personally seen and examined, in addition to being examined and evaluated by NP.  All elements of the note were edited where appropriate.
The patient was personally seen and examined, in addition to being examined and evaluated by NP.  All elements of the note were edited where appropriate.
I saw and examined the patient personally. Spoke with above provider regarding this case. I reviewed the above findings completely.  I agree with the above history, physical, and plan which I have edited where appropriate.
I saw and examined the patient personally. Spoke with above provider regarding this case. I reviewed the above findings completely.  I agree with the above history, physical, and plan which I have edited where appropriate.
Seen/examined. agree with above.
Seen/examined. agree with above.
I personally conducted a physical examination of the patient. Plan as above. I edited the above listed findings which were prepared by the listed resident physician. I personally discussed the plan of care with the patient. The questions and concerns were addressed to the best of my ability. The patient is in agreement with the listed treatment plan.
I personally conducted a physical examination of the patient. I personally gathered the patient's history. I edited the above listed findings which were prepared by the listed resident physician. I personally discussed the plan of care with the patient. The questions and concerns were addressed to the best of my ability. The patient is in agreement with the listed treatment plan.     Patient seen and examined at bedside. States she feels well, plan to complete antibiotics in AM and D/C with wound vac if set up.
I personally conducted a physical examination of the patient. I personally gathered the patient's history. I edited the above listed findings which were prepared by the listed resident physician. I personally discussed the plan of care with the patient. The questions and concerns were addressed to the best of my ability. The patient is in agreement with the listed treatment plan.    Patient seen and examined. States she is doing well, denies any abdominal pain. Continue IV abx until 10/2/20 and then can be discharged with wound vac.
I personally conducted a physical examination of the patient. Plan as above.  I edited the above listed findings which were prepared by the listed resident physician. I personally discussed the plan of care with the patient. The questions and concerns were addressed to the best of my ability. The patient is in agreement with the listed treatment plan.   -Wound VAc in place. Wound care per plastics  -Continue antibiotics. Last dose on Friday. And potential plan for discharge after that   -Ambulate as tolerated
I personally conducted a physical examination of the patient. Plan as above. I edited the above listed findings which were prepared by the listed resident physician. I personally discussed the plan of care with the patient. The questions and concerns were addressed to the best of my ability. The patient is in agreement with the listed treatment plan.     - Continue wound care per Dr. Ruiz, plastic surgery  -Wound Vac in place.  -Continue IV antibiotics. Last dose on Friday.  -Ambulate as tolerated  -Monitor h/h
I personally conducted a physical examination of the patient. Plan as above. I edited the above listed findings which were prepared by the listed resident physician. I personally discussed the plan of care with the patient. The questions and concerns were addressed to the best of my ability. The patient is in agreement with the listed treatment plan.
I personally conducted a physical examination of the patient. Plan as above. I edited the above listed findings which were prepared by the listed resident physician. I personally discussed the plan of care with the patient. The questions and concerns were addressed to the best of my ability. The patient is in agreement with the listed treatment plan.
Pt seen + examined. Case discussed with resident. Agree with assessment and plan above (edited by me in detail):  Time spent: 40min. More than 50% of the visit was spent counseling the patient on medical condition -- wound infection, broad spectrum antibiotics, cultures.
Pt seen + examined. Case discussed with resident. Agree with assessment and plan above (edited by me in detail):  Time spent: 40min. More than 50% of the visit was spent counseling the patient on medical condition -- wound infection, broad spectrum antibiotics, cultures, ESBL E coli, Pseudomonas, meropenem.
I personally conducted a physical examination of the patient. I personally gathered the patient's history. I edited the above listed findings which were prepared by the listed resident physician. I personally discussed the plan of care with the patient. The questions and concerns were addressed to the best of my ability. The patient is in agreement with the listed treatment plan.  On IV Meropanem till 10/01 per ID. Will need Midline. Has Wound Vac. Wound care per Dr. Ruiz.

## 2020-10-02 NOTE — PROGRESS NOTE ADULT - PROVIDER SPECIALTY LIST ADULT
Cardiology
Hospitalist
Infectious Disease
Plastic Surgery
Cardiology
Plastic Surgery
Infectious Disease
Infectious Disease
Hospitalist

## 2020-10-02 NOTE — PROGRESS NOTE ADULT - SUBJECTIVE AND OBJECTIVE BOX
S  Pt w/o complaints        WBC 10.37      H/H 8.9/28.6      Albumin 2.3    O  Afebrile  VSS      Abdomen-wound granulating well                      erythema has resolved                      induration much improved                      no drainage or malodor                       packed with kerlex/betadine    A&P Pt must remain in hospital until the home VAC is set up completely and functioning so that she can continue this very effective treatment         Otherwise she must maintain her present VAC treatment in the hospital-she cannot miss 3 days of treatment         While in the hospital-continue her meropenum via the pic line-The meropenum can be discontinued upon her discharge S  Pt w/o complaints        WBC 10.37      H/H 8.9/28.6      Albumin 2.3    O  Afebrile  VSS      Abdomen-wound granulating well                      erythema has resolved                      induration much improved                      no drainage or malodor                       packed with kerlex/betadine    A&P Pt must remain in hospital until the home VAC is set up completely and functioning so that she can continue this very effective treatment         Otherwise she must maintain her present VAC treatment in the hospital-she cannot miss 3 days of treatment         Discussed with Dr Calhoun who is in agreement         Continue nutritional supplements-pt should be discharged on supplements as well         While in the hospital-continue her meropenum via the pic line-The meropenum can be discontinued upon her discharge

## 2020-10-02 NOTE — CHART NOTE - NSCHARTNOTEFT_GEN_A_CORE
Resident Procedure Note    Patient seen and examined at bedside. Right arm cleaned with alcohol swabs prior to removal. Midline catheter removed and pressure applied for 5-10 minutes with gauze. Hemastasis achieved, no signs of active bleeding noted. Tape and gauze used to create pressure dressing to maintain pressure on midline catheter site. Patient tolerated well without complications. Will continue to follow. RN to call/page if any changes.

## 2020-10-02 NOTE — PROGRESS NOTE ADULT - SUBJECTIVE AND OBJECTIVE BOX
Patient seen and examined at bedside. States she feels well. Very eager to go home. Denies any CP, SOB, abd pain. Wound vac delivered to bedside, discussed with Plastics, Dr. Ruiz, patient to be discharged with home care services to place wound vac tomorrow. Patient to receive final dose of IV antibiotics this afternoon.    Physical Exam:  GENERAL: NAD  HEENT:  anicteric, moist mucous membranes  CHEST/LUNG:  CTA b/l, no rales, wheezes, or rhonchi  HEART:  RRR, S1, S2  ABDOMEN:  BS+, soft, non-tender without drainage or malodor, resolving induration, dressing C/D/I  EXTREMITIES: no edema, cyanosis, or calf tenderness  NERVOUS SYSTEM: answers questions and follows commands appropriately    Please refer to updated D/C note for further details.

## 2020-10-02 NOTE — ADVANCED PRACTICE NURSE CONSULT - ASSESSMENT
Patient to be discharged home today. Veraflo discontinued wet to dry applied. Wound 100% granulation tissue 31 x 4 x 4 tunnel at 3:00   4 cm patient tolerated dressing change with mild discomfort

## 2020-10-02 NOTE — ADVANCED PRACTICE NURSE CONSULT - RECOMMEDATIONS
1. Continue with negative pressure wound therapy  2. Shower before each dressing change  RN educated in the care of this patients wound care  MD made aware of recommendations

## 2020-10-02 NOTE — PROGRESS NOTE ADULT - SUBJECTIVE AND OBJECTIVE BOX
[facility] -- Zakia Barraza, Maye Schmitt, Matthias Oliva Savella, [place]  Office # [phone]    Follow Up:    abdominoplasty wound dehiscense  Subjective/Observations:   Patient seen and examined walking around. She has no complaints of chest pain or shortness of breath. She is looking forward to being discharged.      REVIEW OF SYSTEMS: All other review of systems is negative unless indicated above  PAST MEDICAL & SURGICAL HISTORY:  Obesity    Hypothyroid  Resolved    Coronary artery disease  s/p MIGUEL ANGEL x 3 (9/2019), MIGUEL ANGEL x1 (2014)    HLD (hyperlipidemia)    HTN (hypertension)    S/P coronary artery stent placement  mLAD x1 MIGUEL ANGEL (2014),  3 stents 09/2019    H/O total hysterectomy  2017      MEDICATIONS  (STANDING):  aspirin  chewable 81 milliGRAM(s) Oral daily  atorvastatin 40 milliGRAM(s) Oral at bedtime  chlorhexidine 2% Cloths 1 Application(s) Topical every 12 hours  enoxaparin Injectable 40 milliGRAM(s) SubCutaneous at bedtime  meropenem  IVPB      meropenem  IVPB 1000 milliGRAM(s) IV Intermittent every 8 hours  metoprolol tartrate 50 milliGRAM(s) Oral two times a day    MEDICATIONS  (PRN):  acetaminophen   Tablet .. 650 milliGRAM(s) Oral every 6 hours PRN Mild Pain (1 - 3)  benzonatate 100 milliGRAM(s) Oral three times a day PRN Cough  guaiFENesin   Syrup  (Sugar-Free) 100 milliGRAM(s) Oral every 6 hours PRN Cough not relieved by Tessalon Perles    Allergies    codeine (Unknown)    Intolerances      Vital Signs Last 24 Hrs  T(C): 36.8 (02 Oct 2020 05:18), Max: 36.8 (02 Oct 2020 05:18)  T(F): 98.3 (02 Oct 2020 05:18), Max: 98.3 (02 Oct 2020 05:18)  HR: 99 (02 Oct 2020 05:18) (91 - 112)  BP: 112/76 (02 Oct 2020 05:18) (94/64 - 112/76)  RR: 18 (02 Oct 2020 05:18) (18 - 18)  SpO2: 98% (02 Oct 2020 05:18) (94% - 98%)  I&O's Summary    01 Oct 2020 07:01  -  02 Oct 2020 07:00  --------------------------------------------------------  IN: 50 mL / OUT: 0 mL / NET: 50 mL        PHYSICAL EXAM:  TELE: off tele  Constitutional: NAD, awake and alert, well-developed  HEENT: Moist Mucous Membranes, Anicteric  Pulmonary: Non-labored, breath sounds are clear bilaterally, No wheezing, rales or rhonchi  Cardiovascular: Regular, S1 and S2, No murmurs, rubs, gallops or clicks  Gastrointestinal: +Bowel Sounds present, soft, nontender, dressing dry and intact   Lymph: No peripheral edema. No lymphadenopathy.  Skin: No visible rashes or ulcers.  Psych:  Mood & affect appropriate  LABS: All Labs Reviewed:                        8.9    10.37 )-----------( 494      ( 02 Oct 2020 07:42 )             28.6                         9.6    13.26 )-----------( 585      ( 01 Oct 2020 08:41 )             30.7                         9.5    12.87 )-----------( 534      ( 30 Sep 2020 08:40 )             29.1     02 Oct 2020 07:42    140    |  107    |  27     ----------------------------<  115    4.0     |  27     |  0.92   01 Oct 2020 08:41    140    |  105    |  27     ----------------------------<  111    4.0     |  27     |  0.86   30 Sep 2020 08:40    140    |  104    |  25     ----------------------------<  104    4.3     |  29     |  0.94     Ca    8.3        02 Oct 2020 07:42  Ca    9.2        01 Oct 2020 08:41  Ca    8.6        30 Sep 2020 08:40    TPro  7.3    /  Alb  2.3    /  TBili  0.3    /  DBili  x      /  AST  25     /  ALT  27     /  AlkPhos  112    02 Oct 2020 07:42  TPro  8.0    /  Alb  2.5    /  TBili  0.4    /  DBili  x      /  AST  26     /  ALT  31     /  AlkPhos  120    01 Oct 2020 08:41  TPro  7.6    /  Alb  2.4    /  TBili  0.5    /  DBili  x      /  AST  31     /  ALT  36     /  AlkPhos  118    30 Sep 2020 08:40        12 Lead ECG:   Ventricular Rate 97 BPM  Atrial Rate 97 BPM  P-R Interval 146 ms  QRS Duration 80 ms  Q-T Interval 392 ms  QTC Calculation(Bazett) 497 ms  P Axis 38 degrees  R Axis 0 degrees  T Axis -7 degrees  Diagnosis Line Normal sinus rhythm  Minimal voltage criteria for LVH, may be normal variant  Nonspecific T wave abnormality  Abnormal ECG  Confirmed by Mack Schmitt MD ([id number]) on 9/21/2020 3:58:47 PM (09-20-20 @ 15:49)    < from: TTE Echo Complete w/o Contrast w/ Doppler (09.03.20 @ 12:56) >  Dimensions:  LA 3.0       Normal Values: 2.0 - 4.0 cm  Ao 2.8        Normal Values: 2.0 - 3.8 cm  SEPTUM 1.0       Normal Values: 0.6 - 1.2 cm  PWT 1.1       Normal Values: 0.6 - 1.1 cm  LVIDd 4.7         Normal Values: 3.0 - 5.6 cm  LVIDs 3.0         Normal Values: 1.8 - 4.0 cm    OBSERVATIONS:  Technically difficult and limited study  Mitral Valve: Thickened leaflets, trace physiologic MR.  Aortic Valve/Aorta: normal trileaflet aortic valve.  Tricuspid Valve: normal with trace TR.  Pulmonic Valve: normal  Left Atrium: normal  Right Atrium: normal  Left Ventricle: normal LV size and systolic function, estimated LVEF of 60-65%.  Right Ventricle: Grossly normal size and systolic function.  Pericardium/Pleura: normal, no significant pericardial effusion.    Conclusion:  Technically difficult and limited study  Normal left ventricular internal dimensions and systolic function, estimated LVEF of 60-65%.  Grossly normal RV size and systolic function.  Normal trileaflet aortic valve, without AI.  Trace physiologic MR and TR.  No significant pericardial effusion.    < end of copied text >    < from: Cardiac Cath Lab - Adult (08.30.19 @ 10:28) >  VENTRICLES: Global left ventricular function was normal. EF estimated was  55 %.  CORONARY VESSELS: The coronary circulation is right dominant.  LM:   --  LM:Normal.  LAD:   --  Ostial LAD: There was a 30 % stenosis.  --  Proximal LAD: There was a discrete 80 % stenosis in the distal third of  the vessel segment. There was ELIAS grade 3 flow through the vessel (brisk  flow).  --  Mid LAD: There was a 0 % stenosis at the site of a prior stent, in the  proximal third of the vessel segment.  CX:   --  Proximal circumflex: There was a discrete 30 % stenosis.  --  OM1: There was a 80 % stenosis.  RCA:   --  RCA: Angiography showed minor luminal irregularities with no  flow limiting lesions.  --  Mid RCA: There was a tubular 30 % stenosis in the proximal third of the  vessel segment.  --  Distal RCA: There was a discrete 20 % stenosis in the distal third of  the vessel segment.  --  RPDA: There was a discrete 50 % stenosis in the middle third of the  vessel segment. There was ELIAS grade 3 flow through the vessel (brisk  flow).  COMPLICATIONS: There were no complications.  SUMMARY:  1ST LESION INTERVENTIONS: A successful balloon angioplasty with stentwas  performed on the 80 % lesion in the proximal LAD. Following intervention  there was a 1 % residual stenosis.  2ND LESION INTERVENTIONS: A successful balloon angioplasty with stent was  performed on the 80 % lesion in the 1st obtuse marginal. Following  intervention there was a 1 % residual stenosis.  DIAGNOSTIC RECOMMENDATIONS: PCI of proximal LAD and OM1 lesions for  treatment of unstable angina.  INTERVENTIONAL RECOMMENDATIONS: Continue aspirin, 81 mg, PO, daily.  Increase ticagrelor to 90mg twice daily. Patient management should  include aggressive medical therapy.  < end of copied text >    < from: Xray Chest 1 View-PORTABLE IMMEDIATE (09.20.20 @ 15:25) >  AP chest. Prior 4/10/2020. Normal heart mediastinum. No pleural-parenchymal abnormality. Calcific bursitis right shoulder.  Impression: No active disease  < end of copied text >

## 2020-10-02 NOTE — PROGRESS NOTE ADULT - ASSESSMENT
44 year old female PMHx CAD (mLAD x1 MIGUEL ANGEL (2014), x3 stents 09/2019), HTN, prediabetes, hyperlipidemia, recent COVID-19 infection admitted for wound dehiscence and possible revision.    Obstructive CAD s/p PCI 9/ 2019   - Cardiologist Dr. Abdoul Lam from [facility]  - No acute changes on EKG compared to previous  - Echo 9/3/2020: trace MR, trace TR, LVEF 60-65%   - Continue ASA 81 and lopressor 50mg po bid and high intensity statin Lipitor 40mg po qhs    HTN   - BP stable  - Continue current dose of metoprolol 50 mg BID with hold parameters   -HR remains slightly high     Anemia  -stable  - transfuse to keep HGB greater than 8 given CAD    HLD  - Continue atorvastatin 40 mg qhs     Cellulitis   - wound cellulitis now sp wound vac  - follow up id, wound, plastic surgery recs    - Patient stable from cardiac stand point.   - Monitor and replete lytes, keep K>4, Mg>2.  - All other medical needs as per primary team.  - Other cardiovascular workup will depend on clinical course.  - Will continue to follow.  -Astrid Booker, THOMAS  Cardiology  [phone]

## 2020-10-02 NOTE — DISCHARGE NOTE NURSING/CASE MANAGEMENT/SOCIAL WORK - PATIENT PORTAL LINK FT
You can access the FollowMyHealth Patient Portal offered by [facility] by registering at the following website: [email or web address]. By joining [facility]’s FollowMyHealth portal, you will also be able to view your health information using other applications (apps) compatible with our system.

## 2020-10-13 NOTE — ED ADULT NURSE NOTE - NS ED NURSE LEVEL OF CONSCIOUSNESS MENTAL STATUS
OP   Telephone Anticoagulation Service Note      Anticoagulation Summary  As of 10/13/2020    INR goal:  2.0-3.0   TTR:  60.1 % (5.4 y)   INR used for dosing:  3.10 (10/13/2020)   Warfarin maintenance plan:  5 mg (5 mg x 1) every Mon, Fri; 2.5 mg (5 mg x 0.5) all other days   Weekly warfarin total:  22.5 mg   Plan last modified:  Yoni GoreD (7/24/2020)   Next INR check:  10/27/2020   Target end date:  Indefinite    Indications    History of pulmonary embolism and DVT  unprovoked. [Z86.711]  Atrial fibrillation with RVR (HCC) (Resolved) [I48.91]             Anticoagulation Episode Summary     INR check location:      Preferred lab:      Send INR reminders to:      Comments:  [place]      Anticoagulation Care Providers     Provider Role Specialty Phone number    [facility] Responsible  [phone]        Anticoagulation Patient Findings    Spoke with the patient on the phone today, reporting a SUPRA-therapeutic INR of 3.1. Confirmed the current warfarin dosing regimen and patient compliance. Patient denies any interval changes to diet and/or medications. Patient denies any signs/symptoms of bleeding or clotting.  Patient instructed to HOLD warfarin dose TONIGHT ONLY, as a one time adjustment, then to resume his current dosing regimen. Patient will retest again in 2 weeks.     Jay VallejoD        
Awake/Alert

## 2020-10-29 PROCEDURE — 71045 X-RAY EXAM CHEST 1 VIEW: CPT

## 2020-10-29 PROCEDURE — 86769 SARS-COV-2 COVID-19 ANTIBODY: CPT

## 2020-10-29 PROCEDURE — 83036 HEMOGLOBIN GLYCOSYLATED A1C: CPT

## 2020-10-29 PROCEDURE — 96367 TX/PROPH/DG ADDL SEQ IV INF: CPT

## 2020-10-29 PROCEDURE — 84702 CHORIONIC GONADOTROPIN TEST: CPT

## 2020-10-29 PROCEDURE — 87040 BLOOD CULTURE FOR BACTERIA: CPT

## 2020-10-29 PROCEDURE — 82728 ASSAY OF FERRITIN: CPT

## 2020-10-29 PROCEDURE — 87086 URINE CULTURE/COLONY COUNT: CPT

## 2020-10-29 PROCEDURE — 81001 URINALYSIS AUTO W/SCOPE: CPT

## 2020-10-29 PROCEDURE — 80202 ASSAY OF VANCOMYCIN: CPT

## 2020-10-29 PROCEDURE — 86923 COMPATIBILITY TEST ELECTRIC: CPT

## 2020-10-29 PROCEDURE — C1751: CPT

## 2020-10-29 PROCEDURE — 93005 ELECTROCARDIOGRAM TRACING: CPT

## 2020-10-29 PROCEDURE — 86901 BLOOD TYPING SEROLOGIC RH(D): CPT

## 2020-10-29 PROCEDURE — 76937 US GUIDE VASCULAR ACCESS: CPT

## 2020-10-29 PROCEDURE — 87075 CULTR BACTERIA EXCEPT BLOOD: CPT

## 2020-10-29 PROCEDURE — 87641 MR-STAPH DNA AMP PROBE: CPT

## 2020-10-29 PROCEDURE — 80053 COMPREHEN METABOLIC PANEL: CPT

## 2020-10-29 PROCEDURE — 85025 COMPLETE CBC W/AUTO DIFF WBC: CPT

## 2020-10-29 PROCEDURE — 83735 ASSAY OF MAGNESIUM: CPT

## 2020-10-29 PROCEDURE — 36430 TRANSFUSION BLD/BLD COMPNT: CPT

## 2020-10-29 PROCEDURE — 87640 STAPH A DNA AMP PROBE: CPT

## 2020-10-29 PROCEDURE — 87186 SC STD MICRODIL/AGAR DIL: CPT

## 2020-10-29 PROCEDURE — 84443 ASSAY THYROID STIM HORMONE: CPT

## 2020-10-29 PROCEDURE — 86850 RBC ANTIBODY SCREEN: CPT

## 2020-10-29 PROCEDURE — U0003: CPT

## 2020-10-29 PROCEDURE — 84466 ASSAY OF TRANSFERRIN: CPT

## 2020-10-29 PROCEDURE — 82746 ASSAY OF FOLIC ACID SERUM: CPT

## 2020-10-29 PROCEDURE — 87205 SMEAR GRAM STAIN: CPT

## 2020-10-29 PROCEDURE — P9016: CPT

## 2020-10-29 PROCEDURE — 82607 VITAMIN B-12: CPT

## 2020-10-29 PROCEDURE — 74177 CT ABD & PELVIS W/CONTRAST: CPT

## 2020-10-29 PROCEDURE — 36415 COLL VENOUS BLD VENIPUNCTURE: CPT

## 2020-10-29 PROCEDURE — 36573 INSJ PICC RS&I 5 YR+: CPT

## 2020-10-29 PROCEDURE — 85610 PROTHROMBIN TIME: CPT

## 2020-10-29 PROCEDURE — 85730 THROMBOPLASTIN TIME PARTIAL: CPT

## 2020-10-29 PROCEDURE — 80061 LIPID PANEL: CPT

## 2020-10-29 PROCEDURE — 85018 HEMOGLOBIN: CPT

## 2020-10-29 PROCEDURE — 83550 IRON BINDING TEST: CPT

## 2020-10-29 PROCEDURE — 86900 BLOOD TYPING SEROLOGIC ABO: CPT

## 2020-10-29 PROCEDURE — 84100 ASSAY OF PHOSPHORUS: CPT

## 2020-10-29 PROCEDURE — 85014 HEMATOCRIT: CPT

## 2020-10-29 PROCEDURE — 96365 THER/PROPH/DIAG IV INF INIT: CPT | Mod: XU

## 2020-10-29 PROCEDURE — 83605 ASSAY OF LACTIC ACID: CPT

## 2020-10-29 PROCEDURE — 87070 CULTURE OTHR SPECIMN AEROBIC: CPT

## 2020-10-29 PROCEDURE — 99285 EMERGENCY DEPT VISIT HI MDM: CPT | Mod: 25

## 2020-10-29 PROCEDURE — 83540 ASSAY OF IRON: CPT

## 2020-10-29 PROCEDURE — 80048 BASIC METABOLIC PNL TOTAL CA: CPT

## 2021-03-30 NOTE — PRE-OP CHECKLIST - ISOLATION PRECAUTIONS
Spoke with PARKER Lantigua [phone].   Take 5mg M, W, F; 2.5mg all other days.   INR on Fri 04-02.   
none
none

## 2021-04-05 ENCOUNTER — TRANSCRIPTION ENCOUNTER (OUTPATIENT)
Age: 46
End: 2021-04-05

## 2021-05-13 NOTE — PATIENT PROFILE ADULT - PATIENT REPRESENTATIVE: ( YOU CAN CHOOSE ANY PERSON THAT CAN ASSIST YOU WITH YOUR HEALTH CARE PREFERENCES, DOES NOT HAVE TO BE A SPOUSE, IMMEDIATE FAMILY OR SIGNIFICANT OTHER/PARTNER)
ASSESSMENT AND PLAN:   Belching bloating and a sudden change in bowel habit.  The patient's symptoms are associated with significant stress and could be functional in origin.  She has not had colonoscopy and upper endoscopy in several years, I recommend that she proceed with upper endoscopy and colonoscopy.  Under current protocols, the patient requires COVID testing prior to the procedures and she declined to undergo COVID testing and therefore did not schedule the procedures.          Chief Complaint   Patient presents with   • Office Visit     epigastric pain     • Consultation     Referred by: Debbie Allison PA-C       HISTORY OF PRESENT ILLNESS:    This is a 60 year old female seen in consultation at the request of Dr Finn for a sudden change in bowel habit symptoms of bloating belching and retrosternal discomfort.  This is a 60-year-old woman who woke up 1 night with lower abdominal discomfort and had a sudden onset every a followed by multiple bowel movements she also developed bloating in the lower abdomen and has had recurrent symptoms of belching that have lasted for approximately 10 days.  She has seen no blood in the stool and denies any constitutional symptoms such as fever. She is under a great deal of stress at work because of the people she works with.  She has been started on a low FODMAP diet and reports some improvement with the diet.  She has a history of reflux disease and has been taking Nexium for several years       Specific review of systems: No fever, joint pains, skin rashes   Family history: No family history of esophageal cancer, Viera's esophagus, celiac disease,  [place]-rectal cancer or inflammatory bowel disease           Current Outpatient Medications   Medication Sig Dispense Refill   • Esomeprazole Magnesium (NEXIUM PO) Take by mouth daily.     • Coenzyme Q10 (COQ10 PO) Take 1 capsule by mouth daily.     • Krill Oil 300 MG Cap Take by mouth daily.     • Aloe Vera 25 MG Cap  Take by mouth daily.     • Cranberry 500 MG Cap Take by mouth daily.     • Cholecalciferol (vitamin D) 50 mcg (2,000 units) capsule Take by mouth daily.     • Winnie, Zingiber officinalis, (Winnie Root) 550 MG Cap Take by mouth daily.     • estrogens, conjugated (Premarin) 0.3 MG tablet Take one tablet by mouth daily 90 tablet 3   • clindamycin-benzoyl peroxide 1.2-5 % gel Apply 1.25 grams twice daily to face 45 g 11   • aspirin 81 MG tablet Take 81 mg by mouth nightly.     • vitamin E 100 units capsule Take 100 Units by mouth nightly.      • Multiple Vitamin (MULTI-VITAMIN DAILY PO) Take 1 tablet by mouth nightly.      • GARLIC PO Take by mouth daily.       No current facility-administered medications for this visit.       Allergies as of 05/13/2021 - Reviewed 05/13/2021   Allergen Reaction Noted   • Morphine RASH 08/23/2012   • Erythromycin GI UPSET 08/23/2012   • Naproxen GI UPSET 08/23/2012   • Tape [adhesive   (environmental)] ERYTHEMA 07/02/2013         HYSTERECTOMY                                    01/01/1994    APPENDECTOMY                                                  BUNIONECTOMY                                                  TONSILLECTOMY AND ADENOIDECTOMY                               DISC REMOVAL                                    07/12/2011      Comment: LUMBAR 4-5    DISC REMOVAL                                    07/08/2011      Comment: REPEAT LUMBAR 4-5,     COLONOSCOPY                                     09/10/2008    TOE SURGERY                                     07/03/2013      Comment: great toe - excision of bone spur and cyst    CHEILECTOMY                                     08/23/2013      Comment: great toe    Social History     Tobacco Use   • Smoking status: Never Smoker   • Smokeless tobacco: Never Used   Substance Use Topics   • Alcohol use: Yes     Alcohol/week: 0.0 standard drinks   • Drug use: No       Family History   Problem Relation Age of Onset   • Diabetes Mother    •  Hypertension Mother    • High cholesterol Mother    • Diabetes Father    • Hypertension Father    • High cholesterol Father    • Cancer Father    • High cholesterol Sister    • Hypertension Sister    • Diabetes Sister    • Cancer Sister    • Cancer Brother    • Diabetes Brother    • Hypertension Brother    • High cholesterol Brother    • Hypertension Sister    • High cholesterol Sister    • Diabetes Sister    • Cancer Sister          REVIEW OF SYSTEMS:  A 14 point review of systems was obtained and all pertinent positive and negative findings are documented in my HPI(history of present illness).        PHYSICAL EXAM:  Visit Vitals  BP (!) 146/88 (BP Location: LUE - Left upper extremity, Patient Position: Sitting, Cuff Size: Regular)   Pulse 94   Ht 5' 5\" (1.651 m)   Wt 75.2 kg   BMI 27.59 kg/m²      GENERAL: Alert, is in no apparent distress, is well developed and well nourished and normal affect  SKIN: Warm and Dry  LYMPH NODES: No cervical adenopathy, no supraclavicular adenopathy, no axillary adenopathy and no inguinal adenopathy  SKIN:  Normal color, normal texture, normal turgor, no skin rashes, no atypical appearing skin lesions and no bruises  EYES: Pupils are equal and reactive to light and accommodation extraocular movements are full.  Sclerae and conjunctivae are normal.   Lids and lashes are normal  EARS: Pinnae and external ears are normal bilaterally, external auditory canals are normal, tympanic membranes are normal, middle ear space is normal bilaterally, there is no discomfort on traction of the pinna or palpation of the tragus and auditory acuity is grossly normal  NOSE: External nose is normal to inspection and no septal deviation  MOUTH/THROAT: Tongue is midline and appears normal, oropharynx appears normal, soft palate and uvula are normal and oral mucosa is normal  NECK:  Neck is supple, no thyromegaly, no anterior cervical adenopathy, no posterior cervical adenopathy and no supraclavicular  adenopathy  CHEST:  Contour is normal with normal AP diameter, normal respiratory excursion and respiratory effort is not labored  LUNGS:  Lungs are clear to auscultation with normal inspiratory/expiratory sounds, no rales, no rhonchi and no wheezes  HEART: Normal PMI, normal rate and rhythm, no palpable heaves or thrills, S1 and S2 normal, no S3 or S4, no murmurs and no extra heart sounds  ABDOMEN:  Abdomen is soft, normal active bowel sounds, nontender, without masses, without hepatomegaly, without splenomegaly and no pulsatile masses  NEUROLOGIC:  Motor strength normal, gait and station normal, coordination normal and no tremor noted  PSYCH: Mood and affect were normal.       LABS:   Lab Services on 05/05/2021   Component Date Value   • Immunoglobulin A 05/05/2021 215    • Tissue Transglutaminase * 05/05/2021 4    • Helicobacter Pylori Anti* 05/05/2021 Negative    • Fasting Status 05/05/2021 18    • Sodium 05/05/2021 138    • Potassium 05/05/2021 3.9    • Chloride 05/05/2021 100    • Carbon Dioxide 05/05/2021 25    • Anion Gap 05/05/2021 17    • Glucose 05/05/2021 157*   • BUN 05/05/2021 12    • Creatinine 05/05/2021 0.51    • Glomerular Filtration Ra* 05/05/2021 >90    • BUN/ Creatinine Ratio 05/05/2021 24    • Calcium 05/05/2021 10.4*   • Bilirubin, Total 05/05/2021 0.4    • GOT/AST 05/05/2021 50*   • GPT/ALT 05/05/2021 45    • Alkaline Phosphatase 05/05/2021 95    • Albumin 05/05/2021 4.1    • Protein, Total 05/05/2021 8.8*   • Globulin 05/05/2021 4.7*   • A/G Ratio 05/05/2021 0.9*   • Lipase 05/05/2021 65*   • WBC 05/05/2021 8.4    • RBC 05/05/2021 4.89    • HGB 05/05/2021 13.2    • HCT 05/05/2021 40.6    • MCV 05/05/2021 83.0    • MCH 05/05/2021 27.0    • MCHC 05/05/2021 32.5    • RDW-CV 05/05/2021 13.4    • RDW-SD 05/05/2021 40.4    • PLT 05/05/2021 274    • NRBC 05/05/2021 0    • Neutrophil, Percent 05/05/2021 66    • Lymphocytes, Percent 05/05/2021 23    • Mono, Percent 05/05/2021 7    • Eosinophils,  Percent 05/05/2021 3    • Basophils, Percent 05/05/2021 1    • Immature Granulocytes 05/05/2021 0    • Absolute Neutrophils 05/05/2021 5.6    • Absolute Lymphocytes 05/05/2021 1.9    • Absolute Monocytes 05/05/2021 0.5    • Absolute Eosinophils  05/05/2021 0.2    • Absolute Basophils 05/05/2021 0.1    • Absolute Immmature Granu* 05/05/2021 0.0                  Instructions provided as documented in the after visit summary.    The patient indicated understanding of the diagnosis and agreed with the plan of care.      A copy of this note was sent to the referring provider. Thank you for involving me  in the care of this patient.         declines

## 2021-06-15 NOTE — ED PROVIDER NOTE - NS ED MD DISPO SPECIAL CONSIDERATION1
"[facility] HEART [facility]   Arrhythmia Clinic    Assessment/Plan:  Diagnoses and all orders for this visit:    Persistent atrial fibrillation and has had 3 short episodes of a few hours since start of sotalol.  She spontaneously converted with loading of sotalol to sinus rhythm.  She did not need cardioversion.  She denies any bradycardic symptoms on questioning.  She is on sotalol 120 mg p.o. twice daily and  ms. today.  Fuad LOFTON to check on creatinine.  I discussed with Pat that I recommend due to chronic kidney disease that she decrease sotalol to 80 mg p.o. twice daily.  If I went down to 120 mg p.o. daily I think she will have significant increase in A. fib burden.  Better to slowly decrease dose.  If she has increased A. fib burden may end up increasing dose since QTC has been okay but I discussed she may need to consider ablation sooner.  She is too young to use long-term amiodarone.  She wants to proceed with ablation but Dr. Agustin stressed importance of weight loss first and she wants to work on this.  I discussed the association between SHARA untreated and reoccurrence of atrial fib after ablation.  She has problems with insomnia as well.  She has used Restoril at night for sleeping pill for \"years \".  Her trigger for A. fib is often not sleeping during the night and then is worn out during the day.  I recommend she see sleep physician first as needed some help with insomnia and will definitely need a stronger sleeping pill to do a formal split-night sleep study, which is what I recommend eventually.  She is at higher risk for SHARA based on BMI .  [id number] score of 4 and on Eliquis.  -     ECG Clinic - Today  -     sotalol (BETAPACE) 80 MG tablet; Take 1 tablet (80 mg total) by mouth 2 (two) times a day.  Dispense: 60 tablet; Refill: 3  -     Basic Metabolic Panel    Essential hypertension and well-controlled.    Stage III chronic kidney disease and not new to Pat.  She is avoiding NSAIDs and " drinks at least 6-12 ounce glasses of fluid in a day.  Discussed that sotalol is renally cleared and therefore use lower doses with chronic kidney disease.  40 minutes were spent in face-to-face counseling regarding above diagnoses and options for treatment.    ______________________________________________________________________    Subjective:    I had the opportunity to see Romy Hurley at the [facility]. Romy Hurley is a 69 y.o. female and here for EP follow-up after canceled cardioversion on 2017 as she spontaneously converted to sinus rhythm with loading of sotalol.    Romy Hurley has a known history of persistent atrial fib with her first episode and then paroxysmal thereafter until this fall.  She failed flecainide and now is on sotalol.  She denies any side effects on sotalol.  She has had 3 episodes of A. fib which have lasted several hours each since I saw her on 2017.  She is wintering in [place] in February.  She denies other cardiac symptoms outside of atrial fib episodes.  ______________________________________________________________________    Problem List:  Patient Active Problem List   Diagnosis     Obesity     Essential hypertension     Persistent atrial fibrillation     Osteoarthritis     Insomnia     Mild intermittent asthma     Type 2 diabetes mellitus without complication, without long-term current use of insulin     Persistent insomnia     Total knee replacement status     Medical History:  Past Medical History:   Diagnosis Date     Asthma      Atrial fibrillation      Diabetes mellitus      Hypertension      Insomnia      Obesity      Osteoarthritis      Surgical History:  Past Surgical History:   Procedure Laterality Date      SECTION       REPLACEMENT TOTAL KNEE BILATERAL       SHOULDER ARTHROSCOPY W/ ROTATOR CUFF REPAIR Right      Social History:  Social History   Substance Use Topics     Smoking status: Former Smoker     Smokeless  tobacco: Never Used     Alcohol use No        Review of Systems: Review of Systems:   General: WNL  Eyes: WNL  Ears/Nose/Throat: WNL  Lungs: Wheezing  Heart: Irregular Heartbeat, Leg Swelling  Stomach: WNL  Bladder: WNL  Muscle/Joints: WNL  Skin: WNL  Nervous System: WNL  Mental Health: WNL     Blood: WNL      Family History:  Family History   Problem Relation Age of Onset     Ovarian cancer Mother      Lung cancer Father      Brain cancer Sister      No Medical Problems Brother      No Medical Problems Sister      No Medical Problems Sister          Allergies:  Allergies   Allergen Reactions     Ace Inhibitors      Cortisone Acetate      Methylprednisolone Anxiety     Turns red     Medications:  Current Outpatient Prescriptions   Medication Sig Dispense Refill     acetaminophen (TYLENOL) 500 MG tablet Take 1,000 mg by mouth every 6 (six) hours as needed for pain.       apixaban (ELIQUIS) 5 mg Tab tablet Take 1 tablet (5 mg total) by mouth 2 (two) times a day. 60 tablet 11     cholecalciferol, vitamin D3, 2,000 unit Tab Take 2,000 Units by mouth daily.       FLUTICASONE/SALMETEROL (ADVAIR DISKUS INHL) Inhale 2 puffs daily as needed.        levalbuterol (XOPENEX HFA) 45 mcg/actuation inhaler Inhale 1-2 puffs every 4 (four) hours as needed for wheezing.       MAGNESIUM CITRATE ORAL Take 250 mg by mouth daily.       metFORMIN (GLUCOPHAGE) 500 MG tablet Take 250 mg by mouth 2 (two) times a day with meals.        PARoxetine (PAXIL) 10 MG tablet Take 1 tablet (10 mg total) by mouth every morning. 30 tablet 11     simvastatin (ZOCOR) 20 MG tablet Take 20 mg by mouth at bedtime.        sotalol (BETAPACE) 80 MG tablet Take 1 tablet (80 mg total) by mouth 2 (two) times a day. 60 tablet 3     temazepam (RESTORIL) 15 mg capsule Take 1 capsule by mouth as needed.       valsartan-hydrochlorothiazide (DIOVAN-HCT) 160-25 mg per tablet Take 1 tablet by mouth daily.       No current facility-administered medications for this visit.   "      Objective:   Vital signs:  /78 (Patient Site: Right Arm, Patient Position: Sitting, Cuff Size: Adult Large)  Pulse 64  Resp 16  Ht 5' 9\" (1.753 m)  Wt (!) 295 lb (133.8 kg)  BMI 43.56 kg/m2      Physical Exam:    GENERAL APPEARANCE: Alert, cooperative and in no acute distress.  HEENT: No scleral icterus. No Xanthelasma. Oral mucuos membranes pink and moist.  NECK: JVP Nl.   CHEST: clear to auscultation  CARDIOVASCULAR: S1, S2 without murmur ,clicks or rubs. Regular, regular.  Radial and posterior tibial pulses are intact and symetric.   EXTREMITIES: No cyanosis, clubbing or edema.    Results personally reviewed:  Results for orders placed during the hospital encounter of 09/11/17   Echo Complete [ECH10] 09/11/2017    Narrative 1. Normal left ventricular size and systolic performance with a visually   estimated ejection fraction of 65%.   2. There is mild aortic insufficiency.  3. There is mild biatrial enlargement.    When compared to the prior real-time echocardiogram dated 29 March 2012,   mild aortic insufficiency is now noted.  Otherwise, there has been little   appreciable interval change.    August 2017 24-hour Holter shows A. fib throughout with controlled ventricular response of 83 on average.  This is on flecainide 50 mg p.o. twice daily plus metoprolol tartrate 50 mg p.o. twice daily.      Results for orders placed or performed in visit on 01/08/18   ECG Clinic - Today   Result Value Ref Range    SYSTOLIC BLOOD PRESSURE  mmHg    DIASTOLIC BLOOD PRESSURE  mmHg    VENTRICULAR RATE 61 BPM    ATRIAL RATE 61 BPM    P-R INTERVAL 194 ms    QRS DURATION 92 ms    Q-T INTERVAL 448 ms    QTC CALCULATION (BEZET) 450 ms    P Axis 31 degrees    R AXIS -17 degrees    T AXIS -4 degrees    MUSE DIAGNOSIS       Normal sinus rhythm  Moderate voltage criteria for LVH, may be normal variant  Borderline ECG  When compared with ECG of 06-DEC-2017 12:42,  No significant change was found  Confirmed by RUPAL VARGAS, " MONICA LOC:SJ ([id number]) on 1/8/2018 3:48:57 PM         TSH:   Lab Results   Component Value Date    TSH 2.8 04/25/2012     BNP: No results found for: BNP  BMP:  Lab Results   Component Value Date    CREATININE 1.00 02/02/2010    BUN 23 (H) 02/02/2010     (L) 02/02/2010    K 4.2 11/02/2017     02/02/2010    CO2 28 02/02/2010       This note has been dictated using voice recognition software. Any grammatical or context distortions are unintentional and inherent to the software.    DANIA FLORES RN, [facility] HEART [facility]  [phone]             None

## 2021-07-06 NOTE — PRE-OP CHECKLIST - WAS PATIENT ON BETA BLOCKER?
Post-Discharge Transitional Care Management Services or Hospital Follow Up      Julia Monique   YOB: 1951    Date of Office Visit:  7/6/2021  Date of Hospital Admission: 6/25/21  Date of Hospital Discharge: 6/28/21  Readmission Risk Score(high >=14%.  Medium >=10%):Readmission Risk Score: 12      Care management risk score Rising risk (score 2-5) and Complex Care (Scores >=6): 0     Non face to face  following discharge, date last encounter closed (first attempt may have been earlier): 6/29/2021 11:35 AM 6/29/2021 11:35 AM    Call initiated 2 business days of discharge: Yes     Patient Active Problem List   Diagnosis    Cellulitis    HTN (hypertension)    Hyperlipidemia    Atypical chest pain    Hemangioma       Allergies   Allergen Reactions    Adhesive Tape     Other Other (See Comments)     Silk sutures do not dissolve    Vancomycin Hives       Medications listed as ordered at the time of discharge from hospital   [facility]   Home Medication Instructions NANCY:    Printed on:07/06/21 0913   Medication Information                      Ascorbic Acid (VITAMIN C) 250 MG tablet  Take 1,000 mg by mouth daily             b complex vitamins capsule  Take 1 capsule by mouth daily             balsalazide (COLAZAL) 750 MG capsule  Take 2,250 mg by mouth daily             calcium carbonate (OSCAL) 500 MG TABS tablet  Take 500 mg by mouth daily             cephALEXin (KEFLEX) 500 MG capsule  Take 1 capsule by mouth 3 times daily for 10 days             cephALEXin (KEFLEX) 500 MG capsule  Take 1 capsule by mouth daily To be started after completing course of Keflex 500 mg TID X 10 days             Cholecalciferol (VITAMIN D) 50 MCG (2000 UT) CAPS capsule  Take 1,000 Units by mouth             dicyclomine (BENTYL) 10 MG capsule  Take 1 capsule by mouth 3 times daily (before meals)             hydroCHLOROthiazide (HYDRODIURIL) 50 MG tablet  Take 1 tablet by mouth daily             metoprolol tartrate (LOPRESSOR) 50 MG tablet  Take 1 tablet by mouth 2 times daily             Multiple Vitamins-Minerals (CENTRUM SILVER ADULT 50+ PO)  Take by mouth daily             NONFORMULARY  Apply topically daily Epi-Serum skin barrier. Apply daily and PRN to right leg Hemangioma             Omega-3 Fatty Acids (FISH OIL) 1000 MG CAPS  Take 1,000 mg by mouth 3 times daily              omeprazole (PRILOSEC) 20 MG delayed release capsule  Take 40 mg by mouth Daily             potassium chloride (KLOR-CON M20) 20 MEQ extended release tablet  Take 1 tablet by mouth daily             Probiotic Product (ACIDOPHILUS PROBIOTIC BLEND) CAPS  Take 1 tablet by mouth 2 times daily              simvastatin (ZOCOR) 20 MG tablet  Take 1 tablet by mouth nightly             vitamin B-12 (CYANOCOBALAMIN) 1000 MCG tablet  Take 1,000 mcg by mouth daily                   Medications marked \"taking\" at this time  Outpatient Medications Marked as Taking for the 7/6/21 encounter (Office Visit) with Brenda Chris MD   Medication Sig Dispense Refill    NONFORMULARY Apply topically daily Epi-Serum skin barrier.   Apply daily and PRN to right leg Hemangioma      cephALEXin (KEFLEX) 500 MG capsule Take 1 capsule by mouth 3 times daily for 10 days 30 capsule 0    cephALEXin (KEFLEX) 500 MG capsule Take 1 capsule by mouth daily To be started after completing course of Keflex 500 mg TID X 10 days 30 capsule 0    omeprazole (PRILOSEC) 20 MG delayed release capsule Take 40 mg by mouth Daily      Cholecalciferol (VITAMIN D) 50 MCG (2000 UT) CAPS capsule Take 1,000 Units by mouth      Ascorbic Acid (VITAMIN C) 250 MG tablet Take 1,000 mg by mouth daily      calcium carbonate (OSCAL) 500 MG TABS tablet Take 500 mg by mouth daily      balsalazide (COLAZAL) 750 MG capsule Take 2,250 mg by mouth daily      b complex vitamins capsule Take 1 capsule by mouth daily      simvastatin (ZOCOR) 20 MG tablet Take 1 tablet by mouth nightly 90 tablet 1    metoprolol tartrate (LOPRESSOR) 50 MG tablet Take 1 tablet by mouth 2 times daily 180 tablet 1    hydroCHLOROthiazide (HYDRODIURIL) 50 MG tablet Take 1 tablet by mouth daily 90 tablet 1    potassium chloride (KLOR-CON M20) 20 MEQ extended release tablet Take 1 tablet by mouth daily 90 tablet 1    dicyclomine (BENTYL) 10 MG capsule Take 1 capsule by mouth 3 times daily (before meals) (Patient taking differently: Take 10 mg by mouth 4 times daily (before meals and nightly) ) 90 capsule 2    Multiple Vitamins-Minerals (CENTRUM SILVER ADULT 50+ PO) Take by mouth daily      vitamin B-12 (CYANOCOBALAMIN) 1000 MCG tablet Take 1,000 mcg by mouth daily      Omega-3 Fatty Acids (FISH OIL) 1000 MG CAPS Take 1,000 mg by mouth 3 times daily       Probiotic Product (ACIDOPHILUS PROBIOTIC BLEND) CAPS Take 1 tablet by mouth 2 times daily           Medications patient taking as of now reconciled against medications ordered at time of hospital discharge: Yes    Chief Complaint   Patient presents with    [facility]    Cellulitis     right leg       HPI    Inpatient course: Discharge summary reviewed- see chart. Interval history/Current status:     RLE cellulitis with bacteremia with 2 out of 2 blood cultures growing gram-positive cocci in pairs possibly due to enterococci. RLE cellulitis occurs every 1-2 years. On keflex 3x per day and then will go to keflex 1x per day. Hx of numerous skin grafts to RLE. Wearing compression stocking. Eating and drinking well. Review of Systems   Constitutional: Positive for fatigue. Negative for chills and fever. Skin: Positive for color change and rash. Vitals:    07/06/21 0859   BP: 118/78   Site: Right Upper Arm   Position: Sitting   Pulse: (!) 49   Resp: 16   Temp: 96.7 °F (35.9 °C)   SpO2: 97%   Weight: 168 lb 12.8 oz (76.6 kg)   Height: 5' 11\" (1.803 m)     Body mass index is 23.54 kg/m².    Wt Readings from Last 3 Encounters:   07/06/21 168 lb 12.8 oz (76.6 kg)   06/25/21 170 lb (77.1 kg)   01/20/21 174 lb (78.9 kg)     BP Readings from Last 3 Encounters:   07/06/21 118/78   06/28/21 138/69   01/20/21 128/80       Physical Exam  Vitals reviewed. Constitutional:       General: He is not in acute distress. Appearance: He is well-developed. He is not ill-appearing. Cardiovascular:      Rate and Rhythm: Regular rhythm. Bradycardia present. Heart sounds: No murmur heard. Pulmonary:      Effort: Pulmonary effort is normal. No respiratory distress. Breath sounds: Normal breath sounds. No wheezing. Musculoskeletal:      Right lower leg: No edema. Left lower leg: No edema. Neurological:      Mental Status: He is alert. Mental status is at baseline. Psychiatric:         Mood and Affect: Mood normal.         Behavior: Behavior normal.       Right lower extremity: No erythema. Chronic venous changes. Areas of skin grafting. No open wounds. Chronic skin changes are present. 1+ edema    Left medial elbow with small area of ecchymosis. Assessment/Plan:  1. Cellulitis of right lower extremity  Acute episode. Recurrent. May have had sepsis as well in the hospital.?  Doing well. Continue Keflex 3 times a day and transition to once daily.   He will  Follow-up with infectious disease to see if he wants you to stay on chronic suppressive daily Keflex  - [place] DISCHARGE MEDS RECONCILED W/ CURRENT OUTPATIENT MED LIST    F/u with Dr. Devin Reddy about 2 weeks for medicare wellness      Medical Decision Making: moderate complexity      Juju Jernigan MD No Yes

## 2022-03-17 DIAGNOSIS — Z01.818 ENCOUNTER FOR OTHER PREPROCEDURAL EXAMINATION: ICD-10-CM

## 2022-03-17 DIAGNOSIS — E66.01 MORBID (SEVERE) OBESITY DUE TO EXCESS CALORIES: ICD-10-CM

## 2022-03-18 ENCOUNTER — APPOINTMENT (OUTPATIENT)
Dept: SURGERY | Facility: CLINIC | Age: 47
End: 2022-03-18
Payer: MEDICAID

## 2022-03-18 VITALS
SYSTOLIC BLOOD PRESSURE: 161 MMHG | WEIGHT: 206 LBS | OXYGEN SATURATION: 97 % | RESPIRATION RATE: 18 BRPM | HEIGHT: 60 IN | BODY MASS INDEX: 40.44 KG/M2 | HEART RATE: 87 BPM | TEMPERATURE: 97.5 F | DIASTOLIC BLOOD PRESSURE: 97 MMHG

## 2022-03-18 DIAGNOSIS — M19.90 UNSPECIFIED OSTEOARTHRITIS, UNSPECIFIED SITE: ICD-10-CM

## 2022-03-18 DIAGNOSIS — I25.10 ATHEROSCLEROTIC HEART DISEASE OF NATIVE CORONARY ARTERY W/OUT ANGINA PECTORIS: ICD-10-CM

## 2022-03-18 DIAGNOSIS — E78.5 HYPERLIPIDEMIA, UNSPECIFIED: ICD-10-CM

## 2022-03-18 DIAGNOSIS — M54.50 LOW BACK PAIN, UNSPECIFIED: ICD-10-CM

## 2022-03-18 DIAGNOSIS — K21.9 GASTRO-ESOPHAGEAL REFLUX DISEASE W/OUT ESOPHAGITIS: ICD-10-CM

## 2022-03-18 PROCEDURE — 99204 OFFICE O/P NEW MOD 45 MIN: CPT

## 2022-03-18 RX ORDER — CEFDINIR 300 MG/1
300 CAPSULE ORAL DAILY
Qty: 14 | Refills: 0 | Status: DISCONTINUED | COMMUNITY
Start: 2018-11-21 | End: 2022-03-18

## 2022-03-18 RX ORDER — TRAMADOL HYDROCHLORIDE 50 MG/1
50 TABLET, COATED ORAL
Qty: 21 | Refills: 0 | Status: DISCONTINUED | COMMUNITY
Start: 2018-11-20 | End: 2022-03-18

## 2022-03-18 RX ORDER — OXYBUTYNIN CHLORIDE 10 MG/1
10 TABLET, EXTENDED RELEASE ORAL
Qty: 30 | Refills: 3 | Status: DISCONTINUED | COMMUNITY
Start: 2018-11-20 | End: 2022-03-18

## 2022-03-18 RX ORDER — VALSARTAN 80 MG/1
80 TABLET, COATED ORAL
Qty: 1 | Refills: 0 | Status: DISCONTINUED | COMMUNITY
Start: 2020-03-17 | End: 2022-03-18

## 2022-03-18 NOTE — PHYSICAL EXAM
[Obese, well nourished, in no acute distress] : obese, well nourished, in no acute distress [Normal] : affect appropriate [de-identified] : Normal respirations [de-identified] : Soft, nontender, nondistended.  Well-healed lower abdominal abdominoplasty incision.

## 2022-03-18 NOTE — ASSESSMENT
[FreeTextEntry1] : 46-year-old female with longstanding history of morbid obesity (height 5 feet, weight 206 pounds, BMI 40.2) with comorbidities of coronary artery disease status post stenting, hypertension, hyperlipidemia, and prediabetes.  She is interested in bariatric surgery, specifically sleeve gastrectomy.\par \par The patient has failed multiple prior attempts at weight loss and is now dealing with the side effects, risk factors, and poor quality of life associated with morbid obesity.  They would benefit from surgical weight loss as outlined in the NIH and  ASMBS consensus statements on bariatric surgery.  Surgical intervention is medically indicated.\par \par My impression is that the patient is a reasonable candidate for laparoscopic sleeve gastrectomy.\par

## 2022-03-18 NOTE — HISTORY OF PRESENT ILLNESS
[de-identified] : LOUIE  is a 46 year  female  here for a consultation for weight loss surgery. \par \par The patient has been struggling with obesity for many years.  She has attempted several diet and exercise programs without success.  She had an abdominoplasty several years ago and managed to lose over 20 pounds, however has regained the weight.  The excess weight is starting to significantly affect her health and lifestyle.  She suffers from hypertension and coronary artery disease, with 4 cardiac stents.  She has a family history of coronary artery disease.\par \par Medical history is significant for hypertension, hyperlipidemia, coronary artery disease status post stents x4 (2014 and 2017), prediabetes.  Medications include ASA 81, metoprolol, statin.  She is not on Plavix or other blood thinners.\par Surgical history is significant for abdominoplasty and hysterectomy.\par The patient denies prior history of blood clot or abnormal bleeding.\par The patient denies prior history of dysphagia or reflux.\par \par She works at [employer].\par \par Referred by Dr. Lowe.

## 2022-04-18 ENCOUNTER — APPOINTMENT (OUTPATIENT)
Dept: SURGERY | Facility: CLINIC | Age: 47
End: 2022-04-18

## 2022-04-27 ENCOUNTER — INPATIENT (INPATIENT)
Facility: HOSPITAL | Age: 47
LOS: 1 days | Discharge: ANOTHER HOSPITAL | End: 2022-04-29
Attending: INTERNAL MEDICINE | Admitting: INTERNAL MEDICINE
Payer: MEDICAID

## 2022-04-27 ENCOUNTER — TRANSCRIPTION ENCOUNTER (OUTPATIENT)
Age: 47
End: 2022-04-27

## 2022-04-27 VITALS
DIASTOLIC BLOOD PRESSURE: 80 MMHG | TEMPERATURE: 98 F | OXYGEN SATURATION: 99 % | HEART RATE: 76 BPM | HEIGHT: 60 IN | RESPIRATION RATE: 18 BRPM | SYSTOLIC BLOOD PRESSURE: 152 MMHG

## 2022-04-27 DIAGNOSIS — I20.0 UNSTABLE ANGINA: ICD-10-CM

## 2022-04-27 DIAGNOSIS — E78.5 HYPERLIPIDEMIA, UNSPECIFIED: ICD-10-CM

## 2022-04-27 DIAGNOSIS — Z90.710 ACQUIRED ABSENCE OF BOTH CERVIX AND UTERUS: Chronic | ICD-10-CM

## 2022-04-27 DIAGNOSIS — Z29.9 ENCOUNTER FOR PROPHYLACTIC MEASURES, UNSPECIFIED: ICD-10-CM

## 2022-04-27 DIAGNOSIS — I10 ESSENTIAL (PRIMARY) HYPERTENSION: ICD-10-CM

## 2022-04-27 DIAGNOSIS — R07.9 CHEST PAIN, UNSPECIFIED: ICD-10-CM

## 2022-04-27 DIAGNOSIS — Z95.5 PRESENCE OF CORONARY ANGIOPLASTY IMPLANT AND GRAFT: Chronic | ICD-10-CM

## 2022-04-27 DIAGNOSIS — E11.9 TYPE 2 DIABETES MELLITUS WITHOUT COMPLICATIONS: ICD-10-CM

## 2022-04-27 LAB
ALBUMIN SERPL ELPH-MCNC: 3.6 G/DL — SIGNIFICANT CHANGE UP (ref 3.3–5)
ALP SERPL-CCNC: 107 U/L — SIGNIFICANT CHANGE UP (ref 40–120)
ALT FLD-CCNC: 25 U/L — SIGNIFICANT CHANGE UP (ref 4–33)
ANION GAP SERPL CALC-SCNC: 11 MMOL/L — SIGNIFICANT CHANGE UP (ref 7–14)
APTT BLD: 28.6 SEC — SIGNIFICANT CHANGE UP (ref 27–36.3)
AST SERPL-CCNC: 32 U/L — SIGNIFICANT CHANGE UP (ref 4–32)
BASOPHILS # BLD AUTO: 0.05 K/UL — SIGNIFICANT CHANGE UP (ref 0–0.2)
BASOPHILS NFR BLD AUTO: 0.5 % — SIGNIFICANT CHANGE UP (ref 0–2)
BILIRUB SERPL-MCNC: 0.2 MG/DL — SIGNIFICANT CHANGE UP (ref 0.2–1.2)
BUN SERPL-MCNC: 14 MG/DL — SIGNIFICANT CHANGE UP (ref 7–23)
CALCIUM SERPL-MCNC: 8.5 MG/DL — SIGNIFICANT CHANGE UP (ref 8.4–10.5)
CHLORIDE SERPL-SCNC: 104 MMOL/L — SIGNIFICANT CHANGE UP (ref 98–107)
CO2 SERPL-SCNC: 22 MMOL/L — SIGNIFICANT CHANGE UP (ref 22–31)
CREAT SERPL-MCNC: 0.77 MG/DL — SIGNIFICANT CHANGE UP (ref 0.5–1.3)
EGFR: 96 ML/MIN/1.73M2 — SIGNIFICANT CHANGE UP
EOSINOPHIL # BLD AUTO: 0.21 K/UL — SIGNIFICANT CHANGE UP (ref 0–0.5)
EOSINOPHIL NFR BLD AUTO: 2.2 % — SIGNIFICANT CHANGE UP (ref 0–6)
FLUAV AG NPH QL: SIGNIFICANT CHANGE UP
FLUBV AG NPH QL: SIGNIFICANT CHANGE UP
GLUCOSE BLDC GLUCOMTR-MCNC: 127 MG/DL — HIGH (ref 70–99)
GLUCOSE BLDC GLUCOMTR-MCNC: 143 MG/DL — HIGH (ref 70–99)
GLUCOSE SERPL-MCNC: 286 MG/DL — HIGH (ref 70–99)
HCG SERPL-ACNC: <5 MIU/ML — SIGNIFICANT CHANGE UP
HCT VFR BLD CALC: 38.9 % — SIGNIFICANT CHANGE UP (ref 34.5–45)
HGB BLD-MCNC: 12.4 G/DL — SIGNIFICANT CHANGE UP (ref 11.5–15.5)
IANC: 6.37 K/UL — SIGNIFICANT CHANGE UP (ref 1.8–7.4)
IMM GRANULOCYTES NFR BLD AUTO: 0.3 % — SIGNIFICANT CHANGE UP (ref 0–1.5)
INR BLD: 1.13 RATIO — SIGNIFICANT CHANGE UP (ref 0.88–1.16)
LYMPHOCYTES # BLD AUTO: 2.22 K/UL — SIGNIFICANT CHANGE UP (ref 1–3.3)
LYMPHOCYTES # BLD AUTO: 23.4 % — SIGNIFICANT CHANGE UP (ref 13–44)
MCHC RBC-ENTMCNC: 30 PG — SIGNIFICANT CHANGE UP (ref 27–34)
MCHC RBC-ENTMCNC: 31.9 GM/DL — LOW (ref 32–36)
MCV RBC AUTO: 94.2 FL — SIGNIFICANT CHANGE UP (ref 80–100)
MONOCYTES # BLD AUTO: 0.61 K/UL — SIGNIFICANT CHANGE UP (ref 0–0.9)
MONOCYTES NFR BLD AUTO: 6.4 % — SIGNIFICANT CHANGE UP (ref 2–14)
NEUTROPHILS # BLD AUTO: 6.37 K/UL — SIGNIFICANT CHANGE UP (ref 1.8–7.4)
NEUTROPHILS NFR BLD AUTO: 67.2 % — SIGNIFICANT CHANGE UP (ref 43–77)
NRBC # BLD: 0 /100 WBCS — SIGNIFICANT CHANGE UP
NRBC # FLD: 0 K/UL — SIGNIFICANT CHANGE UP
PA ADP PRP-ACNC: 65 PRU — LOW (ref 194–418)
PLATELET # BLD AUTO: 223 K/UL — SIGNIFICANT CHANGE UP (ref 150–400)
POTASSIUM SERPL-MCNC: 4.3 MMOL/L — SIGNIFICANT CHANGE UP (ref 3.5–5.3)
POTASSIUM SERPL-SCNC: 4.3 MMOL/L — SIGNIFICANT CHANGE UP (ref 3.5–5.3)
PROT SERPL-MCNC: 6.5 G/DL — SIGNIFICANT CHANGE UP (ref 6–8.3)
PROTHROM AB SERPL-ACNC: 13.1 SEC — SIGNIFICANT CHANGE UP (ref 10.5–13.4)
RBC # BLD: 4.13 M/UL — SIGNIFICANT CHANGE UP (ref 3.8–5.2)
RBC # FLD: 13 % — SIGNIFICANT CHANGE UP (ref 10.3–14.5)
RSV RNA NPH QL NAA+NON-PROBE: SIGNIFICANT CHANGE UP
SARS-COV-2 RNA SPEC QL NAA+PROBE: SIGNIFICANT CHANGE UP
SODIUM SERPL-SCNC: 137 MMOL/L — SIGNIFICANT CHANGE UP (ref 135–145)
TROPONIN T, HIGH SENSITIVITY RESULT: <6 NG/L — SIGNIFICANT CHANGE UP
WBC # BLD: 9.49 K/UL — SIGNIFICANT CHANGE UP (ref 3.8–10.5)
WBC # FLD AUTO: 9.49 K/UL — SIGNIFICANT CHANGE UP (ref 3.8–10.5)

## 2022-04-27 PROCEDURE — 93458 L HRT ARTERY/VENTRICLE ANGIO: CPT | Mod: 26

## 2022-04-27 PROCEDURE — 93571 IV DOP VEL&/PRESS C FLO 1ST: CPT | Mod: 26,LD,59

## 2022-04-27 PROCEDURE — 99152 MOD SED SAME PHYS/QHP 5/>YRS: CPT | Mod: 59

## 2022-04-27 PROCEDURE — 99223 1ST HOSP IP/OBS HIGH 75: CPT

## 2022-04-27 PROCEDURE — 71046 X-RAY EXAM CHEST 2 VIEWS: CPT | Mod: 26

## 2022-04-27 PROCEDURE — 99285 EMERGENCY DEPT VISIT HI MDM: CPT

## 2022-04-27 RX ORDER — ATORVASTATIN CALCIUM 80 MG/1
40 TABLET, FILM COATED ORAL AT BEDTIME
Refills: 0 | Status: DISCONTINUED | OUTPATIENT
Start: 2022-04-27 | End: 2022-04-29

## 2022-04-27 RX ORDER — HYDRALAZINE HCL 50 MG
5 TABLET ORAL ONCE
Refills: 0 | Status: DISCONTINUED | OUTPATIENT
Start: 2022-04-27 | End: 2022-04-27

## 2022-04-27 RX ORDER — METOPROLOL TARTRATE 50 MG
50 TABLET ORAL
Refills: 0 | Status: DISCONTINUED | OUTPATIENT
Start: 2022-04-27 | End: 2022-04-29

## 2022-04-27 RX ORDER — HEPARIN SODIUM 5000 [USP'U]/ML
5000 INJECTION INTRAVENOUS; SUBCUTANEOUS EVERY 8 HOURS
Refills: 0 | Status: DISCONTINUED | OUTPATIENT
Start: 2022-04-27 | End: 2022-04-27

## 2022-04-27 RX ORDER — ASPIRIN/CALCIUM CARB/MAGNESIUM 324 MG
324 TABLET ORAL ONCE
Refills: 0 | Status: COMPLETED | OUTPATIENT
Start: 2022-04-27 | End: 2022-04-27

## 2022-04-27 RX ORDER — ASPIRIN/CALCIUM CARB/MAGNESIUM 324 MG
81 TABLET ORAL DAILY
Refills: 0 | Status: DISCONTINUED | OUTPATIENT
Start: 2022-04-28 | End: 2022-04-29

## 2022-04-27 RX ORDER — TICAGRELOR 90 MG/1
180 TABLET ORAL ONCE
Refills: 0 | Status: COMPLETED | OUTPATIENT
Start: 2022-04-27 | End: 2022-04-27

## 2022-04-27 RX ORDER — DEXTROSE 50 % IN WATER 50 %
25 SYRINGE (ML) INTRAVENOUS ONCE
Refills: 0 | Status: DISCONTINUED | OUTPATIENT
Start: 2022-04-27 | End: 2022-04-29

## 2022-04-27 RX ORDER — INSULIN LISPRO 100/ML
VIAL (ML) SUBCUTANEOUS EVERY 6 HOURS
Refills: 0 | Status: DISCONTINUED | OUTPATIENT
Start: 2022-04-27 | End: 2022-04-29

## 2022-04-27 RX ORDER — LOSARTAN POTASSIUM 100 MG/1
25 TABLET, FILM COATED ORAL DAILY
Refills: 0 | Status: DISCONTINUED | OUTPATIENT
Start: 2022-04-28 | End: 2022-04-29

## 2022-04-27 RX ORDER — FUROSEMIDE 40 MG
20 TABLET ORAL DAILY
Refills: 0 | Status: DISCONTINUED | OUTPATIENT
Start: 2022-04-28 | End: 2022-04-29

## 2022-04-27 RX ADMIN — Medication 50 MILLIGRAM(S): at 18:32

## 2022-04-27 RX ADMIN — TICAGRELOR 180 MILLIGRAM(S): 90 TABLET ORAL at 15:19

## 2022-04-27 RX ADMIN — ATORVASTATIN CALCIUM 40 MILLIGRAM(S): 80 TABLET, FILM COATED ORAL at 21:57

## 2022-04-27 RX ADMIN — Medication 324 MILLIGRAM(S): at 15:19

## 2022-04-27 NOTE — ED ADULT TRIAGE NOTE - CHIEF COMPLAINT QUOTE
pt .co chest pain since last night with DANIEL. since last night. Pt with HX of 4 cardiac stents. Pt denies nausea. Pt does not look tachypneic in triage.

## 2022-04-27 NOTE — ED PROVIDER NOTE - NS ED ATTENDING STATEMENT MOD
This was a shared visit with the NASIR. I reviewed and verified the documentation and independently performed the documented:

## 2022-04-27 NOTE — DISCHARGE NOTE PROVIDER - HOSPITAL COURSE
46F with hx of CAD s/p PCIx4 (last 2017), HTN, HLD, T2DM (not on medications), who presents with midsternal chest pain a/w unstable angina pending cath.     Unstable angina.   - presents with midsternal chest pain, trops neg  - hx of CAD s/p PCIx4 (last 2017)  - appreciate cardiology Dr. Genaro padron - pending cath today, s/p asa + Brilinta load  - follow up post cath recommendations  - c/w asa 81mg daily starting tomorrow as s/p load today, atorvastatin, metoprolol tartrate 50mg BID  - resume losartan post cath  - monitor on telemetry.    Hypertension.   - BP stable  - resume losartan post cath, c/w metoprolol tartrate  - monitor vital signs.    Hyperlipidemia.   - c/w atorvastatin, check lipid profile in AM.    Type 2 diabetes mellitus.   - Blood glucose on BMP elevated  - not on any medications at home   - check A1C, last in 2020 6.5%  - FS/SSI qac/hs, [id number] while NPO.   46F with hx of CAD s/p PCIx4 (last 2017), HTN, HLD, T2DM (not on medications), who presents with midsternal chest pain a/w unstable angina pending cath.     Unstable angina.   - presents with midsternal chest pain, trops neg  - hx of CAD s/p PCIx4 (last 2017)  - appreciate cardiology Dr. Genaro padron - s/p asa + Brilinta load  - [place] on 4/27- triple vessel disease- Pt will need transfer to [facility] for CABG  - c/w asa 81mg daily starting tomorrow as s/p load, atorvastatin, metoprolol tartrate 50mg BID  - resumed losartan post cath  - CABG w/u- TTE, carotid duplex----- (do not need to wait for these tests to be done for transfer to [facility] for CABG)    Hypertension.   - BP stable  - resumed losartan post cath, c/w metoprolol tartrate    Hyperlipidemia.   - c/w atorvastatin    Type 2 diabetes mellitus.   - Blood glucose on BMP elevated  - not on any medications at home   - check A1C, last in 2020 6.5%  - FS/SSI qac/hs, [id number] while NPO.   46F with hx of CAD s/p PCIx4 (last 2017), HTN, HLD, T2DM (not on medications), who presents with midsternal chest pain a/w unstable angina     Unstable angina.   - presents with midsternal chest pain, trops neg  - hx of CAD s/p PCIx4 (last 2017)  - appreciate cardiology Dr. Genaro padron - s/p asa + Brilinta load  - [facility] on 4/27- triple vessel disease- Pt will need transfer to [facility] for CABG  - c/w asa 81mg daily starting tomorrow as s/p load, atorvastatin, metoprolol tartrate 50mg BID  - resumed losartan post cath  - CABG w/u- TTE, carotid duplex----- (do not need to wait for these tests to be done for transfer to [facility] for CABG)    Hypertension.   - BP stable  - resumed losartan post cath, c/w metoprolol tartrate    Hyperlipidemia.   - c/w atorvastatin    Type 2 diabetes mellitus.   - Blood glucose on BMP elevated  - not on any medications at home   - A1C 7.3, last in 2020 6.5%  - FS/SSI qac/hs   46F with hx of CAD s/p PCIx4 (last 2017), HTN, HLD, T2DM (not on medications), who presents with midsternal chest pain a/w unstable angina     Unstable angina.   - presents with midsternal chest pain, trops neg  - hx of CAD s/p PCIx4 (last 2017)  - appreciate cardiology Dr. Genaro padron - s/p asa + Brilinta load  - [facility] on 4/27- triple vessel disease- Pt will need transfer to [facility] for CABG  - c/w asa 81mg daily starting tomorrow as s/p load, atorvastatin, metoprolol tartrate 50mg BID  - resumed losartan post cath  - CABG w/u- TTE-  Low normal left ventricular systolic function. No segmental wall motion abnormalities., carotid duplex----- (do not need to wait for these tests to be done for transfer to [facility] for CABG)    Hypertension.   - BP stable  - resumed losartan post cath, c/w metoprolol tartrate    Hyperlipidemia.   - c/w atorvastatin    Type 2 diabetes mellitus.   - Blood glucose on BMP elevated  - not on any medications at home   - A1C 7.3, last in 2020 6.5%  - FS/SSI qac/hs   46F with hx of CAD s/p PCIx4 (last 2017), HTN, HLD, T2DM (not on medications), who presents with midsternal chest pain a/w unstable angina     Unstable angina.   - presents with midsternal chest pain, trops neg  - hx of CAD s/p PCIx4 (last 2017)  - appreciate cardiology Dr. Lam recs - s/p asa + Brilinta load  - [facility] on 4/27- triple vessel disease- Pt will need transfer to [facility] for CABG  - c/w asa 81mg daily starting tomorrow as s/p load, atorvastatin, metoprolol tartrate 50mg BID  - resumed losartan post cath  - CABG w/u- TTE-  Low normal left ventricular systolic function. No segmental wall motion abnormalities., carotid duplex----- (do not need to wait for these tests to be done for transfer to [facility] for CABG)    Hypertension.   - BP stable  - resumed losartan post cath, c/w metoprolol tartrate    Hyperlipidemia.   - c/w atorvastatin    Type 2 diabetes mellitus.   - Blood glucose on BMP elevated  - not on any medications at home   - A1C 7.3, last in 2020 6.5%  - FS/SSI qac/hs    Patient is medically stable for discharge to [facility] for CABG procedure as discussed with Dr. Lam on 4/28/22

## 2022-04-27 NOTE — ED PROVIDER NOTE - OBJECTIVE STATEMENT
45 y/o female hx HTN CAD (stents x 4) presents to ER c/o chest pain. Pt. states for the past few weeks she has been experiencing midsternal  chest pain and heaviness which has been worsening - states over th epast 3-4 days the pain has gotten worse - describes feeling as if someone is sitting on her chest. Today when she got to work and walking across parking lot -the further she walked the more pain she had. States 9/10 pain radiating to right side of neck with some mild assc shortness of breath. Denies fever chills nausea vomit palpitations dizziness loc.  States had a negative stress test with PMD one month ago.  Last stent/cardiac cath 2017. 45 y/o female hx HTN CAD (stents x 4) presents to ER c/o chest pain. Pt. states for the past few weeks she has been experiencing midsternal  chest pain and heaviness which has been worsening - states over th epast 3-4 days the pain has gotten worse - describes feeling as if someone is sitting on her chest. Today when she got to work and walking across parking lot -the further she walked the more pain she had. States 9/10 pain radiating to right side of neck with some mild assc shortness of breath. Denies fever chills nausea vomit palpitations dizziness loc.  States had a negative stress test with PMD one month ago.  Last stent/cardiac cath 2017.  Attending - Agree with above.  I evaluated patient myself. 45 y/o F with  at bedside.  Hx CAD s/p stents x 2 in 2014, stents x 2 in 2017, Dr. Lam.  Reports progressively worsening episodes of MSCP x 2 1/2 weeks.  Usually begins with walking and persists until she takes metoprolol pill or NTG pill.  Episodes became worse x few days and was severe today.  Lasted approx 15 minutes today.  No pain currently.  Associated shortness of breath with pain.  Denies fever, cough, recent covid.

## 2022-04-27 NOTE — ED ADULT TRIAGE NOTE - MEANS OF ARRIVAL
BMT History & Physical     Admission  Name: Theo Roblero  Date:  10/16/2020  Service: BMT   Chief Complaint:     Informant:  Patient and Chart  Resuscitation Status: Full Code    Patient ID:  Theo Roblero is a 55 year old male with high risk (IPI 6.5 and Ki67 of 20%), stage IV Mantle cell lymphoma in CR1 s/p 8 cycles of alternating 6 cycles of Maxi-R- CHOP alternating with high-dose LITZY-C. Last cycle was given on 6/11/2020. His therapy was complicated by a kidney stone and sepsis in 5/2020, prior to cycle 5. Upon diagnosis, a CT was performed to evaluate pain concerning for an aortic dissection and showed LAD above and below the diaphragm, with large portacaval nodes measure in excess of 5cm. No thoracoabdominal aortic dissection or aneurysm was found.The findings of LAD were confirmed by a PET/CT. Lymph node biopsy on 2/7/2020 showed MCL, as described as CD20 positive B cells with positive staining for CD5 and cyclin D1.  The Ki-67 proliferation index was 20%. A bone marrow biopsy on 2/18/2020 was positive for intramedullary MCL (<5%). Early in his disease course, he reportedly developed a sepsis syndrome with leukocytosis (17.8) a creatinine 2.1 and was admitted on 2/20/2020. Kidney function has since improved.     He went on to receive Maxi-R- CHOP alternating with high-dose LITZY-C from 2-6/2020. Follow up imaging post therapy on 7/6/2020 showed a CR by PET CT, with resolution of avid lymph nodes. A persistent pleural effusion was observed and not PET avid.        Pre-transplant restaging PET CT confirms a stable CR post initial therapy. Bone marrow shows a normocellular marrow with trilineage hematopoiesis (crush artifact noted). Flow negative. LDH normal at 150. Mild anemia, but no other cytopenia. Intact IgG. EBV+, VZV+, HSV-, CMV+. HBV HCV HIV WNV CLEMENTE Negative. Hep C Ab nonreactive.     The open visit identified some degree of frailty at a score of 2. Thus, will consider pepper-transplant  PT/OT.     PFTs within standard limits. ECHO shows an EF of 50% with early diastolic dysfunction. EKG shows NSR.     Colonoscopy in 2018, no indication to repeat at this time.     Path confirmed locally.    Dental cleared.     He present today for stem cell mobilization and ASCT planning.          Bone Marrow Workup Results:  Blood Counts Recent Labs   Lab Test 10/13/20  1323   WBC 6.0   ANEU 4.8   ALYM 0.5*   CLAUDIA 0.5   AEOS 0.1   HGB 13.2*   HCT 40.1         Bone Marrow No evidence of disease, trilineage hematopoiesis.   Blood Type Recent Labs   Lab Test 10/13/20  1323   ABO AB      Chemistries Recent Labs   Lab Test 10/13/20  1323      POTASSIUM 4.0   CHLORIDE 106   CO2 24   BUN 20   CR 1.00      Liver Tests Recent Labs   Lab Test 10/13/20  1323   BILITOTAL 0.4   ALKPHOS 87   AST 18   ALT 22      Chest CT Remains in CR, compared to recent PET/CT showing the same   PFTs FVC%  Recent Labs   Lab Test 08/31/20  1031   [id number] 94       FEV1%  Recent Labs   Lab Test 08/31/20  1031   00840 103       DLCO%  Recent Labs   Lab Test 08/31/20  1031   [id number] 108      ECHO or MUGA: EECHO:  50%     EKG NSR   Serologies: EBV+, VZV+, HSV-, CMV +       Family History:   Family History   Problem Relation Age of Onset     Heart Disease Mother      Lymphoma Mother      Heart Disease Father      Heart Disease Sister        Social History:   Social History     Socioeconomic History     Marital status: Single     Spouse name: Not on file     Number of children: Not on file     Years of education: Not on file     Highest education level: Not on file   Occupational History     Not on file   Social Needs     Financial resource strain: Not on file     Food insecurity     Worry: Not on file     Inability: Not on file     Transportation needs     Medical: Not on file     Non-medical: Not on file   Tobacco Use     Smoking status: Never Smoker     Smokeless tobacco: Current User     Types: Chew     Tobacco comment: trying to quit    Substance and Sexual Activity     Alcohol use: Not Currently     Drug use: Never     Sexual activity: Not on file   Lifestyle     Physical activity     Days per week: Not on file     Minutes per session: Not on file     Stress: Not on file   Relationships     Social connections     Talks on phone: Not on file     Gets together: Not on file     Attends Mosque service: Not on file     Active member of club or organization: Not on file     Attends meetings of clubs or organizations: Not on file     Relationship status: Not on file     Intimate partner violence     Fear of current or ex partner: Not on file     Emotionally abused: Not on file     Physically abused: Not on file     Forced sexual activity: Not on file   Other Topics Concern     Not on file   Social History Narrative     Not on file       Past Medical History:   Past Medical History:   Diagnosis Date     Kidney stones 06/2020    left     Sepsis without septic shock (H) 05/12/2020    seconary tp pyelonephritis from obstruction left ureteroliethasis        Past Surgical History:   Past Surgical History:   Procedure Laterality Date     cystoscopy, left pyelogram, ureteral stent placement  05/12/2020    stone was blocking the ureter     cystoscopy, retrograde pyelogram, uretroscopy. laser lithotripsy Left 06/04/2020    treat kidney stones     port a cath placement Right 02/18/2020    internal jugular vein       Allergies: No Known Allergies    Home Medications      Prior to Admission medications    Medication Sig Start Date End Date Taking? Authorizing Provider   acetaminophen (TYLENOL) 500 MG tablet Take 500-1,000 mg by mouth    Reported, Patient   glimepiride (AMARYL) 4 MG tablet Take 4 mg by mouth every morning (before breakfast)  6/26/20   Reported, Patient   metFORMIN (GLUCOPHAGE) 500 MG tablet 2 times daily (with meals)  6/26/20   Reported, Patient   metoprolol succinate ER (TOPROL-XL) 25 MG 24 hr tablet States he takes metoprolol BID 8/1/20    Reported, Patient       Review of Systems    Review of Systems:  CONSTITUTIONAL: NEGATIVE for fever, chills, change in weight  INTEGUMENTARY/SKIN: NEGATIVE for worrisome rashes, moles or lesions  EYES: NEGATIVE for vision changes or irritation  ENT/MOUTH: NEGATIVE for ear, mouth and throat problems  RESP: NEGATIVE for significant cough or SOB  BREAST: NEGATIVE for masses, tenderness or discharge  CV: NEGATIVE for chest pain, palpitations or peripheral edema  GI: NEGATIVE for nausea, abdominal pain, heartburn, or change in bowel habits  : NEGATIVE for frequency, dysuria, or hematuria  MUSCULOSKELETAL: NEGATIVE for significant arthralgias or myalgia  NEURO: NEGATIVE for weakness, dizziness or paresthesias  ENDOCRINE: NEGATIVE for temperature intolerance, skin/hair changes  HEME/ALLERGY: NEGATIVE for bleeding problems  PSYCHIATRIC: NEGATIVE for changes in mood or affect    PHYSICAL EXAM      Weight     Wt Readings from Last 3 Encounters:   10/13/20 109.2 kg (240 lb 11.2 oz)   08/31/20 109.6 kg (241 lb 9.6 oz)   08/27/20 109.9 kg (242 lb 4.8 oz)          There were no vitals taken for this visit.     General: NAD   Eyes: MARYAN, sclera anicteric   Nose/Mouth/Throat: OP clear, buccal mucosa moist, no ulcerations   Lungs: CTA bilaterally  Cardiovascular: RRR, no M/R/G   Abdominal/Rectal: +BS, soft, NT, ND, No HSM   Lymphatics: No edema  Skin: No rashes or petechaie  Neuro: A&O   Additional Findings: Forte site NT, no drainage.    ASSESSMENT BY SYSTEMS   Theo Roblero is a 55 year old male with high risk (IPI 6.5 and Ki67 of 20%), stage IV Mantle cell lymphoma in CR1. We recommend proceeding with GCSF stem mobilization and collection, followed by a BEAM condition autologous HCT. KGF can be used to reduce mucositis. We recommend post-transplant maintenance rituximab for 1-2 years total. Clear to start G-mobilization this week.     1.  BMT: Prep as above. Allopurinol through day 0. Flush and pre-meds prior to  transplant. GCSF starts d+5 and cont to until ANC>1500 x3 consecutive days. Re-stage per protocol.     2.  HEME: Keep Hgb>8 and plts>10K. No pre-meds.                            3.  ID: Afebrile. Cont Levo, Fluc, and ACV (CMV+, HSV-, EBV+) prophy. Bactrim or appropriate PCP therapy to start d+28.                                             4.  GI: Zofran and dexamethasone prior to chemo to prevent N/V. Ativan and compazine available PRN break-through symptoms. Protonix for GI prophy. Ursodiol for VOD prophy.     5.  FEN/Renal: Monitor creat and lytes. Replete lytes PRN per SS. Monitor weight, I+O, lytes per protocol with IVF flush.     BMT and Cell Therapy Informed Consent Discussion  In today's visit, we discussed in detail the research for which Theo Roblero is eligible. We discussed the potential risks and potential benefits of each protocol individually. We explained potential alternatives to the protocols discussed. We explained to the patient that participation is voluntary and that consent may be withdrawn at any time.      The patient completed the last round of treatment on 7/2020.     HCT-CI score: 0 We counseled the patient about the impact of this on the risk of treatment related and overall mortality. The score fit within treatment protocol eligibility criteria.     Karnofsky performance score: 90        Active infections: None, history of pleural effusion since diagnosis. Not PET avid.     Reproductive status: What methods of birth control does the patient plan to use during the treatment period beginning with conditioning and ending with the discontinuation of immune suppression (indicate with an X all that apply):  __ The patient is confirmed to be sterile or post-menopausal  __ Sexual abstinence  _X_ Condoms  __ Implants  __ Injectables  __ Oral contraceptives  __ Intrauterine devices (IUD)  __ Other (describe)    The patient received appropriate reproductive counseling and agreed with the  need for effective contraception during the treatment procedures.       Dental health suitable to proceed: He will be scheduled to see a Dentist.     After our detailed discussion above, the patient signed the following consents for treatment and protocols:    [id number]     The patient will receive a signed copy of the consents. The patient had opportunity to ask questions that were answered to the best of my ability and to the patient's apparent satisfaction.     Sam Zavala MD    ambulatory

## 2022-04-27 NOTE — H&P ADULT - NSHPPHYSICALEXAM_GEN_ALL_CORE
Vital Signs Last 24 Hrs  T(C): 36.8 (27 Apr 2022 10:24), Max: 36.8 (27 Apr 2022 10:24)  T(F): 98.2 (27 Apr 2022 10:24), Max: 98.2 (27 Apr 2022 10:24)  HR: 70 (27 Apr 2022 11:43) (70 - 76)  BP: 146/86 (27 Apr 2022 11:43) (146/86 - 152/80)  BP(mean): --  RR: 16 (27 Apr 2022 11:43) (16 - 18)  SpO2: 100% (27 Apr 2022 11:43) (99% - 100%)    CONSTITUTIONAL: well-developed, well-groomed, no apparent distress  EYES: no conjunctival or scleral injection, non-icteric, PERRLA  ENMT: no external nasal lesions, oral mucosa with moist membranes  NECK: trachea midline, no palpable neck mass   RESPIRATORY: breathing comfortably, lungs CTA without wheeze/rales/rhonchi  CARDIOVASCULAR: regular rate and rhythm; +S1S2, no murmurs, rubs, or gallops, no lower extremity edema, 2+ peripheral pulses  GASTROINTESTINAL: soft, nontender, nondistended; +BS throughout, no rebound/guarding  MUSCULOSKELETAL: no joint effusions, normal strength and tone of extremities   NEUROLOGIC: non-focal, sensation intact to light touch in b/l upper and lower extremities   PSYCHIATRIC: AAOx3, appropriate mood and affect  SKIN: no rashes or lesions, warm

## 2022-04-27 NOTE — ED ADULT NURSE NOTE - OBJECTIVE STATEMENT
patient alert ox4 came in c/o chest pain DANIEL and SOB since today. h/o cardiac stents. breathing even and unlabored. connected to CM shows NSR. labs done as ordered. skin warm and d ry. NAD. breathing even and unlabored. awaiting results and will continue to monitor.

## 2022-04-27 NOTE — DISCHARGE NOTE PROVIDER - NSDCFUADDINST_GEN_ALL_CORE_FT
No heavy lifting of greater than 10 lbs for 1 week after your catheterization. No strenuous activity for 1 week.

## 2022-04-27 NOTE — DISCHARGE NOTE PROVIDER - NSDCCPCAREPLAN_GEN_ALL_CORE_FT
PRINCIPAL DISCHARGE DIAGNOSIS  Diagnosis: Acute chest pain  Assessment and Plan of Treatment: 4/27 cath---------------       PRINCIPAL DISCHARGE DIAGNOSIS  Diagnosis: Acute chest pain  Assessment and Plan of Treatment: You presented to the [facility] with chest pain, and you had a cardiac catheterization done on 4/27. You were found to have blockages in multiple arteries in your heart, and it is determined that you need a cardiac bypass surgery. You will be transferred to [facility] to get these surgery done.      SECONDARY DISCHARGE DIAGNOSES  Diagnosis: Hyperlipidemia  Assessment and Plan of Treatment: Continue prescribed medications to control your cholesterol levels and a DASH (Low fat/salt) diet. Follow up with your primary care provider upon discharge for further management and monitoring of cholesterol levels.    Diagnosis: Type 2 diabetes mellitus  Assessment and Plan of Treatment: Your HgA1C during hospitalization was noted to be _____ (Provide such information to your primary care).  Continue your medication regimen and a consistent carbohydrate diet (Meaning eating the same amount of carbohydrates at the same time each day). Monitor blood glucose levels throughout the day before meals and at bedtime. Record blood sugars and bring to outpatient providers appointment in order to be reviewed by your doctor for management modifications. If your sugars are more than 400 or less than 70 you should contact your PCP immediately.   Monitor for signs/symptoms of low blood glucose, such as, dizziness, altered mental status, or cool/clammy skin. In addition, monitor for signs/symptoms of high blood glucose, such as, feeling hot, dry, fatigued, or with increased thirst/urination.   Make regular podiatry appointments in order to have feet checked for wounds and uncontrolled toe nail growth to prevent infections, as well as, appointments with an ophthalmologist to monitor your vision.    Diagnosis: Hypertension  Assessment and Plan of Treatment: Continue blood pressure medication regimen as directed. Monitor for any visual changes, headaches or dizziness.  Monitor blood pressure regularly.  Follow up with your primary care provider for further management for high blood pressure.     PRINCIPAL DISCHARGE DIAGNOSIS  Diagnosis: Acute chest pain  Assessment and Plan of Treatment: You presented to the \[facility]\"" with chest pain, and you had a cardiac catheterization done on 4/27. You were found to have blockages in multiple arteries in your heart, and it is determined that you need a cardiac bypass surgery. You will be transferred to [facility] to get these surgery done.      SECONDARY DISCHARGE DIAGNOSES  Diagnosis: Hyperlipidemia  Assessment and Plan of Treatment: Continue prescribed medications to control your cholesterol levels and a DASH (Low fat/salt) diet. Follow up with your primary care provider upon discharge for further management and monitoring of cholesterol levels.    Diagnosis: Type 2 diabetes mellitus  Assessment and Plan of Treatment: Your HgA1C during hospitalization was noted to be 7.3% (Provide such information to your primary care).  Continue your medication regimen and a consistent carbohydrate diet (Meaning eating the same amount of carbohydrates at the same time each day). Monitor blood glucose levels throughout the day before meals and at bedtime. Record blood sugars and bring to outpatient providers appointment in order to be reviewed by your doctor for management modifications. If your sugars are more than 400 or less than 70 you should contact your PCP immediately.   Monitor for signs/symptoms of low blood glucose, such as, dizziness, altered mental status, or cool/clammy skin. In addition, monitor for signs/symptoms of high blood glucose, such as, feeling hot, dry, fatigued, or with increased thirst/urination.   Make regular podiatry appointments in order to have feet checked for wounds and uncontrolled toe nail growth to prevent infections, as well as, appointments with an ophthalmologist to monitor your vision.    Diagnosis: Hypertension  Assessment and Plan of Treatment: Continue blood pressure medication regimen as directed. Monitor for any visual changes, headaches or dizziness.  Monitor blood pressure regularly.  Follow up with your primary care provider for further management for high blood pressure.

## 2022-04-27 NOTE — H&P ADULT - NSHPLABSRESULTS_GEN_ALL_CORE
12.4   9.49  )-----------( 223      ( 27 Apr 2022 11:54 )             38.9     04-27    137  |  104  |  14  ----------------------------<  286<H>  4.3   |  22  |  0.77    Ca    8.5      27 Apr 2022 11:54    TPro  6.5  /  Alb  3.6  /  TBili  0.2  /  DBili  x   /  AST  32  /  ALT  25  /  AlkPhos  107  04-27    d/w Dr. Lam - load with asa and Brilinta, no need for hep gtt, cath soon

## 2022-04-27 NOTE — H&P ADULT - PROBLEM SELECTOR PLAN 1
presents with midsternal chest pain, trops neg  - hx of CAD s/p PCIx4 (last 2017)  - appreciate cardiology Dr. Lam recs - pending cath today, s/p asa + Brilinta load  - follow up post cath recommendations  - c/w asa 81mg daily starting tomorrow as s/p load today, atorvastatin, metoprolol tartrate 50mg BID  - resume losartan post cath  - monitor on telemetry presents with midsternal chest pain, trops neg  - hx of CAD s/p PCIx4 (last 2017)  - appreciate cardiology Dr. Lam recs - pending cath today, s/p asa + Brilinta load  - follow up post cath recommendations  - c/w asa 81mg daily starting tomorrow as s/p load today, atorvastatin, metoprolol, losartan  - monitor on telemetry

## 2022-04-27 NOTE — CHART NOTE - NSCHARTNOTEFT_GEN_A_CORE
RADIAL BAND REMOVAL NOTE    Right radial band removed without any complications. No bleeding or hematoma. Positive peripheral pulses. Good capillary refill. RN to monitor site for bleeding and hematoma.

## 2022-04-27 NOTE — H&P ADULT - HISTORY OF PRESENT ILLNESS
46F with hx of CAD s/p PCIx4 (last 2017), HTN, HLD, T2DM (not on medications), who presents with midsternal chest pain. Patient has been having symptoms for the past few days however chest pain significantly worsened today prompting visit to the ED. Chest pain is pressure-like, radiates to neck, associated with shortness of breath. Of note, she reports she had a stress test with PMD one month ago which was negative. She denies fevers, chills, diaphoresis, abdominal pain, n/v/d, dysuria.     ED: Tm 98.2, HR 70, /86, RR 16, 100% on RA  s/p asa 324mg x1 + Brilinta 180mg x1

## 2022-04-27 NOTE — DISCHARGE NOTE PROVIDER - NSDCFUSCHEDAPPT_GEN_ALL_CORE_FT
Abdoul Lam Physician [facility]  Cardio [phone]  Scheduled Appointment: 05/04/2022     Alexander Abreu  [facility] Physician [facility]  CT Surg [address]  Scheduled Appointment: 05/02/2022    Abdoul Lam  [facility] Physician [facility]  Cardio [phone]  Scheduled Appointment: 05/04/2022

## 2022-04-27 NOTE — ED PROVIDER NOTE - PHYSICAL EXAMINATION
ATTENDING PHYSICAL EXAM  GEN - NAD; well appearing; A+O x3, [id number].  O2 100%RA  HEAD - NC/AT; EYES/NOSE - PERRL, EOMI, mucous membranes moist, no discharge; THROAT: Oral cavity and pharynx normal. No inflammation, swelling, exudate, or lesions  NECK: Neck supple, no JVD appreciated but diff to assess due to girth.  PULMONARY - CTA b/l, symmetric breath sounds, no w/r/r  CARDIAC -s1s2, RRR, no M,R,G  ABDOMEN - +NABS, obese, NT, soft, no guarding, no rebound, no masses   BACK - no CVA tenderness, No vertebral or paravertebral tenderness  EXTREMITIES - symmetric pulses, 2+ dp, capillary refill < 2 seconds, no clubbing, no cyanosis, no edema  SKIN - no rash or bruising   NEUROLOGIC - alert, CN 2-12 intact, no focal deficits

## 2022-04-27 NOTE — DISCHARGE NOTE PROVIDER - NSDCMRMEDTOKEN_GEN_ALL_CORE_FT
aspirin 81 mg oral tablet: 1 tab(s) orally once a day  atorvastatin 40 mg oral tablet: 1 tab(s) orally once a day  Lasix 20 mg oral tablet: 1 tab(s) orally once a day  losartan 25 mg oral tablet: 1 tab(s) orally once a day  metoprolol tartrate 50 mg oral tablet: 1 tab(s) orally 2 times a day   aspirin 81 mg oral delayed release tablet: 1 tab(s) orally once a day  aspirin 81 mg oral tablet: 1 tab(s) orally once a day  atorvastatin 40 mg oral tablet: 1 tab(s) orally once a day  atorvastatin 40 mg oral tablet: 1 tab(s) orally once a day (at bedtime)  furosemide 20 mg oral tablet: 1 tab(s) orally once a day  Lasix 20 mg oral tablet: 1 tab(s) orally once a day  losartan 25 mg oral tablet: 1 tab(s) orally once a day  losartan 25 mg oral tablet: 1 tab(s) orally once a day  metoprolol tartrate 50 mg oral tablet: 1 tab(s) orally 2 times a day  metoprolol tartrate 50 mg oral tablet: 1 tab(s) orally 2 times a day   aspirin 81 mg oral delayed release tablet: 1 tab(s) orally once a day  atorvastatin 40 mg oral tablet: 1 tab(s) orally once a day (at bedtime)  furosemide 20 mg oral tablet: 1 tab(s) orally once a day  heparin 100 units/mL-D5% intravenous solution: 1000 unit(s) intravenous at 10 units/hr   for 48 hrs Goal pTT= 53-73  Notify a provider if PTT &lt;40 or &gt;93    Follow full anticoagulation nomogram on sunrise for dosing adjustments  insulin lispro 100 units/mL injectable solution: injectable every 6 hours. Follow sliding scale  protocol listed below    1 Unit(s) if Glucose 151 - 200  2 Unit(s) if Glucose 201 - 250  3 Unit(s) if Glucose 251 - 300  4 Unit(s) if Glucose 301 - 350  5 Unit(s) if Glucose 351 - 400  6 Unit(s) if Glucose Greater Than 400  losartan 25 mg oral tablet: 1 tab(s) orally once a day  metoprolol tartrate 50 mg oral tablet: 1 tab(s) orally 2 times a day

## 2022-04-27 NOTE — H&P ADULT - ASSESSMENT
46F with hx of CAD s/p PCIx4 (last 2017), HTN, HLD, T2DM (not on medications), who presents with midsternal chest pain a/w unstable angina pending cath.

## 2022-04-27 NOTE — H&P ADULT - PROBLEM SELECTOR PLAN 4
Blood glucose on BMP elevated  - not on any medications at home   - check A1C, last in 2020 6.5%  - FS/SSI qac/hs, [id number] while NPO

## 2022-04-27 NOTE — DISCHARGE NOTE PROVIDER - NSDCFUADDAPPT_GEN_ALL_CORE_FT
Please follow up with the cardiologist within 1 week after your operation     Please follow up with your PCP within 1-2 weeks of discharge

## 2022-04-27 NOTE — ED PROVIDER NOTE - ATTENDING APP SHARED VISIT CONTRIBUTION OF CARE
I personally saw the patient with the PA and performed a substantive portion of the visit including all aspects of the medical decision making.

## 2022-04-27 NOTE — CONSULT NOTE ADULT - SUBJECTIVE AND OBJECTIVE BOX
Chief Complaint:     Briefly, 46 year old woman well known to me with h/o CAD and multiple PCI in the past here with angina.    HPI: 46 year old woman with HTN, DM, HLD with PCI in the past here for 3 weeks CP. The pain initially started after coughing, but then changed in nature. It now occurs while walking, relieved with rest and s/l NTG. Today, her sx worsened and did not response to meds.     PMH:   HTN (hypertension)    LVH (left ventricular hypertrophy)    HLD (hyperlipidemia)    Coronary artery disease    Hypothyroid    Obesity      PSH:   No significant past surgical history    H/O total hysterectomy    S/P coronary artery stent placement      Medications:     Allergies:  codeine (Unknown)    FAMILY HISTORY:  Family history of coronary artery disease younger than 40 years of age (Sibling)      Social History:  Smoking:  Alcohol:  Drugs:    Review of Systems: Per HPI o/w all negative  Constitutional: [ ] Fever [ ] Chills [ ] Fatigue [ ] Weight change   HEENT: [ ] Blurred vision [ ] Eye Pain [ ] Headache [ ] Runny nose [ ] Sore Throat   Respiratory: [ x] Cough [ ] Wheezing [ ] Shortness of breath  Cardiovascular: [x ] Chest Pain [ ] Palpitations [ ] DANIEL [ ] PND [ ] Orthopnea  Gastrointestinal: [ ] Abdominal Pain [ ] Diarrhea [ ] Constipation [ ] Hemorrhoids [ ] Nausea [ ] Vomiting  Genitourinary: [ ] Nocturia [ ] Dysuria [ ] Incontinence  Extremities: [ ] Swelling [ ] Joint Pain  Neurologic: [ ] Focal deficit [ ] Paresthesias [ ] Syncope  Lymphatic: [ ] Swelling [ ] Lymphadenopathy   Skin: [ ] Rash [ ] Ecchymoses [ ] Wounds [ ] Lesions  Psychiatry: [ ] Depression [ ] Suicidal/Homicidal Ideation [ ] Anxiety [ ] Sleep Disturbances  [ ] 10 point review of systems is otherwise negative except as mentioned above            [ ]Unable to obtain    Physical Exam:  T(C): 36.8 (04-27-22 @ 10:24), Max: 36.8 (04-27-22 @ 10:24)  HR: 70 (04-27-22 @ 11:43) (70 - 76)  BP: 146/86 (04-27-22 @ 11:43) (146/86 - 152/80)  RR: 16 (04-27-22 @ 11:43) (16 - 18)  SpO2: 100% (04-27-22 @ 11:43) (99% - 100%)  Wt(kg): --    Daily Height in cm: 152.4 (27 Apr 2022 10:24)    Daily     Appearance: [ ] Normal [ ] NAD  Eyes: [ ] PERRL [ ] EOMI  HENT: [ ] Normal oral [place] [ ]NC/AT  Cardiovascular: [x ] S1 x[ ] S2 [ x] RRR [ x] No m/r/g [ ]No edema [ ] JVP  Procedural Access Site: [ ] No hematoma [ ] Non-tender to palpation [ ] 2+ pulse [ ] No bruit [ ] No Ecchymosis  Respiratory: [ x] Clear to auscultation bilaterally  Gastrointestinal: [ ] Soft [ ] Non-tender [ ] Non-distended [ ] BS+  Musculoskeletal: [ ] No clubbing [ ] No joint deformity   Neurologic: [ ] Non-focal  Lymphatic: [ ] No lymphadenopathy  Psychiatry: [x ] AAOx3 [ ] Mood & affect appropriate  Skin: [ ] No rashes [ ] No ecchymoses [ ] No cyanosis    Cardiovascular Diagnostic Testing:  ECG: NSR NSST    Echo:    Stress Testing:    Cath:    Interpretation of Telemetry:    Imaging:    Labs:                        12.4   9.49  )-----------( 223      ( 27 Apr 2022 11:54 )             38.9     04-27    137  |  104  |  14  ----------------------------<  286<H>  4.3   |  22  |  0.77    Ca    8.5      27 Apr 2022 11:54    TPro  6.5  /  Alb  3.6  /  TBili  0.2  /  DBili  x   /  AST  32  /  ALT  25  /  AlkPhos  107  04-27    PT/INR - ( 27 Apr 2022 11:54 )   PT: 13.1 sec;   INR: 1.13 ratio         PTT - ( 27 Apr 2022 11:54 )  PTT:28.6 sec

## 2022-04-28 LAB
A1C WITH ESTIMATED AVERAGE GLUCOSE RESULT: 7.3 % — HIGH (ref 4–5.6)
ANION GAP SERPL CALC-SCNC: 16 MMOL/L — HIGH (ref 7–14)
APTT BLD: 107 SEC — HIGH (ref 27–36.3)
APTT BLD: 32.3 SEC — SIGNIFICANT CHANGE UP (ref 27–36.3)
BUN SERPL-MCNC: 14 MG/DL — SIGNIFICANT CHANGE UP (ref 7–23)
CALCIUM SERPL-MCNC: 8.6 MG/DL — SIGNIFICANT CHANGE UP (ref 8.4–10.5)
CHLORIDE SERPL-SCNC: 104 MMOL/L — SIGNIFICANT CHANGE UP (ref 98–107)
CHOLEST SERPL-MCNC: 189 MG/DL — SIGNIFICANT CHANGE UP
CO2 SERPL-SCNC: 19 MMOL/L — LOW (ref 22–31)
CREAT SERPL-MCNC: 0.64 MG/DL — SIGNIFICANT CHANGE UP (ref 0.5–1.3)
EGFR: 110 ML/MIN/1.73M2 — SIGNIFICANT CHANGE UP
ESTIMATED AVERAGE GLUCOSE: 163 — SIGNIFICANT CHANGE UP
GLUCOSE BLDC GLUCOMTR-MCNC: 150 MG/DL — HIGH (ref 70–99)
GLUCOSE BLDC GLUCOMTR-MCNC: 160 MG/DL — HIGH (ref 70–99)
GLUCOSE BLDC GLUCOMTR-MCNC: 167 MG/DL — HIGH (ref 70–99)
GLUCOSE BLDC GLUCOMTR-MCNC: 169 MG/DL — HIGH (ref 70–99)
GLUCOSE SERPL-MCNC: 132 MG/DL — HIGH (ref 70–99)
HDLC SERPL-MCNC: 42 MG/DL — LOW
LIPID PNL WITH DIRECT LDL SERPL: 101 MG/DL — HIGH
MAGNESIUM SERPL-MCNC: 1.9 MG/DL — SIGNIFICANT CHANGE UP (ref 1.6–2.6)
NON HDL CHOLESTEROL: 147 MG/DL — HIGH
PHOSPHATE SERPL-MCNC: 3 MG/DL — SIGNIFICANT CHANGE UP (ref 2.5–4.5)
POTASSIUM SERPL-MCNC: 3.8 MMOL/L — SIGNIFICANT CHANGE UP (ref 3.5–5.3)
POTASSIUM SERPL-SCNC: 3.8 MMOL/L — SIGNIFICANT CHANGE UP (ref 3.5–5.3)
SODIUM SERPL-SCNC: 139 MMOL/L — SIGNIFICANT CHANGE UP (ref 135–145)
TRIGL SERPL-MCNC: 230 MG/DL — HIGH
TSH SERPL-MCNC: 2.15 UIU/ML — SIGNIFICANT CHANGE UP (ref 0.27–4.2)

## 2022-04-28 PROCEDURE — 93306 TTE W/DOPPLER COMPLETE: CPT | Mod: 26

## 2022-04-28 PROCEDURE — 99232 SBSQ HOSP IP/OBS MODERATE 35: CPT

## 2022-04-28 RX ORDER — HEPARIN SODIUM 5000 [USP'U]/ML
INJECTION INTRAVENOUS; SUBCUTANEOUS
Qty: 25000 | Refills: 0 | Status: DISCONTINUED | OUTPATIENT
Start: 2022-04-28 | End: 2022-04-29

## 2022-04-28 RX ORDER — ATORVASTATIN CALCIUM 80 MG/1
1 TABLET, FILM COATED ORAL
Qty: 0 | Refills: 0 | DISCHARGE

## 2022-04-28 RX ORDER — HEPARIN SODIUM 5000 [USP'U]/ML
5600 INJECTION INTRAVENOUS; SUBCUTANEOUS EVERY 6 HOURS
Refills: 0 | Status: DISCONTINUED | OUTPATIENT
Start: 2022-04-28 | End: 2022-04-29

## 2022-04-28 RX ORDER — ASPIRIN/CALCIUM CARB/MAGNESIUM 324 MG
1 TABLET ORAL
Qty: 0 | Refills: 0 | DISCHARGE
Start: 2022-04-28

## 2022-04-28 RX ORDER — INSULIN LISPRO 100/ML
1 VIAL (ML) SUBCUTANEOUS
Qty: 0 | Refills: 0 | DISCHARGE
Start: 2022-04-28

## 2022-04-28 RX ORDER — METOPROLOL TARTRATE 50 MG
1 TABLET ORAL
Qty: 0 | Refills: 0 | DISCHARGE
Start: 2022-04-28

## 2022-04-28 RX ORDER — ATORVASTATIN CALCIUM 80 MG/1
1 TABLET, FILM COATED ORAL
Qty: 0 | Refills: 0 | DISCHARGE
Start: 2022-04-28

## 2022-04-28 RX ORDER — LOSARTAN POTASSIUM 100 MG/1
1 TABLET, FILM COATED ORAL
Qty: 0 | Refills: 0 | DISCHARGE
Start: 2022-04-28

## 2022-04-28 RX ORDER — LOSARTAN POTASSIUM 100 MG/1
1 TABLET, FILM COATED ORAL
Qty: 0 | Refills: 0 | DISCHARGE

## 2022-04-28 RX ORDER — FUROSEMIDE 40 MG
1 TABLET ORAL
Qty: 0 | Refills: 0 | DISCHARGE

## 2022-04-28 RX ORDER — ASPIRIN/CALCIUM CARB/MAGNESIUM 324 MG
1 TABLET ORAL
Qty: 0 | Refills: 0 | DISCHARGE

## 2022-04-28 RX ORDER — FUROSEMIDE 40 MG
1 TABLET ORAL
Qty: 0 | Refills: 0 | DISCHARGE
Start: 2022-04-28

## 2022-04-28 RX ORDER — HEPARIN SODIUM 5000 [USP'U]/ML
1000 INJECTION INTRAVENOUS; SUBCUTANEOUS
Qty: 0 | Refills: 0 | DISCHARGE
Start: 2022-04-28

## 2022-04-28 RX ADMIN — Medication 1: at 11:58

## 2022-04-28 RX ADMIN — HEPARIN SODIUM 0 UNIT(S)/HR: 5000 INJECTION INTRAVENOUS; SUBCUTANEOUS at 19:54

## 2022-04-28 RX ADMIN — HEPARIN SODIUM 5600 UNIT(S): 5000 INJECTION INTRAVENOUS; SUBCUTANEOUS at 12:38

## 2022-04-28 RX ADMIN — HEPARIN SODIUM 1000 UNIT(S)/HR: 5000 INJECTION INTRAVENOUS; SUBCUTANEOUS at 01:51

## 2022-04-28 RX ADMIN — Medication 20 MILLIGRAM(S): at 06:11

## 2022-04-28 RX ADMIN — Medication 50 MILLIGRAM(S): at 06:12

## 2022-04-28 RX ADMIN — Medication 81 MILLIGRAM(S): at 11:31

## 2022-04-28 RX ADMIN — Medication 1: at 18:02

## 2022-04-28 RX ADMIN — HEPARIN SODIUM 1300 UNIT(S)/HR: 5000 INJECTION INTRAVENOUS; SUBCUTANEOUS at 12:34

## 2022-04-28 RX ADMIN — Medication 1: at 23:53

## 2022-04-28 RX ADMIN — ATORVASTATIN CALCIUM 40 MILLIGRAM(S): 80 TABLET, FILM COATED ORAL at 21:23

## 2022-04-28 RX ADMIN — Medication 50 MILLIGRAM(S): at 18:02

## 2022-04-28 RX ADMIN — LOSARTAN POTASSIUM 25 MILLIGRAM(S): 100 TABLET, FILM COATED ORAL at 06:12

## 2022-04-28 NOTE — PROGRESS NOTE ADULT - SUBJECTIVE AND OBJECTIVE BOX
Patient seen and examined at bedside.    Overnight Events:     No AEON     Denied any chest pain, SOB, palpitations     Review Of Systems: No chest pain, shortness of breath, or palpitations            Current Meds:  aspirin enteric coated 81 milliGRAM(s) Oral daily  atorvastatin 40 milliGRAM(s) Oral at bedtime  dextrose 50% Injectable 25 Gram(s) IV Push once  furosemide    Tablet 20 milliGRAM(s) Oral daily  heparin   Injectable 5600 Unit(s) IV Push every 6 hours PRN  heparin  Infusion.  Unit(s)/Hr IV Continuous <Continuous>  insulin lispro (ADMELOG) corrective regimen sliding scale   SubCutaneous every 6 hours  losartan 25 milliGRAM(s) Oral daily  metoprolol tartrate 50 milliGRAM(s) Oral two times a day      Vitals:  T(F): 97.3 (), Max: 98.6 ()  HR: 73 () (70 - 83)  BP: 128/85 () (128/85 - 156/80)  RR: 17 ()  SpO2: 100% ()  I&O's Summary      Physical Exam:  Appearance: No acute distress; well appearing  Eyes: PERRL, EOMI, pink conjunctiva  HEENT: Normal oral mucosa  Cardiovascular: RRR, S1, S2, no murmurs, rubs, or gallops; no edema; no JVD  Respiratory: Clear to auscultation bilaterally  Gastrointestinal: soft, non-tender, non-distended with normal bowel sounds  Musculoskeletal: No clubbing; no joint deformity   Neurologic: Non-focal  Lymphatic: No lymphadenopathy  Psychiatry: AAOx3, mood & affect appropriate  Skin: No rashes, ecchymoses, or cyanosis                          12.4   9.49  )-----------( 223      ( 2022 11:54 )             38.9         139  |  104  |  14  ----------------------------<  132<H>  3.8   |  19<L>  |  0.64    Ca    8.6      2022 08:10  Phos  3.0       Mg     1.90         TPro  6.5  /  Alb  3.6  /  TBili  0.2  /  DBili  x   /  AST  32  /  ALT  25  /  AlkPhos  107  04-27    PT/INR - ( 2022 11:54 )   PT: 13.1 sec;   INR: 1.13 ratio         PTT - ( 2022 08:26 )  PTT:32.3 sec  CARDIAC MARKERS ( 2022 11:54 )  <6 ng/L / x     / x     / x     / x     / x            Total Cholesterol: 189  LDL: --  HDL: 42  T        New ECG(s): Personally reviewed    Echo:    Stress Testing:     Cath:    Imaging:    Interpretation of Telemetry:

## 2022-04-28 NOTE — PROGRESS NOTE ADULT - ASSESSMENT
46 year old woman with HTN, DM, HLD, CAD s/p PCI, hypothyroidism who presents with chest pain. Found to have TVD on cath now awaiting CABG at [facility]     #Chest pain   #TVD   #CAD   Cath showed TVD, patient currently asymptomatic   -Can continue heparin gtt for 48 hours   -Aspirin 81mg daily  -Hold P2Y12 inhibitors   -Atorvastatin   -Metoprolol   -Losartan  46 year old woman with HTN, DM, HLD, CAD s/p PCI, hypothyroidism who presents with chest pain. Found to have TVD on cath now awaiting CABG at [facility]     #Chest pain   #TVD   #CAD   Cath showed TVD, patient currently asymptomatic   -F/u echo  -Can continue heparin gtt for 48 hours   -Aspirin 81mg daily  -Hold P2Y12 inhibitors   -Atorvastatin   -Metoprolol   -Losartan  46 year old woman with HTN, DM, HLD, CAD s/p PCI, hypothyroidism who presents with chest pain. Found to have TVD on cath now awaiting CABG at [facility]     #Chest pain   #TVD   #CAD   Cath showed TVD, patient currently asymptomatic   -F/u echo  -Can stop heparin gtt at this time   -Aspirin 81mg daily  -Hold P2Y12 inhibitors   -Atorvastatin   -Metoprolol   -Losartan

## 2022-04-28 NOTE — CHART NOTE - NSCHARTNOTEFT_GEN_A_CORE
Pt is s/p cath w/ radial access.   Patient denies pain, numbness, tingling, CP, SOB.     Vital Signs Last 24 Hrs  T(C): 37 (27 Apr 2022 21:51), Max: 37 (27 Apr 2022 21:51)  T(F): 98.6 (27 Apr 2022 21:51), Max: 98.6 (27 Apr 2022 21:51)  HR: 80 (27 Apr 2022 21:51) (70 - 83)  BP: 139/76 (27 Apr 2022 21:51) (139/76 - 156/80)  RR: 17 (27 Apr 2022 21:51) (16 - 18)  SpO2: 96% (27 Apr 2022 21:51) (96% - 100%)  VSS.     Dressing w/ 1cm dry blood on gauze, contained within previous demarcation. no active bleed.   Site is without hematoma or bleeding.   Pulses palpable, cap refill <3 sec.   Strength, sensation intact in UE b/l   Will restart hep gtt at 1am.     Will continue to monitor. Pt is s/p cath w/ radial access.   Patient denies pain, numbness, tingling, CP, SOB.     Vital Signs Last 24 Hrs  T(C): 37 (27 Apr 2022 21:51), Max: 37 (27 Apr 2022 21:51)  T(F): 98.6 (27 Apr 2022 21:51), Max: 98.6 (27 Apr 2022 21:51)  HR: 80 (27 Apr 2022 21:51) (70 - 83)  BP: 139/76 (27 Apr 2022 21:51) (139/76 - 156/80)  RR: 17 (27 Apr 2022 21:51) (16 - 18)  SpO2: 96% (27 Apr 2022 21:51) (96% - 100%)  VSS.     Dressing w/ 1cm dry blood on gauze, contained within previous demarcation. no active bleed.   Site is without hematoma or bleeding.   Pulses palpable, cap refill <3 sec.   Strength, sensation intact in UE b/l   Will start hep gtt at 1am.     Will continue to monitor.

## 2022-04-29 ENCOUNTER — INPATIENT (INPATIENT)
Facility: HOSPITAL | Age: 47
LOS: 7 days | Discharge: HOME | DRG: 235 | End: 2022-05-07
Attending: THORACIC SURGERY (CARDIOTHORACIC VASCULAR SURGERY) | Admitting: THORACIC SURGERY (CARDIOTHORACIC VASCULAR SURGERY)
Payer: MEDICAID

## 2022-04-29 VITALS
TEMPERATURE: 98 F | SYSTOLIC BLOOD PRESSURE: 141 MMHG | OXYGEN SATURATION: 98 % | DIASTOLIC BLOOD PRESSURE: 89 MMHG | HEART RATE: 82 BPM | RESPIRATION RATE: 18 BRPM

## 2022-04-29 VITALS
OXYGEN SATURATION: 97 % | DIASTOLIC BLOOD PRESSURE: 102 MMHG | RESPIRATION RATE: 20 BRPM | WEIGHT: 209 LBS | HEART RATE: 80 BPM | TEMPERATURE: 98 F | HEIGHT: 60 IN | SYSTOLIC BLOOD PRESSURE: 148 MMHG

## 2022-04-29 DIAGNOSIS — Z90.710 ACQUIRED ABSENCE OF BOTH CERVIX AND UTERUS: Chronic | ICD-10-CM

## 2022-04-29 DIAGNOSIS — Z95.5 PRESENCE OF CORONARY ANGIOPLASTY IMPLANT AND GRAFT: Chronic | ICD-10-CM

## 2022-04-29 DIAGNOSIS — I25.10 ATHEROSCLEROTIC HEART DISEASE OF NATIVE CORONARY ARTERY WITHOUT ANGINA PECTORIS: ICD-10-CM

## 2022-04-29 DIAGNOSIS — I10 ESSENTIAL (PRIMARY) HYPERTENSION: ICD-10-CM

## 2022-04-29 DIAGNOSIS — E66.9 OBESITY, UNSPECIFIED: ICD-10-CM

## 2022-04-29 DIAGNOSIS — E11.9 TYPE 2 DIABETES MELLITUS WITHOUT COMPLICATIONS: ICD-10-CM

## 2022-04-29 DIAGNOSIS — E78.5 HYPERLIPIDEMIA, UNSPECIFIED: ICD-10-CM

## 2022-04-29 LAB
A1C WITH ESTIMATED AVERAGE GLUCOSE RESULT: 7.5 % — HIGH (ref 4–5.6)
ALBUMIN SERPL ELPH-MCNC: 3.8 G/DL — SIGNIFICANT CHANGE UP (ref 3.3–5)
ALP SERPL-CCNC: 109 U/L — SIGNIFICANT CHANGE UP (ref 40–120)
ALT FLD-CCNC: 24 U/L — SIGNIFICANT CHANGE UP (ref 10–45)
ANION GAP SERPL CALC-SCNC: 16 MMOL/L — SIGNIFICANT CHANGE UP (ref 5–17)
APPEARANCE UR: ABNORMAL
APTT BLD: 28.6 SEC — SIGNIFICANT CHANGE UP (ref 27.5–35.5)
AST SERPL-CCNC: 24 U/L — SIGNIFICANT CHANGE UP (ref 10–40)
BACTERIA # UR AUTO: ABNORMAL
BASOPHILS # BLD AUTO: 0.06 K/UL — SIGNIFICANT CHANGE UP (ref 0–0.2)
BASOPHILS NFR BLD AUTO: 0.5 % — SIGNIFICANT CHANGE UP (ref 0–2)
BILIRUB SERPL-MCNC: 0.4 MG/DL — SIGNIFICANT CHANGE UP (ref 0.2–1.2)
BILIRUB UR-MCNC: NEGATIVE — SIGNIFICANT CHANGE UP
BLD GP AB SCN SERPL QL: NEGATIVE — SIGNIFICANT CHANGE UP
BUN SERPL-MCNC: 19 MG/DL — SIGNIFICANT CHANGE UP (ref 7–23)
CALCIUM SERPL-MCNC: 9.2 MG/DL — SIGNIFICANT CHANGE UP (ref 8.4–10.5)
CHLORIDE SERPL-SCNC: 103 MMOL/L — SIGNIFICANT CHANGE UP (ref 96–108)
CO2 SERPL-SCNC: 19 MMOL/L — LOW (ref 22–31)
COLOR SPEC: YELLOW — SIGNIFICANT CHANGE UP
CREAT SERPL-MCNC: 0.68 MG/DL — SIGNIFICANT CHANGE UP (ref 0.5–1.3)
DIFF PNL FLD: NEGATIVE — SIGNIFICANT CHANGE UP
EGFR: 108 ML/MIN/1.73M2 — SIGNIFICANT CHANGE UP
EOSINOPHIL # BLD AUTO: 0.23 K/UL — SIGNIFICANT CHANGE UP (ref 0–0.5)
EOSINOPHIL NFR BLD AUTO: 2.1 % — SIGNIFICANT CHANGE UP (ref 0–6)
EPI CELLS # UR: 1 /HPF — SIGNIFICANT CHANGE UP
ESTIMATED AVERAGE GLUCOSE: 169 MG/DL — HIGH (ref 68–114)
FIBRINOGEN PPP-MCNC: 563 MG/DL — HIGH (ref 330–520)
GLUCOSE BLDC GLUCOMTR-MCNC: 128 MG/DL — HIGH (ref 70–99)
GLUCOSE BLDC GLUCOMTR-MCNC: 132 MG/DL — HIGH (ref 70–99)
GLUCOSE BLDC GLUCOMTR-MCNC: 144 MG/DL — HIGH (ref 70–99)
GLUCOSE BLDC GLUCOMTR-MCNC: 145 MG/DL — HIGH (ref 70–99)
GLUCOSE SERPL-MCNC: 162 MG/DL — HIGH (ref 70–99)
GLUCOSE UR QL: NEGATIVE — SIGNIFICANT CHANGE UP
HCT VFR BLD CALC: 40.1 % — SIGNIFICANT CHANGE UP (ref 34.5–45)
HGB BLD-MCNC: 12.8 G/DL — SIGNIFICANT CHANGE UP (ref 11.5–15.5)
HYALINE CASTS # UR AUTO: 5 /LPF — HIGH (ref 0–2)
IMM GRANULOCYTES NFR BLD AUTO: 0.5 % — SIGNIFICANT CHANGE UP (ref 0–1.5)
INR BLD: 1.18 RATIO — HIGH (ref 0.88–1.16)
KETONES UR-MCNC: NEGATIVE — SIGNIFICANT CHANGE UP
LEUKOCYTE ESTERASE UR-ACNC: ABNORMAL
LYMPHOCYTES # BLD AUTO: 28.9 % — SIGNIFICANT CHANGE UP (ref 13–44)
LYMPHOCYTES # BLD AUTO: 3.21 K/UL — SIGNIFICANT CHANGE UP (ref 1–3.3)
MCHC RBC-ENTMCNC: 30 PG — SIGNIFICANT CHANGE UP (ref 27–34)
MCHC RBC-ENTMCNC: 31.9 GM/DL — LOW (ref 32–36)
MCV RBC AUTO: 94.1 FL — SIGNIFICANT CHANGE UP (ref 80–100)
MONOCYTES # BLD AUTO: 0.85 K/UL — SIGNIFICANT CHANGE UP (ref 0–0.9)
MONOCYTES NFR BLD AUTO: 7.7 % — SIGNIFICANT CHANGE UP (ref 2–14)
MRSA PCR RESULT.: SIGNIFICANT CHANGE UP
NEUTROPHILS # BLD AUTO: 6.71 K/UL — SIGNIFICANT CHANGE UP (ref 1.8–7.4)
NEUTROPHILS NFR BLD AUTO: 60.3 % — SIGNIFICANT CHANGE UP (ref 43–77)
NITRITE UR-MCNC: POSITIVE
NRBC # BLD: 0 /100 WBCS — SIGNIFICANT CHANGE UP (ref 0–0)
NT-PROBNP SERPL-SCNC: 239 PG/ML — SIGNIFICANT CHANGE UP (ref 0–300)
PA ADP PRP-ACNC: 107 PRU — LOW (ref 194–417)
PH UR: 6 — SIGNIFICANT CHANGE UP (ref 5–8)
PLATELET # BLD AUTO: 249 K/UL — SIGNIFICANT CHANGE UP (ref 150–400)
POTASSIUM SERPL-MCNC: 3.9 MMOL/L — SIGNIFICANT CHANGE UP (ref 3.5–5.3)
POTASSIUM SERPL-SCNC: 3.9 MMOL/L — SIGNIFICANT CHANGE UP (ref 3.5–5.3)
PROT SERPL-MCNC: 7.1 G/DL — SIGNIFICANT CHANGE UP (ref 6–8.3)
PROT UR-MCNC: SIGNIFICANT CHANGE UP
PROTHROM AB SERPL-ACNC: 13.6 SEC — HIGH (ref 10.5–13.4)
RBC # BLD: 4.26 M/UL — SIGNIFICANT CHANGE UP (ref 3.8–5.2)
RBC # FLD: 13 % — SIGNIFICANT CHANGE UP (ref 10.3–14.5)
RBC CASTS # UR COMP ASSIST: 1 /HPF — SIGNIFICANT CHANGE UP (ref 0–4)
RH IG SCN BLD-IMP: POSITIVE — SIGNIFICANT CHANGE UP
S AUREUS DNA NOSE QL NAA+PROBE: SIGNIFICANT CHANGE UP
SODIUM SERPL-SCNC: 138 MMOL/L — SIGNIFICANT CHANGE UP (ref 135–145)
SP GR SPEC: 1.02 — SIGNIFICANT CHANGE UP (ref 1.01–1.02)
T4 FREE SERPL-MCNC: 1.2 NG/DL — SIGNIFICANT CHANGE UP (ref 0.9–1.8)
TSH SERPL-MCNC: 2.94 UIU/ML — SIGNIFICANT CHANGE UP (ref 0.27–4.2)
UROBILINOGEN FLD QL: NEGATIVE — SIGNIFICANT CHANGE UP
WBC # BLD: 11.11 K/UL — HIGH (ref 3.8–10.5)
WBC # FLD AUTO: 11.11 K/UL — HIGH (ref 3.8–10.5)
WBC UR QL: 21 /HPF — HIGH (ref 0–5)

## 2022-04-29 PROCEDURE — 76536 US EXAM OF HEAD AND NECK: CPT | Mod: 26

## 2022-04-29 PROCEDURE — 99223 1ST HOSP IP/OBS HIGH 75: CPT

## 2022-04-29 PROCEDURE — 93880 EXTRACRANIAL BILAT STUDY: CPT | Mod: 26

## 2022-04-29 PROCEDURE — 71045 X-RAY EXAM CHEST 1 VIEW: CPT | Mod: 26

## 2022-04-29 PROCEDURE — 93010 ELECTROCARDIOGRAM REPORT: CPT

## 2022-04-29 PROCEDURE — 99231 SBSQ HOSP IP/OBS SF/LOW 25: CPT

## 2022-04-29 PROCEDURE — 94010 BREATHING CAPACITY TEST: CPT | Mod: 26

## 2022-04-29 PROCEDURE — ZZZZZ: CPT

## 2022-04-29 RX ORDER — ASPIRIN/CALCIUM CARB/MAGNESIUM 324 MG
81 TABLET ORAL DAILY
Refills: 0 | Status: DISCONTINUED | OUTPATIENT
Start: 2022-04-29 | End: 2022-05-02

## 2022-04-29 RX ORDER — CIPROFLOXACIN LACTATE 400MG/40ML
500 VIAL (ML) INTRAVENOUS EVERY 12 HOURS
Refills: 0 | Status: COMPLETED | OUTPATIENT
Start: 2022-04-29 | End: 2022-05-02

## 2022-04-29 RX ORDER — ATORVASTATIN CALCIUM 80 MG/1
80 TABLET, FILM COATED ORAL AT BEDTIME
Refills: 0 | Status: DISCONTINUED | OUTPATIENT
Start: 2022-04-29 | End: 2022-05-02

## 2022-04-29 RX ORDER — INSULIN LISPRO 100/ML
VIAL (ML) SUBCUTANEOUS
Refills: 0 | Status: DISCONTINUED | OUTPATIENT
Start: 2022-04-29 | End: 2022-05-02

## 2022-04-29 RX ORDER — METOPROLOL TARTRATE 50 MG
25 TABLET ORAL EVERY 12 HOURS
Refills: 0 | Status: DISCONTINUED | OUTPATIENT
Start: 2022-04-29 | End: 2022-04-29

## 2022-04-29 RX ORDER — METOPROLOL TARTRATE 50 MG
50 TABLET ORAL EVERY 12 HOURS
Refills: 0 | Status: DISCONTINUED | OUTPATIENT
Start: 2022-04-29 | End: 2022-05-02

## 2022-04-29 RX ORDER — FUROSEMIDE 40 MG
20 TABLET ORAL DAILY
Refills: 0 | Status: DISCONTINUED | OUTPATIENT
Start: 2022-04-29 | End: 2022-05-02

## 2022-04-29 RX ORDER — ASPIRIN/CALCIUM CARB/MAGNESIUM 324 MG
81 TABLET ORAL DAILY
Refills: 0 | Status: DISCONTINUED | OUTPATIENT
Start: 2022-04-29 | End: 2022-04-29

## 2022-04-29 RX ORDER — SODIUM CHLORIDE 9 MG/ML
3 INJECTION INTRAMUSCULAR; INTRAVENOUS; SUBCUTANEOUS EVERY 8 HOURS
Refills: 0 | Status: DISCONTINUED | OUTPATIENT
Start: 2022-04-29 | End: 2022-05-02

## 2022-04-29 RX ADMIN — SODIUM CHLORIDE 3 MILLILITER(S): 9 INJECTION INTRAMUSCULAR; INTRAVENOUS; SUBCUTANEOUS at 05:40

## 2022-04-29 RX ADMIN — Medication 50 MILLIGRAM(S): at 17:57

## 2022-04-29 RX ADMIN — ATORVASTATIN CALCIUM 80 MILLIGRAM(S): 80 TABLET, FILM COATED ORAL at 21:28

## 2022-04-29 RX ADMIN — Medication 81 MILLIGRAM(S): at 11:42

## 2022-04-29 RX ADMIN — Medication 500 MILLIGRAM(S): at 14:15

## 2022-04-29 RX ADMIN — SODIUM CHLORIDE 3 MILLILITER(S): 9 INJECTION INTRAMUSCULAR; INTRAVENOUS; SUBCUTANEOUS at 14:12

## 2022-04-29 RX ADMIN — SODIUM CHLORIDE 3 MILLILITER(S): 9 INJECTION INTRAMUSCULAR; INTRAVENOUS; SUBCUTANEOUS at 21:48

## 2022-04-29 RX ADMIN — Medication 500 MILLIGRAM(S): at 21:27

## 2022-04-29 RX ADMIN — Medication 50 MILLIGRAM(S): at 05:56

## 2022-04-29 RX ADMIN — Medication 20 MILLIGRAM(S): at 05:56

## 2022-04-29 NOTE — PATIENT PROFILE ADULT - FALL HARM RISK - UNIVERSAL INTERVENTIONS
Bed in lowest position, wheels locked, appropriate side rails in place/Call bell, personal items and telephone in reach/Instruct patient to call for assistance before getting out of bed or chair/Non-slip footwear when patient is out of bed/[place] to call system/Physically safe environment - no spills, clutter or unnecessary equipment/Purposeful Proactive Rounding/Room/bathroom lighting operational, light cord in reach

## 2022-04-29 NOTE — PROGRESS NOTE ADULT - ASSESSMENT
46F with hx of CAD s/p PCIx4 (last 2017), HTN, HLD, T2DM (not on medications), who presents with midsternal chest pain. Patient has been having symptoms for the past few days however chest pain significantly worsened today prompting visit to the ED. Chest pain is pressure-like, radiates to neck, associated with shortness of breath. Of note, she reports she had a stress test with PMD one month ago which was negative. She denies fevers, chills, diaphoresis, abdominal pain, n/v/d, dysuria. Pt s/p cardiac catherization at [facility] with evidence of triple vessel disease. Pt transferred to [facility] for cardiac surgery evaluation with Dr. Abreu. Pt scheduled for CABG with Dr. Abreu on Monday, 05/02.  4/29  endo cslt for hyperglycemia,  lop 50 q12

## 2022-04-29 NOTE — H&P ADULT - NSHPLABSRESULTS_GEN_ALL_CORE
< from: Transthoracic Echocardiogram (04.28.22 @ 09:16) >    CONCLUSIONS:  1. Normal trileaflet aortic valve.  2. Normal left ventricular internal dimensions and wall  thicknesses.  3. Low normal left ventricular systolic function. No  segmental wall motion abnormalities.  4. Normal right ventricular size and function.

## 2022-04-29 NOTE — H&P ADULT - NSHPREVIEWOFSYSTEMS_GEN_ALL_CORE
REVIEW OF SYSTEMS:  CONSTITUTIONAL: No fever, weight loss, or fatigue  EYES: No eye pain, visual disturbances, or discharge  ENMT:  No difficulty hearing, tinnitus, vertigo; No sinus or throat pain  NECK: No pain or stiffness  RESPIRATORY: No non/productive cough, No wheezing, chills or hemoptysis; No shortness of breath  CARDIOVASCULAR: No chest pain, No palpitations, dizziness, or leg swelling  GASTROINTESTINAL: No abdominal or epigastric pain. No nausea, vomiting, or hematemesis; No diarrhea ,has No melena  GENITOURINARY: No dysuria, frequency, hematuria, or incontinence  NEUROLOGICAL: No headaches, memory loss, loss of strength, numbness, or tremors  SKIN: No itching, burning, rashes, or lesions   LYMPH NODES: No enlarged glands  ENDOCRINE: No heat or cold intolerance; No hair loss  MUSCULOSKELETAL: No joint pain or swelling; No muscle, back, or extremity pain  PSYCHIATRIC: No depression, anxiety, mood swings, or difficulty sleeping  HEME/LYMPH: No easy bruising, or bleeding gums  ALLERGY: No hives or eczema

## 2022-04-29 NOTE — PATIENT PROFILE ADULT - NSPRESCRALCFREQ_GEN_A_NUR
Subjective:    Patient ID:  Rome Garrett Jr. is a 68 y.o. male who presents for Hypertension; Valvular Heart Disease; and Hyperlipidemia        HPI  DISCUSSED LABS AND GOALS, CMP OK,  LDL 67, HDL 57, NO CP, NO SOB, BP LOG OK, SEE ROS    Past Medical History:   Diagnosis Date    Cancer     SKIN/L EAR    Coronary artery disease     Heart murmur     Hyperlipidemia     Hypertension     Mitral valve disorder     Mitral valve insufficiency and aortic valve insufficiency     Valvular regurgitation      Past Surgical History:   Procedure Laterality Date    VASECTOMY       Family History   Problem Relation Age of Onset    Brain cancer Mother     Leukemia Father     Testicular cancer Brother      Social History     Social History    Marital status:      Spouse name: N/A    Number of children: N/A    Years of education: N/A     Social History Main Topics    Smoking status: Never Smoker    Smokeless tobacco: Never Used    Alcohol use Yes      Comment: socially    Drug use: No    Sexual activity: Not on file     Other Topics Concern    Not on file     Social History Narrative    No narrative on file       Review of patient's allergies indicates:  No Known Allergies    Current Outpatient Prescriptions:     aspirin (ECOTRIN) 81 MG EC tablet, Take 81 mg by mouth every other day., Disp: , Rfl:     coenzyme Q10 (COQ-10) 100 mg capsule, , Disp: , Rfl:     flaxseed oil 1,000 mg Cap, 2 capsules 2 (two) times daily. , Disp: , Rfl:     mometasone (NASONEX) 50 mcg/actuation nasal spray, USE 2 SPRAYS IEN BID, Disp: , Rfl: 0    rosuvastatin (CRESTOR) 20 MG tablet, Take 1 tablet (20 mg total) by mouth once daily., Disp: 30 tablet, Rfl: 3    valsartan-hydrochlorothiazide (DIOVAN-HCT) 160-12.5 mg per tablet, TK 1/2 T PO QD IN THE MORNING, Disp: 45 tablet, Rfl: 1    Review of Systems   Constitution: Negative for chills, diaphoresis, fever, weakness, malaise/fatigue and night sweats.   HENT: Negative for  "congestion and nosebleeds.    Eyes: Negative for double vision and visual disturbance.   Cardiovascular: Negative for chest pain, claudication, cyanosis, dyspnea on exertion, irregular heartbeat, leg swelling, near-syncope, orthopnea, palpitations, paroxysmal nocturnal dyspnea and syncope.   Respiratory: Negative for cough, hemoptysis and shortness of breath.    Endocrine: Negative for cold intolerance and polyuria.   Hematologic/Lymphatic: Negative for adenopathy and bleeding problem. Does not bruise/bleed easily.   Skin: Negative for color change, itching and rash.   Musculoskeletal: Negative for back pain, falls and joint pain (HIPS, MILD).   Gastrointestinal: Negative for abdominal pain, change in bowel habit, dysphagia, heartburn, hematemesis, jaundice and vomiting.   Genitourinary: Negative for dysuria, flank pain and frequency.        ED   Neurological: Negative for brief paralysis, dizziness, focal weakness, light-headedness, loss of balance, numbness and tremors.   Psychiatric/Behavioral: Negative for altered mental status and memory loss.   Allergic/Immunologic: Negative for hives and persistent infections.        Objective:      Vitals:    06/18/18 1324   BP: 138/75   Pulse: (!) 54   Weight: 82.3 kg (181 lb 7 oz)   Height: 5' 6" (1.676 m)   PainSc: 0-No pain     Body mass index is 29.28 kg/m².    Physical Exam   Constitutional: He is oriented to person, place, and time. He appears well-developed and well-nourished. He is active.   HENT:   Head: Normocephalic and atraumatic.   Mouth/Throat: Oropharynx is clear and moist and mucous membranes are normal.   Eyes: Conjunctivae and EOM are normal. Pupils are equal, round, and reactive to light.   Neck: Normal range of motion. Neck supple. Normal carotid pulses, no hepatojugular reflux and no JVD present. Carotid bruit is not present. No tracheal deviation, no edema and no erythema present. No thyromegaly present.   Cardiovascular: Normal rate and regular " rhythm.   No extrasystoles are present. PMI is not displaced.  Exam reveals no gallop, no distant heart sounds, no friction rub and no midsystolic click.    Murmur heard.   Medium-pitched holosystolic murmur is present with a grade of 2/6  at the apex  High-pitched decrescendo early diastolic murmur is present with a grade of 1/6  at the upper right sternal border radiating to the apex  Pulses:       Carotid pulses are 2+ on the right side, and 2+ on the left side.       Radial pulses are 2+ on the right side, and 2+ on the left side.        Femoral pulses are 2+ on the right side, and 2+ on the left side.       Posterior tibial pulses are 2+ on the right side, and 2+ on the left side.   Pulmonary/Chest: Effort normal and breath sounds normal. No accessory muscle usage. No tachypnea and no bradypnea. No respiratory distress.   Abdominal: Soft. Bowel sounds are normal. He exhibits no distension. There is no hepatosplenomegaly. There is no tenderness. There is no CVA tenderness.   Musculoskeletal: Normal range of motion. He exhibits no edema or deformity.   Lymphadenopathy:     He has no cervical adenopathy.   Neurological: He is alert and oriented to person, place, and time. He has normal strength. He displays no tremor. No cranial nerve deficit (mildly [place]).   Skin: Skin is warm and dry. No cyanosis or erythema. No pallor.   Psychiatric: He has a normal mood and affect. His speech is normal and behavior is normal.               ..    Chemistry        Component Value Date/Time     06/11/2018 0926    K 4.3 06/11/2018 0926     06/11/2018 0926    CO2 28 06/11/2018 0926    BUN 18 06/11/2018 0926    CREATININE 1.1 06/11/2018 0926     06/11/2018 0926        Component Value Date/Time    CALCIUM 9.8 06/11/2018 0926    ALKPHOS 30 (L) 06/11/2018 0926    AST 23 06/11/2018 0926    ALT 19 06/11/2018 0926    BILITOT 0.6 06/11/2018 0926    ESTGFRAFRICA >60.0 06/11/2018 0926    EGFRNONAA >60.0 06/11/2018 0926             ..  Lab Results   Component Value Date    CHOL 140 06/11/2018    CHOL 194 02/05/2018    CHOL 183 12/05/2017     Lab Results   Component Value Date    HDL 54 06/11/2018    HDL 60 02/05/2018    HDL 52 12/05/2017     Lab Results   Component Value Date    LDLCALC 67.2 06/11/2018    LDLCALC 115.6 02/05/2018    LDLCALC 106 (H) 12/05/2017     Lab Results   Component Value Date    TRIG 94 06/11/2018    TRIG 92 02/05/2018    TRIG 139 12/05/2017     Lab Results   Component Value Date    CHOLHDL 38.6 06/11/2018    CHOLHDL 30.9 02/05/2018    CHOLHDL 3.5 12/05/2017     ..No results found for: WBC, HGB, HCT, MCV, PLT    Test(s) Reviewed  I have reviewed the following in detail:  [] Stress test   [] Angiography   [] Echocardiogram   [x] Labs   [] Other:       Assessment:         ICD-10-CM ICD-9-CM   1. Coronary artery disease involving native coronary artery of native heart without angina pectoris I25.10 414.01   2. Essential hypertension I10 401.9   3. Mitral and aortic regurgitation I08.0 396.3   4. Hypercholesterolemia E78.00 272.0   5. Bradycardia R00.1 427.89     Problem List Items Addressed This Visit        Cardiac/Vascular    Mitral and aortic regurgitation    Coronary artery disease involving native coronary artery of native heart without angina pectoris - Primary    Essential hypertension    Hypercholesterolemia    Bradycardia           Plan:         OK FOR VIAGRA, ALL CV CLINICALLY STABLE, NO ANGINA, NO HF, NO TIA, NO CLINICAL ARRHYTHMIA,CONTINUE CURRENT MEDS, EDUCATION, DIET, EXERCISE, RTC IN 6-7 MO WITH CMP  Coronary artery disease involving native coronary artery of native heart without angina pectoris    Essential hypertension    Mitral and aortic regurgitation    Hypercholesterolemia    Bradycardia    RTC Low level/low impact aerobic exercise 5x's/wk. Heart healthy diet and risk factor modification.    See labs and med orders.    Aerobic exercise 5x's/wk. Heart healthy diet and risk factor modification.     See labs and med orders.         Never

## 2022-04-29 NOTE — H&P ADULT - ASSESSMENT
46F with hx of CAD s/p PCIx4 (last 2017), HTN, HLD, T2DM (not on medications), who presents with midsternal chest pain. Pt s/p cardiac catherization at [facility] with evidence of triple vessel disease. Pt transferred to [facility] for cardiac surgery evaluation with Dr. Abreu. Pt scheduled for CABG with Dr. Abreu on Monday, 05/02. Labs and imaging to be reviewed with Dr. Abreu.    4/29 preop w/u in progress, Carotids, PFTs, TFTs, f/u P2Y12, scheduled for CABG on Monday 5/2.

## 2022-04-29 NOTE — H&P ADULT - NSHPSOCIALHISTORY_GEN_ALL_CORE
Marital Status:   Occupation: / at [employer]  Lives with: /son/mother    Substance Use : denies  Tobacco Usage:  (  x ) never smoked   (   ) former smoker   (   ) current smoker  (     ) pack year  (        ) last tobacco use date  Alcohol Usage: denies

## 2022-04-29 NOTE — H&P ADULT - PROBLEM SELECTOR PLAN 1
Cath Report Pre-Marrero: Triple Vessel disease  c/w ASA 81, Atorvastatin 80, Lopressor 50 BID  Preop w/u in progress- Carotids, PFTs, TFTs, preop labs  f/u P2Y12 Brilinta loaded in ED  OR Date: Monday, 5/2  Labs and imaging reviewed with Dr. Abreu

## 2022-04-29 NOTE — H&P ADULT - HISTORY OF PRESENT ILLNESS
46F with hx of CAD s/p PCIx4 (last 2017), HTN, HLD, T2DM (not on medications), who presents with midsternal chest pain. Patient has been having symptoms for the past few days however chest pain significantly worsened today prompting visit to the ED. Chest pain is pressure-like, radiates to neck, associated with shortness of breath. Of note, she reports she had a stress test with PMD one month ago which was negative. She denies fevers, chills, diaphoresis, abdominal pain, n/v/d, dysuria. Pt s/p cardiac catherization at [facility] with evidence of triple vessel disease. Pt transferred to [facility] for cardiac surgery evaluation with Dr. Abreu. Pt scheduled for CABG with Dr. Abreu on Monday, 05/02.

## 2022-04-29 NOTE — H&P ADULT - NSHPPHYSICALEXAM_GEN_ALL_CORE
General: NAD, well appearing female sitting in bed  HEENT:  NC/AT  Neuro: A&Ox4, gait steady, speech clear, no focal deficits noted  Respiratory: B/L BS CTA, no wheeze, no rhonchi, no crackles noted  Cardiovascular: RRR, normal S1S2, no murmur noted  GI: Abd soft, NT/ND, +BSx4Q +BM  Peripheral Vascular:  B/L LE neg edema, 2+ peripheral pulses, prominent anterior leg veins, no clubbing, cyanosis, varicosities/PVD noted  Musculoskeletal: B/L UE and LE 5/5 strength, groins stable   Psychiatric: Normal mood, normal affect observed  Skin: Normal exam to inspection and palpation. no bleeding, no hematoma. Right upper arm ecchymosis no hematoma/edema, right radial cath site CDI no bleeding noted

## 2022-04-29 NOTE — H&P ADULT - NS ATTEND AMEND GEN_ALL_CORE FT
pt seen and examined.  Severe TVD S/P PCI  NL EF.  for CABG  Targets LAD, OM, PDA.  STS risk 1-2%.  Risksbenefits d/w pt.  Agree to proceed.  Follow P2Y 12 level

## 2022-04-29 NOTE — CONSULT NOTE ADULT - SUBJECTIVE AND OBJECTIVE BOX
HPI:  46F with hx of CAD s/p PCIx4 (last 2017), HTN, HLD, T2DM (not on medications), who presents with midsternal chest pain. Patient has been having symptoms for the past few days however chest pain significantly worsened today prompting visit to the ED. Chest pain is pressure-like, radiates to neck, associated with shortness of breath. Of note, she reports she had a stress test with PMD one month ago which was negative. She denies fevers, chills, diaphoresis, abdominal pain, n/v/d, dysuria. Pt s/p cardiac catherization at [facility] with evidence of triple vessel disease. Pt transferred to [facility] for cardiac surgery evaluation with Dr. Abreu. Pt scheduled for CABG with Dr. Abreu on Monday, 05/02. (29 Apr 2022 01:21)  Patient with newly dx diabetes, A1C 7.5%, was not taking any hypoglycemic agents, no polyuria polydipsia. Patient follows up with PCP for health management.  Endo was consulted for glycemic control.    PAST MEDICAL & SURGICAL HISTORY:  HTN (hypertension)    HLD (hyperlipidemia)    Coronary artery disease  s/p MIGUEL ANGEL x 3 (9/2019), MIGUEL ANGEL x1 (2014)    Hypothyroid  Resolved    Obesity    H/O total hysterectomy  2017    S/P coronary artery stent placement  mLAD x1 MIGUEL ANGEL (2014),  3 stents 09/2019        FAMILY HISTORY:  Family history of coronary artery disease younger than 40 years of age (Sibling)        Social History:    Outpatient Medications:    MEDICATIONS  (STANDING):  aspirin enteric coated 81 milliGRAM(s) Oral daily  atorvastatin 80 milliGRAM(s) Oral at bedtime  ciprofloxacin     Tablet 500 milliGRAM(s) Oral every 12 hours  furosemide    Tablet 20 milliGRAM(s) Oral daily  insulin lispro (ADMELOG) corrective regimen sliding scale   SubCutaneous three times a day before meals  metoprolol tartrate 50 milliGRAM(s) Oral every 12 hours  sodium chloride 0.9% lock flush 3 milliLiter(s) IV Push every 8 hours    MEDICATIONS  (PRN):      Allergies    codeine (Unknown)    Intolerances      Review of Systems:  Constitutional: No fever, no chills  Eyes: No blurry vision  Neuro: No tremors  HEENT: No pain, no neck swelling  Cardiovascular: No chest pain, no palpitations  Respiratory: Has SOB, no cough  GI: No nausea, vomiting, abdominal pain  : No dysuria  Skin: no rash  MSK: Has leg swelling.  Psych: no depression  Endocrine: no polyuria, polydipsia    ALL OTHER SYSTEMS REVIEWED AND NEGATIVE    UNABLE TO OBTAIN    PHYSICAL EXAM:  VITALS: T(C): 36.9 (04-29-22 @ 11:28)  T(F): 98.4 (04-29-22 @ 11:28), Max: 98.4 (04-28-22 @ 23:29)  HR: 68 (04-29-22 @ 11:28) (68 - 82)  BP: 137/88 (04-29-22 @ 11:28) (133/87 - 148/102)  RR:  (18 - 20)  SpO2:  (96% - 98%)  Wt(kg): --  GENERAL: NAD, well-groomed, well-developed  EYES: No proptosis, no lid lag  HEENT:  Atraumatic, Normocephalic  THYROID: Normal size, no palpable nodules  RESPIRATORY: Clear to auscultation bilaterally; No rales, rhonchi, wheezing  CARDIOVASCULAR: Si S2, No murmurs;  GI: Soft, non distended, normal bowel sounds  SKIN: Dry, intact, No rashes or lesions  MUSCULOSKELETAL: Has BL lower extremity edema.  NEURO:  no tremor, sensation decreased in feet BL,    POCT Blood Glucose.: 144 mg/dL (04-29-22 @ 11:26)  POCT Blood Glucose.: 145 mg/dL (04-29-22 @ 07:45)  POCT Blood Glucose.: 167 mg/dL (04-28-22 @ 23:13)  POCT Blood Glucose.: 169 mg/dL (04-28-22 @ 17:08)  POCT Blood Glucose.: 160 mg/dL (04-28-22 @ 11:38)  POCT Blood Glucose.: 150 mg/dL (04-28-22 @ 07:49)  POCT Blood Glucose.: 127 mg/dL (04-27-22 @ 23:46)  POCT Blood Glucose.: 143 mg/dL (04-27-22 @ 21:20)                            12.8   11.11 )-----------( 249      ( 29 Apr 2022 02:03 )             40.1       04-29    138  |  103  |  19  ----------------------------<  162<H>  3.9   |  19<L>  |  0.68    EGFR if : x   EGFR if non : x     Ca    9.2      04-29  Mg     1.90     04-28  Phos  3.0     04-28    TPro  7.1  /  Alb  3.8  /  TBili  0.4  /  DBili  x   /  AST  24  /  ALT  24  /  AlkPhos  109  04-29      Thyroid Function Tests:  04-29 @ 08:33 TSH 2.94 FreeT4 1.2 T3 -- Anti TPO -- Anti Thyroglobulin Ab -- TSI --  04-28 @ 08:10 TSH 2.15 FreeT4 -- T3 -- Anti TPO -- Anti Thyroglobulin Ab -- TSI --          04-28 Chol 189 Direct LDL -- LDL calculated 101<H> HDL 42<L> Trig 230<H>    Radiology:                    HPI:  46F with hx of CAD s/p PCIx4 (last 2017), HTN, HLD, T2DM (not on medications), who presents with midsternal chest pain. Patient has been having  symptoms for the past few days however chest pain significantly worsened today prompting visit to the ED. Chest pain is pressure-like, radiates to neck, associated with shortness of breath. Of note, she reports she had a stress test with PMD one month ago which was negative. She denies fevers, chills, diaphoresis, abdominal pain, n/v/d, dysuria. Pt s/p cardiac catherization at [facility] with evidence of triple vessel disease. Pt transferred to [facility] for cardiac surgery evaluation with Dr. Abreu. Pt scheduled for CABG with Dr. Abreu on Monday, 05/02. (29 Apr 2022 01:21)  Patient with newly dx diabetes, A1C 7.5%, was not taking any hypoglycemic agents, no polyuria polydipsia. Patient follows up with PCP for health management.  Endo was consulted for glycemic control.    PAST MEDICAL & SURGICAL HISTORY:  HTN (hypertension)    HLD (hyperlipidemia)    Coronary artery disease  s/p MIGUEL ANGEL x 3 (9/2019), MIGUEL ANGEL x1 (2014)    Hypothyroid  Resolved    Obesity    H/O total hysterectomy  2017    S/P coronary artery stent placement  mLAD x1 MIGUEL ANGEL (2014),  3 stents 09/2019        FAMILY HISTORY:  Family history of coronary artery disease younger than 40 years of age (Sibling)        Social History:    Outpatient Medications:    MEDICATIONS  (STANDING):  aspirin enteric coated 81 milliGRAM(s) Oral daily  atorvastatin 80 milliGRAM(s) Oral at bedtime  ciprofloxacin     Tablet 500 milliGRAM(s) Oral every 12 hours  furosemide    Tablet 20 milliGRAM(s) Oral daily  insulin lispro (ADMELOG) corrective regimen sliding scale   SubCutaneous three times a day before meals  metoprolol tartrate 50 milliGRAM(s) Oral every 12 hours  sodium chloride 0.9% lock flush 3 milliLiter(s) IV Push every 8 hours    MEDICATIONS  (PRN):      Allergies    codeine (Unknown)    Intolerances      Review of Systems:  Constitutional: No fever, no chills  Eyes: No blurry vision  Neuro: No tremors  HEENT: No pain, no neck swelling  Cardiovascular: No chest pain, no palpitations  Respiratory: Has SOB, no cough  GI: No nausea, vomiting, abdominal pain  : No dysuria  Skin: no rash  MSK: Has leg swelling.  Psych: no depression  Endocrine: no polyuria, polydipsia    ALL OTHER SYSTEMS REVIEWED AND NEGATIVE    UNABLE TO OBTAIN    PHYSICAL EXAM:  VITALS: T(C): 36.9 (04-29-22 @ 11:28)  T(F): 98.4 (04-29-22 @ 11:28), Max: 98.4 (04-28-22 @ 23:29)  HR: 68 (04-29-22 @ 11:28) (68 - 82)  BP: 137/88 (04-29-22 @ 11:28) (133/87 - 148/102)  RR:  (18 - 20)  SpO2:  (96% - 98%)  Wt(kg): --  GENERAL: NAD, well-groomed, well-developed  EYES: No proptosis, no lid lag  HEENT:  Atraumatic, Normocephalic  THYROID: Normal size, no palpable nodules  RESPIRATORY: Clear to auscultation bilaterally; No rales, rhonchi, wheezing  CARDIOVASCULAR: Si S2, No murmurs;  GI: Soft, non distended, normal bowel sounds  SKIN: Dry, intact, No rashes or lesions  MUSCULOSKELETAL: Has BL lower extremity edema.  NEURO:  no tremor, sensation decreased in feet BL,    POCT Blood Glucose.: 144 mg/dL (04-29-22 @ 11:26)  POCT Blood Glucose.: 145 mg/dL (04-29-22 @ 07:45)  POCT Blood Glucose.: 167 mg/dL (04-28-22 @ 23:13)  POCT Blood Glucose.: 169 mg/dL (04-28-22 @ 17:08)  POCT Blood Glucose.: 160 mg/dL (04-28-22 @ 11:38)  POCT Blood Glucose.: 150 mg/dL (04-28-22 @ 07:49)  POCT Blood Glucose.: 127 mg/dL (04-27-22 @ 23:46)  POCT Blood Glucose.: 143 mg/dL (04-27-22 @ 21:20)                            12.8   11.11 )-----------( 249      ( 29 Apr 2022 02:03 )             40.1       04-29    138  |  103  |  19  ----------------------------<  162<H>  3.9   |  19<L>  |  0.68    EGFR if : x   EGFR if non : x     Ca    9.2      04-29  Mg     1.90     04-28  Phos  3.0     04-28    TPro  7.1  /  Alb  3.8  /  TBili  0.4  /  DBili  x   /  AST  24  /  ALT  24  /  AlkPhos  109  04-29      Thyroid Function Tests:  04-29 @ 08:33 TSH 2.94 FreeT4 1.2 T3 -- Anti TPO -- Anti Thyroglobulin Ab -- TSI --  04-28 @ 08:10 TSH 2.15 FreeT4 -- T3 -- Anti TPO -- Anti Thyroglobulin Ab -- TSI --          04-28 Chol 189 Direct LDL -- LDL calculated 101<H> HDL 42<L> Trig 230<H>    Radiology:

## 2022-04-29 NOTE — CONSULT NOTE ADULT - ASSESSMENT
Assessment  DMT2: 46y Female with newly dx DM with hyperglycemia, A1C 7.5%, was not taking any hypoglycemic agents,  now on insulin coverage, blood sugars trending within overall acceptable range, no hypoglycemias, NAD.  ?Goiter: no known history, has visible neck lump, euthyroid.  CAD: on medications, no chest pain, stable, monitored.  HTN: Controlled,  on antihypertensive medications.  HLD: On statin  Obesity: No strict exercise routines, not on any weight loss program, neither on low calorie diet.          Harjeet Varghese MD  Cell: [phone]  Office: [phone]               Assessment  DMT2: 46y Female with newly dx DM with hyperglycemia, A1C 7.5%, was not taking any hypoglycemic agents,  now on insulin coverage, blood sugars trending  within overall acceptable range, no hypoglycemias, NAD.  ?Goiter: no known history, has visible neck lump, euthyroid.  CAD: on medications, no chest pain, stable, monitored.  HTN: Controlled,  on antihypertensive medications.  HLD: On statin  Obesity: No strict exercise routines, not on any weight loss program, neither on low calorie diet.          Harjeet Varghese MD  Cell: [phone]  Office: [phone]

## 2022-04-29 NOTE — PROGRESS NOTE ADULT - SUBJECTIVE AND OBJECTIVE BOX
VITAL SIGNS    Telemetry:      Vital Signs Last 24 Hrs  T(C): 36.9 (22 @ 05:00), Max: 36.9 (22 @ 23:29)  T(F): 98.4 (22 @ 05:00), Max: 98.4 (22 @ 23:29)  HR: 77 (22 @ 05:00) (74 - 82)  BP: 133/87 (22 @ 05:00) (131/85 - 148/102)  RR: 18 (22 @ 05:00) (18 - 20)  SpO2: 96% (22 @ 05:00) (96% - 98%)                   Daily Height in cm: 152.4 (2022 00:59)    Daily Weight in k.8 (2022 00:59)      Bilirubin Total, Serum: 0.4 mg/dL ( @ 02:03)    CAPILLARY BLOOD GLUCOSE      POCT Blood Glucose.: 167 mg/dL (2022 23:13)  POCT Blood Glucose.: 169 mg/dL (2022 17:08)  POCT Blood Glucose.: 160 mg/dL (2022 11:38)  POCT Blood Glucose.: 150 mg/dL (2022 07:49)                          PHYSICAL EXAM  s"  Neurology: alert and oriented x 3, moves all extremities with no defecits  CV :  RRR  Lungs:   CTA B/L  Abdomen: soft, nontender, nondistended, positive bowel sounds, last bowel movement   Extremities:                                            VITAL SIGNS    Telemetry:    sr  70    Vital Signs Last 24 Hrs  T(C): 36.9 (22 @ 05:00), Max: 36.9 (22 @ 23:29)  T(F): 98.4 (22 @ 05:00), Max: 98.4 (22 @ 23:29)  HR: 77 (22 @ 05:00) (74 - 82)  BP: 133/87 (22 @ 05:00) (131/85 - 148/102)  RR: 18 (22 @ 05:00) (18 - 20)  SpO2: 96% (22 @ 05:00) (96% - 98%)                   Daily Height in cm: 152.4 (2022 00:59)    Daily Weight in k.8 (2022 00:59)      Bilirubin Total, Serum: 0.4 mg/dL ( @ 02:03)    CAPILLARY BLOOD GLUCOSE      POCT Blood Glucose.: 167 mg/dL (2022 23:13)  POCT Blood Glucose.: 169 mg/dL (2022 17:08)  POCT Blood Glucose.: 160 mg/dL (2022 11:38)  POCT Blood Glucose.: 150 mg/dL (2022 07:49)                          PHYSICAL EXAM  s"no sob  no chest pain"  Neurology: alert and oriented x 3, moves all extremities with no defecits  CV :  RRR  Lungs:   CTA B/L  Abdomen: soft, nontender, nondistended, positive bowel sounds,   Extremities:       no edema  no calf tenderness

## 2022-04-30 LAB
ANION GAP SERPL CALC-SCNC: 16 MMOL/L — SIGNIFICANT CHANGE UP (ref 5–17)
BUN SERPL-MCNC: 22 MG/DL — SIGNIFICANT CHANGE UP (ref 7–23)
CALCIUM SERPL-MCNC: 9.2 MG/DL — SIGNIFICANT CHANGE UP (ref 8.4–10.5)
CHLORIDE SERPL-SCNC: 103 MMOL/L — SIGNIFICANT CHANGE UP (ref 96–108)
CO2 SERPL-SCNC: 19 MMOL/L — LOW (ref 22–31)
CREAT SERPL-MCNC: 0.74 MG/DL — SIGNIFICANT CHANGE UP (ref 0.5–1.3)
EGFR: 100 ML/MIN/1.73M2 — SIGNIFICANT CHANGE UP
GLUCOSE BLDC GLUCOMTR-MCNC: 137 MG/DL — HIGH (ref 70–99)
GLUCOSE BLDC GLUCOMTR-MCNC: 143 MG/DL — HIGH (ref 70–99)
GLUCOSE BLDC GLUCOMTR-MCNC: 144 MG/DL — HIGH (ref 70–99)
GLUCOSE BLDC GLUCOMTR-MCNC: 154 MG/DL — HIGH (ref 70–99)
GLUCOSE SERPL-MCNC: 147 MG/DL — HIGH (ref 70–99)
HCT VFR BLD CALC: 42.3 % — SIGNIFICANT CHANGE UP (ref 34.5–45)
HGB BLD-MCNC: 13.3 G/DL — SIGNIFICANT CHANGE UP (ref 11.5–15.5)
MCHC RBC-ENTMCNC: 29.7 PG — SIGNIFICANT CHANGE UP (ref 27–34)
MCHC RBC-ENTMCNC: 31.4 GM/DL — LOW (ref 32–36)
MCV RBC AUTO: 94.4 FL — SIGNIFICANT CHANGE UP (ref 80–100)
NRBC # BLD: 0 /100 WBCS — SIGNIFICANT CHANGE UP (ref 0–0)
PLATELET # BLD AUTO: 229 K/UL — SIGNIFICANT CHANGE UP (ref 150–400)
POTASSIUM SERPL-MCNC: 3.8 MMOL/L — SIGNIFICANT CHANGE UP (ref 3.5–5.3)
POTASSIUM SERPL-SCNC: 3.8 MMOL/L — SIGNIFICANT CHANGE UP (ref 3.5–5.3)
RBC # BLD: 4.48 M/UL — SIGNIFICANT CHANGE UP (ref 3.8–5.2)
RBC # FLD: 13.1 % — SIGNIFICANT CHANGE UP (ref 10.3–14.5)
SODIUM SERPL-SCNC: 138 MMOL/L — SIGNIFICANT CHANGE UP (ref 135–145)
WBC # BLD: 11.16 K/UL — HIGH (ref 3.8–10.5)
WBC # FLD AUTO: 11.16 K/UL — HIGH (ref 3.8–10.5)

## 2022-04-30 PROCEDURE — 99231 SBSQ HOSP IP/OBS SF/LOW 25: CPT

## 2022-04-30 RX ADMIN — SODIUM CHLORIDE 3 MILLILITER(S): 9 INJECTION INTRAMUSCULAR; INTRAVENOUS; SUBCUTANEOUS at 05:48

## 2022-04-30 RX ADMIN — Medication 50 MILLIGRAM(S): at 05:06

## 2022-04-30 RX ADMIN — SODIUM CHLORIDE 3 MILLILITER(S): 9 INJECTION INTRAMUSCULAR; INTRAVENOUS; SUBCUTANEOUS at 13:24

## 2022-04-30 RX ADMIN — Medication 500 MILLIGRAM(S): at 05:05

## 2022-04-30 RX ADMIN — Medication 20 MILLIGRAM(S): at 05:06

## 2022-04-30 RX ADMIN — ATORVASTATIN CALCIUM 80 MILLIGRAM(S): 80 TABLET, FILM COATED ORAL at 20:08

## 2022-04-30 RX ADMIN — Medication 50 MILLIGRAM(S): at 17:18

## 2022-04-30 RX ADMIN — SODIUM CHLORIDE 3 MILLILITER(S): 9 INJECTION INTRAMUSCULAR; INTRAVENOUS; SUBCUTANEOUS at 21:09

## 2022-04-30 RX ADMIN — Medication 81 MILLIGRAM(S): at 11:46

## 2022-04-30 RX ADMIN — Medication 500 MILLIGRAM(S): at 17:17

## 2022-04-30 NOTE — PROGRESS NOTE ADULT - SUBJECTIVE AND OBJECTIVE BOX
Chief complaint    Patient is a 46y old  Female who presents with a chief complaint of Triple Vessel Disease (30 Apr 2022 07:26)   Review of systems  Patient in bed, appears comfortable.    Labs and Fingersticks  CAPILLARY BLOOD GLUCOSE      POCT Blood Glucose.: 144 mg/dL (30 Apr 2022 11:44)  POCT Blood Glucose.: 137 mg/dL (30 Apr 2022 07:50)  POCT Blood Glucose.: 132 mg/dL (29 Apr 2022 21:36)  POCT Blood Glucose.: 128 mg/dL (29 Apr 2022 17:43)      Anion Gap, Serum: 16 (04-30 @ 06:30)  Anion Gap, Serum: 16 (04-29 @ 02:03)      Calcium, Total Serum: 9.2 (04-30 @ 06:30)  Calcium, Total Serum: 9.2 (04-29 @ 02:03)  Albumin, Serum: 3.8 (04-29 @ 02:03)    Alanine Aminotransferase (ALT/SGPT): 24 (04-29 @ 02:03)  Alkaline Phosphatase, Serum: 109 (04-29 @ 02:03)  Aspartate Aminotransferase (AST/SGOT): 24 (04-29 @ 02:03)        04-30    138  |  103  |  22  ----------------------------<  147<H>  3.8   |  19<L>  |  0.74    Ca    9.2      30 Apr 2022 06:30    TPro  7.1  /  Alb  3.8  /  TBili  0.4  /  DBili  x   /  AST  24  /  ALT  24  /  AlkPhos  109  04-29                        13.3   11.16 )-----------( 229      ( 30 Apr 2022 06:30 )             42.3     Medications  MEDICATIONS  (STANDING):  aspirin enteric coated 81 milliGRAM(s) Oral daily  atorvastatin 80 milliGRAM(s) Oral at bedtime  ciprofloxacin     Tablet 500 milliGRAM(s) Oral every 12 hours  furosemide    Tablet 20 milliGRAM(s) Oral daily  insulin lispro (ADMELOG) corrective regimen sliding scale   SubCutaneous three times a day before meals  metoprolol tartrate 50 milliGRAM(s) Oral every 12 hours  sodium chloride 0.9% lock flush 3 milliLiter(s) IV Push every 8 hours      Physical Exam  General: Patient comfortable in bed  Vital Signs Last 12 Hrs  T(F): 98 (04-30-22 @ 12:50), Max: 98 (04-30-22 @ 12:50)  HR: 81 (04-30-22 @ 12:50) (70 - 81)  BP: 140/89 (04-30-22 @ 12:50) (102/62 - 140/89)  BP(mean): 75 (04-30-22 @ 04:42) (75 - 75)  RR: 18 (04-30-22 @ 12:50) (18 - 18)  SpO2: 100% (04-30-22 @ 12:50) (97% - 100%)  Neck: No palpable thyroid nodules.  CVS: S1S2, No murmurs  Respiratory: No wheezing, no crepitations  GI: Abdomen soft, bowel sounds positive  Musculoskeletal:  edema lower extremities.     Diagnostics    US Thyroid + Parathyroid: Routine   Indication: Goiter  Transport: Stretcher-Crib  Exam Completed  Provider's Contact #: [phone] (04-29 @ 12:24)

## 2022-04-30 NOTE — PROGRESS NOTE ADULT - ASSESSMENT
Assessment  DMT2: 46y Female with newly dx DM with hyperglycemia, A1C 7.5%, was not taking any hypoglycemic agents,  now on insulin coverage, blood sugars improving no hypoglycemias, NAD.  ?Goiter: no known history, has visible neck lump, euthyroid, US thyroid pending.  CAD: on medications, no chest pain, stable, monitored.  HTN: Controlled,  on antihypertensive medications.  HLD: On statin  Obesity: No strict exercise routines, not on any weight loss program, neither on low calorie diet.          Harjeet Varghese MD  Cell: [phone]  Office: [phone]

## 2022-04-30 NOTE — PROGRESS NOTE ADULT - ASSESSMENT
46F with hx of CAD s/p PCIx4 (last 2017), HTN, HLD, T2DM (not on medications), who presents with midsternal chest pain. Patient has been having symptoms for the past few days however chest pain significantly worsened today prompting visit to the ED. Chest pain is pressure-like, radiates to neck, associated with shortness of breath. Of note, she reports she had a stress test with PMD one month ago which was negative. She denies fevers, chills, diaphoresis, abdominal pain, n/v/d, dysuria. Pt s/p cardiac catherization at [facility] with evidence of triple vessel disease. Pt transferred to [facility] for cardiac surgery evaluation with Dr. Abreu. Pt scheduled for CABG with Dr. Abreu on Monday, 05/02.  4/29  endo cslt for hyperglycemia,  lop 50 q12 46F with hx of CAD s/p PCIx4 (last 2017), HTN, HLD, T2DM (not on medications), who presents with midsternal chest pain. Patient has been having symptoms for the past few days however chest pain significantly worsened today prompting visit to the ED. Chest pain is pressure-like, radiates to neck, associated with shortness of breath. Of note, she reports she had a stress test with PMD one month ago which was negative. She denies fevers, chills, diaphoresis, abdominal pain, n/v/d, dysuria. Pt s/p cardiac catherization at [facility] with evidence of triple vessel disease. Pt transferred to [facility] for cardiac surgery evaluation with Dr. Abreu. Pt scheduled for CABG with Dr. Abreu on Monday, 05/02.  4/29  endo cslt for hyperglycemia,  lop 50 q12  4/30    ck thyroid US   being followed by endo for hyperglycemia and thyroid goiter

## 2022-04-30 NOTE — PROGRESS NOTE ADULT - SUBJECTIVE AND OBJECTIVE BOX
VITAL SIGNS    Telemetry:      Vital Signs Last 24 Hrs  T(C): 36.5 (22 @ 04:42), Max: 36.9 (22 @ 11:28)  T(F): 97.7 (22 @ 04:42), Max: 98.5 (22 @ 20:47)  HR: 70 (22 @ 04:42) (68 - 81)  BP: 102/62 (22 @ 04:42) (102/62 - 137/88)  RR: 18 (22 @ 04:42) (18 - 18)  SpO2: 97% (22 @ 04:42) (95% - 97%)                   Daily     Daily Weight in k.4 (2022 10:30)        CAPILLARY BLOOD GLUCOSE      POCT Blood Glucose.: 132 mg/dL (2022 21:36)  POCT Blood Glucose.: 128 mg/dL (2022 17:43)  POCT Blood Glucose.: 144 mg/dL (2022 11:26)  POCT Blood Glucose.: 145 mg/dL (2022 07:45)          Drains:     MS         [  ] Drainage:                 L Pleural  [  ]  Drainage:                R Pleural  [  ]  Drainage:    Pacing Wires        [  ]   Settings:                                  Isolated  [  ]    Coumadin    [ ] YES          [  ]      NO         Reason:                         PHYSICAL EXAM    Neurology: alert and oriented x 3, moves all extremities with no defecits  CV :  RRR  Sternal Wound :  CDI , Stable  Lungs:   CTA B/L  Abdomen: soft, nontender, nondistended, positive bowel sounds, last bowel movement   Extremities:                                            VITAL SIGNS    Telemetry:     sr 60    Vital Signs Last 24 Hrs  T(C): 36.5 (22 @ 04:42), Max: 36.9 (22 @ 11:28)  T(F): 97.7 (22 @ 04:42), Max: 98.5 (22 @ 20:47)  HR: 70 (22 @ 04:42) (68 - 81)  BP: 102/62 (22 @ 04:42) (102/62 - 137/88)  RR: 18 (22 @ 04:42) (18 - 18)  SpO2: 97% (22 @ 04:42) (95% - 97%)                   Daily     Daily Weight in k.4 (2022 10:30)        CAPILLARY BLOOD GLUCOSE      POCT Blood Glucose.: 132 mg/dL (2022 21:36)  POCT Blood Glucose.: 128 mg/dL (2022 17:43)  POCT Blood Glucose.: 144 mg/dL (2022 11:26)  POCT Blood Glucose.: 145 mg/dL (2022 07:45)                                PHYSICAL EXAM  s  No sob  no chest pian"  Neurology: alert and oriented x 3, moves all extremities with no defecits  CV :  RRR  Sternal Wound :  CDI , Stable  Lungs:   CTA B/L  Abdomen: soft, nontender, nondistended, positive bowel sounds,  Extremities  :       no edema

## 2022-05-01 ENCOUNTER — TRANSCRIPTION ENCOUNTER (OUTPATIENT)
Age: 47
End: 2022-05-01

## 2022-05-01 LAB
ANION GAP SERPL CALC-SCNC: 15 MMOL/L — SIGNIFICANT CHANGE UP (ref 5–17)
BLD GP AB SCN SERPL QL: NEGATIVE — SIGNIFICANT CHANGE UP
BUN SERPL-MCNC: 26 MG/DL — HIGH (ref 7–23)
CALCIUM SERPL-MCNC: 9.4 MG/DL — SIGNIFICANT CHANGE UP (ref 8.4–10.5)
CHLORIDE SERPL-SCNC: 106 MMOL/L — SIGNIFICANT CHANGE UP (ref 96–108)
CO2 SERPL-SCNC: 19 MMOL/L — LOW (ref 22–31)
CREAT SERPL-MCNC: 0.78 MG/DL — SIGNIFICANT CHANGE UP (ref 0.5–1.3)
EGFR: 94 ML/MIN/1.73M2 — SIGNIFICANT CHANGE UP
GLUCOSE BLDC GLUCOMTR-MCNC: 144 MG/DL — HIGH (ref 70–99)
GLUCOSE BLDC GLUCOMTR-MCNC: 153 MG/DL — HIGH (ref 70–99)
GLUCOSE BLDC GLUCOMTR-MCNC: 157 MG/DL — HIGH (ref 70–99)
GLUCOSE BLDC GLUCOMTR-MCNC: 188 MG/DL — HIGH (ref 70–99)
GLUCOSE SERPL-MCNC: 156 MG/DL — HIGH (ref 70–99)
HCT VFR BLD CALC: 41.9 % — SIGNIFICANT CHANGE UP (ref 34.5–45)
HGB BLD-MCNC: 13.5 G/DL — SIGNIFICANT CHANGE UP (ref 11.5–15.5)
MCHC RBC-ENTMCNC: 30.3 PG — SIGNIFICANT CHANGE UP (ref 27–34)
MCHC RBC-ENTMCNC: 32.2 GM/DL — SIGNIFICANT CHANGE UP (ref 32–36)
MCV RBC AUTO: 94.2 FL — SIGNIFICANT CHANGE UP (ref 80–100)
NRBC # BLD: 0 /100 WBCS — SIGNIFICANT CHANGE UP (ref 0–0)
PA ADP PRP-ACNC: 360 PRU — SIGNIFICANT CHANGE UP (ref 194–417)
PLATELET # BLD AUTO: 253 K/UL — SIGNIFICANT CHANGE UP (ref 150–400)
POTASSIUM SERPL-MCNC: 4.1 MMOL/L — SIGNIFICANT CHANGE UP (ref 3.5–5.3)
POTASSIUM SERPL-SCNC: 4.1 MMOL/L — SIGNIFICANT CHANGE UP (ref 3.5–5.3)
RBC # BLD: 4.45 M/UL — SIGNIFICANT CHANGE UP (ref 3.8–5.2)
RBC # FLD: 13 % — SIGNIFICANT CHANGE UP (ref 10.3–14.5)
RH IG SCN BLD-IMP: POSITIVE — SIGNIFICANT CHANGE UP
SARS-COV-2 RNA SPEC QL NAA+PROBE: SIGNIFICANT CHANGE UP
SODIUM SERPL-SCNC: 140 MMOL/L — SIGNIFICANT CHANGE UP (ref 135–145)
WBC # BLD: 13.05 K/UL — HIGH (ref 3.8–10.5)
WBC # FLD AUTO: 13.05 K/UL — HIGH (ref 3.8–10.5)

## 2022-05-01 PROCEDURE — 99024 POSTOP FOLLOW-UP VISIT: CPT

## 2022-05-01 RX ORDER — CHLORHEXIDINE GLUCONATE 213 G/1000ML
5 SOLUTION TOPICAL ONCE
Refills: 0 | Status: DISCONTINUED | OUTPATIENT
Start: 2022-05-01 | End: 2022-05-02

## 2022-05-01 RX ORDER — GABAPENTIN 400 MG/1
300 CAPSULE ORAL ONCE
Refills: 0 | Status: COMPLETED | OUTPATIENT
Start: 2022-05-02 | End: 2022-05-02

## 2022-05-01 RX ORDER — ACETAMINOPHEN 500 MG
1000 TABLET ORAL ONCE
Refills: 0 | Status: COMPLETED | OUTPATIENT
Start: 2022-05-02 | End: 2022-05-02

## 2022-05-01 RX ORDER — CHLORHEXIDINE GLUCONATE 213 G/1000ML
1 SOLUTION TOPICAL ONCE
Refills: 0 | Status: COMPLETED | OUTPATIENT
Start: 2022-05-01 | End: 2022-05-01

## 2022-05-01 RX ORDER — ASCORBIC ACID 60 MG
2000 TABLET,CHEWABLE ORAL ONCE
Refills: 0 | Status: COMPLETED | OUTPATIENT
Start: 2022-05-01 | End: 2022-05-01

## 2022-05-01 RX ORDER — CEFUROXIME AXETIL 250 MG
1500 TABLET ORAL ONCE
Refills: 0 | Status: DISCONTINUED | OUTPATIENT
Start: 2022-05-02 | End: 2022-05-02

## 2022-05-01 RX ADMIN — ATORVASTATIN CALCIUM 80 MILLIGRAM(S): 80 TABLET, FILM COATED ORAL at 21:42

## 2022-05-01 RX ADMIN — Medication 2000 MILLIGRAM(S): at 21:42

## 2022-05-01 RX ADMIN — Medication 500 MILLIGRAM(S): at 17:29

## 2022-05-01 RX ADMIN — Medication 50 MILLIGRAM(S): at 17:29

## 2022-05-01 RX ADMIN — Medication 81 MILLIGRAM(S): at 11:59

## 2022-05-01 RX ADMIN — SODIUM CHLORIDE 3 MILLILITER(S): 9 INJECTION INTRAMUSCULAR; INTRAVENOUS; SUBCUTANEOUS at 05:39

## 2022-05-01 RX ADMIN — Medication 50 MILLIGRAM(S): at 05:32

## 2022-05-01 RX ADMIN — SODIUM CHLORIDE 3 MILLILITER(S): 9 INJECTION INTRAMUSCULAR; INTRAVENOUS; SUBCUTANEOUS at 22:02

## 2022-05-01 RX ADMIN — CHLORHEXIDINE GLUCONATE 1 APPLICATION(S): 213 SOLUTION TOPICAL at 21:20

## 2022-05-01 RX ADMIN — Medication 20 MILLIGRAM(S): at 05:32

## 2022-05-01 RX ADMIN — Medication 1: at 16:43

## 2022-05-01 RX ADMIN — SODIUM CHLORIDE 3 MILLILITER(S): 9 INJECTION INTRAMUSCULAR; INTRAVENOUS; SUBCUTANEOUS at 14:06

## 2022-05-01 RX ADMIN — Medication 500 MILLIGRAM(S): at 05:32

## 2022-05-01 RX ADMIN — Medication 1: at 08:08

## 2022-05-01 NOTE — PROGRESS NOTE ADULT - SUBJECTIVE AND OBJECTIVE BOX
Chief complaint  Patient is a 46y old  Female who presents with a chief complaint of Triple Vessel Disease (30 Apr 2022 13:27)   Review of systems  Patient in bed, looks comfortable, no hypoglycemic episodes.    Labs and Fingersticks  CAPILLARY BLOOD GLUCOSE      POCT Blood Glucose.: 144 mg/dL (01 May 2022 11:23)  POCT Blood Glucose.: 153 mg/dL (01 May 2022 07:57)  POCT Blood Glucose.: 154 mg/dL (30 Apr 2022 21:07)  POCT Blood Glucose.: 143 mg/dL (30 Apr 2022 16:26)      Anion Gap, Serum: 15 (05-01 @ 05:48)  Anion Gap, Serum: 16 (04-30 @ 06:30)      Calcium, Total Serum: 9.4 (05-01 @ 05:48)  Calcium, Total Serum: 9.2 (04-30 @ 06:30)          05-01    140  |  106  |  26<H>  ----------------------------<  156<H>  4.1   |  19<L>  |  0.78    Ca    9.4      01 May 2022 05:48                          13.5   13.05 )-----------( 253      ( 01 May 2022 05:48 )             41.9     Medications  MEDICATIONS  (STANDING):  ascorbic acid 2000 milliGRAM(s) Oral once  aspirin enteric coated 81 milliGRAM(s) Oral daily  atorvastatin 80 milliGRAM(s) Oral at bedtime  chlorhexidine 0.12% Liquid 5 milliLiter(s) Swish and Spit once  chlorhexidine 4% Liquid 1 Application(s) Topical once  ciprofloxacin     Tablet 500 milliGRAM(s) Oral every 12 hours  furosemide    Tablet 20 milliGRAM(s) Oral daily  insulin lispro (ADMELOG) corrective regimen sliding scale   SubCutaneous three times a day before meals  metoprolol tartrate 50 milliGRAM(s) Oral every 12 hours  sodium chloride 0.9% lock flush 3 milliLiter(s) IV Push every 8 hours      Physical Exam  General: Patient comfortable in bed  Vital Signs Last 12 Hrs  T(F): 98.2 (05-01-22 @ 04:47), Max: 98.2 (05-01-22 @ 04:47)  HR: 82 (05-01-22 @ 04:47) (82 - 82)  BP: 133/81 (05-01-22 @ 04:47) (133/81 - 133/81)  BP(mean): 99 (05-01-22 @ 04:47) (99 - 99)  RR: 18 (05-01-22 @ 04:47) (18 - 18)  SpO2: 99% (05-01-22 @ 04:47) (99% - 99%)  Neck: No palpable thyroid nodules.  CVS: S1S2, No murmurs  Respiratory: No wheezing, no crepitations  GI: Abdomen soft, bowel sounds positive  Musculoskeletal:  edema lower extremities.   Skin: No skin rashes, no ecchymosis    Diagnostics           Chief complaint  Patient is a 46y old  Female who presents with a chief complaint of Triple Vessel Disease (30 Apr 2022 13:27)   Review of systems  Patient in bed, looks comfortable, no hypoglycemic episodes.    Labs and Fingersticks  CAPILLARY BLOOD GLUCOSE      POCT Blood Glucose.: 144 mg/dL (01 May 2022 11:23)  POCT Blood Glucose.: 153 mg/dL (01 May 2022 07:57)  POCT Blood Glucose.: 154 mg/dL (30 Apr 2022 21:07)  POCT Blood Glucose.: 143 mg/dL (30 Apr 2022 16:26)      Anion Gap, Serum: 15 (05-01 @ 05:48)  Anion Gap, Serum: 16 (04-30 @ 06:30)      Calcium, Total Serum: 9.4 (05-01 @ 05:48)  Calcium, Total Serum: 9.2 (04-30 @ 06:30)          05-01    140  |  106  |  26<H>  ----------------------------<  156<H>  4.1   |  19<L>  |  0.78    Ca    9.4      01 May 2022 05:48                          13.5   13.05 )-----------( 253      ( 01 May 2022 05:48 )             41.9     Medications  MEDICATIONS  (STANDING):  ascorbic acid 2000 milliGRAM(s) Oral once  aspirin enteric coated 81 milliGRAM(s) Oral daily  atorvastatin 80 milliGRAM(s) Oral at bedtime  chlorhexidine 0.12% Liquid 5 milliLiter(s) Swish and Spit once  chlorhexidine 4% Liquid 1 Application(s) Topical once  ciprofloxacin     Tablet 500 milliGRAM(s) Oral every 12 hours  furosemide    Tablet 20 milliGRAM(s) Oral daily  insulin lispro (ADMELOG) corrective regimen sliding scale   SubCutaneous three times a day before meals  metoprolol tartrate 50 milliGRAM(s) Oral every 12 hours  sodium chloride 0.9% lock flush 3 milliLiter(s) IV Push every 8 hours      Physical Exam  General: Patient comfortable in bed  Vital Signs Last 12 Hrs  T(F): 98.2 (05-01-22 @ 04:47), Max: 98.2 (05-01-22 @ 04:47)  HR: 82 (05-01-22 @ 04:47) (82 - 82)  BP: 133/81 (05-01-22 @ 04:47) (133/81 - 133/81)  BP(mean): 99 (05-01-22 @ 04:47) (99 - 99)  RR: 18 (05-01-22 @ 04:47) (18 - 18)  SpO2: 99% (05-01-22 @ 04:47) (99% - 99%)  Neck: No palpable thyroid nodules.  CVS: S1S2, No murmurs  Respiratory: No wheezing, no crepitations  GI: Abdomen soft, bowel sounds positive  Musculoskeletal:  edema lower extremities.   Skin: No skin rashes, no ecchymosis    Diagnostics

## 2022-05-01 NOTE — PRE-ANESTHESIA EVALUATION ADULT - NSANTHPMHFT_GEN_ALL_CORE
46F with hx of CAD s/p PCIx4 (last 2017), HTN, HLD, T2DM (not on medications), who presents with midsternal chest pain. Patient has been having symptoms for the past few days however chest pain significantly worsened today prompting visit to the ED. Chest pain is pressure-like, radiates to neck, associated with shortness of breath. Of note, she reports she had a stress test with PMD one month ago which was negative. She denies fevers, chills, diaphoresis, abdominal pain, n/v/d, dysuria. Pt s/p cardiac catherization at [facility] with evidence of triple vessel disease.

## 2022-05-01 NOTE — PROGRESS NOTE ADULT - PROBLEM SELECTOR PLAN 1
lop 50 q12  ator  asa,    cipro for UTI  vein mapping   PA  aware   npo after midnight for CABG  monday

## 2022-05-01 NOTE — PROGRESS NOTE ADULT - ASSESSMENT
Assessment  DMT2: 46y Female with newly dx DM with hyperglycemia, A1C 7.5%, was not taking any hypoglycemic agents, now on insulin coverage, blood sugars trending within acceptable range, no hypoglycemias, eating meals, well-appearing, planning CABG tomorrow.  ?Goiter: no known history, has visible neck lump, euthyroid, US thyroid completed, results pending..  CAD: on medications, no chest pain, stable, monitored.  HTN: Controlled,  on antihypertensive medications.  HLD: On statin  Obesity: No strict exercise routines, not on any weight loss program, neither on low calorie diet.          Harjeet Varghese MD  Cell: [phone]  Office: [phone]               Assessment  DMT2: 46y Female with newly dx DM with hyperglycemia, A1C 7.5%, was not taking any hypoglycemic agents, now on insulin coverage, blood sugars trending within acceptable range, no hypoglycemias, eating meals,  well-appearing, planning CABG tomorrow.  ?Goiter: no known history, has visible neck lump, euthyroid, US thyroid completed, results pending..  CAD: on medications, no chest pain, stable, monitored.  HTN: Controlled,  on antihypertensive medications.  HLD: On statin  Obesity: No strict exercise routines, not on any weight loss program, neither on low calorie diet.          Harjeet Varghese MD  Cell: [phone]  Office: [phone]

## 2022-05-01 NOTE — PROGRESS NOTE ADULT - SUBJECTIVE AND OBJECTIVE BOX
Cardiac Surgery Pre-op Note:    CC: Patient is a 46y old  Female who presents with a chief complaint of Triple Vessel Disease (01 May 2022 12:57)                                                                                                               Surgeon:   Dr urena    Procedure: (Date) (Procedure)      CABG    Allergies    codeine (Unknown)    Intolerances        HPI:  46F with hx of CAD s/p PCIx4 (last 2017), HTN, HLD, T2DM (not on medications), who presents with midsternal chest pain. Patient has been having symptoms for the past few days however chest pain significantly worsened today prompting visit to the ED. Chest pain is pressure-like, radiates to neck, associated with shortness of breath. Of note, she reports she had a stress test with PMD one month ago which was negative. She denies fevers, chills, diaphoresis, abdominal pain, n/v/d, dysuria. Pt s/p cardiac catherization at [facility] with evidence of triple vessel disease. Pt transferred to [facility] for cardiac surgery evaluation with Dr. Urena. Pt scheduled for CABG with Dr. Urena on Monday, 05/02. (29 Apr 2022 01:21)      PAST MEDICAL & SURGICAL HISTORY:  HTN (hypertension)    HLD (hyperlipidemia)    Coronary artery disease  s/p MIGUEL ANGEL x 3 (9/2019), MIGUEL ANGEL x1 (2014)    Hypothyroid  Resolved    Obesity    H/O total hysterectomy  2017    S/P coronary artery stent placement  mLAD x1 MIGUEL ANGEL (2014),  3 stents 09/2019        MEDICATIONS  (STANDING):  ascorbic acid 2000 milliGRAM(s) Oral once  aspirin enteric coated 81 milliGRAM(s) Oral daily  atorvastatin 80 milliGRAM(s) Oral at bedtime  chlorhexidine 0.12% Liquid 5 milliLiter(s) Swish and Spit once  chlorhexidine 4% Liquid 1 Application(s) Topical once  ciprofloxacin     Tablet 500 milliGRAM(s) Oral every 12 hours  furosemide    Tablet 20 milliGRAM(s) Oral daily  insulin lispro (ADMELOG) corrective regimen sliding scale   SubCutaneous three times a day before meals  metoprolol tartrate 50 milliGRAM(s) Oral every 12 hours  sodium chloride 0.9% lock flush 3 milliLiter(s) IV Push every 8 hours    MEDICATIONS  (PRN):        Labs:                        13.5   13.05 )-----------( 253      ( 01 May 2022 05:48 )             41.9     05-01    140  |  106  |  26<H>  ----------------------------<  156<H>  4.1   |  19<L>  |  0.78    Ca    9.4      01 May 2022 05:48          Blood Type: ABO Interpretation: O (05-01 @ 06:42  Pro-BNP: Serum Pro-Brain Natriuretic Peptide: 239 pg/mL (04-29 @ 02:03)    Thyroid Panel: 04-29 @ 08:33/2.94  1.2/--/--  04-28 @ 08:10/2.15  --/--/--    MRSA: MRSA PCR Result.: NotDetec (04-29 @ 06:35)   / MSSA:       CXR:   clear    EKG:  Diagnosis Line NORMAL SINUS RHYTHM  SEPTAL INFARCT , AGE UNDETERMINED  ABNORMAL ECG    Carotid Duplex:  ION: Minimal plaque without duplex evidence of hemodynamically   significant stenosis of either carotid artery.      Echocardiogram:1. Normal trileaflet aortic valve.  2. Normal left ventricular internal dimensions and wall  thicknesses.  3. Low normal left ventricular systolic function. No  segmental wall motion abnormalities.  4. Normal right ventricular size and function    Cardiac catheterization:  Coronary Angiography   The coronary circulation is right dominant.      LAD   Left anterior descending artery: severe stenosis of LAD stent, IFR  0.86. There is a 70 % stenosis.    CX   Distal circumflex: There is a 60 % stenosis. ELIAS Flow 3. First obtuse  marginal: 100 occlusion of OM1 stent. There is a 100 %  stenosis. ELIAS Flow 0.      Vein Mapping:   spoke to PA  to complete    Gen: WN/WD NAD  Neuro: AAOx3, nonfocal  Pulm: CTA B/L  CV: RRR, S1S2  Abd: Soft, NT, ND +BS  Ext: No edema, + peripheral pulses      Pt has AICD/PPM [ ] Yes    Pre-op Beta Blocker ordered within 24 hrs of surgery (CABG ONLY)?  [ ] Yes   Type & Cross  [ ] Yes    NPO after Midnight [ ] Yes    Pre-op ABX ordered, to be taped on chart:  [ ] Yes    Hibiclens/Peridex ordered [ ] Yes  [ ] No  Intraop on Hold: PRBCs, CXR, SKYLAR [ ]   Consent obtained  [ ] Yes  [ ] No

## 2022-05-01 NOTE — PROGRESS NOTE ADULT - ASSESSMENT
46F with hx of CAD s/p PCIx4 (last 2017), HTN, HLD, T2DM (not on medications), who presents with midsternal chest pain. Patient has been having symptoms for the past few days however chest pain significantly worsened today prompting visit to the ED. Chest pain is pressure-like, radiates to neck, associated with shortness of breath. Of note, she reports she had a stress test with PMD one month ago which was negative. She denies fevers, chills, diaphoresis, abdominal pain, n/v/d, dysuria. Pt s/p cardiac catherization at [facility] with evidence of triple vessel disease. Pt transferred to [facility] for cardiac surgery evaluation with Dr. Abreu. Pt scheduled for CABG with Dr. Abreu on Monday, 05/02.  4/29  endo cslt for hyperglycemia,  lop 50 q12  4/30    ck thyroid US   being followed by endo for hyperglycemia and thyroid goiter  5/1   vss

## 2022-05-02 ENCOUNTER — APPOINTMENT (OUTPATIENT)
Dept: CARDIOTHORACIC SURGERY | Facility: HOSPITAL | Age: 47
End: 2022-05-02

## 2022-05-02 ENCOUNTER — TRANSCRIPTION ENCOUNTER (OUTPATIENT)
Age: 47
End: 2022-05-02

## 2022-05-02 LAB
-  AMIKACIN: SIGNIFICANT CHANGE UP
-  AMOXICILLIN/CLAVULANIC ACID: SIGNIFICANT CHANGE UP
-  AMPICILLIN/SULBACTAM: SIGNIFICANT CHANGE UP
-  AMPICILLIN: SIGNIFICANT CHANGE UP
-  AZTREONAM: SIGNIFICANT CHANGE UP
-  CEFAZOLIN: SIGNIFICANT CHANGE UP
-  CEFEPIME: SIGNIFICANT CHANGE UP
-  CEFOXITIN: SIGNIFICANT CHANGE UP
-  CEFTRIAXONE: SIGNIFICANT CHANGE UP
-  CIPROFLOXACIN: SIGNIFICANT CHANGE UP
-  ERTAPENEM: SIGNIFICANT CHANGE UP
-  GENTAMICIN: SIGNIFICANT CHANGE UP
-  IMIPENEM: SIGNIFICANT CHANGE UP
-  LEVOFLOXACIN: SIGNIFICANT CHANGE UP
-  MEROPENEM: SIGNIFICANT CHANGE UP
-  NITROFURANTOIN: SIGNIFICANT CHANGE UP
-  PIPERACILLIN/TAZOBACTAM: SIGNIFICANT CHANGE UP
-  TIGECYCLINE: SIGNIFICANT CHANGE UP
-  TOBRAMYCIN: SIGNIFICANT CHANGE UP
-  TRIMETHOPRIM/SULFAMETHOXAZOLE: SIGNIFICANT CHANGE UP
ALBUMIN SERPL ELPH-MCNC: 2.8 G/DL — LOW (ref 3.3–5)
ALP SERPL-CCNC: 65 U/L — SIGNIFICANT CHANGE UP (ref 40–120)
ALT FLD-CCNC: 24 U/L — SIGNIFICANT CHANGE UP (ref 10–45)
ANION GAP SERPL CALC-SCNC: 13 MMOL/L — SIGNIFICANT CHANGE UP (ref 5–17)
APTT BLD: 27.2 SEC — LOW (ref 27.5–35.5)
AST SERPL-CCNC: 45 U/L — HIGH (ref 10–40)
BASE EXCESS BLDV CALC-SCNC: -2.9 MMOL/L — LOW (ref -2–2)
BASE EXCESS BLDV CALC-SCNC: -3.2 MMOL/L — LOW (ref -2–2)
BASOPHILS # BLD AUTO: 0.07 K/UL — SIGNIFICANT CHANGE UP (ref 0–0.2)
BASOPHILS NFR BLD AUTO: 0.4 % — SIGNIFICANT CHANGE UP (ref 0–2)
BILIRUB SERPL-MCNC: 0.7 MG/DL — SIGNIFICANT CHANGE UP (ref 0.2–1.2)
BUN SERPL-MCNC: 24 MG/DL — HIGH (ref 7–23)
CALCIUM SERPL-MCNC: 8.3 MG/DL — LOW (ref 8.4–10.5)
CHLORIDE SERPL-SCNC: 105 MMOL/L — SIGNIFICANT CHANGE UP (ref 96–108)
CK MB BLD-MCNC: 11.5 % — HIGH (ref 0–3.5)
CK MB CFR SERPL CALC: 28.4 NG/ML — HIGH (ref 0–3.8)
CK SERPL-CCNC: 248 U/L — HIGH (ref 25–170)
CO2 BLDV-SCNC: 25 MMOL/L — SIGNIFICANT CHANGE UP (ref 22–26)
CO2 BLDV-SCNC: 25 MMOL/L — SIGNIFICANT CHANGE UP (ref 22–26)
CO2 SERPL-SCNC: 21 MMOL/L — LOW (ref 22–31)
CREAT SERPL-MCNC: 0.73 MG/DL — SIGNIFICANT CHANGE UP (ref 0.5–1.3)
CULTURE RESULTS: SIGNIFICANT CHANGE UP
EGFR: 102 ML/MIN/1.73M2 — SIGNIFICANT CHANGE UP
EOSINOPHIL # BLD AUTO: 0.08 K/UL — SIGNIFICANT CHANGE UP (ref 0–0.5)
EOSINOPHIL NFR BLD AUTO: 0.4 % — SIGNIFICANT CHANGE UP (ref 0–6)
FIBRINOGEN PPP-MCNC: 341 MG/DL — SIGNIFICANT CHANGE UP (ref 330–520)
GAS PNL BLDA: SIGNIFICANT CHANGE UP
GLUCOSE BLDC GLUCOMTR-MCNC: 165 MG/DL — HIGH (ref 70–99)
GLUCOSE BLDC GLUCOMTR-MCNC: 168 MG/DL — HIGH (ref 70–99)
GLUCOSE BLDC GLUCOMTR-MCNC: 176 MG/DL — HIGH (ref 70–99)
GLUCOSE BLDC GLUCOMTR-MCNC: 181 MG/DL — HIGH (ref 70–99)
GLUCOSE BLDC GLUCOMTR-MCNC: 181 MG/DL — HIGH (ref 70–99)
GLUCOSE BLDC GLUCOMTR-MCNC: 182 MG/DL — HIGH (ref 70–99)
GLUCOSE BLDC GLUCOMTR-MCNC: 191 MG/DL — HIGH (ref 70–99)
GLUCOSE BLDC GLUCOMTR-MCNC: 192 MG/DL — HIGH (ref 70–99)
GLUCOSE BLDC GLUCOMTR-MCNC: 193 MG/DL — HIGH (ref 70–99)
GLUCOSE BLDC GLUCOMTR-MCNC: 198 MG/DL — HIGH (ref 70–99)
GLUCOSE BLDC GLUCOMTR-MCNC: 201 MG/DL — HIGH (ref 70–99)
GLUCOSE BLDC GLUCOMTR-MCNC: 234 MG/DL — HIGH (ref 70–99)
GLUCOSE SERPL-MCNC: 194 MG/DL — HIGH (ref 70–99)
HCO3 BLDV-SCNC: 24 MMOL/L — SIGNIFICANT CHANGE UP (ref 22–29)
HCO3 BLDV-SCNC: 24 MMOL/L — SIGNIFICANT CHANGE UP (ref 22–29)
HCT VFR BLD CALC: 36.5 % — SIGNIFICANT CHANGE UP (ref 34.5–45)
HGB BLD-MCNC: 11.9 G/DL — SIGNIFICANT CHANGE UP (ref 11.5–15.5)
HOROWITZ INDEX BLDV+IHG-RTO: 50 — SIGNIFICANT CHANGE UP
HOROWITZ INDEX BLDV+IHG-RTO: 50 — SIGNIFICANT CHANGE UP
IMM GRANULOCYTES NFR BLD AUTO: 1.4 % — SIGNIFICANT CHANGE UP (ref 0–1.5)
INR BLD: 1.39 RATIO — HIGH (ref 0.88–1.16)
LYMPHOCYTES # BLD AUTO: 13.2 % — SIGNIFICANT CHANGE UP (ref 13–44)
LYMPHOCYTES # BLD AUTO: 2.52 K/UL — SIGNIFICANT CHANGE UP (ref 1–3.3)
MAGNESIUM SERPL-MCNC: 2.4 MG/DL — SIGNIFICANT CHANGE UP (ref 1.6–2.6)
MCHC RBC-ENTMCNC: 30.5 PG — SIGNIFICANT CHANGE UP (ref 27–34)
MCHC RBC-ENTMCNC: 32.6 GM/DL — SIGNIFICANT CHANGE UP (ref 32–36)
MCV RBC AUTO: 93.6 FL — SIGNIFICANT CHANGE UP (ref 80–100)
METHOD TYPE: SIGNIFICANT CHANGE UP
MONOCYTES # BLD AUTO: 1.16 K/UL — HIGH (ref 0–0.9)
MONOCYTES NFR BLD AUTO: 6.1 % — SIGNIFICANT CHANGE UP (ref 2–14)
NEUTROPHILS # BLD AUTO: 15.07 K/UL — HIGH (ref 1.8–7.4)
NEUTROPHILS NFR BLD AUTO: 78.5 % — HIGH (ref 43–77)
NRBC # BLD: 0 /100 WBCS — SIGNIFICANT CHANGE UP (ref 0–0)
ORGANISM # SPEC MICROSCOPIC CNT: SIGNIFICANT CHANGE UP
ORGANISM # SPEC MICROSCOPIC CNT: SIGNIFICANT CHANGE UP
PCO2 BLDV: 47 MMHG — HIGH (ref 39–42)
PCO2 BLDV: 48 MMHG — HIGH (ref 39–42)
PH BLDV: 7.3 — LOW (ref 7.32–7.43)
PH BLDV: 7.31 — LOW (ref 7.32–7.43)
PHOSPHATE SERPL-MCNC: 2.9 MG/DL — SIGNIFICANT CHANGE UP (ref 2.5–4.5)
PLATELET # BLD AUTO: 186 K/UL — SIGNIFICANT CHANGE UP (ref 150–400)
PO2 BLDV: 39 MMHG — SIGNIFICANT CHANGE UP (ref 25–45)
PO2 BLDV: 47 MMHG — HIGH (ref 25–45)
POTASSIUM SERPL-MCNC: 4.5 MMOL/L — SIGNIFICANT CHANGE UP (ref 3.5–5.3)
POTASSIUM SERPL-SCNC: 4.5 MMOL/L — SIGNIFICANT CHANGE UP (ref 3.5–5.3)
PROT SERPL-MCNC: 4.7 G/DL — LOW (ref 6–8.3)
PROTHROM AB SERPL-ACNC: 16 SEC — HIGH (ref 10.5–13.4)
RBC # BLD: 3.9 M/UL — SIGNIFICANT CHANGE UP (ref 3.8–5.2)
RBC # FLD: 13 % — SIGNIFICANT CHANGE UP (ref 10.3–14.5)
SAO2 % BLDV: 71.4 % — SIGNIFICANT CHANGE UP (ref 67–88)
SAO2 % BLDV: 76.2 % — SIGNIFICANT CHANGE UP (ref 67–88)
SODIUM SERPL-SCNC: 139 MMOL/L — SIGNIFICANT CHANGE UP (ref 135–145)
SPECIMEN SOURCE: SIGNIFICANT CHANGE UP
TROPONIN T, HIGH SENSITIVITY RESULT: 325 NG/L — HIGH (ref 0–51)
WBC # BLD: 19.16 K/UL — HIGH (ref 3.8–10.5)
WBC # FLD AUTO: 19.16 K/UL — HIGH (ref 3.8–10.5)

## 2022-05-02 PROCEDURE — 71045 X-RAY EXAM CHEST 1 VIEW: CPT | Mod: 26

## 2022-05-02 PROCEDURE — 93010 ELECTROCARDIOGRAM REPORT: CPT

## 2022-05-02 PROCEDURE — 33517 CABG ARTERY-VEIN SINGLE: CPT

## 2022-05-02 PROCEDURE — 99292 CRITICAL CARE ADDL 30 MIN: CPT

## 2022-05-02 PROCEDURE — 99291 CRITICAL CARE FIRST HOUR: CPT

## 2022-05-02 PROCEDURE — 35600 OPEN HRV UXTR ART 1 SGM CAB: CPT

## 2022-05-02 PROCEDURE — 33534 CABG ARTERIAL TWO: CPT

## 2022-05-02 PROCEDURE — 33508 ENDOSCOPIC VEIN HARVEST: CPT | Mod: 59

## 2022-05-02 DEVICE — KIT A-LINE 1LUM 20G X 12CM SAFE KIT: Type: IMPLANTABLE DEVICE | Status: FUNCTIONAL

## 2022-05-02 DEVICE — CANNULA VENOUS 2 STAGE THIN FLEX 36/46FR X 1/2" WITH RETURN DIAL: Type: IMPLANTABLE DEVICE | Status: FUNCTIONAL

## 2022-05-02 DEVICE — KIT CVC 2LUM MAC 9FR CHG: Type: IMPLANTABLE DEVICE | Status: FUNCTIONAL

## 2022-05-02 DEVICE — CANNULA AORTIC PERFUSION EZ GLIDE STRAIGHT 21FR X 35CM VENTED: Type: IMPLANTABLE DEVICE | Status: FUNCTIONAL

## 2022-05-02 DEVICE — CANNULA VESSEL 3MM BLUNT TIP CLEAR 1-WAY VALVE: Type: IMPLANTABLE DEVICE | Status: FUNCTIONAL

## 2022-05-02 DEVICE — CHEST DRAIN PLEUR-EVAC 28FR STRAIGHT: Type: IMPLANTABLE DEVICE | Status: FUNCTIONAL

## 2022-05-02 DEVICE — PACING WIRE ORANGE M-25 WINGED WIRE 37MM X 89MM: Type: IMPLANTABLE DEVICE | Status: FUNCTIONAL

## 2022-05-02 DEVICE — CANNULA AORTIC ROOT WITH VENT LINE 12G X 14CM FLANGED: Type: IMPLANTABLE DEVICE | Status: FUNCTIONAL

## 2022-05-02 DEVICE — LIGATING CLIPS WECK HORIZON MEDIUM (BLUE) 24: Type: IMPLANTABLE DEVICE | Status: FUNCTIONAL

## 2022-05-02 DEVICE — LIGATING CLIPS WECK HORIZON SMALL-WIDE (RED) 24: Type: IMPLANTABLE DEVICE | Status: FUNCTIONAL

## 2022-05-02 DEVICE — OCCLUDER INTERNAL VESSEL FLO-RESTER 1 X 12MM: Type: IMPLANTABLE DEVICE | Status: FUNCTIONAL

## 2022-05-02 DEVICE — MEDIASTINAL CATH DRAIN 9MM: Type: IMPLANTABLE DEVICE | Status: FUNCTIONAL

## 2022-05-02 DEVICE — IMPLANTABLE DEVICE: Type: IMPLANTABLE DEVICE | Status: FUNCTIONAL

## 2022-05-02 DEVICE — CANNULA ARTERIOTOMY 2MM X 1.3": Type: IMPLANTABLE DEVICE | Status: FUNCTIONAL

## 2022-05-02 DEVICE — PACING WIRE WHITE M-24 BAREWIRE 37MM X 89MM: Type: IMPLANTABLE DEVICE | Status: FUNCTIONAL

## 2022-05-02 RX ORDER — ALBUMIN HUMAN 25 %
250 VIAL (ML) INTRAVENOUS
Refills: 0 | Status: COMPLETED | OUTPATIENT
Start: 2022-05-02 | End: 2022-05-02

## 2022-05-02 RX ORDER — POTASSIUM CHLORIDE 20 MEQ
10 PACKET (EA) ORAL
Refills: 0 | Status: DISCONTINUED | OUTPATIENT
Start: 2022-05-02 | End: 2022-05-03

## 2022-05-02 RX ORDER — POTASSIUM CHLORIDE 20 MEQ
10 PACKET (EA) ORAL
Refills: 0 | Status: DISCONTINUED | OUTPATIENT
Start: 2022-05-02 | End: 2022-05-02

## 2022-05-02 RX ORDER — POLYETHYLENE GLYCOL 3350 17 G/17G
17 POWDER, FOR SOLUTION ORAL DAILY
Refills: 0 | Status: DISCONTINUED | OUTPATIENT
Start: 2022-05-03 | End: 2022-05-07

## 2022-05-02 RX ORDER — ASCORBIC ACID 60 MG
500 TABLET,CHEWABLE ORAL
Refills: 0 | Status: COMPLETED | OUTPATIENT
Start: 2022-05-02 | End: 2022-05-07

## 2022-05-02 RX ORDER — ATORVASTATIN CALCIUM 80 MG/1
40 TABLET, FILM COATED ORAL AT BEDTIME
Refills: 0 | Status: DISCONTINUED | OUTPATIENT
Start: 2022-05-02 | End: 2022-05-03

## 2022-05-02 RX ORDER — DEXTROSE 50 % IN WATER 50 %
50 SYRINGE (ML) INTRAVENOUS
Refills: 0 | Status: DISCONTINUED | OUTPATIENT
Start: 2022-05-02 | End: 2022-05-03

## 2022-05-02 RX ORDER — DEXTROSE 50 % IN WATER 50 %
25 SYRINGE (ML) INTRAVENOUS
Refills: 0 | Status: DISCONTINUED | OUTPATIENT
Start: 2022-05-02 | End: 2022-05-03

## 2022-05-02 RX ORDER — INSULIN HUMAN 100 [IU]/ML
3 INJECTION, SOLUTION SUBCUTANEOUS
Qty: 100 | Refills: 0 | Status: DISCONTINUED | OUTPATIENT
Start: 2022-05-02 | End: 2022-05-04

## 2022-05-02 RX ORDER — CHLORHEXIDINE GLUCONATE 213 G/1000ML
15 SOLUTION TOPICAL EVERY 12 HOURS
Refills: 0 | Status: DISCONTINUED | OUTPATIENT
Start: 2022-05-02 | End: 2022-05-03

## 2022-05-02 RX ORDER — ALBUMIN HUMAN 25 %
250 VIAL (ML) INTRAVENOUS ONCE
Refills: 0 | Status: COMPLETED | OUTPATIENT
Start: 2022-05-02 | End: 2022-05-02

## 2022-05-02 RX ORDER — DOBUTAMINE HCL 250MG/20ML
1 VIAL (ML) INTRAVENOUS
Qty: 500 | Refills: 0 | Status: DISCONTINUED | OUTPATIENT
Start: 2022-05-02 | End: 2022-05-03

## 2022-05-02 RX ORDER — OXYCODONE HYDROCHLORIDE 5 MG/1
10 TABLET ORAL EVERY 4 HOURS
Refills: 0 | Status: DISCONTINUED | OUTPATIENT
Start: 2022-05-02 | End: 2022-05-07

## 2022-05-02 RX ORDER — SODIUM CHLORIDE 9 MG/ML
1000 INJECTION INTRAMUSCULAR; INTRAVENOUS; SUBCUTANEOUS
Refills: 0 | Status: DISCONTINUED | OUTPATIENT
Start: 2022-05-02 | End: 2022-05-04

## 2022-05-02 RX ORDER — ACETAMINOPHEN 500 MG
650 TABLET ORAL EVERY 6 HOURS
Refills: 0 | Status: COMPLETED | OUTPATIENT
Start: 2022-05-02 | End: 2022-05-05

## 2022-05-02 RX ORDER — SENNA PLUS 8.6 MG/1
2 TABLET ORAL AT BEDTIME
Refills: 0 | Status: DISCONTINUED | OUTPATIENT
Start: 2022-05-03 | End: 2022-05-07

## 2022-05-02 RX ORDER — CEFUROXIME AXETIL 250 MG
1500 TABLET ORAL EVERY 8 HOURS
Refills: 0 | Status: COMPLETED | OUTPATIENT
Start: 2022-05-02 | End: 2022-05-04

## 2022-05-02 RX ORDER — ASPIRIN/CALCIUM CARB/MAGNESIUM 324 MG
300 TABLET ORAL ONCE
Refills: 0 | Status: DISCONTINUED | OUTPATIENT
Start: 2022-05-02 | End: 2022-05-02

## 2022-05-02 RX ORDER — AMIODARONE HYDROCHLORIDE 400 MG/1
400 TABLET ORAL
Refills: 0 | Status: COMPLETED | OUTPATIENT
Start: 2022-05-02 | End: 2022-05-05

## 2022-05-02 RX ORDER — ACETAMINOPHEN 500 MG
650 TABLET ORAL EVERY 6 HOURS
Refills: 0 | Status: DISCONTINUED | OUTPATIENT
Start: 2022-05-05 | End: 2022-05-07

## 2022-05-02 RX ORDER — CHLORHEXIDINE GLUCONATE 213 G/1000ML
1 SOLUTION TOPICAL DAILY
Refills: 0 | Status: DISCONTINUED | OUTPATIENT
Start: 2022-05-02 | End: 2022-05-07

## 2022-05-02 RX ORDER — DEXMEDETOMIDINE HYDROCHLORIDE IN 0.9% SODIUM CHLORIDE 4 UG/ML
0.4 INJECTION INTRAVENOUS
Qty: 200 | Refills: 0 | Status: DISCONTINUED | OUTPATIENT
Start: 2022-05-02 | End: 2022-05-03

## 2022-05-02 RX ORDER — OXYCODONE HYDROCHLORIDE 5 MG/1
5 TABLET ORAL EVERY 4 HOURS
Refills: 0 | Status: DISCONTINUED | OUTPATIENT
Start: 2022-05-02 | End: 2022-05-07

## 2022-05-02 RX ORDER — HYDROMORPHONE HYDROCHLORIDE 2 MG/ML
0.5 INJECTION INTRAMUSCULAR; INTRAVENOUS; SUBCUTANEOUS EVERY 6 HOURS
Refills: 0 | Status: DISCONTINUED | OUTPATIENT
Start: 2022-05-02 | End: 2022-05-04

## 2022-05-02 RX ORDER — GABAPENTIN 400 MG/1
100 CAPSULE ORAL EVERY 8 HOURS
Refills: 0 | Status: COMPLETED | OUTPATIENT
Start: 2022-05-02 | End: 2022-05-07

## 2022-05-02 RX ORDER — PANTOPRAZOLE SODIUM 20 MG/1
40 TABLET, DELAYED RELEASE ORAL DAILY
Refills: 0 | Status: DISCONTINUED | OUTPATIENT
Start: 2022-05-02 | End: 2022-05-03

## 2022-05-02 RX ORDER — NOREPINEPHRINE BITARTRATE/D5W 8 MG/250ML
0.05 PLASTIC BAG, INJECTION (ML) INTRAVENOUS
Qty: 8 | Refills: 0 | Status: DISCONTINUED | OUTPATIENT
Start: 2022-05-02 | End: 2022-05-03

## 2022-05-02 RX ORDER — ASPIRIN/CALCIUM CARB/MAGNESIUM 324 MG
81 TABLET ORAL DAILY
Refills: 0 | Status: DISCONTINUED | OUTPATIENT
Start: 2022-05-02 | End: 2022-05-07

## 2022-05-02 RX ORDER — CLOPIDOGREL BISULFATE 75 MG/1
75 TABLET, FILM COATED ORAL DAILY
Refills: 0 | Status: DISCONTINUED | OUTPATIENT
Start: 2022-05-02 | End: 2022-05-07

## 2022-05-02 RX ADMIN — SODIUM CHLORIDE 3 MILLILITER(S): 9 INJECTION INTRAMUSCULAR; INTRAVENOUS; SUBCUTANEOUS at 06:13

## 2022-05-02 RX ADMIN — Medication 8.71 MICROGRAM(S)/KG/MIN: at 18:12

## 2022-05-02 RX ADMIN — Medication 50 MILLIGRAM(S): at 05:59

## 2022-05-02 RX ADMIN — DEXMEDETOMIDINE HYDROCHLORIDE IN 0.9% SODIUM CHLORIDE 9.29 MICROGRAM(S)/KG/HR: 4 INJECTION INTRAVENOUS at 21:08

## 2022-05-02 RX ADMIN — Medication 125 MILLILITER(S): at 15:01

## 2022-05-02 RX ADMIN — HYDROMORPHONE HYDROCHLORIDE 0.5 MILLIGRAM(S): 2 INJECTION INTRAMUSCULAR; INTRAVENOUS; SUBCUTANEOUS at 14:25

## 2022-05-02 RX ADMIN — Medication 125 MILLILITER(S): at 17:22

## 2022-05-02 RX ADMIN — INSULIN HUMAN 3 UNIT(S)/HR: 100 INJECTION, SOLUTION SUBCUTANEOUS at 21:08

## 2022-05-02 RX ADMIN — INSULIN HUMAN 3 UNIT(S)/HR: 100 INJECTION, SOLUTION SUBCUTANEOUS at 14:29

## 2022-05-02 RX ADMIN — Medication 50 MILLIEQUIVALENT(S): at 23:52

## 2022-05-02 RX ADMIN — Medication 125 MILLILITER(S): at 19:49

## 2022-05-02 RX ADMIN — HYDROMORPHONE HYDROCHLORIDE 0.5 MILLIGRAM(S): 2 INJECTION INTRAMUSCULAR; INTRAVENOUS; SUBCUTANEOUS at 14:10

## 2022-05-02 RX ADMIN — DEXMEDETOMIDINE HYDROCHLORIDE IN 0.9% SODIUM CHLORIDE 9.29 MICROGRAM(S)/KG/HR: 4 INJECTION INTRAVENOUS at 18:30

## 2022-05-02 RX ADMIN — Medication 100 MILLIGRAM(S): at 23:12

## 2022-05-02 RX ADMIN — Medication 500 MILLILITER(S): at 20:04

## 2022-05-02 RX ADMIN — Medication 125 MILLILITER(S): at 13:45

## 2022-05-02 RX ADMIN — Medication 20 MILLIGRAM(S): at 05:59

## 2022-05-02 RX ADMIN — INSULIN HUMAN 3 UNIT(S)/HR: 100 INJECTION, SOLUTION SUBCUTANEOUS at 18:31

## 2022-05-02 RX ADMIN — CHLORHEXIDINE GLUCONATE 15 MILLILITER(S): 213 SOLUTION TOPICAL at 18:12

## 2022-05-02 RX ADMIN — GABAPENTIN 100 MILLIGRAM(S): 400 CAPSULE ORAL at 21:08

## 2022-05-02 RX ADMIN — Medication 125 MILLILITER(S): at 14:15

## 2022-05-02 RX ADMIN — Medication 100 MILLIGRAM(S): at 16:03

## 2022-05-02 RX ADMIN — Medication 6.97 MICROGRAM(S)/KG/MIN: at 21:08

## 2022-05-02 RX ADMIN — Medication 6.97 MICROGRAM(S)/KG/MIN: at 18:13

## 2022-05-02 RX ADMIN — Medication 500 MILLIGRAM(S): at 05:59

## 2022-05-02 RX ADMIN — GABAPENTIN 300 MILLIGRAM(S): 400 CAPSULE ORAL at 05:59

## 2022-05-02 RX ADMIN — CLOPIDOGREL BISULFATE 75 MILLIGRAM(S): 75 TABLET, FILM COATED ORAL at 20:43

## 2022-05-02 RX ADMIN — Medication 1000 MILLIGRAM(S): at 05:59

## 2022-05-02 NOTE — PROGRESS NOTE ADULT - SUBJECTIVE AND OBJECTIVE BOX
Chief complaint  Patient is a 46y old  Female who presents with a chief complaint of Triple Vessel Disease (02 May 2022 13:34)   Review of systems  Patient is POD0 s/p CABG, no hypoglycemic episodes.    Labs and Fingersticks  CAPILLARY BLOOD GLUCOSE  201 (02 May 2022 15:00)  234 (02 May 2022 14:00)  193 (02 May 2022 13:15)      POCT Blood Glucose.: 201 mg/dL (02 May 2022 15:00)  POCT Blood Glucose.: 234 mg/dL (02 May 2022 14:07)  POCT Blood Glucose.: 165 mg/dL (02 May 2022 06:05)  POCT Blood Glucose.: 157 mg/dL (01 May 2022 21:57)  POCT Blood Glucose.: 188 mg/dL (01 May 2022 16:36)      Anion Gap, Serum: 13 (05-02 @ 14:11)  Anion Gap, Serum: 15 (05-01 @ 05:48)      Calcium, Total Serum: 8.3 *L* (05-02 @ 14:11)  Calcium, Total Serum: 9.4 (05-01 @ 05:48)  Albumin, Serum: 2.8 *L* (05-02 @ 14:11)    Alanine Aminotransferase (ALT/SGPT): 24 (05-02 @ 14:11)  Alkaline Phosphatase, Serum: 65 (05-02 @ 14:11)  Aspartate Aminotransferase (AST/SGOT): 45 *H* (05-02 @ 14:11)        05-02    139  |  105  |  24<H>  ----------------------------<  194<H>  4.5   |  21<L>  |  0.73    Ca    8.3<L>      02 May 2022 14:11  Phos  2.9     05-02  Mg     2.4     05-02    TPro  4.7<L>  /  Alb  2.8<L>  /  TBili  0.7  /  DBili  x   /  AST  45<H>  /  ALT  24  /  AlkPhos  65  05-02                        11.9   19.16 )-----------( 186      ( 02 May 2022 14:11 )             36.5     Medications  MEDICATIONS  (STANDING):  acetaminophen     Tablet .. 650 milliGRAM(s) Oral every 6 hours  albumin human  5% IVPB 250 milliLiter(s) IV Intermittent every 30 minutes  albumin human  5% IVPB 250 milliLiter(s) IV Intermittent once  aMIOdarone    Tablet 400 milliGRAM(s) Oral two times a day  ascorbic acid 500 milliGRAM(s) Oral two times a day  aspirin enteric coated 81 milliGRAM(s) Oral daily  aspirin Suppository 300 milliGRAM(s) Rectal once  cefuroxime  IVPB 1500 milliGRAM(s) IV Intermittent every 8 hours  chlorhexidine 0.12% Liquid 15 milliLiter(s) Oral Mucosa every 12 hours  chlorhexidine 2% Cloths 1 Application(s) Topical daily  clopidogrel Tablet 75 milliGRAM(s) Oral daily  dexMEDEtomidine Infusion 0.4 MICROgram(s)/kG/Hr (9.29 mL/Hr) IV Continuous <Continuous>  dextrose 50% Injectable 50 milliLiter(s) IV Push every 15 minutes  dextrose 50% Injectable 25 milliLiter(s) IV Push every 15 minutes  DOBUTamine Infusion 2.5 MICROgram(s)/kG/Min (6.97 mL/Hr) IV Continuous <Continuous>  gabapentin 100 milliGRAM(s) Oral every 8 hours  insulin regular Infusion 3 Unit(s)/Hr (3 mL/Hr) IV Continuous <Continuous>  norepinephrine Infusion 0.05 MICROgram(s)/kG/Min (8.71 mL/Hr) IV Continuous <Continuous>  pantoprazole  Injectable 40 milliGRAM(s) IV Push daily  potassium chloride  10 mEq/50 mL IVPB 10 milliEquivalent(s) IV Intermittent every 1 hour  potassium chloride  10 mEq/50 mL IVPB 10 milliEquivalent(s) IV Intermittent every 1 hour  potassium chloride  10 mEq/50 mL IVPB 10 milliEquivalent(s) IV Intermittent every 1 hour  sodium chloride 0.9%. 1000 milliLiter(s) (10 mL/Hr) IV Continuous <Continuous>      Physical Exam  Vital Signs Last 12 Hrs  T(F): 109.4 (05-02-22 @ 13:15), Max: 109.4 (05-02-22 @ 13:15)  HR: 102 (05-02-22 @ 15:30) (88 - 105)  BP: 143/79 (05-02-22 @ 15:00) (105/64 - 143/79)  BP(mean): 104 (05-02-22 @ 15:00) (79 - 104)  RR: 15 (05-02-22 @ 15:30) (10 - 19)  SpO2: 100% (05-02-22 @ 15:30) (97% - 100%)       Chief complaint  Patient is a 46y old  Female who presents with a chief complaint of Triple Vessel Disease (02 May 2022 13:34)   Review of systems  Patient is POD0 s/p CABG, no hypoglycemic episodes.    Labs and Fingersticks  CAPILLARY BLOOD GLUCOSE  201 (02 May 2022 15:00)  234 (02 May 2022 14:00)  193 (02 May 2022 13:15)      POCT Blood Glucose.: 201 mg/dL (02 May 2022 15:00)  POCT Blood Glucose.: 234 mg/dL (02 May 2022 14:07)  POCT Blood Glucose.: 165 mg/dL (02 May 2022 06:05)  POCT Blood Glucose.: 157 mg/dL (01 May 2022 21:57)  POCT Blood Glucose.: 188 mg/dL (01 May 2022 16:36)      Anion Gap, Serum: 13 (05-02 @ 14:11)  Anion Gap, Serum: 15 (05-01 @ 05:48)      Calcium, Total Serum: 8.3 *L* (05-02 @ 14:11)  Calcium, Total Serum: 9.4 (05-01 @ 05:48)  Albumin, Serum: 2.8 *L* (05-02 @ 14:11)    Alanine Aminotransferase (ALT/SGPT): 24 (05-02 @ 14:11)  Alkaline Phosphatase, Serum: 65 (05-02 @ 14:11)  Aspartate Aminotransferase (AST/SGOT): 45 *H* (05-02 @ 14:11)        05-02    139  |  105  |  24<H>  ----------------------------<  194<H>  4.5   |  21<L>  |  0.73    Ca    8.3<L>      02 May 2022 14:11  Phos  2.9     05-02  Mg     2.4     05-02    TPro  4.7<L>  /  Alb  2.8<L>  /  TBili  0.7  /  DBili  x   /  AST  45<H>  /  ALT  24  /  AlkPhos  65  05-02                        11.9   19.16 )-----------( 186      ( 02 May 2022 14:11 )             36.5     Medications  MEDICATIONS  (STANDING):  acetaminophen     Tablet .. 650 milliGRAM(s) Oral every 6 hours  albumin human  5% IVPB 250 milliLiter(s) IV Intermittent every 30 minutes  albumin human  5% IVPB 250 milliLiter(s) IV Intermittent once  aMIOdarone    Tablet 400 milliGRAM(s) Oral two times a day  ascorbic acid 500 milliGRAM(s) Oral two times a day  aspirin enteric coated 81 milliGRAM(s) Oral daily  aspirin Suppository 300 milliGRAM(s) Rectal once  cefuroxime  IVPB 1500 milliGRAM(s) IV Intermittent every 8 hours  chlorhexidine 0.12% Liquid 15 milliLiter(s) Oral Mucosa every 12 hours  chlorhexidine 2% Cloths 1 Application(s) Topical daily  clopidogrel Tablet 75 milliGRAM(s) Oral daily  dexMEDEtomidine Infusion 0.4 MICROgram(s)/kG/Hr (9.29 mL/Hr) IV Continuous <Continuous>  dextrose 50% Injectable 50 milliLiter(s) IV Push every 15 minutes  dextrose 50% Injectable 25 milliLiter(s) IV Push every 15 minutes  DOBUTamine Infusion 2.5 MICROgram(s)/kG/Min (6.97 mL/Hr) IV Continuous <Continuous>  gabapentin 100 milliGRAM(s) Oral every 8 hours  insulin regular Infusion 3 Unit(s)/Hr (3 mL/Hr) IV Continuous <Continuous>  norepinephrine Infusion 0.05 MICROgram(s)/kG/Min (8.71 mL/Hr) IV Continuous <Continuous>  pantoprazole  Injectable 40 milliGRAM(s) IV Push daily  potassium chloride  10 mEq/50 mL IVPB 10 milliEquivalent(s) IV Intermittent every 1 hour  potassium chloride  10 mEq/50 mL IVPB 10 milliEquivalent(s) IV Intermittent every 1 hour  potassium chloride  10 mEq/50 mL IVPB 10 milliEquivalent(s) IV Intermittent every 1 hour  sodium chloride 0.9%. 1000 milliLiter(s) (10 mL/Hr) IV Continuous <Continuous>      Physical Exam  Vital Signs Last 12 Hrs  T(F): 109.4 (05-02-22 @ 13:15), Max: 109.4 (05-02-22 @ 13:15)  HR: 102 (05-02-22 @ 15:30) (88 - 105)  BP: 143/79 (05-02-22 @ 15:00) (105/64 - 143/79)  BP(mean): 104 (05-02-22 @ 15:00) (79 - 104)  RR: 15 (05-02-22 @ 15:30) (10 - 19)  SpO2: 100% (05-02-22 @ 15:30) (97% - 100%)

## 2022-05-02 NOTE — PROGRESS NOTE ADULT - SUBJECTIVE AND OBJECTIVE BOX
CRITICAL CARE ATTENDING - CTICU    MEDICATIONS  (STANDING):  acetaminophen     Tablet .. 650 milliGRAM(s) Oral every 6 hours  aMIOdarone    Tablet 400 milliGRAM(s) Oral two times a day  ascorbic acid 500 milliGRAM(s) Oral two times a day  aspirin enteric coated 81 milliGRAM(s) Oral daily  aspirin Suppository 300 milliGRAM(s) Rectal once  cefuroxime  IVPB 1500 milliGRAM(s) IV Intermittent every 8 hours  chlorhexidine 0.12% Liquid 15 milliLiter(s) Oral Mucosa every 12 hours  chlorhexidine 2% Cloths 1 Application(s) Topical daily  dexMEDEtomidine Infusion 0.4 MICROgram(s)/kG/Hr (9.29 mL/Hr) IV Continuous <Continuous>  dextrose 50% Injectable 50 milliLiter(s) IV Push every 15 minutes  dextrose 50% Injectable 25 milliLiter(s) IV Push every 15 minutes  DOBUTamine Infusion 2.5 MICROgram(s)/kG/Min (6.97 mL/Hr) IV Continuous <Continuous>  gabapentin 100 milliGRAM(s) Oral every 8 hours  insulin regular Infusion 3 Unit(s)/Hr (3 mL/Hr) IV Continuous <Continuous>  norepinephrine Infusion 0.05 MICROgram(s)/kG/Min (8.71 mL/Hr) IV Continuous <Continuous>  pantoprazole  Injectable 40 milliGRAM(s) IV Push daily  potassium chloride  10 mEq/50 mL IVPB 10 milliEquivalent(s) IV Intermittent every 1 hour  potassium chloride  10 mEq/50 mL IVPB 10 milliEquivalent(s) IV Intermittent every 1 hour  potassium chloride  10 mEq/50 mL IVPB 10 milliEquivalent(s) IV Intermittent every 1 hour  sodium chloride 0.9%. 1000 milliLiter(s) (10 mL/Hr) IV Continuous <Continuous>                                    13.5   13.05 )-----------( 253      ( 01 May 2022 05:48 )             41.9       05-01    140  |  106  |  26<H>  ----------------------------<  156<H>  4.1   |  19<L>  |  0.78    Ca    9.4      01 May 2022 05:48            Mode: AC/ CMV (Assist Control/ Continuous Mandatory Ventilation)  RR (machine): 10  TV (machine): 450  FiO2: 100  PEEP: 5  ITime: 1  MAP: 9  PIP: 26      Daily Height in cm: 152.4 (01 May 2022 19:51)    Daily Weight in k.9 (02 May 2022 04:51)       @ 07:01  -   @ 07:00  --------------------------------------------------------  IN: 960 mL / OUT: 750 mL / NET: 210 mL        Critically Ill patient  : [ ] preoperative ,   [x ] post operative    Requires :  [x ] Arterial Line   [ x] Central Line  [ ] PA catheter  [ ] IABP  [ ] ECMO  [ ] LVAD  [ x] Ventilator  [ x] pacemaker - TPM  [ ] Impella.                      [x ] ABG's     [ x] Pulse Oxymetry Monitoring  Bedside evaluation , monitoring , treatment of hemodynamics , fluids , IVP/ IVCD meds.        Diagnosis:     Op Day - CABG X 3 L     Hypotension     Hypovolemia     Hemodynamic lability,  instability. Requires IVCD [ x] vasopressors [ x] inotropes  [ ] vasodilator  [ x]IVSS fluid  to maintain MAP, perfusion, C.I.     CHF- acute [ x]   chronic [x ]    systolic [x ]   diatolic [ ]          - Echo- EF -             [x ] RV dysfunction          - Cxr-cardiomegally, edema          - Clinical-  [x ]inotropes   [x ]pressors   [ ]diuresis   [ ]IABP   [ ]ECMO   [ ]LVAD   [ ]Respiratory Failure    Ventilator Management:  [ x]AC-rest post op    [ x]CPAP-PS Wean  this PM    [ ]Trach Collar     [ ]Extubate    [ ] T-Piece  [x ] FiO2 - 100%    Requires chest PT, pulmonary toilet, ambu bagging, suctioning to maintain SaO2,  patent airway and treat atelectasis.     Chest Tube Drainage     Obesity OHS / DANIEL     Temporary pacemaker (TPM) interrogation and setting.     Requires bedside physical therapy, mobilization and total [facility] care.     Prerenal Azotemia     metabolic acidosis                         -                     Discussed with CT surgeon, Physician's Assistant - Nurse Practitioner- Critical care medicine team.   Discussed at  AM / PM rounds.   Chart, labs , films reviewed.    Cumulative Critical Care Time Given Today : 30 min

## 2022-05-02 NOTE — PROGRESS NOTE ADULT - PROBLEM SELECTOR PLAN 1
Suggest to continue IV insulin for now.  Encourage close monitoring of FS and adjusting insulin dose per ICU protocol to keep blood sugars within target range.  Will continue monitoring FS, log, and FU.

## 2022-05-02 NOTE — PROGRESS NOTE ADULT - ASSESSMENT
Assessment  DMT2: 46y Female with newly dx DM with hyperglycemia, A1C 7.5%, was not taking any hypoglycemic agents, euglycemic on insulin coverage preoperatively, now POD0 s/p CABG started on IV insulin, blood sugars trending up/running high, no hypoglycemias.  ?Goiter: no known history, has visible neck lump, euthyroid, US thyroid completed and unremarkable.  CAD: on medications, no chest pain, stable, monitored.  HTN: Controlled,  on antihypertensive medications.  HLD: On statin  Obesity: No strict exercise routines, not on any weight loss program, neither on low calorie diet.          Harjeet Varghese MD  Cell: [phone]  Office: [phone]               Assessment  DMT2: 46y Female with newly dx DM with hyperglycemia, A1C 7.5%, was not taking any hypoglycemic agents, euglycemic on insulin coverage preoperatively, now POD0 s/p CABG started on IV insulin, blood sugars trending up/running  high, no hypoglycemias.  ?Goiter: no known history, has visible neck lump, euthyroid, US thyroid completed and unremarkable.  CAD: on medications, no chest pain, stable, monitored.  HTN: Controlled,  on antihypertensive medications.  HLD: On statin  Obesity: No strict exercise routines, not on any weight loss program, neither on low calorie diet.          Harjeet Varghese MD  Cell: [phone]  Office: [phone]

## 2022-05-02 NOTE — PROGRESS NOTE ADULT - SUBJECTIVE AND OBJECTIVE BOX
Patient seen and examined at the bedside.    Remained critically ill on continuous ICU monitoring.    OBJECTIVE:  Vital Signs Last 24 Hrs  T(C): 36.1 (02 May 2022 16:00), Max: 43 (02 May 2022 13:15)  T(F): 97 (02 May 2022 16:00), Max: 109.4 (02 May 2022 13:15)  HR: 101 (02 May 2022 18:30) (66 - 105)  BP: 113/74 (02 May 2022 18:30) (105/64 - 159/88)  BP(mean): 90 (02 May 2022 18:30) (78 - 112)  RR: 12 (02 May 2022 18:30) (10 - 19)  SpO2: 100% (02 May 2022 18:30) (96% - 100%)    REVIEW OF SYSTEMS:  [x ] N/A    Physical Exam:  General: intubated multiple lines gtt & tubes   Neurology: sedated   Eyes: bilateral pupils equal and reactive   ENT/Neck: +ETT midline, Neck supple, trachea midline, No JVD   Respiratory: Rales noted bilaterally   CV: RRR, S1S2, no murmurs        [x] Sternal dressing, [x] Mediastinal CT, [x] Pleural CT         [x] Sinus rhythm / tachy [x] Temporary pacing - box off   Abdominal: Soft, NT, ND +BS,   Extremities: 1-2+ pedal edema noted, + peripheral pulses   Skin: No Rashes, Hematoma, Ecchymosis                           Assessment:  46F with hx of CAD s/p PCIx4 (last 2017) / HTN / HLD  / T2DM (not on medications)    CAD s/p C3L, L radial, POD #0      Plan:   ***Neuro***  [x] Sedation with [x] Precedex  - weaned off   Post operative neuro assessment     ***Cardiovascular***  Mildly decreased BiV function / Inferior and inferoseptal walls are hypokinetic.  Invasive hemodynamic monitoring, assess perfusion indices   SR, intermittently tachy / CVP 8 / MAP 81 / Hct 30.0%   Lactic acidosis / Lactate rising, 3.5    [x] Dobutamine  1.5mcg/kg/min  [ ] Levophed  - weaned to off   Volume: [x ] Albumin 1250cc post op   Reassessment of hemodynamics post resuscitation   Monitor chest tube outputs   [] Bleeding ___ / [] Nonbleeding ___   [x] ASA [x] Plavix    Serial EKG and cardiac enzymes     ***Pulmonary***  Post op vent management / PS vent weaning as tolerated   Titration of FiO2 and PEEP, follow SpO2, CXR, blood gasses     Mode: CPAP with PS  FiO2: 50  PEEP: 5  PS: 5     Will plan to wean & extubate once pt is hemodynamically stable and not bleeding    ***GI***  [x] NPO    [x] Protonix       ***Renal***      Continue to monitor I/Os, BUN/Creatinine.   Replete lytes PRN  Caldera present  [x ] positive     ***ID***  Perioperative antibiotics     ***Endocrine***  [x] Hyperglycemia  [x] DM2  : HbA1c 7.5%                - [x] Insulin gtt                - Need tight glycemic control to prevent wound infection.      Patient requires continuous monitoring with bedside rhythm monitoring, pulse oximetry monitoring, and continuous central venous and arterial pressure monitoring; and intermittent blood gas analysis. Care plan discussed with the ICU care team.   Patient remained critical, at risk for life threatening decompensation.    I have spent 30 minutes providing critical care management to this patient.    By signing my name below, I, Sridevi Singleton, attest that this documentation has been prepared under the direction and in the presence of Gema Clements MD   Electronically signed: Jamal Castillo, 05-02-22 @ 19:15    I, Gema Clements, personally performed the services described in this documentation. all medical record entries made by the rufinoibwes were at my direction and in my presence. I have reviewed the chart and agree that the record reflects my personal performance and is accurate and complete  Electronically signed: Gema Clements MD  Patient seen and examined at the bedside.    Remained critically ill on continuous ICU monitoring.    OBJECTIVE:  Vital Signs Last 24 Hrs  T(C): 36.1 (02 May 2022 16:00), Max: 43 (02 May 2022 13:15)  T(F): 97 (02 May 2022 16:00), Max: 109.4 (02 May 2022 13:15)  HR: 101 (02 May 2022 18:30) (66 - 105)  BP: 113/74 (02 May 2022 18:30) (105/64 - 159/88)  BP(mean): 90 (02 May 2022 18:30) (78 - 112)  RR: 12 (02 May 2022 18:30) (10 - 19)  SpO2: 100% (02 May 2022 18:30) (96% - 100%)    REVIEW OF SYSTEMS:  [x ] N/A    Physical Exam:  General: intubated multiple lines gtt & tubes   Neurology: sedated   Eyes: bilateral pupils equal and reactive   ENT/Neck: +ETT midline, Neck supple, trachea midline, No JVD   Respiratory: Rales noted bilaterally   CV: RRR, S1S2, no murmurs        [x] Sternal dressing, [x] Mediastinal CT, [x] Pleural CT         [x] Sinus rhythm / tachy [x] Temporary pacing - box off   Abdominal: Soft, NT, ND +BS,   Extremities: 1-2+ pedal edema noted, + peripheral pulses   Skin: No Rashes, Hematoma, Ecchymosis                           Assessment:  46F with hx of CAD s/p PCIx4 (last 2017) / HTN / HLD  / T2DM (not on medications)    CAD s/p C3L, L radial, POD #0      Plan:   ***Neuro***  [x] Sedation with [x] Precedex  - weaned off   Post operative neuro assessment     ***Cardiovascular***  Mildly decreased BiV function / Inferior and inferoseptal walls are hypokinetic.  Invasive hemodynamic monitoring, assess perfusion indices   SR, intermittently tachy / CVP 8 / MAP 81 / Hct 30.0%   Lactic acidosis / Lactate rising, 3.5    [x] Dobutamine  1.5mcg/kg/min  [ ] Levophed  - weaned to off   Volume: [x ] Albumin 1.2L post op   Reassessment of hemodynamics post resuscitation   Monitor chest tube outputs   [] Bleeding ___ / [] Nonbleeding ___   [x] ASA [x] Plavix    Serial EKG and cardiac enzymes     ***Pulmonary***  Post op vent management / PS vent weaning as tolerated   Titration of FiO2 and PEEP, follow SpO2, CXR, blood gasses     Mode: CPAP with PS  FiO2: 50  PEEP: 5  PS: 5     Will plan to wean & extubate once pt is hemodynamically stable and not bleeding    ***GI***  [x] NPO    [x] Protonix       ***Renal***      Continue to monitor I/Os, BUN/Creatinine.   Replete lytes PRN  Caldera present  [x ] positive     ***ID***  Perioperative antibiotics     ***Endocrine***  [x] Hyperglycemia  [x] DM2  : HbA1c 7.5%                - [x] Insulin gtt                - Need tight glycemic control to prevent wound infection.      Patient requires continuous monitoring with bedside rhythm monitoring, pulse oximetry monitoring, and continuous central venous and arterial pressure monitoring; and intermittent blood gas analysis. Care plan discussed with the ICU care team.   Patient remained critical, at risk for life threatening decompensation.    I have spent 30 minutes providing critical care management to this patient.    By signing my name below, I, Sridevi Singleton, attest that this documentation has been prepared under the direction and in the presence of Gema Clements MD   Electronically signed: Jamal Castillo, 05-02-22 @ 19:15    I, Gema Clements, personally performed the services described in this documentation. all medical record entries made by the rufinoibwes were at my direction and in my presence. I have reviewed the chart and agree that the record reflects my personal performance and is accurate and complete  Electronically signed: Gema Clements MD  Patient seen and examined at the bedside.    Remained critically ill on continuous ICU monitoring.    OBJECTIVE:  Vital Signs Last 24 Hrs  T(C): 36.1 (02 May 2022 16:00), Max: 43 (02 May 2022 13:15)  T(F): 97 (02 May 2022 16:00), Max: 109.4 (02 May 2022 13:15)  HR: 101 (02 May 2022 18:30) (66 - 105)  BP: 113/74 (02 May 2022 18:30) (105/64 - 159/88)  BP(mean): 90 (02 May 2022 18:30) (78 - 112)  RR: 12 (02 May 2022 18:30) (10 - 19)  SpO2: 100% (02 May 2022 18:30) (96% - 100%)    REVIEW OF SYSTEMS:  [x ] N/A    Physical Exam:  General: intubated multiple lines gtt & tubes   Neurology: sedated   Eyes: bilateral pupils equal and reactive   ENT/Neck: +ETT midline, Neck supple, trachea midline, No JVD   Respiratory: Rales noted bilaterally   CV: RRR, S1S2, no murmurs        [x] Sternal dressing, [x] Mediastinal CT, [x] Pleural CT         [x] Sinus rhythm / tachy [x] Temporary pacing - box off   Abdominal: Soft, NT, ND +BS,   Extremities: 1-2+ pedal edema noted, + peripheral pulses   Skin: No Rashes, Hematoma, Ecchymosis                           Assessment:  46F with hx of CAD s/p PCIx4 (last 2017) / HTN / HLD  / T2DM / Obesity BMI 40    S/P Cath multivessel CAD   S/P C3L, L radial, POD #0  Post op multifactorial hypotension related to hypovolemia & mild LV/RV dysfunction  Lactic acidosis  DM2 / Hyperglycemia       Plan:   ***Neuro***  [x] Sedation with [x] Precedex  - weaned off   Post operative neuro assessment     ***Cardiovascular***  Mildly decreased BiV function / Inferior and inferoseptal walls are hypokinetic.  Invasive hemodynamic monitoring, assess perfusion indices   SR, intermittently tachy / CVP 8 / MAP 81 / Hct 30.0% / ScVO2 75 / Lactate 3.8      [x] Dobutamine  1.5mcg/kg/min  [ ] Levophed  - weaned to off   Volume: [x ] Albumin 1250 cc  Reassessment of hemodynamics post resuscitation   Monitor chest tube outputs   [x] Nonbleeding  [x] ASA [x] Plavix [x] Statins     Serial EKG and cardiac enzymes     ***Pulmonary***  Post op vent management / PS vent weaning as tolerated   Titration of FiO2 and PEEP, follow SpO2, CXR, blood gases  BMI 40 at risk for post op pulmonary complication     Mode: CPAP with PS  FiO2: 50  PEEP: 5  PS: 5     Will plan to wean & extubate once pt is hemodynamically stable and not bleeding    ***GI***  [x] NPO    [x] Protonix       ***Renal***      Continue to monitor I/Os, BUN/Creatinine.   Replete lytes PRN  Caldera present  [x ] positive     ***ID***  Perioperative antibiotics     ***Endocrine***  [x] Hyperglycemia  [x] DM2  : HbA1c 7.5%                - [x] Insulin gtt                - Need tight glycemic control to prevent wound infection.      Patient requires continuous monitoring with bedside rhythm monitoring, pulse oximetry monitoring, and continuous central venous and arterial pressure monitoring; and intermittent blood gas analysis. Care plan discussed with the ICU care team.   Patient remained critical, at risk for life threatening decompensation.    I have spent 30 minutes providing critical care management to this patient.    By signing my name below, I, Sridevi Singleton, attest that this documentation has been prepared under the direction and in the presence of Gema Clements MD   Electronically signed: Jamal Castillo, 05-02-22 @ 19:15    I, Gema Clements, personally performed the services described in this documentation. all medical record entries made by the scribe were at my direction and in my presence. I have reviewed the chart and agree that the record reflects my personal performance and is accurate and complete  Electronically signed: Gema Clements MD

## 2022-05-03 LAB
ALBUMIN SERPL ELPH-MCNC: 4.7 G/DL — SIGNIFICANT CHANGE UP (ref 3.3–5)
ALP SERPL-CCNC: 42 U/L — SIGNIFICANT CHANGE UP (ref 40–120)
ALT FLD-CCNC: 25 U/L — SIGNIFICANT CHANGE UP (ref 10–45)
ANION GAP SERPL CALC-SCNC: 16 MMOL/L — SIGNIFICANT CHANGE UP (ref 5–17)
APTT BLD: 23.5 SEC — LOW (ref 27.5–35.5)
AST SERPL-CCNC: 69 U/L — HIGH (ref 10–40)
BASE EXCESS BLDV CALC-SCNC: -1.3 MMOL/L — SIGNIFICANT CHANGE UP (ref -2–2)
BASE EXCESS BLDV CALC-SCNC: -1.8 MMOL/L — SIGNIFICANT CHANGE UP (ref -2–2)
BILIRUB SERPL-MCNC: 0.8 MG/DL — SIGNIFICANT CHANGE UP (ref 0.2–1.2)
BUN SERPL-MCNC: 16 MG/DL — SIGNIFICANT CHANGE UP (ref 7–23)
CALCIUM SERPL-MCNC: 8.2 MG/DL — LOW (ref 8.4–10.5)
CHLORIDE SERPL-SCNC: 104 MMOL/L — SIGNIFICANT CHANGE UP (ref 96–108)
CO2 BLDV-SCNC: 25 MMOL/L — SIGNIFICANT CHANGE UP (ref 22–26)
CO2 BLDV-SCNC: 26 MMOL/L — SIGNIFICANT CHANGE UP (ref 22–26)
CO2 SERPL-SCNC: 20 MMOL/L — LOW (ref 22–31)
CREAT SERPL-MCNC: 0.58 MG/DL — SIGNIFICANT CHANGE UP (ref 0.5–1.3)
EGFR: 112 ML/MIN/1.73M2 — SIGNIFICANT CHANGE UP
FIBRINOGEN PPP-MCNC: 331 MG/DL — SIGNIFICANT CHANGE UP (ref 330–520)
GAS PNL BLDA: SIGNIFICANT CHANGE UP
GAS PNL BLDA: SIGNIFICANT CHANGE UP
GAS PNL BLDV: SIGNIFICANT CHANGE UP
GAS PNL BLDV: SIGNIFICANT CHANGE UP
GLUCOSE BLDC GLUCOMTR-MCNC: 114 MG/DL — HIGH (ref 70–99)
GLUCOSE BLDC GLUCOMTR-MCNC: 118 MG/DL — HIGH (ref 70–99)
GLUCOSE BLDC GLUCOMTR-MCNC: 123 MG/DL — HIGH (ref 70–99)
GLUCOSE BLDC GLUCOMTR-MCNC: 131 MG/DL — HIGH (ref 70–99)
GLUCOSE BLDC GLUCOMTR-MCNC: 138 MG/DL — HIGH (ref 70–99)
GLUCOSE BLDC GLUCOMTR-MCNC: 139 MG/DL — HIGH (ref 70–99)
GLUCOSE BLDC GLUCOMTR-MCNC: 147 MG/DL — HIGH (ref 70–99)
GLUCOSE BLDC GLUCOMTR-MCNC: 157 MG/DL — HIGH (ref 70–99)
GLUCOSE BLDC GLUCOMTR-MCNC: 161 MG/DL — HIGH (ref 70–99)
GLUCOSE BLDC GLUCOMTR-MCNC: 165 MG/DL — HIGH (ref 70–99)
GLUCOSE BLDC GLUCOMTR-MCNC: 95 MG/DL — SIGNIFICANT CHANGE UP (ref 70–99)
GLUCOSE SERPL-MCNC: 167 MG/DL — HIGH (ref 70–99)
HCO3 BLDV-SCNC: 24 MMOL/L — SIGNIFICANT CHANGE UP (ref 22–29)
HCO3 BLDV-SCNC: 24 MMOL/L — SIGNIFICANT CHANGE UP (ref 22–29)
HCT VFR BLD CALC: 26.1 % — LOW (ref 34.5–45)
HGB BLD-MCNC: 8.7 G/DL — LOW (ref 11.5–15.5)
HOROWITZ INDEX BLDV+IHG-RTO: 40 — SIGNIFICANT CHANGE UP
HOROWITZ INDEX BLDV+IHG-RTO: 50 — SIGNIFICANT CHANGE UP
INR BLD: 1.38 RATIO — HIGH (ref 0.88–1.16)
MAGNESIUM SERPL-MCNC: 2 MG/DL — SIGNIFICANT CHANGE UP (ref 1.6–2.6)
MCHC RBC-ENTMCNC: 30.5 PG — SIGNIFICANT CHANGE UP (ref 27–34)
MCHC RBC-ENTMCNC: 33.3 GM/DL — SIGNIFICANT CHANGE UP (ref 32–36)
MCV RBC AUTO: 91.6 FL — SIGNIFICANT CHANGE UP (ref 80–100)
NRBC # BLD: 0 /100 WBCS — SIGNIFICANT CHANGE UP (ref 0–0)
PCO2 BLDV: 42 MMHG — SIGNIFICANT CHANGE UP (ref 39–42)
PCO2 BLDV: 43 MMHG — HIGH (ref 39–42)
PH BLDV: 7.36 — SIGNIFICANT CHANGE UP (ref 7.32–7.43)
PH BLDV: 7.36 — SIGNIFICANT CHANGE UP (ref 7.32–7.43)
PHOSPHATE SERPL-MCNC: 1.3 MG/DL — LOW (ref 2.5–4.5)
PLATELET # BLD AUTO: 114 K/UL — LOW (ref 150–400)
PO2 BLDV: 38 MMHG — SIGNIFICANT CHANGE UP (ref 25–45)
PO2 BLDV: 40 MMHG — SIGNIFICANT CHANGE UP (ref 25–45)
POTASSIUM SERPL-MCNC: 3.9 MMOL/L — SIGNIFICANT CHANGE UP (ref 3.5–5.3)
POTASSIUM SERPL-SCNC: 3.9 MMOL/L — SIGNIFICANT CHANGE UP (ref 3.5–5.3)
PROT SERPL-MCNC: 6.1 G/DL — SIGNIFICANT CHANGE UP (ref 6–8.3)
PROTHROM AB SERPL-ACNC: 15.9 SEC — HIGH (ref 10.5–13.4)
RBC # BLD: 2.85 M/UL — LOW (ref 3.8–5.2)
RBC # FLD: 13.1 % — SIGNIFICANT CHANGE UP (ref 10.3–14.5)
SAO2 % BLDV: 67.8 % — SIGNIFICANT CHANGE UP (ref 67–88)
SAO2 % BLDV: 68.7 % — SIGNIFICANT CHANGE UP (ref 67–88)
SODIUM SERPL-SCNC: 140 MMOL/L — SIGNIFICANT CHANGE UP (ref 135–145)
WBC # BLD: 13.19 K/UL — HIGH (ref 3.8–10.5)
WBC # FLD AUTO: 13.19 K/UL — HIGH (ref 3.8–10.5)

## 2022-05-03 PROCEDURE — 99291 CRITICAL CARE FIRST HOUR: CPT | Mod: 24

## 2022-05-03 PROCEDURE — 71045 X-RAY EXAM CHEST 1 VIEW: CPT | Mod: 26

## 2022-05-03 PROCEDURE — 93010 ELECTROCARDIOGRAM REPORT: CPT

## 2022-05-03 RX ORDER — POTASSIUM CHLORIDE 20 MEQ
10 PACKET (EA) ORAL
Refills: 0 | Status: COMPLETED | OUTPATIENT
Start: 2022-05-03 | End: 2022-05-03

## 2022-05-03 RX ORDER — PANTOPRAZOLE SODIUM 20 MG/1
40 TABLET, DELAYED RELEASE ORAL
Refills: 0 | Status: DISCONTINUED | OUTPATIENT
Start: 2022-05-03 | End: 2022-05-07

## 2022-05-03 RX ORDER — ATORVASTATIN CALCIUM 80 MG/1
80 TABLET, FILM COATED ORAL AT BEDTIME
Refills: 0 | Status: DISCONTINUED | OUTPATIENT
Start: 2022-05-03 | End: 2022-05-07

## 2022-05-03 RX ORDER — AMLODIPINE BESYLATE 2.5 MG/1
2.5 TABLET ORAL DAILY
Refills: 0 | Status: DISCONTINUED | OUTPATIENT
Start: 2022-05-03 | End: 2022-05-07

## 2022-05-03 RX ORDER — INSULIN LISPRO 100/ML
6 VIAL (ML) SUBCUTANEOUS
Refills: 0 | Status: DISCONTINUED | OUTPATIENT
Start: 2022-05-03 | End: 2022-05-04

## 2022-05-03 RX ORDER — MAGNESIUM SULFATE 500 MG/ML
1 VIAL (ML) INJECTION ONCE
Refills: 0 | Status: COMPLETED | OUTPATIENT
Start: 2022-05-03 | End: 2022-05-03

## 2022-05-03 RX ORDER — INSULIN GLARGINE 100 [IU]/ML
18 INJECTION, SOLUTION SUBCUTANEOUS AT BEDTIME
Refills: 0 | Status: DISCONTINUED | OUTPATIENT
Start: 2022-05-03 | End: 2022-05-04

## 2022-05-03 RX ORDER — ENOXAPARIN SODIUM 100 MG/ML
40 INJECTION SUBCUTANEOUS EVERY 24 HOURS
Refills: 0 | Status: DISCONTINUED | OUTPATIENT
Start: 2022-05-03 | End: 2022-05-07

## 2022-05-03 RX ORDER — INSULIN LISPRO 100/ML
VIAL (ML) SUBCUTANEOUS
Refills: 0 | Status: DISCONTINUED | OUTPATIENT
Start: 2022-05-03 | End: 2022-05-07

## 2022-05-03 RX ORDER — FUROSEMIDE 40 MG
20 TABLET ORAL ONCE
Refills: 0 | Status: COMPLETED | OUTPATIENT
Start: 2022-05-03 | End: 2022-05-03

## 2022-05-03 RX ORDER — POTASSIUM CHLORIDE 20 MEQ
20 PACKET (EA) ORAL DAILY
Refills: 0 | Status: COMPLETED | OUTPATIENT
Start: 2022-05-03 | End: 2022-05-05

## 2022-05-03 RX ORDER — METOPROLOL TARTRATE 50 MG
25 TABLET ORAL EVERY 12 HOURS
Refills: 0 | Status: DISCONTINUED | OUTPATIENT
Start: 2022-05-03 | End: 2022-05-04

## 2022-05-03 RX ORDER — HYDROMORPHONE HYDROCHLORIDE 2 MG/ML
0.5 INJECTION INTRAMUSCULAR; INTRAVENOUS; SUBCUTANEOUS ONCE
Refills: 0 | Status: DISCONTINUED | OUTPATIENT
Start: 2022-05-03 | End: 2022-05-03

## 2022-05-03 RX ORDER — FUROSEMIDE 40 MG
20 TABLET ORAL DAILY
Refills: 0 | Status: COMPLETED | OUTPATIENT
Start: 2022-05-03 | End: 2022-05-06

## 2022-05-03 RX ADMIN — Medication 20 MILLIGRAM(S): at 18:25

## 2022-05-03 RX ADMIN — Medication 100 MILLIEQUIVALENT(S): at 01:38

## 2022-05-03 RX ADMIN — HYDROMORPHONE HYDROCHLORIDE 0.5 MILLIGRAM(S): 2 INJECTION INTRAMUSCULAR; INTRAVENOUS; SUBCUTANEOUS at 10:23

## 2022-05-03 RX ADMIN — HYDROMORPHONE HYDROCHLORIDE 0.5 MILLIGRAM(S): 2 INJECTION INTRAMUSCULAR; INTRAVENOUS; SUBCUTANEOUS at 10:48

## 2022-05-03 RX ADMIN — Medication 650 MILLIGRAM(S): at 17:39

## 2022-05-03 RX ADMIN — Medication 650 MILLIGRAM(S): at 11:33

## 2022-05-03 RX ADMIN — Medication 500 MILLIGRAM(S): at 17:33

## 2022-05-03 RX ADMIN — GABAPENTIN 100 MILLIGRAM(S): 400 CAPSULE ORAL at 14:00

## 2022-05-03 RX ADMIN — GABAPENTIN 100 MILLIGRAM(S): 400 CAPSULE ORAL at 05:15

## 2022-05-03 RX ADMIN — Medication 650 MILLIGRAM(S): at 17:33

## 2022-05-03 RX ADMIN — CLOPIDOGREL BISULFATE 75 MILLIGRAM(S): 75 TABLET, FILM COATED ORAL at 11:20

## 2022-05-03 RX ADMIN — Medication 20 MILLIGRAM(S): at 11:21

## 2022-05-03 RX ADMIN — HYDROMORPHONE HYDROCHLORIDE 0.5 MILLIGRAM(S): 2 INJECTION INTRAMUSCULAR; INTRAVENOUS; SUBCUTANEOUS at 05:50

## 2022-05-03 RX ADMIN — Medication 6 UNIT(S): at 12:00

## 2022-05-03 RX ADMIN — Medication 500 MILLIGRAM(S): at 05:15

## 2022-05-03 RX ADMIN — Medication 100 GRAM(S): at 01:34

## 2022-05-03 RX ADMIN — Medication 25 MILLIGRAM(S): at 17:33

## 2022-05-03 RX ADMIN — GABAPENTIN 100 MILLIGRAM(S): 400 CAPSULE ORAL at 22:11

## 2022-05-03 RX ADMIN — Medication 20 MILLIEQUIVALENT(S): at 11:20

## 2022-05-03 RX ADMIN — Medication 100 MILLIGRAM(S): at 07:33

## 2022-05-03 RX ADMIN — Medication 6 UNIT(S): at 16:46

## 2022-05-03 RX ADMIN — Medication 25 MILLIGRAM(S): at 10:24

## 2022-05-03 RX ADMIN — AMLODIPINE BESYLATE 2.5 MILLIGRAM(S): 2.5 TABLET ORAL at 14:01

## 2022-05-03 RX ADMIN — Medication 650 MILLIGRAM(S): at 05:15

## 2022-05-03 RX ADMIN — HYDROMORPHONE HYDROCHLORIDE 0.5 MILLIGRAM(S): 2 INJECTION INTRAMUSCULAR; INTRAVENOUS; SUBCUTANEOUS at 03:00

## 2022-05-03 RX ADMIN — HYDROMORPHONE HYDROCHLORIDE 0.5 MILLIGRAM(S): 2 INJECTION INTRAMUSCULAR; INTRAVENOUS; SUBCUTANEOUS at 02:42

## 2022-05-03 RX ADMIN — Medication 81 MILLIGRAM(S): at 11:19

## 2022-05-03 RX ADMIN — Medication 83.33 MILLIMOLE(S): at 01:34

## 2022-05-03 RX ADMIN — HYDROMORPHONE HYDROCHLORIDE 0.5 MILLIGRAM(S): 2 INJECTION INTRAMUSCULAR; INTRAVENOUS; SUBCUTANEOUS at 06:05

## 2022-05-03 RX ADMIN — Medication 2: at 16:46

## 2022-05-03 RX ADMIN — Medication 100 MILLIGRAM(S): at 16:04

## 2022-05-03 RX ADMIN — CHLORHEXIDINE GLUCONATE 1 APPLICATION(S): 213 SOLUTION TOPICAL at 12:14

## 2022-05-03 RX ADMIN — Medication 650 MILLIGRAM(S): at 12:14

## 2022-05-03 RX ADMIN — ATORVASTATIN CALCIUM 80 MILLIGRAM(S): 80 TABLET, FILM COATED ORAL at 22:11

## 2022-05-03 RX ADMIN — AMIODARONE HYDROCHLORIDE 400 MILLIGRAM(S): 400 TABLET ORAL at 05:15

## 2022-05-03 RX ADMIN — INSULIN GLARGINE 18 UNIT(S): 100 INJECTION, SOLUTION SUBCUTANEOUS at 22:11

## 2022-05-03 RX ADMIN — POLYETHYLENE GLYCOL 3350 17 GRAM(S): 17 POWDER, FOR SOLUTION ORAL at 13:37

## 2022-05-03 RX ADMIN — AMIODARONE HYDROCHLORIDE 400 MILLIGRAM(S): 400 TABLET ORAL at 17:33

## 2022-05-03 RX ADMIN — HYDROMORPHONE HYDROCHLORIDE 0.5 MILLIGRAM(S): 2 INJECTION INTRAMUSCULAR; INTRAVENOUS; SUBCUTANEOUS at 22:37

## 2022-05-03 RX ADMIN — Medication 650 MILLIGRAM(S): at 05:45

## 2022-05-03 RX ADMIN — Medication 100 MILLIEQUIVALENT(S): at 00:55

## 2022-05-03 RX ADMIN — Medication 100 MILLIEQUIVALENT(S): at 02:43

## 2022-05-03 RX ADMIN — ENOXAPARIN SODIUM 40 MILLIGRAM(S): 100 INJECTION SUBCUTANEOUS at 11:19

## 2022-05-03 RX ADMIN — HYDROMORPHONE HYDROCHLORIDE 0.5 MILLIGRAM(S): 2 INJECTION INTRAMUSCULAR; INTRAVENOUS; SUBCUTANEOUS at 23:38

## 2022-05-03 RX ADMIN — PANTOPRAZOLE SODIUM 40 MILLIGRAM(S): 20 TABLET, DELAYED RELEASE ORAL at 11:30

## 2022-05-03 NOTE — OCCUPATIONAL THERAPY INITIAL EVALUATION ADULT - PRECAUTIONS/LIMITATIONS, REHAB EVAL
Chest pain is pressure-like, radiates to neck, associated with shortness of breath. Of note, she reports she had a stress test with PMD one month ago which was negative. She denies fevers, chills, diaphoresis, abdominal pain, n/v/d, dysuria. Pt s/p cardiac catherization at [facility] with evidence of triple vessel disease. Pt transferred to [facility] for cardiac surgery evaluation with Dr. Abreu. Pt scheduled for CABG with Dr. Abreu on Monday, 05/02. Pt s/p CABG x3/cardiac precautions/sternal precautions

## 2022-05-03 NOTE — PROGRESS NOTE ADULT - SUBJECTIVE AND OBJECTIVE BOX
Chief complaint  Patient is a 46y old  Female who presents with a chief complaint of Triple Vessel Disease (03 May 2022 11:51)   Review of systems  Patient is postop, looks comfortable, no hypoglycemic episodes.    Labs and Fingersticks  CAPILLARY BLOOD GLUCOSE  131 (03 May 2022 07:00)  157 (03 May 2022 06:00)  139 (03 May 2022 05:00)  165 (03 May 2022 03:00)  157 (03 May 2022 02:00)  161 (03 May 2022 01:00)  168 (03 May 2022 00:00)  176 (02 May 2022 23:00)  181 (02 May 2022 22:00)  198 (02 May 2022 21:00)  191 (02 May 2022 20:00)  193 (02 May 2022 19:00)  192 (02 May 2022 18:00)  181 (02 May 2022 17:00)  182 (02 May 2022 16:00)      POCT Blood Glucose.: 138 mg/dL (03 May 2022 11:38)  POCT Blood Glucose.: 114 mg/dL (03 May 2022 10:30)  POCT Blood Glucose.: 123 mg/dL (03 May 2022 09:01)  POCT Blood Glucose.: 118 mg/dL (03 May 2022 07:28)  POCT Blood Glucose.: 131 mg/dL (03 May 2022 06:45)  POCT Blood Glucose.: 157 mg/dL (03 May 2022 05:52)  POCT Blood Glucose.: 139 mg/dL (03 May 2022 05:07)  POCT Blood Glucose.: 165 mg/dL (03 May 2022 02:51)  POCT Blood Glucose.: 157 mg/dL (03 May 2022 01:55)  POCT Blood Glucose.: 161 mg/dL (03 May 2022 00:48)  POCT Blood Glucose.: 168 mg/dL (02 May 2022 23:44)  POCT Blood Glucose.: 176 mg/dL (02 May 2022 22:54)  POCT Blood Glucose.: 181 mg/dL (02 May 2022 21:53)  POCT Blood Glucose.: 198 mg/dL (02 May 2022 20:51)  POCT Blood Glucose.: 191 mg/dL (02 May 2022 19:42)  POCT Blood Glucose.: 193 mg/dL (02 May 2022 18:51)  POCT Blood Glucose.: 192 mg/dL (02 May 2022 18:17)  POCT Blood Glucose.: 181 mg/dL (02 May 2022 17:09)  POCT Blood Glucose.: 182 mg/dL (02 May 2022 16:16)      Anion Gap, Serum: 16 (05-03 @ 00:32)  Anion Gap, Serum: 13 (05-02 @ 14:11)      Calcium, Total Serum: 8.2 *L* (05-03 @ 00:32)  Calcium, Total Serum: 8.3 *L* (05-02 @ 14:11)  Albumin, Serum: 4.7 (05-03 @ 00:32)  Albumin, Serum: 2.8 *L* (05-02 @ 14:11)    Alanine Aminotransferase (ALT/SGPT): 25 (05-03 @ 00:32)  Alanine Aminotransferase (ALT/SGPT): 24 (05-02 @ 14:11)  Alkaline Phosphatase, Serum: 42 (05-03 @ 00:32)  Alkaline Phosphatase, Serum: 65 (05-02 @ 14:11)  Aspartate Aminotransferase (AST/SGOT): 69 *H* (05-03 @ 00:32)  Aspartate Aminotransferase (AST/SGOT): 45 *H* (05-02 @ 14:11)        05-03    140  |  104  |  16  ----------------------------<  167<H>  3.9   |  20<L>  |  0.58    Ca    8.2<L>      03 May 2022 00:32  Phos  1.3     05-03  Mg     2.0     05-03    TPro  6.1  /  Alb  4.7  /  TBili  0.8  /  DBili  x   /  AST  69<H>  /  ALT  25  /  AlkPhos  42  05-03                        8.7    13.19 )-----------( 114      ( 03 May 2022 00:32 )             26.1     Medications  MEDICATIONS  (STANDING):  acetaminophen     Tablet .. 650 milliGRAM(s) Oral every 6 hours  aMIOdarone    Tablet 400 milliGRAM(s) Oral two times a day  amLODIPine   Tablet 2.5 milliGRAM(s) Oral daily  ascorbic acid 500 milliGRAM(s) Oral two times a day  aspirin enteric coated 81 milliGRAM(s) Oral daily  atorvastatin 80 milliGRAM(s) Oral at bedtime  cefuroxime  IVPB 1500 milliGRAM(s) IV Intermittent every 8 hours  chlorhexidine 2% Cloths 1 Application(s) Topical daily  clopidogrel Tablet 75 milliGRAM(s) Oral daily  dextrose 50% Injectable 50 milliLiter(s) IV Push every 15 minutes  enoxaparin Injectable 40 milliGRAM(s) SubCutaneous every 24 hours  furosemide    Tablet 20 milliGRAM(s) Oral daily  gabapentin 100 milliGRAM(s) Oral every 8 hours  insulin glargine Injectable (LANTUS) 18 Unit(s) SubCutaneous at bedtime  insulin lispro (ADMELOG) corrective regimen sliding scale   SubCutaneous Before meals and at bedtime  insulin lispro Injectable (ADMELOG) 6 Unit(s) SubCutaneous three times a day before meals  insulin regular Infusion 3 Unit(s)/Hr (3 mL/Hr) IV Continuous <Continuous>  metoprolol tartrate 25 milliGRAM(s) Oral every 12 hours  pantoprazole  Injectable 40 milliGRAM(s) IV Push daily  polyethylene glycol 3350 17 Gram(s) Oral daily  potassium chloride    Tablet ER 20 milliEquivalent(s) Oral daily  senna 2 Tablet(s) Oral at bedtime  sodium chloride 0.9%. 1000 milliLiter(s) (10 mL/Hr) IV Continuous <Continuous>      Physical Exam  General: Patient comfortable in bed  Vital Signs Last 12 Hrs  T(F): 99 (05-03-22 @ 11:00), Max: 99 (05-03-22 @ 04:00)  HR: 88 (05-03-22 @ 15:00) (88 - 111)  BP: 121/69 (05-03-22 @ 15:00) (103/70 - 125/78)  BP(mean): 89 (05-03-22 @ 15:00) (88 - 96)  RR: 12 (05-03-22 @ 15:00) (11 - 23)  SpO2: 99% (05-03-22 @ 15:00) (98% - 100%)  Neck: No palpable thyroid nodules.  CVS: S1S2, No murmurs  Respiratory: No wheezing, no crepitations  GI: Abdomen soft, bowel sounds positive  Musculoskeletal:  edema lower extremities.   Skin: No skin rashes, no ecchymosis    Diagnostics             Chief complaint  Patient is a 46y old  Female who presents with a chief complaint of Triple Vessel Disease (03 May 2022 11:51)   Review of systems  Patient is postop, looks comfortable, no hypoglycemic episodes.    Labs and Fingersticks  CAPILLARY BLOOD GLUCOSE  131 (03 May 2022 07:00)  157 (03 May 2022 06:00)  139 (03 May 2022 05:00)  165 (03 May 2022 03:00)  157 (03 May 2022 02:00)  161 (03 May 2022 01:00)  168 (03 May 2022 00:00)  176 (02 May 2022 23:00)  181 (02 May 2022 22:00)  198 (02 May 2022 21:00)  191 (02 May 2022 20:00)  193 (02 May 2022 19:00)  192 (02 May 2022 18:00)  181 (02 May 2022 17:00)  182 (02 May 2022 16:00)      POCT Blood Glucose.: 138 mg/dL (03 May 2022 11:38)  POCT Blood Glucose.: 114 mg/dL (03 May 2022 10:30)  POCT Blood Glucose.: 123 mg/dL (03 May 2022 09:01)  POCT Blood Glucose.: 118 mg/dL (03 May 2022 07:28)  POCT Blood Glucose.: 131 mg/dL (03 May 2022 06:45)  POCT Blood Glucose.: 157 mg/dL (03 May 2022 05:52)  POCT Blood Glucose.: 139 mg/dL (03 May 2022 05:07)  POCT Blood Glucose.: 165 mg/dL (03 May 2022 02:51)  POCT Blood Glucose.: 157 mg/dL (03 May 2022 01:55)  POCT Blood Glucose.: 161 mg/dL (03 May 2022 00:48)  POCT Blood Glucose.: 168 mg/dL (02 May 2022 23:44)  POCT Blood Glucose.: 176 mg/dL (02 May 2022 22:54)  POCT Blood Glucose.: 181 mg/dL (02 May 2022 21:53)  POCT Blood Glucose.: 198 mg/dL (02 May 2022 20:51)  POCT Blood Glucose.: 191 mg/dL (02 May 2022 19:42)  POCT Blood Glucose.: 193 mg/dL (02 May 2022 18:51)  POCT Blood Glucose.: 192 mg/dL (02 May 2022 18:17)  POCT Blood Glucose.: 181 mg/dL (02 May 2022 17:09)  POCT Blood Glucose.: 182 mg/dL (02 May 2022 16:16)      Anion Gap, Serum: 16 (05-03 @ 00:32)  Anion Gap, Serum: 13 (05-02 @ 14:11)      Calcium, Total Serum: 8.2 *L* (05-03 @ 00:32)  Calcium, Total Serum: 8.3 *L* (05-02 @ 14:11)  Albumin, Serum: 4.7 (05-03 @ 00:32)  Albumin, Serum: 2.8 *L* (05-02 @ 14:11)    Alanine Aminotransferase (ALT/SGPT): 25 (05-03 @ 00:32)  Alanine Aminotransferase (ALT/SGPT): 24 (05-02 @ 14:11)  Alkaline Phosphatase, Serum: 42 (05-03 @ 00:32)  Alkaline Phosphatase, Serum: 65 (05-02 @ 14:11)  Aspartate Aminotransferase (AST/SGOT): 69 *H* (05-03 @ 00:32)  Aspartate Aminotransferase (AST/SGOT): 45 *H* (05-02 @ 14:11)        05-03    140  |  104  |  16  ----------------------------<  167<H>  3.9   |  20<L>  |  0.58    Ca    8.2<L>      03 May 2022 00:32  Phos  1.3     05-03  Mg     2.0     05-03    TPro  6.1  /  Alb  4.7  /  TBili  0.8  /  DBili  x   /  AST  69<H>  /  ALT  25  /  AlkPhos  42  05-03                        8.7    13.19 )-----------( 114      ( 03 May 2022 00:32 )             26.1     Medications  MEDICATIONS  (STANDING):  acetaminophen     Tablet .. 650 milliGRAM(s) Oral every 6 hours  aMIOdarone    Tablet 400 milliGRAM(s) Oral two times a day  amLODIPine   Tablet 2.5 milliGRAM(s) Oral daily  ascorbic acid 500 milliGRAM(s) Oral two times a day  aspirin enteric coated 81 milliGRAM(s) Oral daily  atorvastatin 80 milliGRAM(s) Oral at bedtime  cefuroxime  IVPB 1500 milliGRAM(s) IV Intermittent every 8 hours  chlorhexidine 2% Cloths 1 Application(s) Topical daily  clopidogrel Tablet 75 milliGRAM(s) Oral daily  dextrose 50% Injectable 50 milliLiter(s) IV Push every 15 minutes  enoxaparin Injectable 40 milliGRAM(s) SubCutaneous every 24 hours  furosemide    Tablet 20 milliGRAM(s) Oral daily  gabapentin 100 milliGRAM(s) Oral every 8 hours  insulin glargine Injectable (LANTUS) 18 Unit(s) SubCutaneous at bedtime  insulin lispro (ADMELOG) corrective regimen sliding scale   SubCutaneous Before meals and at bedtime  insulin lispro Injectable (ADMELOG) 6 Unit(s) SubCutaneous three times a day before meals  insulin regular Infusion 3 Unit(s)/Hr (3 mL/Hr) IV Continuous <Continuous>  metoprolol tartrate 25 milliGRAM(s) Oral every 12 hours  pantoprazole  Injectable 40 milliGRAM(s) IV Push daily  polyethylene glycol 3350 17 Gram(s) Oral daily  potassium chloride    Tablet ER 20 milliEquivalent(s) Oral daily  senna 2 Tablet(s) Oral at bedtime  sodium chloride 0.9%. 1000 milliLiter(s) (10 mL/Hr) IV Continuous <Continuous>      Physical Exam  General: Patient comfortable in bed  Vital Signs Last 12 Hrs  T(F): 99 (05-03-22 @ 11:00), Max: 99 (05-03-22 @ 04:00)  HR: 88 (05-03-22 @ 15:00) (88 - 111)  BP: 121/69 (05-03-22 @ 15:00) (103/70 - 125/78)  BP(mean): 89 (05-03-22 @ 15:00) (88 - 96)  RR: 12 (05-03-22 @ 15:00) (11 - 23)  SpO2: 99% (05-03-22 @ 15:00) (98% - 100%)  Neck: No palpable thyroid nodules.  CVS: S1S2, No murmurs  Respiratory: No wheezing, no crepitations  GI: Abdomen soft, bowel sounds positive  Musculoskeletal:  edema lower extremities.   Skin: No skin rashes, no ecchymosis    Diagnostics

## 2022-05-03 NOTE — PROGRESS NOTE ADULT - SUBJECTIVE AND OBJECTIVE BOX
Patient seen and examined at the bedside.    Remained critically ill on continuous ICU monitoring.    OBJECTIVE:  Vital Signs Last 24 Hrs  T(C): 37.2 (03 May 2022 11:00), Max: 43 (02 May 2022 13:15)  T(F): 99 (03 May 2022 11:00), Max: 109.4 (02 May 2022 13:15)  HR: 98 (03 May 2022 11:00) (89 - 111)  BP: 103/70 (03 May 2022 11:00) (103/70 - 143/79)  BP(mean): 88 (03 May 2022 11:00) (78 - 104)  RR: 12 (03 May 2022 11:00) (10 - 23)  SpO2: 100% (03 May 2022 11:00) (96% - 100%)      Physical Exam:   General: NAD   Neurology: nonfocal   Eyes: bilateral pupils equal and reactive   ENT/Neck: Neck supple, trachea midline, No JVD   Respiratory: Clear bilaterally   CV: S1S2, no murmurs        [x] Sternal dressing, [x] Mediastinal CT, [x] Pleural CT        [x] Sinus rhythm  Abdominal: Soft, NT, ND +BS   Extremities: 1-2+ pedal edema noted, + peripheral pulses   Skin: No Rashes, Hematoma, Ecchymosis                           Assessment:  46F with hx of CAD s/p PCIx4 (last 2017) / HTN / HLD  / T2DM / Obesity BMI 40    Cath multivessel CAD S/P C3L, L radial 5/2/22  Post op multifactorial hypotension related to hypovolemia & mild LV/RV dysfunction  Lactic acidosis  DM2 / Hyperglycemia   Acute blood loss anemia  Thrombocytopenia   Leukocytosis      Plan:   ***Neuro***   [x] Nonfocal   Post operative neuro assessment     ***Cardiovascular***  Invasive hemodynamic monitoring, assess perfusion indices   SR / CVP 12 / MAP 87 / Hct 26.1 / Lactate 2.2   Volume: [x ] Albumin 1250 cc yesterday  Reassessment of hemodynamics post resuscitation  Monitor chest tube outputs  PO Amio for afib prophylaxis   Lopressor for blood pressure support / add Norvasc   [x] VTE prophylaxis Lovenox   [x] Plavix [x] Statin   Serial EKG and cardiac enzymes     ***Pulmonary***  Extubated to NC.   Continue to monitor RR, breathing pattern, pulse ox, and ABG's.  Encourage incentive spirometry.     Mode: CPAP with PS  FiO2: 50  PEEP: 5  PS: 5  MAP: 6  PIP: 10              ***GI***  [x]  Diet:  PO consistent carb diet.   [x] Protonix    Bowel regimen with Miralax and senna     ***Renal***  Continue to monitor I/Os, BUN/Creatinine.   Replete lytes PRN  Caldera present  C/w Lasix for diuresing     ***ID***  Continue Cefuroxime for perioperative antibiotic coverage.     ***Endocrine***  [x] Hyperglycemia  [x] DM2  : HbA1c 7.5%                - [x] Insulin gtt  [x] ISS [x] Lantus              - Need tight glycemic control to prevent wound infection.          Patient requires continuous monitoring with bedside rhythm monitoring, pulse oximetry monitoring, and continuous central venous and arterial pressure monitoring; and intermittent blood gas analysis. Care plan discussed with the ICU care team.   Patient remained critical, at risk for life threatening decompensation.    I have spent 30 minutes providing critical care management to this patient.    By signing my name below, I, Lynn Pearl, attest that this documentation has been prepared under the direction and in the presence of ENEIDA Kumar   Electronically signed: Jamal Griffin, 05-03-22 @ 11:51    I, ENEIDA Kumar, personally performed the services described in this documentation. all medical record entries made by the scribe were at my direction and in my presence. I have reviewed the chart and agree that the record reflects my personal performance and is accurate and complete  Electronically signed: ENEIDA Kumar  Patient seen and examined at the bedside.    Remained critically ill on continuous ICU monitoring.    OBJECTIVE:  Vital Signs Last 24 Hrs  T(C): 37.2 (03 May 2022 11:00), Max: 43 (02 May 2022 13:15)  T(F): 99 (03 May 2022 11:00), Max: 109.4 (02 May 2022 13:15)  HR: 98 (03 May 2022 11:00) (89 - 111)  BP: 103/70 (03 May 2022 11:00) (103/70 - 143/79)  BP(mean): 88 (03 May 2022 11:00) (78 - 104)  RR: 12 (03 May 2022 11:00) (10 - 23)  SpO2: 100% (03 May 2022 11:00) (96% - 100%)      Physical Exam:   General: NAD   Neurology: nonfocal   Eyes: bilateral pupils equal and reactive   ENT/Neck: Neck supple, trachea midline, No JVD   Respiratory: Clear bilaterally   CV: S1S2, no murmurs        [x] Sternal dressing, [x] Mediastinal CT, [x] Pleural CT        [x] Sinus rhythm  Abdominal: Soft, NT, ND +BS   Extremities: 1-2+ pedal edema noted, + peripheral pulses   Skin: No Rashes, Hematoma, Ecchymosis                           Assessment:  46F with hx of CAD s/p PCIx4 (last 2017) / HTN / HLD  / T2DM / Obesity BMI 40    Cath multivessel CAD S/P C3L, L radial 5/2/22  Post op multifactorial hypotension related to hypovolemia & mild LV/RV dysfunction  Lactic acidosis  DM2 / Hyperglycemia   Acute blood loss anemia  Thrombocytopenia   Leukocytosis      Plan:   ***Neuro***   [x] Nonfocal   Post operative neuro assessment     ***Cardiovascular***  Invasive hemodynamic monitoring, assess perfusion indices   SR / CVP 12 / MAP 87 / Hct 26.1 / Lactate 2.2   Volume: [x ] Albumin 1250 cc yesterday  Reassessment of hemodynamics post resuscitation  Monitor chest tube outputs  PO Amio for afib prophylaxis   Lopressor for blood pressure support / add Norvasc for radial graft  [x] VTE prophylaxis Lovenox   [x] Plavix [x] Statin   Serial EKG    ***Pulmonary***  Extubated to NC.   Continue to monitor RR, breathing pattern, pulse ox, and ABG's.  Encourage incentive spirometry.       ***GI***  [x]  Diet:  PO consistent carb diet.   [x] Protonix    Bowel regimen with Miralax and senna     ***Renal***  Continue to monitor I/Os, BUN/Creatinine.   Replete lytes REBEKAN  Werner present  C/w Lasix for diuresing     ***ID***  Continue Cefuroxime for perioperative antibiotic coverage.     ***Endocrine***  [x] Hyperglycemia  [x] DM2  : HbA1c 7.5%                - [x] Insulin gtt  [x] ISS [x] Lantus              - Need tight glycemic control to prevent wound infection.              - start lantus and premeal with sliding scale to transition off insulin gtt          Patient requires continuous monitoring with bedside rhythm monitoring, pulse oximetry monitoring, and continuous central venous and arterial pressure monitoring; and intermittent blood gas analysis. Care plan discussed with the ICU care team.   Patient remained critical, at risk for life threatening decompensation.    I have spent 35 minutes providing critical care management to this patient.    By signing my name below, I, Lynn Pearl, attest that this documentation has been prepared under the direction and in the presence of ENEIDA Kumar   Electronically signed: Jamal Griffin, 05-03-22 @ 11:51    I, ENEIDA Kumar, personally performed the services described in this documentation. all medical record entries made by the scribe were at my direction and in my presence. I have reviewed the chart and agree that the record reflects my personal performance and is accurate and complete  Electronically signed: ENEIDA Kumar

## 2022-05-03 NOTE — OCCUPATIONAL THERAPY INITIAL EVALUATION ADULT - GENERAL OBSERVATIONS, REHAB EVAL
Pt received seated in bedside chair, +ICU monitoring, +chest tube, +external pacing wires, +mendez, +o2 via NC, +michele.

## 2022-05-03 NOTE — AIRWAY REMOVAL NOTE  ADULT & PEDS - ARTIFICAL AIRWAY REMOVAL COMMENTS
Written order for extubation verified, patient was identified by full name and birth date compared to the identification band. Present during the procedure was ... Alexandra Antoine)

## 2022-05-03 NOTE — PHYSICAL THERAPY INITIAL EVALUATION ADULT - ADDITIONAL COMMENTS
Pt lives with her , mother and children in a apt with 5 steps, +HR. Pt states her  is available to assist when needed.

## 2022-05-03 NOTE — PROGRESS NOTE ADULT - ASSESSMENT
Assessment  DMT2: 46y Female with newly dx DM with hyperglycemia, A1C 7.5%, was not taking any hypoglycemic agents, postop IV insulin transitioned to basal bolus insulin by team, first bolus dose with lunch, blood sugars in overall acceptable range, no hypoglycemias, eating meals.  ?Goiter: no known history, has visible neck lump, euthyroid, US thyroid completed and unremarkable, ruled out.  CAD: on medications, no chest pain, stable, monitored.  HTN: Controlled,  on antihypertensive medications.  HLD: On statin  Obesity: No strict exercise routines, not on any weight loss program, neither on low calorie diet.          Harjeet Varghese MD  Cell: [phone]  Office: [phone]               Assessment  DMT2: 46y Female with newly dx DM with hyperglycemia, A1C 7.5%, was not taking any hypoglycemic agents,    postop IV insulin transitioned to basal bolus insulin  by team, first bolus dose with lunch, blood sugars in overall acceptable range, no hypoglycemias, eating meals.  ?Goiter: no known history, has visible neck lump, euthyroid, US thyroid completed and unremarkable,  ruled out.  CAD: on medications, no chest pain, stable, monitored.  HTN: Controlled,  on antihypertensive medications.  HLD: On statin  Obesity: No strict exercise routines, not on any weight loss program, neither on low calorie diet.          Harjeet Varghese MD  Cell: [phone]  Office: [phone]

## 2022-05-03 NOTE — PHYSICAL THERAPY INITIAL EVALUATION ADULT - GENERAL OBSERVATIONS, REHAB EVAL
Pt received sitting in recliner with +cardiac monitor, +pulse ox, +a-line, +mendez, +temp probe, +5LNC, +ext pacemaker, +2 chest tubes and +prevena. NAD noted.

## 2022-05-03 NOTE — PHYSICAL THERAPY INITIAL EVALUATION ADULT - PERTINENT HX OF CURRENT PROBLEM, REHAB EVAL
Pt is a 47 yo F with hx of CAD s/p PCIx4 (last 2017), HTN, HLD, T2DM (not on medications), who presents with midsternal chest pain. Patient has been having symptoms for the past few days however chest pain significantly worsened today prompting visit to the ED.  See below.

## 2022-05-03 NOTE — PHYSICAL THERAPY INITIAL EVALUATION ADULT - PRECAUTIONS/LIMITATIONS, REHAB EVAL
Chest pain is pressure-like, radiates to neck, associated with shortness of breath. Of note, she reports she had a stress test with PMD one month ago which was negative. She denies fevers, chills, diaphoresis, abdominal pain, n/v/d, dysuria. Pt s/p cardiac catherization at [facility] with evidence of triple vessel disease. Pt transferred to [facility] for cardiac surgery evaluation with Dr. Abreu. Pt scheduled for CABG with Dr. Abreu on Monday, 05/02. Pt s/p CABG x3./cardiac precautions/fall precautions/sternal precautions

## 2022-05-03 NOTE — OCCUPATIONAL THERAPY INITIAL EVALUATION ADULT - PERTINENT HX OF CURRENT PROBLEM, REHAB EVAL
47 yo F with hx of CAD s/p PCIx4 (last 2017), HTN, HLD, T2DM (not on medications), who presents with midsternal chest pain. Patient has been having symptoms for the past few days however chest pain significantly worsened today prompting visit to the ED.  See below

## 2022-05-04 ENCOUNTER — APPOINTMENT (OUTPATIENT)
Dept: CARDIOLOGY | Facility: CLINIC | Age: 47
End: 2022-05-04

## 2022-05-04 DIAGNOSIS — Z95.1 PRESENCE OF AORTOCORONARY BYPASS GRAFT: ICD-10-CM

## 2022-05-04 LAB
ALBUMIN SERPL ELPH-MCNC: 4.3 G/DL — SIGNIFICANT CHANGE UP (ref 3.3–5)
ALP SERPL-CCNC: 51 U/L — SIGNIFICANT CHANGE UP (ref 40–120)
ALT FLD-CCNC: 24 U/L — SIGNIFICANT CHANGE UP (ref 10–45)
ANION GAP SERPL CALC-SCNC: 14 MMOL/L — SIGNIFICANT CHANGE UP (ref 5–17)
AST SERPL-CCNC: 44 U/L — HIGH (ref 10–40)
BILIRUB SERPL-MCNC: 0.5 MG/DL — SIGNIFICANT CHANGE UP (ref 0.2–1.2)
BUN SERPL-MCNC: 17 MG/DL — SIGNIFICANT CHANGE UP (ref 7–23)
CALCIUM SERPL-MCNC: 8.4 MG/DL — SIGNIFICANT CHANGE UP (ref 8.4–10.5)
CHLORIDE SERPL-SCNC: 102 MMOL/L — SIGNIFICANT CHANGE UP (ref 96–108)
CO2 SERPL-SCNC: 22 MMOL/L — SIGNIFICANT CHANGE UP (ref 22–31)
CREAT SERPL-MCNC: 0.7 MG/DL — SIGNIFICANT CHANGE UP (ref 0.5–1.3)
EGFR: 107 ML/MIN/1.73M2 — SIGNIFICANT CHANGE UP
GLUCOSE BLDC GLUCOMTR-MCNC: 204 MG/DL — HIGH (ref 70–99)
GLUCOSE BLDC GLUCOMTR-MCNC: 205 MG/DL — HIGH (ref 70–99)
GLUCOSE BLDC GLUCOMTR-MCNC: 231 MG/DL — HIGH (ref 70–99)
GLUCOSE BLDC GLUCOMTR-MCNC: 240 MG/DL — HIGH (ref 70–99)
GLUCOSE SERPL-MCNC: 215 MG/DL — HIGH (ref 70–99)
HCT VFR BLD CALC: 27.8 % — LOW (ref 34.5–45)
HGB BLD-MCNC: 9 G/DL — LOW (ref 11.5–15.5)
MAGNESIUM SERPL-MCNC: 2.2 MG/DL — SIGNIFICANT CHANGE UP (ref 1.6–2.6)
MCHC RBC-ENTMCNC: 31.4 PG — SIGNIFICANT CHANGE UP (ref 27–34)
MCHC RBC-ENTMCNC: 32.4 GM/DL — SIGNIFICANT CHANGE UP (ref 32–36)
MCV RBC AUTO: 96.9 FL — SIGNIFICANT CHANGE UP (ref 80–100)
NRBC # BLD: 0 /100 WBCS — SIGNIFICANT CHANGE UP (ref 0–0)
PHOSPHATE SERPL-MCNC: 3.1 MG/DL — SIGNIFICANT CHANGE UP (ref 2.5–4.5)
PLATELET # BLD AUTO: 146 K/UL — LOW (ref 150–400)
POTASSIUM SERPL-MCNC: 4.3 MMOL/L — SIGNIFICANT CHANGE UP (ref 3.5–5.3)
POTASSIUM SERPL-SCNC: 4.3 MMOL/L — SIGNIFICANT CHANGE UP (ref 3.5–5.3)
PROT SERPL-MCNC: 6.3 G/DL — SIGNIFICANT CHANGE UP (ref 6–8.3)
RBC # BLD: 2.87 M/UL — LOW (ref 3.8–5.2)
RBC # FLD: 13.5 % — SIGNIFICANT CHANGE UP (ref 10.3–14.5)
SODIUM SERPL-SCNC: 138 MMOL/L — SIGNIFICANT CHANGE UP (ref 135–145)
WBC # BLD: 21.47 K/UL — HIGH (ref 3.8–10.5)
WBC # FLD AUTO: 21.47 K/UL — HIGH (ref 3.8–10.5)

## 2022-05-04 PROCEDURE — 93010 ELECTROCARDIOGRAM REPORT: CPT

## 2022-05-04 PROCEDURE — 71045 X-RAY EXAM CHEST 1 VIEW: CPT | Mod: 26

## 2022-05-04 PROCEDURE — 99291 CRITICAL CARE FIRST HOUR: CPT | Mod: 24

## 2022-05-04 RX ORDER — METOPROLOL TARTRATE 50 MG
25 TABLET ORAL ONCE
Refills: 0 | Status: COMPLETED | OUTPATIENT
Start: 2022-05-04 | End: 2022-05-04

## 2022-05-04 RX ORDER — METOPROLOL TARTRATE 50 MG
50 TABLET ORAL EVERY 12 HOURS
Refills: 0 | Status: DISCONTINUED | OUTPATIENT
Start: 2022-05-04 | End: 2022-05-07

## 2022-05-04 RX ORDER — SODIUM CHLORIDE 9 MG/ML
3 INJECTION INTRAMUSCULAR; INTRAVENOUS; SUBCUTANEOUS EVERY 8 HOURS
Refills: 0 | Status: DISCONTINUED | OUTPATIENT
Start: 2022-05-04 | End: 2022-05-07

## 2022-05-04 RX ORDER — INSULIN LISPRO 100/ML
7 VIAL (ML) SUBCUTANEOUS
Refills: 0 | Status: DISCONTINUED | OUTPATIENT
Start: 2022-05-04 | End: 2022-05-05

## 2022-05-04 RX ORDER — INSULIN GLARGINE 100 [IU]/ML
20 INJECTION, SOLUTION SUBCUTANEOUS AT BEDTIME
Refills: 0 | Status: DISCONTINUED | OUTPATIENT
Start: 2022-05-04 | End: 2022-05-05

## 2022-05-04 RX ADMIN — Medication 650 MILLIGRAM(S): at 06:56

## 2022-05-04 RX ADMIN — SODIUM CHLORIDE 3 MILLILITER(S): 9 INJECTION INTRAMUSCULAR; INTRAVENOUS; SUBCUTANEOUS at 21:37

## 2022-05-04 RX ADMIN — PANTOPRAZOLE SODIUM 40 MILLIGRAM(S): 20 TABLET, DELAYED RELEASE ORAL at 06:06

## 2022-05-04 RX ADMIN — Medication 4: at 11:55

## 2022-05-04 RX ADMIN — Medication 650 MILLIGRAM(S): at 01:55

## 2022-05-04 RX ADMIN — INSULIN GLARGINE 20 UNIT(S): 100 INJECTION, SOLUTION SUBCUTANEOUS at 22:05

## 2022-05-04 RX ADMIN — Medication 500 MILLIGRAM(S): at 06:05

## 2022-05-04 RX ADMIN — CLOPIDOGREL BISULFATE 75 MILLIGRAM(S): 75 TABLET, FILM COATED ORAL at 13:21

## 2022-05-04 RX ADMIN — Medication 650 MILLIGRAM(S): at 06:06

## 2022-05-04 RX ADMIN — Medication 50 MILLIGRAM(S): at 17:13

## 2022-05-04 RX ADMIN — Medication 650 MILLIGRAM(S): at 14:51

## 2022-05-04 RX ADMIN — GABAPENTIN 100 MILLIGRAM(S): 400 CAPSULE ORAL at 14:09

## 2022-05-04 RX ADMIN — Medication 6 UNIT(S): at 07:21

## 2022-05-04 RX ADMIN — GABAPENTIN 100 MILLIGRAM(S): 400 CAPSULE ORAL at 06:06

## 2022-05-04 RX ADMIN — Medication 100 MILLIGRAM(S): at 00:07

## 2022-05-04 RX ADMIN — Medication 500 MILLIGRAM(S): at 17:13

## 2022-05-04 RX ADMIN — Medication 650 MILLIGRAM(S): at 13:20

## 2022-05-04 RX ADMIN — Medication 650 MILLIGRAM(S): at 17:13

## 2022-05-04 RX ADMIN — Medication 6 UNIT(S): at 11:55

## 2022-05-04 RX ADMIN — AMLODIPINE BESYLATE 2.5 MILLIGRAM(S): 2.5 TABLET ORAL at 06:07

## 2022-05-04 RX ADMIN — GABAPENTIN 100 MILLIGRAM(S): 400 CAPSULE ORAL at 22:04

## 2022-05-04 RX ADMIN — ENOXAPARIN SODIUM 40 MILLIGRAM(S): 100 INJECTION SUBCUTANEOUS at 13:20

## 2022-05-04 RX ADMIN — Medication 81 MILLIGRAM(S): at 13:21

## 2022-05-04 RX ADMIN — Medication 20 MILLIEQUIVALENT(S): at 13:21

## 2022-05-04 RX ADMIN — Medication 650 MILLIGRAM(S): at 17:51

## 2022-05-04 RX ADMIN — Medication 20 MILLIGRAM(S): at 06:07

## 2022-05-04 RX ADMIN — Medication 4: at 07:21

## 2022-05-04 RX ADMIN — SENNA PLUS 2 TABLET(S): 8.6 TABLET ORAL at 22:04

## 2022-05-04 RX ADMIN — SODIUM CHLORIDE 3 MILLILITER(S): 9 INJECTION INTRAMUSCULAR; INTRAVENOUS; SUBCUTANEOUS at 14:51

## 2022-05-04 RX ADMIN — AMIODARONE HYDROCHLORIDE 400 MILLIGRAM(S): 400 TABLET ORAL at 17:12

## 2022-05-04 RX ADMIN — Medication 25 MILLIGRAM(S): at 13:21

## 2022-05-04 RX ADMIN — Medication 7 UNIT(S): at 17:13

## 2022-05-04 RX ADMIN — Medication 650 MILLIGRAM(S): at 00:06

## 2022-05-04 RX ADMIN — OXYCODONE HYDROCHLORIDE 5 MILLIGRAM(S): 5 TABLET ORAL at 22:04

## 2022-05-04 RX ADMIN — CHLORHEXIDINE GLUCONATE 1 APPLICATION(S): 213 SOLUTION TOPICAL at 14:51

## 2022-05-04 RX ADMIN — Medication 4: at 22:05

## 2022-05-04 RX ADMIN — OXYCODONE HYDROCHLORIDE 5 MILLIGRAM(S): 5 TABLET ORAL at 22:34

## 2022-05-04 RX ADMIN — ATORVASTATIN CALCIUM 80 MILLIGRAM(S): 80 TABLET, FILM COATED ORAL at 22:04

## 2022-05-04 RX ADMIN — Medication 25 MILLIGRAM(S): at 06:07

## 2022-05-04 RX ADMIN — AMIODARONE HYDROCHLORIDE 400 MILLIGRAM(S): 400 TABLET ORAL at 06:07

## 2022-05-04 RX ADMIN — Medication 4: at 17:13

## 2022-05-04 NOTE — PROGRESS NOTE ADULT - ASSESSMENT
39F with hyperthyroidism CAD s/p stents to mLAD (7/2014), HTN presents after one episode of passing out at [facility] today. VS for fever and tachycardia. PE notable for pus in right pharynx and crusted lesion on left foot TTP. Labs notable for low TSH, high free T4, and leuokocytosis, trop neg. EKG with sinus tach. Admitted to medicine for syncope.       On 5/2/22, Pt underwent CABG x 3 w/ Lt. radial  post-op course unremarkable  norvasc for radial graft  diurese  increase bb as tolerated  5/4 POD #2 tr. floor. prevena in place 39F with hyperthyroidism CAD s/p stents to mLAD (7/2014), HTN presents after one episode of passing out at [facility] today. VS for fever and tachycardia. PE notable for pus in right pharynx and crusted lesion on left foot TTP. Labs notable for low TSH, high free T4, and leuokocytosis, trop neg. EKG with sinus tach. Admitted to medicine for syncope.       On 5/2/22, Pt underwent CABG x 3 w/ Lt. radial  post-op course unremarkable  norvasc for radial graft  diurese  increase bb as tolerated  5/4 POD #2 tr. floor. prevena in place  preop uti - e. coli - tx w/ cirpro x 2 day sthen zinacef iv x 5 doses-

## 2022-05-04 NOTE — PROGRESS NOTE ADULT - SUBJECTIVE AND OBJECTIVE BOX
VITAL SIGNS-Telemetry:  SR 90s  Vital Signs Last 24 Hrs  T(C): 36.8 (22 @ 10:26), Max: 37.1 (22 @ 16:00)  T(F): 98.2 (22 @ 10:26), Max: 98.7 (22 @ 16:00)  HR: 90 (22 @ 10:26) (80 - 96)  BP: 123/85 (22 @ 10:26) (111/68 - 155/84)  RR: 18 (22 @ 10:26) (10 - )  SpO2: 93% (22 @ 10:26) (93% - 100%)          @ 07:01  -   @ 07:00  --------------------------------------------------------  IN: 1072 mL / OUT: 2520 mL / NET: -1448 mL     @ 07:01  -   @ 12:27  --------------------------------------------------------  IN: 0 mL / OUT: 15 mL / NET: -15 mL    Daily     Daily Weight in k.4 (04 May 2022 00:00)    CAPILLARY BLOOD GLUCOSE  POCT Blood Glucose.: 205 mg/dL (04 May 2022 11:44)  POCT Blood Glucose.: 204 mg/dL (04 May 2022 06:55)  POCT Blood Glucose.: 95 mg/dL (03 May 2022 22:04)  POCT Blood Glucose.: 147 mg/dL (03 May 2022 22:04)  POCT Blood Glucose.: 157 mg/dL (03 May 2022 16:17)        Pacing Wires        [  ]   Settings:                                  Isolated  [  x]       PHYSICAL EXAM:  Neurology: alert and oriented x 3, nonfocal, no gross deficits  CV : S1S2  Sternal Wound :  CDI , Stable-prevena  Lungs: cta  Abdomen: soft, nontender, nondistended, positive bowel sounds, last bowel movement     preop    Extremities:     tr. edema, no calf tenderness LLE inc cdi w/ ace wrap  RUE inc cdi . +radial pulses - hand w&m    acetaminophen     Tablet .. 650 milliGRAM(s) Oral every 6 hours  aMIOdarone    Tablet 400 milliGRAM(s) Oral two times a day  amLODIPine   Tablet 2.5 milliGRAM(s) Oral daily  ascorbic acid 500 milliGRAM(s) Oral two times a day  aspirin enteric coated 81 milliGRAM(s) Oral daily  atorvastatin 80 milliGRAM(s) Oral at bedtime  bisacodyl Suppository 10 milliGRAM(s) Rectal once  chlorhexidine 2% Cloths 1 Application(s) Topical daily  clopidogrel Tablet 75 milliGRAM(s) Oral daily  enoxaparin Injectable 40 milliGRAM(s) SubCutaneous every 24 hours  furosemide    Tablet 20 milliGRAM(s) Oral daily  gabapentin 100 milliGRAM(s) Oral every 8 hours  insulin glargine Injectable (LANTUS) 20 Unit(s) SubCutaneous at bedtime  insulin lispro (ADMELOG) corrective regimen sliding scale   SubCutaneous Before meals and at bedtime  insulin lispro Injectable (ADMELOG) 6 Unit(s) SubCutaneous three times a day before meals  metoprolol tartrate 25 milliGRAM(s) Oral every 12 hours  oxyCODONE    IR 5 milliGRAM(s) Oral every 4 hours PRN  oxyCODONE    IR 10 milliGRAM(s) Oral every 4 hours PRN  pantoprazole    Tablet 40 milliGRAM(s) Oral before breakfast  polyethylene glycol 3350 17 Gram(s) Oral daily  potassium chloride    Tablet ER 20 milliEquivalent(s) Oral daily  senna 2 Tablet(s) Oral at bedtime  sodium chloride 0.9% lock flush 3 milliLiter(s) IV Push every 8 hours    Physical Therapy Rec:   Home  [  ]   Home w/ PT  [ x ]  Rehab  [  ]  Discussed with Cardiothoracic Team at AM rounds.

## 2022-05-04 NOTE — PROGRESS NOTE ADULT - SUBJECTIVE AND OBJECTIVE BOX
Chief complaint  Patient is a 46y old  Female who presents with a chief complaint of Triple Vessel Disease (04 May 2022 12:27)   Review of systems  Patient awake in chair, looks comfortable, no hypoglycemic episodes.    Labs and Fingersticks  CAPILLARY BLOOD GLUCOSE      POCT Blood Glucose.: 205 mg/dL (04 May 2022 11:44)  POCT Blood Glucose.: 204 mg/dL (04 May 2022 06:55)  POCT Blood Glucose.: 95 mg/dL (03 May 2022 22:04)  POCT Blood Glucose.: 147 mg/dL (03 May 2022 22:04)  POCT Blood Glucose.: 157 mg/dL (03 May 2022 16:17)      Anion Gap, Serum: 14 (05-04 @ 00:23)  Anion Gap, Serum: 16 (05-03 @ 00:32)  Anion Gap, Serum: 13 (05-02 @ 14:11)      Calcium, Total Serum: 8.4 (05-04 @ 00:23)  Calcium, Total Serum: 8.2 *L* (05-03 @ 00:32)  Calcium, Total Serum: 8.3 *L* (05-02 @ 14:11)  Albumin, Serum: 4.3 (05-04 @ 00:23)  Albumin, Serum: 4.7 (05-03 @ 00:32)  Albumin, Serum: 2.8 *L* (05-02 @ 14:11)    Alanine Aminotransferase (ALT/SGPT): 24 (05-04 @ 00:23)  Alanine Aminotransferase (ALT/SGPT): 25 (05-03 @ 00:32)  Alanine Aminotransferase (ALT/SGPT): 24 (05-02 @ 14:11)  Alkaline Phosphatase, Serum: 51 (05-04 @ 00:23)  Alkaline Phosphatase, Serum: 42 (05-03 @ 00:32)  Alkaline Phosphatase, Serum: 65 (05-02 @ 14:11)  Aspartate Aminotransferase (AST/SGOT): 44 *H* (05-04 @ 00:23)  Aspartate Aminotransferase (AST/SGOT): 69 *H* (05-03 @ 00:32)  Aspartate Aminotransferase (AST/SGOT): 45 *H* (05-02 @ 14:11)        05-04    138  |  102  |  17  ----------------------------<  215<H>  4.3   |  22  |  0.70    Ca    8.4      04 May 2022 00:23  Phos  3.1     05-04  Mg     2.2     05-04    TPro  6.3  /  Alb  4.3  /  TBili  0.5  /  DBili  x   /  AST  44<H>  /  ALT  24  /  AlkPhos  51  05-04                        9.0    21.47 )-----------( 146      ( 04 May 2022 00:23 )             27.8     Medications  MEDICATIONS  (STANDING):  acetaminophen     Tablet .. 650 milliGRAM(s) Oral every 6 hours  aMIOdarone    Tablet 400 milliGRAM(s) Oral two times a day  amLODIPine   Tablet 2.5 milliGRAM(s) Oral daily  ascorbic acid 500 milliGRAM(s) Oral two times a day  aspirin enteric coated 81 milliGRAM(s) Oral daily  atorvastatin 80 milliGRAM(s) Oral at bedtime  bisacodyl Suppository 10 milliGRAM(s) Rectal once  chlorhexidine 2% Cloths 1 Application(s) Topical daily  clopidogrel Tablet 75 milliGRAM(s) Oral daily  enoxaparin Injectable 40 milliGRAM(s) SubCutaneous every 24 hours  furosemide    Tablet 20 milliGRAM(s) Oral daily  gabapentin 100 milliGRAM(s) Oral every 8 hours  insulin glargine Injectable (LANTUS) 20 Unit(s) SubCutaneous at bedtime  insulin lispro (ADMELOG) corrective regimen sliding scale   SubCutaneous Before meals and at bedtime  insulin lispro Injectable (ADMELOG) 6 Unit(s) SubCutaneous three times a day before meals  metoprolol tartrate 50 milliGRAM(s) Oral every 12 hours  metoprolol tartrate 25 milliGRAM(s) Oral once  pantoprazole    Tablet 40 milliGRAM(s) Oral before breakfast  polyethylene glycol 3350 17 Gram(s) Oral daily  potassium chloride    Tablet ER 20 milliEquivalent(s) Oral daily  senna 2 Tablet(s) Oral at bedtime  sodium chloride 0.9% lock flush 3 milliLiter(s) IV Push every 8 hours      Physical Exam  General: Patient comfortable in bed  Vital Signs Last 12 Hrs  T(F): 98 (05-04-22 @ 12:50), Max: 98.3 (05-04-22 @ 07:00)  HR: 91 (05-04-22 @ 12:50) (80 - 96)  BP: 116/75 (05-04-22 @ 12:50) (111/68 - 155/84)  BP(mean): 98 (05-04-22 @ 10:00) (85 - 112)  RR: 18 (05-04-22 @ 12:50) (10 - 24)  SpO2: 97% (05-04-22 @ 12:50) (93% - 98%)  Neck: No palpable thyroid nodules.  CVS: S1S2, No murmurs  Respiratory: No wheezing, no crepitations  GI: Abdomen soft, bowel sounds positive  Musculoskeletal:  edema lower extremities.   Skin: No skin rashes, no ecchymosis    Diagnostics             Chief complaint  Patient is a 46y old  Female who presents with a chief complaint of Triple Vessel Disease (04 May 2022 12:27)   Review of systems  Patient awake in chair, looks comfortable, no hypoglycemic episodes.    Labs and Fingersticks  CAPILLARY BLOOD GLUCOSE      POCT Blood Glucose.: 205 mg/dL (04 May 2022 11:44)  POCT Blood Glucose.: 204 mg/dL (04 May 2022 06:55)  POCT Blood Glucose.: 95 mg/dL (03 May 2022 22:04)  POCT Blood Glucose.: 147 mg/dL (03 May 2022 22:04)  POCT Blood Glucose.: 157 mg/dL (03 May 2022 16:17)      Anion Gap, Serum: 14 (05-04 @ 00:23)  Anion Gap, Serum: 16 (05-03 @ 00:32)  Anion Gap, Serum: 13 (05-02 @ 14:11)      Calcium, Total Serum: 8.4 (05-04 @ 00:23)  Calcium, Total Serum: 8.2 *L* (05-03 @ 00:32)  Calcium, Total Serum: 8.3 *L* (05-02 @ 14:11)  Albumin, Serum: 4.3 (05-04 @ 00:23)  Albumin, Serum: 4.7 (05-03 @ 00:32)  Albumin, Serum: 2.8 *L* (05-02 @ 14:11)    Alanine Aminotransferase (ALT/SGPT): 24 (05-04 @ 00:23)  Alanine Aminotransferase (ALT/SGPT): 25 (05-03 @ 00:32)  Alanine Aminotransferase (ALT/SGPT): 24 (05-02 @ 14:11)  Alkaline Phosphatase, Serum: 51 (05-04 @ 00:23)  Alkaline Phosphatase, Serum: 42 (05-03 @ 00:32)  Alkaline Phosphatase, Serum: 65 (05-02 @ 14:11)  Aspartate Aminotransferase (AST/SGOT): 44 *H* (05-04 @ 00:23)  Aspartate Aminotransferase (AST/SGOT): 69 *H* (05-03 @ 00:32)  Aspartate Aminotransferase (AST/SGOT): 45 *H* (05-02 @ 14:11)        05-04    138  |  102  |  17  ----------------------------<  215<H>  4.3   |  22  |  0.70    Ca    8.4      04 May 2022 00:23  Phos  3.1     05-04  Mg     2.2     05-04    TPro  6.3  /  Alb  4.3  /  TBili  0.5  /  DBili  x   /  AST  44<H>  /  ALT  24  /  AlkPhos  51  05-04                        9.0    21.47 )-----------( 146      ( 04 May 2022 00:23 )             27.8     Medications  MEDICATIONS  (STANDING):  acetaminophen     Tablet .. 650 milliGRAM(s) Oral every 6 hours  aMIOdarone    Tablet 400 milliGRAM(s) Oral two times a day  amLODIPine   Tablet 2.5 milliGRAM(s) Oral daily  ascorbic acid 500 milliGRAM(s) Oral two times a day  aspirin enteric coated 81 milliGRAM(s) Oral daily  atorvastatin 80 milliGRAM(s) Oral at bedtime  bisacodyl Suppository 10 milliGRAM(s) Rectal once  chlorhexidine 2% Cloths 1 Application(s) Topical daily  clopidogrel Tablet 75 milliGRAM(s) Oral daily  enoxaparin Injectable 40 milliGRAM(s) SubCutaneous every 24 hours  furosemide    Tablet 20 milliGRAM(s) Oral daily  gabapentin 100 milliGRAM(s) Oral every 8 hours  insulin glargine Injectable (LANTUS) 20 Unit(s) SubCutaneous at bedtime  insulin lispro (ADMELOG) corrective regimen sliding scale   SubCutaneous Before meals and at bedtime  insulin lispro Injectable (ADMELOG) 6 Unit(s) SubCutaneous three times a day before meals  metoprolol tartrate 50 milliGRAM(s) Oral every 12 hours  metoprolol tartrate 25 milliGRAM(s) Oral once  pantoprazole    Tablet 40 milliGRAM(s) Oral before breakfast  polyethylene glycol 3350 17 Gram(s) Oral daily  potassium chloride    Tablet ER 20 milliEquivalent(s) Oral daily  senna 2 Tablet(s) Oral at bedtime  sodium chloride 0.9% lock flush 3 milliLiter(s) IV Push every 8 hours      Physical Exam  General: Patient comfortable in bed  Vital Signs Last 12 Hrs  T(F): 98 (05-04-22 @ 12:50), Max: 98.3 (05-04-22 @ 07:00)  HR: 91 (05-04-22 @ 12:50) (80 - 96)  BP: 116/75 (05-04-22 @ 12:50) (111/68 - 155/84)  BP(mean): 98 (05-04-22 @ 10:00) (85 - 112)  RR: 18 (05-04-22 @ 12:50) (10 - 24)  SpO2: 97% (05-04-22 @ 12:50) (93% - 98%)  Neck: No palpable thyroid nodules.  CVS: S1S2, No murmurs  Respiratory: No wheezing, no crepitations  GI: Abdomen soft, bowel sounds positive  Musculoskeletal:  edema lower extremities.   Skin: No skin rashes, no ecchymosis    Diagnostics

## 2022-05-04 NOTE — PROGRESS NOTE ADULT - SUBJECTIVE AND OBJECTIVE BOX
Patient seen and examined at the bedside.    Remained critically ill on continuous ICU monitoring.    OBJECTIVE:  Vital Signs Last 24 Hrs  T(C): 36.8 (04 May 2022 07:00), Max: 37.2 (03 May 2022 11:00)  T(F): 98.3 (04 May 2022 07:00), Max: 99 (03 May 2022 11:00)  HR: 80 (04 May 2022 07:00) (80 - 111)  BP: 128/74 (04 May 2022 07:00) (103/70 - 150/70)  BP(mean): 96 (04 May 2022 07:00) (85 - 101)  RR: 15 (04 May 2022 07:00) (10 - 26)  SpO2: 98% (04 May 2022 07:00) (95% - 100%)      Physical Exam:   General: NAD   Neurology: nonfocal   Eyes: bilateral pupils equal and reactive   ENT/Neck: Neck supple, trachea midline, No JVD   Respiratory: Clear bilaterally   CV: S1S2, no murmurs        [x] Sternal dressing, [x] Mediastinal CT, [x] Pleural CT        [x] Sinus rhythm   Abdominal: Soft, NT, ND +BS   Extremities: 1-2+ pedal edema noted, + peripheral pulses   Skin: No Rashes, Hematoma, Ecchymosis                    Assessment:  46F with hx of CAD s/p PCIx4 (last 2017) / HTN / HLD  / T2DM / Obesity BMI 40    Cath multivessel CAD S/P C3L, L radial 5/2/22  Post op multifactorial hypotension related to hypovolemia & mild LV/RV dysfunction  Lactic acidosis  Acute blood loss anemia  Thrombocytopenia   Leukocytosis     Plan:   ***Neuro***   [x] Nonfocal   Post operative neuro assessment     ***Cardiovascular***  Invasive hemodynamic monitoring, assess perfusion indices - central line removed this AM   SR / Hct 27.8 / Lactate 2.2   Continuos reassessment of hemodynamics   Monitor chest tube outputs  Amiodarone and Lopressor for afib prophylaxis   Norvasc for blood pressure support   [x] VTE prophylaxis Lovenox   [x] Plavix [x] Statin [x] ASA  Serial EKG    ***Pulmonary***  [x] 3L NC   Continue to monitor RR, breathing pattern, pulse ox, and ABG's.  Encourage incentive spirometry.     ***GI***  [x]  Diet:  PO consistent carb diet.   [x] Protonix    Bowel regimen with Miralax and senna     ***Renal***  Continue to monitor I/Os, BUN/Creatinine.   Replete lytes PRN  C/w Lasix for diuresis      ***ID***  No active antibiotic coverage     ***Endocrine***   [x] DM2  : HbA1c 7.5%                - [x] Insulin gtt  [x] ISS [x] Lantus              - Need tight glycemic control to prevent wound infection.         Patient requires continuous monitoring with bedside rhythm monitoring, pulse oximetry monitoring, and continuous central venous and arterial pressure monitoring; and intermittent blood gas analysis. Care plan discussed with the ICU care team.   Patient remained critical, at risk for life threatening decompensation.    I have spent 30 minutes providing critical care management to this patient.    By signing my name below, I, Sridevi Singleton, attest that this documentation has been prepared under the direction and in the presence of Vivien Cortes NP   Electronically signed: Jamal Castillo, 05-04-22 @ 07:54    I, Vivien Cortes NP , personally performed the services described in this documentation. all medical record entries made by the rufinoibe were at my direction and in my presence. I have reviewed the chart and agree that the record reflects my personal performance and is accurate and complete  Electronically signed: Vivien Cortes NP  Patient seen and examined at the bedside.    Remained critically ill on continuous ICU monitoring.    OBJECTIVE:  Vital Signs Last 24 Hrs  T(C): 36.8 (04 May 2022 07:00), Max: 37.2 (03 May 2022 11:00)  T(F): 98.3 (04 May 2022 07:00), Max: 99 (03 May 2022 11:00)  HR: 80 (04 May 2022 07:00) (80 - 111)  BP: 128/74 (04 May 2022 07:00) (103/70 - 150/70)  BP(mean): 96 (04 May 2022 07:00) (85 - 101)  RR: 15 (04 May 2022 07:00) (10 - 26)  SpO2: 98% (04 May 2022 07:00) (95% - 100%)      Physical Exam:   General: NAD   Neurology: nonfocal   Eyes: bilateral pupils equal and reactive   ENT/Neck: Neck supple, trachea midline, No JVD   Respiratory: Clear bilaterally   CV: S1S2, no murmurs        [x] Sternal dressing, [x] Mediastinal CT, [x] Pleural CT        [x] Sinus rhythm   Abdominal: Soft, NT, ND +BS   Extremities: 1-2+ pedal edema noted, + peripheral pulses   Skin: No Rashes, Hematoma, Ecchymosis                    Assessment:  46F with hx of CAD s/p PCIx4 (last 2017) / HTN / HLD  / T2DM / Obesity BMI 40    Cath multivessel CAD S/P C3L, L radial 5/2/22  Post op multifactorial hypotension related to hypovolemia & mild LV/RV dysfunction  Lactic acidosis  Acute blood loss anemia  Thrombocytopenia   Leukocytosis     Plan:   ***Neuro***   [x] Nonfocal   Post operative neuro assessment   Ambulate as tolerated     ***Cardiovascular***  Invasive hemodynamic monitoring, assess perfusion indices - central line removed this AM   SR / Hct 27.8 / Lactate 2.2   Continuos reassessment of hemodynamics   Monitor chest tube outputs - plan to d/c today   Amiodarone and Lopressor for afib prophylaxis   Norvasc for blood pressure support   [x] VTE prophylaxis Lovenox   [x] Plavix [x] Statin [x] ASA  Serial EKG    ***Pulmonary***  [x] 3L NC   Continue to monitor RR, breathing pattern, pulse ox, and ABG's.  Encourage incentive spirometry.     ***GI***  [x]  Diet:  PO consistent carb diet.   [x] Protonix    Bowel regimen with Miralax and senna     ***Renal***  Continue to monitor I/Os, BUN/Creatinine.   Replete lytes PRN  C/w Lasix for diuresis      ***ID***  No active antibiotic coverage     ***Endocrine***   [x] DM2  : HbA1c 7.5%                - [x] Insulin gtt  - off overnight / [x] ISS [x] Lantus              - Need tight glycemic control to prevent wound infection.         Patient requires continuous monitoring with bedside rhythm monitoring, pulse oximetry monitoring, and continuous central venous and arterial pressure monitoring; and intermittent blood gas analysis. Care plan discussed with the ICU care team.   Patient remained critical, at risk for life threatening decompensation.    I have spent 30 minutes providing critical care management to this patient.    By signing my name below, I, Sridevi Singleton, attest that this documentation has been prepared under the direction and in the presence of Vivien Cortes NP   Electronically signed: Shaun Castillo, 05-04-22 @ 07:54    I, Vivien Cortes NP , personally performed the services described in this documentation. all medical record entries made by the shaun were at my direction and in my presence. I have reviewed the chart and agree that the record reflects my personal performance and is accurate and complete  Electronically signed: Vivien Cortes NP  Patient seen and examined at the bedside.    Remained critically ill on continuous ICU monitoring.    OBJECTIVE:  Vital Signs Last 24 Hrs  T(C): 36.8 (04 May 2022 07:00), Max: 37.2 (03 May 2022 11:00)  T(F): 98.3 (04 May 2022 07:00), Max: 99 (03 May 2022 11:00)  HR: 80 (04 May 2022 07:00) (80 - 111)  BP: 128/74 (04 May 2022 07:00) (103/70 - 150/70)  BP(mean): 96 (04 May 2022 07:00) (85 - 101)  RR: 15 (04 May 2022 07:00) (10 - 26)  SpO2: 98% (04 May 2022 07:00) (95% - 100%)      Physical Exam:   General: NAD   Neurology: nonfocal   Eyes: bilateral pupils equal and reactive   ENT/Neck: Neck supple, trachea midline, No JVD   Respiratory: Clear bilaterally   CV: S1S2, no murmurs        [x] Sternal dressing, [x] Mediastinal CT, [x] Pleural CT        [x] Sinus rhythm [x] Temporary pacing   Abdominal: Soft, NT, ND +BS   Extremities: 1-2+ pedal edema noted, + peripheral pulses   Skin: No Rashes, Hematoma, Ecchymosis                    Assessment:  46F with hx of CAD s/p PCIx4 (last 2017) / HTN / HLD  / T2DM / Obesity BMI 40    Cath multivessel CAD S/P C3L, L radial 5/2/22  Post op multifactorial hypotension related to hypovolemia & mild LV/RV dysfunction  Lactic acidosis  Acute blood loss anemia  Thrombocytopenia   Leukocytosis     Plan:   ***Neuro***   [x] Nonfocal   Post operative neuro assessment   Ambulate as tolerated     ***Cardiovascular***  Invasive hemodynamic monitoring, assess perfusion indices - central line removed this AM   SR / Hct 27.8 / Lactate 2.2   Continuos reassessment of hemodynamics   Monitor chest tube outputs - plan to d/c today   Amiodarone and Lopressor for afib prophylaxis   Norvasc for blood pressure support   [x] VTE prophylaxis Lovenox   [x] Plavix [x] Statin [x] ASA  Serial EKG    ***Pulmonary***  [x] 3L NC   Continue to monitor RR, breathing pattern, pulse ox, and ABG's.  Encourage incentive spirometry.     ***GI***  [x]  Diet:  PO consistent carb diet.   [x] Protonix    Bowel regimen with Miralax and senna     ***Renal***  Continue to monitor I/Os, BUN/Creatinine.   Replete lytes PRN  C/w Lasix for diuresis      ***ID***  No active antibiotic coverage     ***Endocrine***   [x] DM2  : HbA1c 7.5%                - [x] Insulin gtt  - off overnight / [x] ISS [x] Lantus              - Need tight glycemic control to prevent wound infection.         Patient requires continuous monitoring with bedside rhythm monitoring, pulse oximetry monitoring, and continuous central venous and arterial pressure monitoring; and intermittent blood gas analysis. Care plan discussed with the ICU care team.   Patient remained critical, at risk for life threatening decompensation.    I have spent 30 minutes providing critical care management to this patient.    By signing my name below, I, Sridevi Singleton, attest that this documentation has been prepared under the direction and in the presence of Vivien Cortes NP   Electronically signed: Jamal Castillo, 05-04-22 @ 07:54    I, Vivien Cortes NP , personally performed the services described in this documentation. all medical record entries made by the rufinoibwes were at my direction and in my presence. I have reviewed the chart and agree that the record reflects my personal performance and is accurate and complete  Electronically signed: Vivien Cortes NP  Patient seen and examined at the bedside.    Remained critically ill on continuous ICU monitoring.    OBJECTIVE:  Vital Signs Last 24 Hrs  T(C): 36.8 (04 May 2022 07:00), Max: 37.2 (03 May 2022 11:00)  T(F): 98.3 (04 May 2022 07:00), Max: 99 (03 May 2022 11:00)  HR: 80 (04 May 2022 07:00) (80 - 111)  BP: 128/74 (04 May 2022 07:00) (103/70 - 150/70)  BP(mean): 96 (04 May 2022 07:00) (85 - 101)  RR: 15 (04 May 2022 07:00) (10 - 26)  SpO2: 98% (04 May 2022 07:00) (95% - 100%)      Physical Exam:   General: NAD   Neurology: nonfocal   Eyes: bilateral pupils equal and reactive   ENT/Neck: Neck supple, trachea midline, No JVD   Respiratory: Clear bilaterally   CV: S1S2, no murmurs        [x] Sternal dressing, [x] Mediastinal CT, [x] Pleural CT        [x] Sinus rhythm [x] Temporary pacing   Abdominal: Soft, NT, ND +BS   Extremities: 1-2+ pedal edema noted, + peripheral pulses   Skin: No Rashes, Hematoma, Ecchymosis                    Assessment:  46F with hx of CAD s/p PCIx4 (last 2017) / HTN / HLD  / T2DM / Obesity BMI 40    Cath multivessel CAD S/P C3L, L radial 5/2/22  Post op multifactorial hypotension related to hypovolemia & mild LV/RV dysfunction  Lactic acidosis  Acute blood loss anemia  Thrombocytopenia   Leukocytosis     Plan:   ***Neuro***   [x] Nonfocal   Post operative neuro assessment   Ambulate as tolerated, non focal    ***Cardiovascular***  Invasive hemodynamic monitoring, assess perfusion indices - central line removed this AM   SR / Hct 27.8 / Lactate 2.2   Continuos reassessment of hemodynamics   Monitor chest tube outputs - plan to d/c today   Amiodarone and Lopressor for afib prophylaxis   Norvasc for radial graft  [x] VTE prophylaxis Lovenox   [x] Plavix [x] Statin [x] ASA  Serial EKG    ***Pulmonary***  [x] 3L NC, wean as tolerated  Continue to monitor RR, breathing pattern, pulse ox, and ABG's.  Encourage incentive spirometry.     ***GI***  [x]  Diet:  PO consistent carb diet.   [x] Protonix    Bowel regimen with Miralax and senna     ***Renal***  Continue to monitor I/Os, BUN/Creatinine.   Replete lytes PRN  C/w Lasix for diuresis      ***ID***  No active antibiotic coverage     ***Endocrine***   [x] DM2  : HbA1c 7.5%                - [x] Insulin gtt  - off overnight / [x] ISS [x] Lantus              - Need tight glycemic control to prevent wound infection.         Patient requires continuous monitoring with bedside rhythm monitoring, pulse oximetry monitoring, and continuous central venous and arterial pressure monitoring; and intermittent blood gas analysis. Care plan discussed with the ICU care team.   Patient remained critical, at risk for life threatening decompensation.    I have spent 30 minutes providing critical care management to this patient.    By signing my name below, I, Sridevi Singleton, attest that this documentation has been prepared under the direction and in the presence of Vivien Cortes NP   Electronically signed: Shaun Castillo, 05-04-22 @ 07:54    I, Vivien Cortes NP , personally performed the services described in this documentation. all medical record entries made by the shaun were at my direction and in my presence. I have reviewed the chart and agree that the record reflects my personal performance and is accurate and complete  Electronically signed: Vivien Cortes NP

## 2022-05-04 NOTE — PROGRESS NOTE ADULT - ASSESSMENT
Assessment  DMT2: 46y Female with newly dx DM with hyperglycemia, A1C 7.5%, was not taking any hypoglycemic agents, postop transitioned to basal bolus insulin, blood sugars fluctuating/running high and not at target today, no hypoglycemias, reports eating meals with improving appetite, well-appearing.  ?Goiter: no known history, has visible neck lump, euthyroid, US thyroid completed and unremarkable, ruled out.  CAD: on medications, no chest pain, stable, monitored.  HTN: Controlled,  on antihypertensive medications.  HLD: On statin  Obesity: No strict exercise routines, not on any weight loss program, neither on low calorie diet.          Harjeet Varghese MD  Cell: [phone]  Office: [phone]               Assessment  DMT2: 46y Female with newly dx DM with hyperglycemia, A1C 7.5%, was not taking any hypoglycemic agents, postop transitioned to basal bolus insulin, blood sugars  fluctuating/running high and not at target today, no hypoglycemias, reports eating meals with improving appetite, well-appearing.  ?Goiter: no known history, has visible neck lump, euthyroid, US thyroid completed and unremarkable, ruled out.  CAD: on medications, no chest pain, stable, monitored.  HTN: Controlled,  on antihypertensive medications.  HLD: On statin  Obesity: No strict exercise routines, not on any weight loss program, neither on low calorie diet.          Harjeet Varghese MD  Cell: [phone]  Office: [phone]

## 2022-05-04 NOTE — PROGRESS NOTE ADULT - PROBLEM SELECTOR PLAN 1
Will increase Lantus to 20u at bedtime.  Will increase Admelog to 7u before each meal and continue Admelog correction scale coverage. Will continue monitoring FS and FU.  Patient counseled for compliance with consistent low carb diet and exercise as tolerated outpatient.

## 2022-05-05 LAB
ALBUMIN SERPL ELPH-MCNC: 4.3 G/DL — SIGNIFICANT CHANGE UP (ref 3.3–5)
ALP SERPL-CCNC: 63 U/L — SIGNIFICANT CHANGE UP (ref 40–120)
ALT FLD-CCNC: 21 U/L — SIGNIFICANT CHANGE UP (ref 10–45)
ANION GAP SERPL CALC-SCNC: 11 MMOL/L — SIGNIFICANT CHANGE UP (ref 5–17)
AST SERPL-CCNC: 21 U/L — SIGNIFICANT CHANGE UP (ref 10–40)
BILIRUB SERPL-MCNC: 0.5 MG/DL — SIGNIFICANT CHANGE UP (ref 0.2–1.2)
BUN SERPL-MCNC: 25 MG/DL — HIGH (ref 7–23)
CALCIUM SERPL-MCNC: 9.7 MG/DL — SIGNIFICANT CHANGE UP (ref 8.4–10.5)
CHLORIDE SERPL-SCNC: 104 MMOL/L — SIGNIFICANT CHANGE UP (ref 96–108)
CO2 SERPL-SCNC: 22 MMOL/L — SIGNIFICANT CHANGE UP (ref 22–31)
CREAT SERPL-MCNC: 0.75 MG/DL — SIGNIFICANT CHANGE UP (ref 0.5–1.3)
EGFR: 99 ML/MIN/1.73M2 — SIGNIFICANT CHANGE UP
GLUCOSE BLDC GLUCOMTR-MCNC: 134 MG/DL — HIGH (ref 70–99)
GLUCOSE BLDC GLUCOMTR-MCNC: 138 MG/DL — HIGH (ref 70–99)
GLUCOSE BLDC GLUCOMTR-MCNC: 151 MG/DL — HIGH (ref 70–99)
GLUCOSE BLDC GLUCOMTR-MCNC: 169 MG/DL — HIGH (ref 70–99)
GLUCOSE SERPL-MCNC: 161 MG/DL — HIGH (ref 70–99)
HCT VFR BLD CALC: 29.5 % — LOW (ref 34.5–45)
HGB BLD-MCNC: 9.3 G/DL — LOW (ref 11.5–15.5)
MAGNESIUM SERPL-MCNC: 2.2 MG/DL — SIGNIFICANT CHANGE UP (ref 1.6–2.6)
MCHC RBC-ENTMCNC: 30.3 PG — SIGNIFICANT CHANGE UP (ref 27–34)
MCHC RBC-ENTMCNC: 31.5 GM/DL — LOW (ref 32–36)
MCV RBC AUTO: 96.1 FL — SIGNIFICANT CHANGE UP (ref 80–100)
NRBC # BLD: 0 /100 WBCS — SIGNIFICANT CHANGE UP (ref 0–0)
PHOSPHATE SERPL-MCNC: 2.5 MG/DL — SIGNIFICANT CHANGE UP (ref 2.5–4.5)
PLATELET # BLD AUTO: 190 K/UL — SIGNIFICANT CHANGE UP (ref 150–400)
POTASSIUM SERPL-MCNC: 4.5 MMOL/L — SIGNIFICANT CHANGE UP (ref 3.5–5.3)
POTASSIUM SERPL-SCNC: 4.5 MMOL/L — SIGNIFICANT CHANGE UP (ref 3.5–5.3)
PROT SERPL-MCNC: 6.7 G/DL — SIGNIFICANT CHANGE UP (ref 6–8.3)
RBC # BLD: 3.07 M/UL — LOW (ref 3.8–5.2)
RBC # FLD: 13.7 % — SIGNIFICANT CHANGE UP (ref 10.3–14.5)
SODIUM SERPL-SCNC: 137 MMOL/L — SIGNIFICANT CHANGE UP (ref 135–145)
WBC # BLD: 21.69 K/UL — HIGH (ref 3.8–10.5)
WBC # FLD AUTO: 21.69 K/UL — HIGH (ref 3.8–10.5)

## 2022-05-05 PROCEDURE — 71045 X-RAY EXAM CHEST 1 VIEW: CPT | Mod: 26

## 2022-05-05 RX ORDER — INSULIN GLARGINE 100 [IU]/ML
23 INJECTION, SOLUTION SUBCUTANEOUS AT BEDTIME
Refills: 0 | Status: DISCONTINUED | OUTPATIENT
Start: 2022-05-05 | End: 2022-05-07

## 2022-05-05 RX ORDER — INSULIN LISPRO 100/ML
9 VIAL (ML) SUBCUTANEOUS
Refills: 0 | Status: DISCONTINUED | OUTPATIENT
Start: 2022-05-05 | End: 2022-05-07

## 2022-05-05 RX ADMIN — GABAPENTIN 100 MILLIGRAM(S): 400 CAPSULE ORAL at 21:39

## 2022-05-05 RX ADMIN — Medication 20 MILLIEQUIVALENT(S): at 12:05

## 2022-05-05 RX ADMIN — GABAPENTIN 100 MILLIGRAM(S): 400 CAPSULE ORAL at 13:42

## 2022-05-05 RX ADMIN — SENNA PLUS 2 TABLET(S): 8.6 TABLET ORAL at 21:40

## 2022-05-05 RX ADMIN — OXYCODONE HYDROCHLORIDE 10 MILLIGRAM(S): 5 TABLET ORAL at 22:15

## 2022-05-05 RX ADMIN — Medication 20 MILLIGRAM(S): at 05:23

## 2022-05-05 RX ADMIN — CLOPIDOGREL BISULFATE 75 MILLIGRAM(S): 75 TABLET, FILM COATED ORAL at 12:04

## 2022-05-05 RX ADMIN — GABAPENTIN 100 MILLIGRAM(S): 400 CAPSULE ORAL at 05:24

## 2022-05-05 RX ADMIN — Medication 9 UNIT(S): at 16:44

## 2022-05-05 RX ADMIN — Medication 650 MILLIGRAM(S): at 12:05

## 2022-05-05 RX ADMIN — Medication 500 MILLIGRAM(S): at 17:33

## 2022-05-05 RX ADMIN — OXYCODONE HYDROCHLORIDE 10 MILLIGRAM(S): 5 TABLET ORAL at 00:00

## 2022-05-05 RX ADMIN — PANTOPRAZOLE SODIUM 40 MILLIGRAM(S): 20 TABLET, DELAYED RELEASE ORAL at 05:24

## 2022-05-05 RX ADMIN — SODIUM CHLORIDE 3 MILLILITER(S): 9 INJECTION INTRAMUSCULAR; INTRAVENOUS; SUBCUTANEOUS at 21:37

## 2022-05-05 RX ADMIN — ENOXAPARIN SODIUM 40 MILLIGRAM(S): 100 INJECTION SUBCUTANEOUS at 12:05

## 2022-05-05 RX ADMIN — Medication 650 MILLIGRAM(S): at 12:40

## 2022-05-05 RX ADMIN — Medication 2: at 08:00

## 2022-05-05 RX ADMIN — CHLORHEXIDINE GLUCONATE 1 APPLICATION(S): 213 SOLUTION TOPICAL at 12:10

## 2022-05-05 RX ADMIN — OXYCODONE HYDROCHLORIDE 10 MILLIGRAM(S): 5 TABLET ORAL at 21:45

## 2022-05-05 RX ADMIN — Medication 50 MILLIGRAM(S): at 05:24

## 2022-05-05 RX ADMIN — AMIODARONE HYDROCHLORIDE 400 MILLIGRAM(S): 400 TABLET ORAL at 05:23

## 2022-05-05 RX ADMIN — Medication 500 MILLIGRAM(S): at 05:24

## 2022-05-05 RX ADMIN — Medication 650 MILLIGRAM(S): at 01:38

## 2022-05-05 RX ADMIN — Medication 81 MILLIGRAM(S): at 12:04

## 2022-05-05 RX ADMIN — Medication 650 MILLIGRAM(S): at 05:43

## 2022-05-05 RX ADMIN — OXYCODONE HYDROCHLORIDE 10 MILLIGRAM(S): 5 TABLET ORAL at 01:37

## 2022-05-05 RX ADMIN — AMLODIPINE BESYLATE 2.5 MILLIGRAM(S): 2.5 TABLET ORAL at 05:24

## 2022-05-05 RX ADMIN — ATORVASTATIN CALCIUM 80 MILLIGRAM(S): 80 TABLET, FILM COATED ORAL at 21:40

## 2022-05-05 RX ADMIN — INSULIN GLARGINE 23 UNIT(S): 100 INJECTION, SOLUTION SUBCUTANEOUS at 21:40

## 2022-05-05 RX ADMIN — Medication 650 MILLIGRAM(S): at 05:24

## 2022-05-05 RX ADMIN — Medication 650 MILLIGRAM(S): at 02:08

## 2022-05-05 RX ADMIN — Medication 9 UNIT(S): at 12:07

## 2022-05-05 RX ADMIN — SODIUM CHLORIDE 3 MILLILITER(S): 9 INJECTION INTRAMUSCULAR; INTRAVENOUS; SUBCUTANEOUS at 14:00

## 2022-05-05 RX ADMIN — SODIUM CHLORIDE 3 MILLILITER(S): 9 INJECTION INTRAMUSCULAR; INTRAVENOUS; SUBCUTANEOUS at 05:21

## 2022-05-05 RX ADMIN — Medication 2: at 12:07

## 2022-05-05 RX ADMIN — Medication 50 MILLIGRAM(S): at 17:33

## 2022-05-05 NOTE — PROGRESS NOTE ADULT - NSPROGADDITIONALINFOA_GEN_ALL_CORE
Attending to bill  Attestation:   Patient seen and examined today at bedside. Chart, labs, vitals, radiology reviewed. Above H&P reviewed and edited where appropriate. Agree with history and physical exam. Agree with assessment and plan. I reviewed the overnight course of events and discussed the care with the patient and their family  35 minutes spent on total encounter of which more than fifty percent of the encounter was spent counseling and/or coordinating care by the attending physician.

## 2022-05-05 NOTE — PROGRESS NOTE ADULT - SUBJECTIVE AND OBJECTIVE BOX
S:  feels well.  no sob.  pain controlled    Telemetry: SR 70-90    Vital Signs Last 24 Hrs  T(C): 36.4 (22 @ 12:53), Max: 36.8 (22 @ 19:33)  T(F): 97.5 (22 @ 12:53), Max: 98.3 (22 @ 19:33)  HR: 83 (22 @ 12:53) (79 - 86)  BP: 109/64 (22 @ 12:53) (108/73 - 112/78)  RR: 18 (22 @ 12:53) (18 - 18)  SpO2: 96% (22 @ 12:53) (94% - 96%)                    07:01  -   @ 07:00  --------------------------------------------------------  IN: 1170 mL / OUT: 1615 mL / NET: -445 mL     @ 07:01  -   @ 13:01  --------------------------------------------------------  IN: 720 mL / OUT: 700 mL / NET: 20 mL      Daily Weight in k.8 (05 May 2022 08:02)        POCT Blood Glucose.: 151 mg/dL (05 May 2022 11:19)  POCT Blood Glucose.: 169 mg/dL (05 May 2022 07:28)  POCT Blood Glucose.: 240 mg/dL (04 May 2022 21:27)  POCT Blood Glucose.: 231 mg/dL (04 May 2022 16:30)          Drains:     MS         [  ] Drainage:                 L Pleural  [  ]  Drainage:                R Pleural  [  ]  Drainage:    Pacing Wires        [  ]   Settings:                                  Isolated  [  ]    Coumadin    [ ] YES          [ x]      NO         Reason:                                 9.3    21.69 )-----------( 190      ( 05 May 2022 05:08 )             29.5       05-05    137  |  104  |  25<H>  ----------------------------<  161<H>  4.5   |  22  |  0.75    Ca    9.7      05 May 2022 05:08  Phos  2.5     05-05  Mg     2.2     05-05    TPro  6.7  /  Alb  4.3  /  TBili  0.5  /  DBili  x   /  AST  21  /  ALT  21  /  AlkPhos  63  05-05          PHYSICAL EXAM:    Neurology: alert and oriented x 3, nonfocal, no gross deficits    CV :  S1S2 heard RRR    Sternal Wound :  CDI , Stable.  prevena in place    Lungs: clear to ausc. no w/r/r    Abdomen: soft, nontender, nondistended, positive bowel sounds bowel movement            Extremities:   l radial inc CDI            acetaminophen     Tablet .. 650 milliGRAM(s) Oral every 6 hours PRN  amLODIPine   Tablet 2.5 milliGRAM(s) Oral daily  ascorbic acid 500 milliGRAM(s) Oral two times a day  aspirin enteric coated 81 milliGRAM(s) Oral daily  atorvastatin 80 milliGRAM(s) Oral at bedtime  bisacodyl Suppository 10 milliGRAM(s) Rectal once  chlorhexidine 2% Cloths 1 Application(s) Topical daily  clopidogrel Tablet 75 milliGRAM(s) Oral daily  enoxaparin Injectable 40 milliGRAM(s) SubCutaneous every 24 hours  furosemide    Tablet 20 milliGRAM(s) Oral daily  gabapentin 100 milliGRAM(s) Oral every 8 hours  insulin glargine Injectable (LANTUS) 23 Unit(s) SubCutaneous at bedtime  insulin lispro (ADMELOG) corrective regimen sliding scale   SubCutaneous Before meals and at bedtime  insulin lispro Injectable (ADMELOG) 9 Unit(s) SubCutaneous three times a day before meals  metoprolol tartrate 50 milliGRAM(s) Oral every 12 hours  oxyCODONE    IR 10 milliGRAM(s) Oral every 4 hours PRN  oxyCODONE    IR 5 milliGRAM(s) Oral every 4 hours PRN  pantoprazole    Tablet 40 milliGRAM(s) Oral before breakfast  polyethylene glycol 3350 17 Gram(s) Oral daily  senna 2 Tablet(s) Oral at bedtime  sodium chloride 0.9% lock flush 3 milliLiter(s) IV Push every 8 hours                              Physical Therapy Rec:   Home  [ x ]   Home w/ PT  [  ]  Rehab  [  ]    Discussed with Cardiothoracic Team at AM rounds.

## 2022-05-05 NOTE — DIETITIAN INITIAL EVALUATION ADULT - NSFNSGIIOFT_GEN_A_CORE
05-04-22 @ 07:01  -  05-05-22 @ 07:00  --------------------------------------------------------  OUT:    Chest Tube (mL): 10 mL    Chest Tube (mL): 5 mL  Total OUT: 15 mL    Total NET: -15 mL  .  Denies nausea/vomiting/constipation/diarrhea. Last bowel movement per flow sheets 4/28?        Skyri[facility] Counseling: I discussed with the patient the risks of risankizumab-rzaa including but not limited to immunosuppression, and serious infections.  The patient understands that monitoring is required including a PPD at baseline and must alert us or the primary physician if symptoms of infection or other concerning signs are noted.

## 2022-05-05 NOTE — DIETITIAN INITIAL EVALUATION ADULT - PERTINENT MEDS FT
MEDICATIONS  (STANDING):  amLODIPine   Tablet 2.5 milliGRAM(s) Oral daily  ascorbic acid 500 milliGRAM(s) Oral two times a day  aspirin enteric coated 81 milliGRAM(s) Oral daily  atorvastatin 80 milliGRAM(s) Oral at bedtime  bisacodyl Suppository 10 milliGRAM(s) Rectal once  chlorhexidine 2% Cloths 1 Application(s) Topical daily  clopidogrel Tablet 75 milliGRAM(s) Oral daily  enoxaparin Injectable 40 milliGRAM(s) SubCutaneous every 24 hours  furosemide    Tablet 20 milliGRAM(s) Oral daily  gabapentin 100 milliGRAM(s) Oral every 8 hours  insulin glargine Injectable (LANTUS) 23 Unit(s) SubCutaneous at bedtime  insulin lispro (ADMELOG) corrective regimen sliding scale   SubCutaneous Before meals and at bedtime  insulin lispro Injectable (ADMELOG) 9 Unit(s) SubCutaneous three times a day before meals  metoprolol tartrate 50 milliGRAM(s) Oral every 12 hours  pantoprazole    Tablet 40 milliGRAM(s) Oral before breakfast  polyethylene glycol 3350 17 Gram(s) Oral daily  senna 2 Tablet(s) Oral at bedtime  sodium chloride 0.9% lock flush 3 milliLiter(s) IV Push every 8 hours    MEDICATIONS  (PRN):  acetaminophen     Tablet .. 650 milliGRAM(s) Oral every 6 hours PRN Mild Pain (1 - 3)  oxyCODONE    IR 10 milliGRAM(s) Oral every 4 hours PRN Severe Pain (7 - 10)  oxyCODONE    IR 5 milliGRAM(s) Oral every 4 hours PRN Moderate Pain (4 - 6)

## 2022-05-05 NOTE — DIETITIAN INITIAL EVALUATION ADULT - PERSON TAUGHT/METHOD
Provided education on Carbohydrate Consistent diet including sources of carbohydrates, portion sizes, pairing protein with carbohydrates and the importance of consistent eating pattern to help optimize glycemic control. Encouraged PO intake as tolerated with emphasis on protein foods as tolerated to aid in incision healing. Educated pt on foods rich in protein and encouraged consumption as able. Pt amenable to recommendations. All nutrition-related questions answered. Pt made aware RD remains available./verbal instruction/written material/patient instructed done

## 2022-05-05 NOTE — DIETITIAN INITIAL EVALUATION ADULT - ADD RECOMMEND
1) Will continue to monitor PO intake, weight, labs, skin, GI status and diet 2) Consider adding DASH restriction to diet. Continue carbohydrate controlled. 3) Continue vitamin C to aid in incision healing. A daily dose of >/= 500mg vitamin C has been shown to lower elevated blood glucose levels in people with type 2 diabetes.

## 2022-05-05 NOTE — PROGRESS NOTE ADULT - SUBJECTIVE AND OBJECTIVE BOX
Chief complaint  Patient is a 46y old  Female who presents with a chief complaint of Triple Vessel Disease (04 May 2022 13:18)   Review of systems  Patient in bed, looks comfortable, no hypoglycemic episodes.    Labs and Fingersticks  CAPILLARY BLOOD GLUCOSE      POCT Blood Glucose.: 151 mg/dL (05 May 2022 11:19)  POCT Blood Glucose.: 169 mg/dL (05 May 2022 07:28)  POCT Blood Glucose.: 240 mg/dL (04 May 2022 21:27)  POCT Blood Glucose.: 231 mg/dL (04 May 2022 16:30)      Anion Gap, Serum: 11 (05-05 @ 05:08)  Anion Gap, Serum: 14 (05-04 @ 00:23)      Calcium, Total Serum: 9.7 (05-05 @ 05:08)  Calcium, Total Serum: 8.4 (05-04 @ 00:23)  Albumin, Serum: 4.3 (05-05 @ 05:08)  Albumin, Serum: 4.3 (05-04 @ 00:23)    Alanine Aminotransferase (ALT/SGPT): 21 (05-05 @ 05:08)  Alanine Aminotransferase (ALT/SGPT): 24 (05-04 @ 00:23)  Alkaline Phosphatase, Serum: 63 (05-05 @ 05:08)  Alkaline Phosphatase, Serum: 51 (05-04 @ 00:23)  Aspartate Aminotransferase (AST/SGOT): 21 (05-05 @ 05:08)  Aspartate Aminotransferase (AST/SGOT): 44 *H* (05-04 @ 00:23)        05-05    137  |  104  |  25<H>  ----------------------------<  161<H>  4.5   |  22  |  0.75    Ca    9.7      05 May 2022 05:08  Phos  2.5     05-05  Mg     2.2     05-05    TPro  6.7  /  Alb  4.3  /  TBili  0.5  /  DBili  x   /  AST  21  /  ALT  21  /  AlkPhos  63  05-05                        9.3    21.69 )-----------( 190      ( 05 May 2022 05:08 )             29.5     Medications  MEDICATIONS  (STANDING):  amLODIPine   Tablet 2.5 milliGRAM(s) Oral daily  ascorbic acid 500 milliGRAM(s) Oral two times a day  aspirin enteric coated 81 milliGRAM(s) Oral daily  atorvastatin 80 milliGRAM(s) Oral at bedtime  bisacodyl Suppository 10 milliGRAM(s) Rectal once  chlorhexidine 2% Cloths 1 Application(s) Topical daily  clopidogrel Tablet 75 milliGRAM(s) Oral daily  enoxaparin Injectable 40 milliGRAM(s) SubCutaneous every 24 hours  furosemide    Tablet 20 milliGRAM(s) Oral daily  gabapentin 100 milliGRAM(s) Oral every 8 hours  insulin glargine Injectable (LANTUS) 23 Unit(s) SubCutaneous at bedtime  insulin lispro (ADMELOG) corrective regimen sliding scale   SubCutaneous Before meals and at bedtime  insulin lispro Injectable (ADMELOG) 9 Unit(s) SubCutaneous three times a day before meals  metoprolol tartrate 50 milliGRAM(s) Oral every 12 hours  pantoprazole    Tablet 40 milliGRAM(s) Oral before breakfast  polyethylene glycol 3350 17 Gram(s) Oral daily  senna 2 Tablet(s) Oral at bedtime  sodium chloride 0.9% lock flush 3 milliLiter(s) IV Push every 8 hours      Physical Exam  General: Patient comfortable in bed  Vital Signs Last 12 Hrs  T(F): 98.1 (05-05-22 @ 04:41), Max: 98.1 (05-05-22 @ 04:41)  HR: 79 (05-05-22 @ 04:41) (79 - 79)  BP: 108/73 (05-05-22 @ 04:41) (108/73 - 108/73)  BP(mean): --  RR: 18 (05-05-22 @ 04:41) (18 - 18)  SpO2: 94% (05-05-22 @ 04:41) (94% - 94%)  Neck: No palpable thyroid nodules.  CVS: S1S2, No murmurs  Respiratory: No wheezing, no crepitations  GI: Abdomen soft, bowel sounds positive  Musculoskeletal:  edema lower extremities.   Skin: No skin rashes, no ecchymosis    Diagnostics             Chief complaint  Patient is a 46y old  Female who presents with a chief complaint of Triple Vessel Disease (04 May 2022 13:18)   Review of systems  Patient in bed, looks comfortable, no hypoglycemic episodes.    Labs and Fingersticks  CAPILLARY BLOOD GLUCOSE      POCT Blood Glucose.: 151 mg/dL (05 May 2022 11:19)  POCT Blood Glucose.: 169 mg/dL (05 May 2022 07:28)  POCT Blood Glucose.: 240 mg/dL (04 May 2022 21:27)  POCT Blood Glucose.: 231 mg/dL (04 May 2022 16:30)      Anion Gap, Serum: 11 (05-05 @ 05:08)  Anion Gap, Serum: 14 (05-04 @ 00:23)      Calcium, Total Serum: 9.7 (05-05 @ 05:08)  Calcium, Total Serum: 8.4 (05-04 @ 00:23)  Albumin, Serum: 4.3 (05-05 @ 05:08)  Albumin, Serum: 4.3 (05-04 @ 00:23)    Alanine Aminotransferase (ALT/SGPT): 21 (05-05 @ 05:08)  Alanine Aminotransferase (ALT/SGPT): 24 (05-04 @ 00:23)  Alkaline Phosphatase, Serum: 63 (05-05 @ 05:08)  Alkaline Phosphatase, Serum: 51 (05-04 @ 00:23)  Aspartate Aminotransferase (AST/SGOT): 21 (05-05 @ 05:08)  Aspartate Aminotransferase (AST/SGOT): 44 *H* (05-04 @ 00:23)        05-05    137  |  104  |  25<H>  ----------------------------<  161<H>  4.5   |  22  |  0.75    Ca    9.7      05 May 2022 05:08  Phos  2.5     05-05  Mg     2.2     05-05    TPro  6.7  /  Alb  4.3  /  TBili  0.5  /  DBili  x   /  AST  21  /  ALT  21  /  AlkPhos  63  05-05                        9.3    21.69 )-----------( 190      ( 05 May 2022 05:08 )             29.5     Medications  MEDICATIONS  (STANDING):  amLODIPine   Tablet 2.5 milliGRAM(s) Oral daily  ascorbic acid 500 milliGRAM(s) Oral two times a day  aspirin enteric coated 81 milliGRAM(s) Oral daily  atorvastatin 80 milliGRAM(s) Oral at bedtime  bisacodyl Suppository 10 milliGRAM(s) Rectal once  chlorhexidine 2% Cloths 1 Application(s) Topical daily  clopidogrel Tablet 75 milliGRAM(s) Oral daily  enoxaparin Injectable 40 milliGRAM(s) SubCutaneous every 24 hours  furosemide    Tablet 20 milliGRAM(s) Oral daily  gabapentin 100 milliGRAM(s) Oral every 8 hours  insulin glargine Injectable (LANTUS) 23 Unit(s) SubCutaneous at bedtime  insulin lispro (ADMELOG) corrective regimen sliding scale   SubCutaneous Before meals and at bedtime  insulin lispro Injectable (ADMELOG) 9 Unit(s) SubCutaneous three times a day before meals  metoprolol tartrate 50 milliGRAM(s) Oral every 12 hours  pantoprazole    Tablet 40 milliGRAM(s) Oral before breakfast  polyethylene glycol 3350 17 Gram(s) Oral daily  senna 2 Tablet(s) Oral at bedtime  sodium chloride 0.9% lock flush 3 milliLiter(s) IV Push every 8 hours      Physical Exam  General: Patient comfortable in bed  Vital Signs Last 12 Hrs  T(F): 98.1 (05-05-22 @ 04:41), Max: 98.1 (05-05-22 @ 04:41)  HR: 79 (05-05-22 @ 04:41) (79 - 79)  BP: 108/73 (05-05-22 @ 04:41) (108/73 - 108/73)  BP(mean): --  RR: 18 (05-05-22 @ 04:41) (18 - 18)  SpO2: 94% (05-05-22 @ 04:41) (94% - 94%)  Neck: No palpable thyroid nodules.  CVS: S1S2, No murmurs  Respiratory: No wheezing, no crepitations  GI: Abdomen soft, bowel sounds positive  Musculoskeletal:  edema lower extremities.   Skin: No skin rashes, no ecchymosis    Diagnostics

## 2022-05-05 NOTE — DIETITIAN INITIAL EVALUATION ADULT - PERTINENT LABORATORY DATA
05-05    137  |  104  |  25<H>  ----------------------------<  161<H>  4.5   |  22  |  0.75    Ca    9.7      05 May 2022 05:08  Phos  2.5     05-05  Mg     2.2     05-05    TPro  6.7  /  Alb  4.3  /  TBili  0.5  /  DBili  x   /  AST  21  /  ALT  21  /  AlkPhos  63  05-05  POCT Blood Glucose.: 151 mg/dL (05-05-22 @ 11:19)  A1C with Estimated Average Glucose Result: 7.5 % (04-29-22 @ 08:36)  A1C with Estimated Average Glucose Result: 7.3 % (04-28-22 @ 08:10)

## 2022-05-05 NOTE — PROGRESS NOTE ADULT - ASSESSMENT
Assessment  DMT2: 46y Female with newly dx DM with hyperglycemia, A1C 7.5%, was not taking any hypoglycemic agents, postop on basal bolus insulin, increased dose yesterday, blood sugars still running high and not at postop target, no hypoglycemias, reports eating meals with improving appetite, well-appearing, agreeable to insulin teaching.  ?Goiter: no known history, has visible neck lump, euthyroid, US thyroid completed and unremarkable, ruled out.  CAD: on medications, no chest pain, stable, monitored.  HTN: Controlled,  on antihypertensive medications.  HLD: On statin  Obesity: No strict exercise routines, not on any weight loss program, neither on low calorie diet.          Harjeet Varghese MD  Cell: [phone]  Office: [phone]               Assessment  DMT2: 46y Female with newly dx DM with hyperglycemia, A1C 7.5%, was not taking any hypoglycemic agents, postop on basal bolus insulin, increased dose yesterday, blood sugars still running high and not at postop target, no hypoglycemias, reports eating meals with improving appetite, well-appearing, agreeable to insulin teaching.  ?Goiter: no known history, has visible neck lump, euthyroid, US thyroid completed and unremarkable, ruled out.  CAD: on medications, no chest pain, stable, monitored.  HTN: Controlled,  on antihypertensive medications.  HLD: On statin  Obesity: No strict exercise routines, not on any weight loss program, neither on low calorie diet.            Harjeet Varghese MD  Cell: [phone]  Office: [phone]

## 2022-05-05 NOTE — PROGRESS NOTE ADULT - ASSESSMENT
39F with hyperthyroidism CAD s/p stents to mLAD (7/2014), HTN presents after one episode of passing out at [employer] today. VS for fever and tachycardia. PE notable for pus in right pharynx and crusted lesion on left foot TTP. Labs notable for low TSH, high free T4, and leuokocytosis, trop neg. EKG with sinus tach. Admitted to medicine for syncope.       On 5/2/22, Pt underwent CABG x 3 w/ Lt. radial  post-op course unremarkable  norvasc for radial graft  diurese  increase bb as tolerated  5/4 POD #2 tr. floor. prevena in place preop uti - e. coli - tx w/ cirpro x 2 day sthen zinacef iv x 5 doses-  5/5 VSS  -445cc/24hrs.  prevena in place.  wbc 21--observe--chest PT.  Needs insulin teaching

## 2022-05-05 NOTE — PROGRESS NOTE ADULT - PROBLEM SELECTOR PLAN 1
asa/plavix/bb  diurese  prevena mt inc  oob  +pw  wbc 21--chest PT / inc spirometry  d/c plan - anticipate home

## 2022-05-05 NOTE — DIETITIAN INITIAL EVALUATION ADULT - ORAL INTAKE PTA/DIET HISTORY
visited pt at bedside this morning, eating well PTA. new DM, not monitoring blood glucose levels PTA. eats a lot of rice PTA, not much bread. eats lean proteins, trying to move away from red meat.

## 2022-05-05 NOTE — PROGRESS NOTE ADULT - PROBLEM SELECTOR PLAN 1
Will increase Lantus to 23u at bedtime.  Will increase Admelog to 9u before each meal and continue Admelog correction scale coverage. Will continue monitoring FS and FU.  Suggest to start insulin administration teaching; Will continue monitoring and FU DC recommendations.  Patient counseled for compliance with consistent low carb diet and exercise as tolerated outpatient.

## 2022-05-06 LAB
ANION GAP SERPL CALC-SCNC: 13 MMOL/L — SIGNIFICANT CHANGE UP (ref 5–17)
BUN SERPL-MCNC: 21 MG/DL — SIGNIFICANT CHANGE UP (ref 7–23)
CALCIUM SERPL-MCNC: 9 MG/DL — SIGNIFICANT CHANGE UP (ref 8.4–10.5)
CHLORIDE SERPL-SCNC: 100 MMOL/L — SIGNIFICANT CHANGE UP (ref 96–108)
CO2 SERPL-SCNC: 23 MMOL/L — SIGNIFICANT CHANGE UP (ref 22–31)
CREAT SERPL-MCNC: 0.73 MG/DL — SIGNIFICANT CHANGE UP (ref 0.5–1.3)
EGFR: 102 ML/MIN/1.73M2 — SIGNIFICANT CHANGE UP
GLUCOSE BLDC GLUCOMTR-MCNC: 133 MG/DL — HIGH (ref 70–99)
GLUCOSE BLDC GLUCOMTR-MCNC: 139 MG/DL — HIGH (ref 70–99)
GLUCOSE BLDC GLUCOMTR-MCNC: 144 MG/DL — HIGH (ref 70–99)
GLUCOSE BLDC GLUCOMTR-MCNC: 168 MG/DL — HIGH (ref 70–99)
GLUCOSE BLDC GLUCOMTR-MCNC: 190 MG/DL — HIGH (ref 70–99)
GLUCOSE SERPL-MCNC: 125 MG/DL — HIGH (ref 70–99)
HCT VFR BLD CALC: 32 % — LOW (ref 34.5–45)
HGB BLD-MCNC: 10.2 G/DL — LOW (ref 11.5–15.5)
MCHC RBC-ENTMCNC: 29.8 PG — SIGNIFICANT CHANGE UP (ref 27–34)
MCHC RBC-ENTMCNC: 31.9 GM/DL — LOW (ref 32–36)
MCV RBC AUTO: 93.6 FL — SIGNIFICANT CHANGE UP (ref 80–100)
NRBC # BLD: 0 /100 WBCS — SIGNIFICANT CHANGE UP (ref 0–0)
PLATELET # BLD AUTO: 227 K/UL — SIGNIFICANT CHANGE UP (ref 150–400)
POTASSIUM SERPL-MCNC: 4.2 MMOL/L — SIGNIFICANT CHANGE UP (ref 3.5–5.3)
POTASSIUM SERPL-SCNC: 4.2 MMOL/L — SIGNIFICANT CHANGE UP (ref 3.5–5.3)
RBC # BLD: 3.42 M/UL — LOW (ref 3.8–5.2)
RBC # FLD: 13.6 % — SIGNIFICANT CHANGE UP (ref 10.3–14.5)
SODIUM SERPL-SCNC: 136 MMOL/L — SIGNIFICANT CHANGE UP (ref 135–145)
WBC # BLD: 19.57 K/UL — HIGH (ref 3.8–10.5)
WBC # FLD AUTO: 19.57 K/UL — HIGH (ref 3.8–10.5)

## 2022-05-06 RX ORDER — SORBITOL SOLUTION 70 %
30 SOLUTION, ORAL MISCELLANEOUS ONCE
Refills: 0 | Status: COMPLETED | OUTPATIENT
Start: 2022-05-06 | End: 2022-05-06

## 2022-05-06 RX ORDER — ACETAMINOPHEN 500 MG
650 TABLET ORAL EVERY 6 HOURS
Refills: 0 | Status: DISCONTINUED | OUTPATIENT
Start: 2022-05-06 | End: 2022-05-07

## 2022-05-06 RX ADMIN — AMLODIPINE BESYLATE 2.5 MILLIGRAM(S): 2.5 TABLET ORAL at 06:20

## 2022-05-06 RX ADMIN — GABAPENTIN 100 MILLIGRAM(S): 400 CAPSULE ORAL at 22:15

## 2022-05-06 RX ADMIN — OXYCODONE HYDROCHLORIDE 10 MILLIGRAM(S): 5 TABLET ORAL at 08:35

## 2022-05-06 RX ADMIN — Medication 20 MILLIGRAM(S): at 06:20

## 2022-05-06 RX ADMIN — Medication 9 UNIT(S): at 17:02

## 2022-05-06 RX ADMIN — SODIUM CHLORIDE 3 MILLILITER(S): 9 INJECTION INTRAMUSCULAR; INTRAVENOUS; SUBCUTANEOUS at 06:25

## 2022-05-06 RX ADMIN — Medication 50 MILLIGRAM(S): at 06:20

## 2022-05-06 RX ADMIN — Medication 30 MILLILITER(S): at 14:22

## 2022-05-06 RX ADMIN — ENOXAPARIN SODIUM 40 MILLIGRAM(S): 100 INJECTION SUBCUTANEOUS at 11:29

## 2022-05-06 RX ADMIN — SODIUM CHLORIDE 3 MILLILITER(S): 9 INJECTION INTRAMUSCULAR; INTRAVENOUS; SUBCUTANEOUS at 13:06

## 2022-05-06 RX ADMIN — Medication 2: at 11:28

## 2022-05-06 RX ADMIN — PANTOPRAZOLE SODIUM 40 MILLIGRAM(S): 20 TABLET, DELAYED RELEASE ORAL at 06:20

## 2022-05-06 RX ADMIN — CLOPIDOGREL BISULFATE 75 MILLIGRAM(S): 75 TABLET, FILM COATED ORAL at 13:33

## 2022-05-06 RX ADMIN — Medication 9 UNIT(S): at 07:58

## 2022-05-06 RX ADMIN — Medication 9 UNIT(S): at 11:28

## 2022-05-06 RX ADMIN — Medication 500 MILLIGRAM(S): at 17:01

## 2022-05-06 RX ADMIN — GABAPENTIN 100 MILLIGRAM(S): 400 CAPSULE ORAL at 06:20

## 2022-05-06 RX ADMIN — Medication 500 MILLIGRAM(S): at 06:20

## 2022-05-06 RX ADMIN — Medication 81 MILLIGRAM(S): at 13:33

## 2022-05-06 RX ADMIN — SODIUM CHLORIDE 3 MILLILITER(S): 9 INJECTION INTRAMUSCULAR; INTRAVENOUS; SUBCUTANEOUS at 22:13

## 2022-05-06 RX ADMIN — CHLORHEXIDINE GLUCONATE 1 APPLICATION(S): 213 SOLUTION TOPICAL at 13:33

## 2022-05-06 RX ADMIN — Medication 650 MILLIGRAM(S): at 17:28

## 2022-05-06 RX ADMIN — Medication 2: at 22:16

## 2022-05-06 RX ADMIN — ATORVASTATIN CALCIUM 80 MILLIGRAM(S): 80 TABLET, FILM COATED ORAL at 22:15

## 2022-05-06 RX ADMIN — GABAPENTIN 100 MILLIGRAM(S): 400 CAPSULE ORAL at 13:33

## 2022-05-06 RX ADMIN — INSULIN GLARGINE 23 UNIT(S): 100 INJECTION, SOLUTION SUBCUTANEOUS at 22:15

## 2022-05-06 RX ADMIN — Medication 650 MILLIGRAM(S): at 17:58

## 2022-05-06 RX ADMIN — Medication 50 MILLIGRAM(S): at 17:01

## 2022-05-06 RX ADMIN — OXYCODONE HYDROCHLORIDE 10 MILLIGRAM(S): 5 TABLET ORAL at 08:05

## 2022-05-06 NOTE — PROGRESS NOTE ADULT - REASON FOR ADMISSION
Triple Vessel Disease
Triple Vessel Disease   preop
Triple Vessel Disease

## 2022-05-06 NOTE — PROGRESS NOTE ADULT - SUBJECTIVE AND OBJECTIVE BOX
VITAL SIGNS    Telemetry:   80-90  Vital Signs Last 24 Hrs  T(C): 36.7 (22 @ 05:05), Max: 36.9 (22 @ 20:31)  T(F): 98.1 (22 @ 05:05), Max: 98.4 (22 @ 20:31)  HR: 92 (22 @ 05:05) (80 - 92)  BP: 119/85 (22 @ 05:05) (109/64 - 119/85)  RR: 18 (22 @ 05:05) (18 - 18)  SpO2: 94% (22 @ 05:05) (94% - 96%)             @ 07:01  -   @ 07:00  --------------------------------------------------------  IN: 960 mL / OUT: 0 mL / NET: 960 mL     @ 07:01  -   @ 11:20  --------------------------------------------------------  IN: 360 mL / OUT: 900 mL / NET: -540 mL       Daily     Daily Weight in k.7 (06 May 2022 08:40)  Admit Wt: Drug Dosing Weight  Height (cm): 152.4 (01 May 2022 19:51)  Weight (kg): 92.9 (02 May 2022 06:08)  BMI (kg/m2): 40 (02 May 2022 06:08)  BSA (m2): 1.89 (02 May 2022 06:08)      CAPILLARY BLOOD GLUCOSE      POCT Blood Glucose.: 133 mg/dL (06 May 2022 10:07)  POCT Blood Glucose.: 139 mg/dL (06 May 2022 07:41)  POCT Blood Glucose.: 134 mg/dL (05 May 2022 21:11)  POCT Blood Glucose.: 138 mg/dL (05 May 2022 16:25)          MEDICATIONS  acetaminophen     Tablet .. 650 milliGRAM(s) Oral every 6 hours PRN  amLODIPine   Tablet 2.5 milliGRAM(s) Oral daily  ascorbic acid 500 milliGRAM(s) Oral two times a day  aspirin enteric coated 81 milliGRAM(s) Oral daily  atorvastatin 80 milliGRAM(s) Oral at bedtime  bisacodyl Suppository 10 milliGRAM(s) Rectal once  chlorhexidine 2% Cloths 1 Application(s) Topical daily  clopidogrel Tablet 75 milliGRAM(s) Oral daily  enoxaparin Injectable 40 milliGRAM(s) SubCutaneous every 24 hours  gabapentin 100 milliGRAM(s) Oral every 8 hours  insulin glargine Injectable (LANTUS) 23 Unit(s) SubCutaneous at bedtime  insulin lispro (ADMELOG) corrective regimen sliding scale   SubCutaneous Before meals and at bedtime  insulin lispro Injectable (ADMELOG) 9 Unit(s) SubCutaneous three times a day before meals  metoprolol tartrate 50 milliGRAM(s) Oral every 12 hours  oxyCODONE    IR 5 milliGRAM(s) Oral every 4 hours PRN  oxyCODONE    IR 10 milliGRAM(s) Oral every 4 hours PRN  pantoprazole    Tablet 40 milliGRAM(s) Oral before breakfast  polyethylene glycol 3350 17 Gram(s) Oral daily  senna 2 Tablet(s) Oral at bedtime  sodium chloride 0.9% lock flush 3 milliLiter(s) IV Push every 8 hours      >>> <<<  PHYSICAL EXAM  Subjective: NAD  Neurology: alert and oriented x 3, nonfocal, no gross deficits  CV : s1s2 prevena dressing  Sternal Wound :  CDI , Stable  Lungs:cta b/l  Abdomen: soft, NT,ND, (+ )BM  :  voiding  Extremities:  -c/c/e     LABS  -    136  |  100  |  21  ----------------------------<  125<H>  4.2   |  23  |  0.73    Ca    9.0      06 May 2022 07:28  Phos  2.5     05-05  Mg     2.2     05-05    TPro  6.7  /  Alb  4.3  /  TBili  0.5  /  DBili  x   /  AST  21  /  ALT  21  /  AlkPhos  63  05-05                                 10.2   19.57 )-----------( 227      ( 06 May 2022 07:29 )             32.0                 PAST MEDICAL & SURGICAL HISTORY:  HTN (hypertension)    HLD (hyperlipidemia)    Coronary artery disease  s/p MIGUEL ANGEL x 3 (2019), MIGUEL ANGEL x1 ()    Hypothyroid  Resolved    Obesity    H/O total hysterectomy      S/P coronary artery stent placement  mLAD x1 MIGUEL ANGEL (),  3 stents 2019

## 2022-05-06 NOTE — PROGRESS NOTE ADULT - PROBLEM SELECTOR PLAN 5
Overweight/Obesity: Patient counseled for weight loss, physical activity as tolerated, low calorie diet.

## 2022-05-06 NOTE — PROGRESS NOTE ADULT - PROBLEM SELECTOR PROBLEM 3
RX request, next apt 11/15/18  
HTN (hypertension)

## 2022-05-06 NOTE — PROGRESS NOTE ADULT - ASSESSMENT
39F with hyperthyroidism CAD s/p stents to mLAD (7/2014), HTN presents after one episode of passing out at [employer] today. VS for fever and tachycardia. PE notable for pus in right pharynx and crusted lesion on left foot TTP. Labs notable for low TSH, high free T4, and leuokocytosis, trop neg. EKG with sinus tach. Admitted to medicine for syncope.       On 5/2/22, Pt underwent CABG x 3 w/ Lt. radial  post-op course unremarkable  norvasc for radial graft  diurese  increase bb as tolerated  5/4 POD #2 tr. floor. prevena in place preop uti - e. coli - tx w/ cirpro x 2 day sthen zinacef iv x 5 doses-  5/5 VSS  -445cc/24hrs.  prevena in place.  wbc 21--observe--chest PT.  Needs insulin teaching  5/6 PW removed. Continue with insulin administration teaching. Plan on dc Sunday/Monday

## 2022-05-06 NOTE — PROGRESS NOTE ADULT - NS ATTEND AMEND GEN_ALL_CORE FT
Attestation:   Patient seen and examined today at bedside. Chart, labs, vitals, radiology reviewed. Above H&P reviewed and edited where appropriate. Agree with history and physical exam. Agree with assessment and plan. I reviewed the overnight course of events and discussed the care with the patient and their family  35 minutes spent on total encounter of which more than fifty percent of the encounter was spent counseling and/or coordinating care by the attending physician.

## 2022-05-06 NOTE — PROGRESS NOTE ADULT - PROBLEM SELECTOR PLAN 1
Will continue current insulin regimen for now. Will continue monitoring FS and FU.  Suggest to continue insulin teachingCONCHA on the following DM regimen:  -Lantus 22u at bedtime  -Janumet 50/1000 BID  -Jardiance 10mg daily  -FU Endo 2 weeks  Patient counseled for compliance with consistent low carb diet and exercise as tolerated outpatient.

## 2022-05-06 NOTE — PROGRESS NOTE ADULT - PROBLEM SELECTOR PROBLEM 2
Triple vessel coronary artery disease
DM (diabetes mellitus)
DM (diabetes mellitus)
Triple vessel coronary artery disease
Triple vessel coronary artery disease
DM (diabetes mellitus)
DM (diabetes mellitus)
Triple vessel coronary artery disease
DM (diabetes mellitus)
DM (diabetes mellitus)
Triple vessel coronary artery disease

## 2022-05-06 NOTE — PROGRESS NOTE ADULT - PROVIDER SPECIALTY LIST ADULT
Critical Care
CT Surgery
Endocrinology

## 2022-05-06 NOTE — PROGRESS NOTE ADULT - PROBLEM SELECTOR PROBLEM 1
Triple vessel coronary artery disease
S/P CABG x 3
DM (diabetes mellitus)
Triple vessel coronary artery disease
Triple vessel coronary artery disease
DM (diabetes mellitus)

## 2022-05-06 NOTE — PROGRESS NOTE ADULT - ASSESSMENT
Assessment  DMT2: 46y Female with newly dx DM with hyperglycemia, A1C 7.5%, was not taking any hypoglycemic agents, postop on basal bolus insulin, increased dose yesterday, blood sugars overall improving, no hypoglycemias, eating meals, NAD, planning DC.  ?Goiter: no known history, has visible neck lump, euthyroid, US thyroid completed and unremarkable, ruled out.  CAD: on medications, no chest pain, stable, monitored.  HTN: Controlled,  on antihypertensive medications.  HLD: On statin  Obesity: No strict exercise routines, not on any weight loss program, neither on low calorie diet.            Harjeet Varghese MD  Cell: [phone]  Office: [phone]               Assessment  DMT2: 46y Female with newly dx DM with hyperglycemia, A1C 7.5%, was not taking any hypoglycemic agents, postop on basal bolus insulin, increased dose yesterday, blood sugars overall  improving, no hypoglycemias, eating meals, NAD, planning DC.  ?Goiter: no known history, has visible neck lump, euthyroid, US thyroid completed and unremarkable, ruled out.  CAD: on medications, no chest pain, stable, monitored.  HTN: Controlled,  on antihypertensive medications.  HLD: On statin  Obesity: No strict exercise routines, not on any weight loss program, neither on low calorie diet.            Harjeet Varghese MD  Cell: [phone]  Office: [phone]

## 2022-05-06 NOTE — PROGRESS NOTE ADULT - PROBLEM SELECTOR PLAN 4
Will continue statin, primary team FU

## 2022-05-06 NOTE — PROGRESS NOTE ADULT - PROBLEM SELECTOR PLAN 3
Suggest to continue medications, monitoring, FU primary team recommendations. .

## 2022-05-06 NOTE — PROGRESS NOTE ADULT - SUBJECTIVE AND OBJECTIVE BOX
Chief complaint  Patient is a 46y old  Female who presents with a chief complaint of Triple Vessel Disease (06 May 2022 11:20)   Review of systems  Patient in bed, looks comfortable, no hypoglycemic episodes.    Labs and Fingersticks  CAPILLARY BLOOD GLUCOSE      POCT Blood Glucose.: 190 mg/dL (06 May 2022 11:22)  POCT Blood Glucose.: 133 mg/dL (06 May 2022 10:07)  POCT Blood Glucose.: 139 mg/dL (06 May 2022 07:41)  POCT Blood Glucose.: 134 mg/dL (05 May 2022 21:11)  POCT Blood Glucose.: 138 mg/dL (05 May 2022 16:25)      Anion Gap, Serum: 13 (05-06 @ 07:28)  Anion Gap, Serum: 11 (05-05 @ 05:08)      Calcium, Total Serum: 9.0 (05-06 @ 07:28)  Calcium, Total Serum: 9.7 (05-05 @ 05:08)  Albumin, Serum: 4.3 (05-05 @ 05:08)    Alanine Aminotransferase (ALT/SGPT): 21 (05-05 @ 05:08)  Alkaline Phosphatase, Serum: 63 (05-05 @ 05:08)  Aspartate Aminotransferase (AST/SGOT): 21 (05-05 @ 05:08)        05-06    136  |  100  |  21  ----------------------------<  125<H>  4.2   |  23  |  0.73    Ca    9.0      06 May 2022 07:28  Phos  2.5     05-05  Mg     2.2     05-05    TPro  6.7  /  Alb  4.3  /  TBili  0.5  /  DBili  x   /  AST  21  /  ALT  21  /  AlkPhos  63  05-05                        10.2   19.57 )-----------( 227      ( 06 May 2022 07:29 )             32.0     Medications  MEDICATIONS  (STANDING):  amLODIPine   Tablet 2.5 milliGRAM(s) Oral daily  ascorbic acid 500 milliGRAM(s) Oral two times a day  aspirin enteric coated 81 milliGRAM(s) Oral daily  atorvastatin 80 milliGRAM(s) Oral at bedtime  bisacodyl Suppository 10 milliGRAM(s) Rectal once  chlorhexidine 2% Cloths 1 Application(s) Topical daily  clopidogrel Tablet 75 milliGRAM(s) Oral daily  enoxaparin Injectable 40 milliGRAM(s) SubCutaneous every 24 hours  gabapentin 100 milliGRAM(s) Oral every 8 hours  insulin glargine Injectable (LANTUS) 23 Unit(s) SubCutaneous at bedtime  insulin lispro (ADMELOG) corrective regimen sliding scale   SubCutaneous Before meals and at bedtime  insulin lispro Injectable (ADMELOG) 9 Unit(s) SubCutaneous three times a day before meals  metoprolol tartrate 50 milliGRAM(s) Oral every 12 hours  pantoprazole    Tablet 40 milliGRAM(s) Oral before breakfast  polyethylene glycol 3350 17 Gram(s) Oral daily  senna 2 Tablet(s) Oral at bedtime  sodium chloride 0.9% lock flush 3 milliLiter(s) IV Push every 8 hours      Physical Exam  General: Patient comfortable in bed  Vital Signs Last 12 Hrs  T(F): 98.1 (05-06-22 @ 05:05), Max: 98.1 (05-06-22 @ 05:05)  HR: 92 (05-06-22 @ 05:05) (92 - 92)  BP: 119/85 (05-06-22 @ 05:05) (119/85 - 119/85)  BP(mean): --  RR: 18 (05-06-22 @ 05:05) (18 - 18)  SpO2: 94% (05-06-22 @ 05:05) (94% - 94%)  Neck: No palpable thyroid nodules.  CVS: S1S2, No murmurs  Respiratory: No wheezing, no crepitations  GI: Abdomen soft, bowel sounds positive  Musculoskeletal:  edema lower extremities.   Skin: No skin rashes, no ecchymosis    Diagnostics             Chief complaint  Patient is a 46y old  Female who presents with a chief complaint of Triple Vessel Disease (06 May 2022 11:20)   Review of systems  Patient in bed, looks comfortable, no hypoglycemic episodes.    Labs and Fingersticks  CAPILLARY BLOOD GLUCOSE      POCT Blood Glucose.: 190 mg/dL (06 May 2022 11:22)  POCT Blood Glucose.: 133 mg/dL (06 May 2022 10:07)  POCT Blood Glucose.: 139 mg/dL (06 May 2022 07:41)  POCT Blood Glucose.: 134 mg/dL (05 May 2022 21:11)  POCT Blood Glucose.: 138 mg/dL (05 May 2022 16:25)      Anion Gap, Serum: 13 (05-06 @ 07:28)  Anion Gap, Serum: 11 (05-05 @ 05:08)      Calcium, Total Serum: 9.0 (05-06 @ 07:28)  Calcium, Total Serum: 9.7 (05-05 @ 05:08)  Albumin, Serum: 4.3 (05-05 @ 05:08)    Alanine Aminotransferase (ALT/SGPT): 21 (05-05 @ 05:08)  Alkaline Phosphatase, Serum: 63 (05-05 @ 05:08)  Aspartate Aminotransferase (AST/SGOT): 21 (05-05 @ 05:08)        05-06    136  |  100  |  21  ----------------------------<  125<H>  4.2   |  23  |  0.73    Ca    9.0      06 May 2022 07:28  Phos  2.5     05-05  Mg     2.2     05-05    TPro  6.7  /  Alb  4.3  /  TBili  0.5  /  DBili  x   /  AST  21  /  ALT  21  /  AlkPhos  63  05-05                        10.2   19.57 )-----------( 227      ( 06 May 2022 07:29 )             32.0     Medications  MEDICATIONS  (STANDING):  amLODIPine   Tablet 2.5 milliGRAM(s) Oral daily  ascorbic acid 500 milliGRAM(s) Oral two times a day  aspirin enteric coated 81 milliGRAM(s) Oral daily  atorvastatin 80 milliGRAM(s) Oral at bedtime  bisacodyl Suppository 10 milliGRAM(s) Rectal once  chlorhexidine 2% Cloths 1 Application(s) Topical daily  clopidogrel Tablet 75 milliGRAM(s) Oral daily  enoxaparin Injectable 40 milliGRAM(s) SubCutaneous every 24 hours  gabapentin 100 milliGRAM(s) Oral every 8 hours  insulin glargine Injectable (LANTUS) 23 Unit(s) SubCutaneous at bedtime  insulin lispro (ADMELOG) corrective regimen sliding scale   SubCutaneous Before meals and at bedtime  insulin lispro Injectable (ADMELOG) 9 Unit(s) SubCutaneous three times a day before meals  metoprolol tartrate 50 milliGRAM(s) Oral every 12 hours  pantoprazole    Tablet 40 milliGRAM(s) Oral before breakfast  polyethylene glycol 3350 17 Gram(s) Oral daily  senna 2 Tablet(s) Oral at bedtime  sodium chloride 0.9% lock flush 3 milliLiter(s) IV Push every 8 hours      Physical Exam  General: Patient comfortable in bed  Vital Signs Last 12 Hrs  T(F): 98.1 (05-06-22 @ 05:05), Max: 98.1 (05-06-22 @ 05:05)  HR: 92 (05-06-22 @ 05:05) (92 - 92)  BP: 119/85 (05-06-22 @ 05:05) (119/85 - 119/85)  BP(mean): --  RR: 18 (05-06-22 @ 05:05) (18 - 18)  SpO2: 94% (05-06-22 @ 05:05) (94% - 94%)  Neck: No palpable thyroid nodules.  CVS: S1S2, No murmurs  Respiratory: No wheezing, no crepitations  GI: Abdomen soft, bowel sounds positive  Musculoskeletal:  edema lower extremities.   Skin: No skin rashes, no ecchymosis    Diagnostics

## 2022-05-07 ENCOUNTER — TRANSCRIPTION ENCOUNTER (OUTPATIENT)
Age: 47
End: 2022-05-07

## 2022-05-07 VITALS
OXYGEN SATURATION: 96 % | TEMPERATURE: 98 F | SYSTOLIC BLOOD PRESSURE: 112 MMHG | DIASTOLIC BLOOD PRESSURE: 71 MMHG | RESPIRATION RATE: 18 BRPM | HEART RATE: 93 BPM

## 2022-05-07 LAB
ALBUMIN SERPL ELPH-MCNC: 4 G/DL — SIGNIFICANT CHANGE UP (ref 3.3–5)
ALP SERPL-CCNC: 83 U/L — SIGNIFICANT CHANGE UP (ref 40–120)
ALT FLD-CCNC: 24 U/L — SIGNIFICANT CHANGE UP (ref 10–45)
ANION GAP SERPL CALC-SCNC: 15 MMOL/L — SIGNIFICANT CHANGE UP (ref 5–17)
APPEARANCE UR: CLEAR — SIGNIFICANT CHANGE UP
AST SERPL-CCNC: 19 U/L — SIGNIFICANT CHANGE UP (ref 10–40)
BACTERIA # UR AUTO: NEGATIVE — SIGNIFICANT CHANGE UP
BILIRUB SERPL-MCNC: 0.8 MG/DL — SIGNIFICANT CHANGE UP (ref 0.2–1.2)
BILIRUB UR-MCNC: NEGATIVE — SIGNIFICANT CHANGE UP
BUN SERPL-MCNC: 21 MG/DL — SIGNIFICANT CHANGE UP (ref 7–23)
CALCIUM SERPL-MCNC: 9.5 MG/DL — SIGNIFICANT CHANGE UP (ref 8.4–10.5)
CHLORIDE SERPL-SCNC: 98 MMOL/L — SIGNIFICANT CHANGE UP (ref 96–108)
CO2 SERPL-SCNC: 23 MMOL/L — SIGNIFICANT CHANGE UP (ref 22–31)
COLOR SPEC: SIGNIFICANT CHANGE UP
CREAT SERPL-MCNC: 0.71 MG/DL — SIGNIFICANT CHANGE UP (ref 0.5–1.3)
DIFF PNL FLD: NEGATIVE — SIGNIFICANT CHANGE UP
EGFR: 105 ML/MIN/1.73M2 — SIGNIFICANT CHANGE UP
EPI CELLS # UR: 2 /HPF — SIGNIFICANT CHANGE UP
GLUCOSE BLDC GLUCOMTR-MCNC: 149 MG/DL — HIGH (ref 70–99)
GLUCOSE BLDC GLUCOMTR-MCNC: 158 MG/DL — HIGH (ref 70–99)
GLUCOSE SERPL-MCNC: 110 MG/DL — HIGH (ref 70–99)
GLUCOSE UR QL: NEGATIVE — SIGNIFICANT CHANGE UP
HCT VFR BLD CALC: 35 % — SIGNIFICANT CHANGE UP (ref 34.5–45)
HGB BLD-MCNC: 11.2 G/DL — LOW (ref 11.5–15.5)
HYALINE CASTS # UR AUTO: 0 /LPF — SIGNIFICANT CHANGE UP (ref 0–2)
KETONES UR-MCNC: NEGATIVE — SIGNIFICANT CHANGE UP
LEUKOCYTE ESTERASE UR-ACNC: NEGATIVE — SIGNIFICANT CHANGE UP
MCHC RBC-ENTMCNC: 30.3 PG — SIGNIFICANT CHANGE UP (ref 27–34)
MCHC RBC-ENTMCNC: 32 GM/DL — SIGNIFICANT CHANGE UP (ref 32–36)
MCV RBC AUTO: 94.6 FL — SIGNIFICANT CHANGE UP (ref 80–100)
NITRITE UR-MCNC: NEGATIVE — SIGNIFICANT CHANGE UP
NRBC # BLD: 0 /100 WBCS — SIGNIFICANT CHANGE UP (ref 0–0)
PH UR: 6.5 — SIGNIFICANT CHANGE UP (ref 5–8)
PLATELET # BLD AUTO: 263 K/UL — SIGNIFICANT CHANGE UP (ref 150–400)
POTASSIUM SERPL-MCNC: 4 MMOL/L — SIGNIFICANT CHANGE UP (ref 3.5–5.3)
POTASSIUM SERPL-SCNC: 4 MMOL/L — SIGNIFICANT CHANGE UP (ref 3.5–5.3)
PROT SERPL-MCNC: 6.8 G/DL — SIGNIFICANT CHANGE UP (ref 6–8.3)
PROT UR-MCNC: SIGNIFICANT CHANGE UP
RBC # BLD: 3.7 M/UL — LOW (ref 3.8–5.2)
RBC # FLD: 13.6 % — SIGNIFICANT CHANGE UP (ref 10.3–14.5)
RBC CASTS # UR COMP ASSIST: 3 /HPF — SIGNIFICANT CHANGE UP (ref 0–4)
SARS-COV-2 RNA SPEC QL NAA+PROBE: SIGNIFICANT CHANGE UP
SODIUM SERPL-SCNC: 136 MMOL/L — SIGNIFICANT CHANGE UP (ref 135–145)
SP GR SPEC: 1.02 — SIGNIFICANT CHANGE UP (ref 1.01–1.02)
UROBILINOGEN FLD QL: NEGATIVE — SIGNIFICANT CHANGE UP
WBC # BLD: 20.34 K/UL — HIGH (ref 3.8–10.5)
WBC # FLD AUTO: 20.34 K/UL — HIGH (ref 3.8–10.5)
WBC UR QL: 1 /HPF — SIGNIFICANT CHANGE UP (ref 0–5)

## 2022-05-07 PROCEDURE — 99024 POSTOP FOLLOW-UP VISIT: CPT

## 2022-05-07 RX ORDER — SITAGLIPTIN AND METFORMIN HYDROCHLORIDE 500; 50 MG/1; MG/1
1 TABLET, FILM COATED ORAL
Qty: 60 | Refills: 0
Start: 2022-05-07 | End: 2022-06-05

## 2022-05-07 RX ORDER — CLOPIDOGREL BISULFATE 75 MG/1
1 TABLET, FILM COATED ORAL
Qty: 30 | Refills: 0
Start: 2022-05-07 | End: 2022-06-05

## 2022-05-07 RX ORDER — SENNA PLUS 8.6 MG/1
2 TABLET ORAL
Qty: 60 | Refills: 0
Start: 2022-05-07 | End: 2022-06-05

## 2022-05-07 RX ORDER — METOPROLOL TARTRATE 50 MG
100 TABLET ORAL DAILY
Refills: 0 | Status: DISCONTINUED | OUTPATIENT
Start: 2022-05-07 | End: 2022-05-07

## 2022-05-07 RX ORDER — INSULIN GLARGINE 100 [IU]/ML
22 INJECTION, SOLUTION SUBCUTANEOUS
Qty: 1 | Refills: 0
Start: 2022-05-07 | End: 2022-06-05

## 2022-05-07 RX ORDER — METOPROLOL TARTRATE 50 MG
1 TABLET ORAL
Qty: 30 | Refills: 0
Start: 2022-05-07 | End: 2022-06-05

## 2022-05-07 RX ORDER — INSULIN GLARGINE 100 [IU]/ML
22 INJECTION, SOLUTION SUBCUTANEOUS
Qty: 660 | Refills: 0
Start: 2022-05-07 | End: 2022-06-05

## 2022-05-07 RX ORDER — METFORMIN HYDROCHLORIDE 850 MG/1
1 TABLET ORAL
Qty: 60 | Refills: 0
Start: 2022-05-07 | End: 2022-06-05

## 2022-05-07 RX ORDER — ISOPROPYL ALCOHOL, BENZOCAINE .7; .06 ML/ML; ML/ML
1 SWAB TOPICAL
Qty: 100 | Refills: 1
Start: 2022-05-07 | End: 2022-06-25

## 2022-05-07 RX ORDER — POLYETHYLENE GLYCOL 3350 17 G/17G
17 POWDER, FOR SOLUTION ORAL
Qty: 510 | Refills: 0
Start: 2022-05-07 | End: 2022-06-05

## 2022-05-07 RX ORDER — AMLODIPINE BESYLATE 2.5 MG/1
1 TABLET ORAL
Qty: 30 | Refills: 0
Start: 2022-05-07 | End: 2022-06-05

## 2022-05-07 RX ORDER — ENOXAPARIN SODIUM 100 MG/ML
22 INJECTION SUBCUTANEOUS
Qty: 1 | Refills: 0
Start: 2022-05-07 | End: 2022-06-05

## 2022-05-07 RX ORDER — ATORVASTATIN CALCIUM 80 MG/1
1 TABLET, FILM COATED ORAL
Qty: 30 | Refills: 0
Start: 2022-05-07 | End: 2022-06-05

## 2022-05-07 RX ORDER — ASPIRIN/CALCIUM CARB/MAGNESIUM 324 MG
1 TABLET ORAL
Qty: 30 | Refills: 0
Start: 2022-05-07 | End: 2022-06-05

## 2022-05-07 RX ORDER — PANTOPRAZOLE SODIUM 20 MG/1
1 TABLET, DELAYED RELEASE ORAL
Qty: 30 | Refills: 0
Start: 2022-05-07 | End: 2022-06-05

## 2022-05-07 RX ORDER — EMPAGLIFLOZIN 10 MG/1
1 TABLET, FILM COATED ORAL
Qty: 30 | Refills: 0
Start: 2022-05-07 | End: 2022-06-05

## 2022-05-07 RX ADMIN — Medication 650 MILLIGRAM(S): at 12:17

## 2022-05-07 RX ADMIN — Medication 100 MILLIGRAM(S): at 11:46

## 2022-05-07 RX ADMIN — Medication 2: at 11:47

## 2022-05-07 RX ADMIN — Medication 81 MILLIGRAM(S): at 11:46

## 2022-05-07 RX ADMIN — PANTOPRAZOLE SODIUM 40 MILLIGRAM(S): 20 TABLET, DELAYED RELEASE ORAL at 05:53

## 2022-05-07 RX ADMIN — Medication 50 MILLIGRAM(S): at 05:53

## 2022-05-07 RX ADMIN — AMLODIPINE BESYLATE 2.5 MILLIGRAM(S): 2.5 TABLET ORAL at 05:52

## 2022-05-07 RX ADMIN — GABAPENTIN 100 MILLIGRAM(S): 400 CAPSULE ORAL at 05:52

## 2022-05-07 RX ADMIN — CLOPIDOGREL BISULFATE 75 MILLIGRAM(S): 75 TABLET, FILM COATED ORAL at 11:45

## 2022-05-07 RX ADMIN — Medication 650 MILLIGRAM(S): at 11:47

## 2022-05-07 RX ADMIN — Medication 9 UNIT(S): at 08:08

## 2022-05-07 RX ADMIN — Medication 500 MILLIGRAM(S): at 05:52

## 2022-05-07 RX ADMIN — SODIUM CHLORIDE 3 MILLILITER(S): 9 INJECTION INTRAMUSCULAR; INTRAVENOUS; SUBCUTANEOUS at 07:06

## 2022-05-07 RX ADMIN — Medication 9 UNIT(S): at 11:46

## 2022-05-07 RX ADMIN — SODIUM CHLORIDE 3 MILLILITER(S): 9 INJECTION INTRAMUSCULAR; INTRAVENOUS; SUBCUTANEOUS at 14:13

## 2022-05-07 NOTE — DISCHARGE NOTE PROVIDER - HOSPITAL COURSE
On 5/2/22, Pt underwent CABG x 3 w/ Lt. radial  post-op course unremarkable  norvasc for radial graft  diurese  increase bb as tolerated  5/4 POD #2 tr. floor. prevena in place preop uti - e. coli - tx w/ cirpro x 2 day sthen zinacef iv x 5 doses-  5/5 VSS  -445cc/24hrs.  prevena in place.  wbc 21--observe--chest PT.  Needs insulin teaching  5/6 PW removed. Continue with insulin administration teaching. Plan on dc Sunday/Monday 5/7 cleared for discharge as per Dr. Abreu, ambulating without assistance

## 2022-05-07 NOTE — DISCHARGE NOTE NURSING/CASE MANAGEMENT/SOCIAL WORK - NSDCFUADDAPPT_GEN_ALL_CORE_FT
Call to make follow up appointment with Dr. Abreu, Cardiac surgeon.   Cardiac Surgery office at [facility]  Office telephone [phone]  Address: [address] [place] [address]  Entrance 3, First floor on left after entering lobby*     Call to make follow up appointment with Dr. Varghese, Endocrinology  Office telephone [phone]

## 2022-05-07 NOTE — DISCHARGE NOTE PROVIDER - NSDCPNSUBOBJ_GEN_ALL_CORE
VSS, Pt ambulating without assistance, stable for dc as per Dr. Abreu.                           11.2   20.34 )-----------( 263      ( 07 May 2022 04:31 )             35.0     Basic Metabolic Panel in AM (22 @ 07:28)    Sodium, Serum: 136 mmol/L    Potassium, Serum: 4.2 mmol/L    Chloride, Serum: 100 mmol/L    Carbon Dioxide, Serum: 23 mmol/L    Anion Gap, Serum: 13 mmol/L    Blood Urea Nitrogen, Serum: 21 mg/dL    Creatinine, Serum: 0.73 mg/dL    Glucose, Serum: 125 mg/dL    Calcium, Total Serum: 9.0 mg/dL    eGFR: 102: The estimated glomerular filtration rate (eGFR) is calculated using the   CKD-EPI creatinine equation, which does not have a coefficient for  race and is validated in individuals 18 years of age and older (N Engl J  Med ; 385:7863-1823). Creatinine-based eGFR may be inaccurate in  various situations including but not limited to extremes of muscle mass,  altered dietary protein intake, or medications that affect renal tubular  creatinine secretion. mL/min/1.73m2    Urinalysis Basic - ( 07 May 2022 10:26 )    Color: Light Yellow / Appearance: Clear / S.021 / pH: x  Gluc: x / Ketone: Negative  / Bili: Negative / Urobili: Negative   Blood: x / Protein: Trace / Nitrite: Negative   Leuk Esterase: Negative / RBC: 3 /hpf / WBC 1 /HPF   Sq Epi: x / Non Sq Epi: 2 /hpf / Bacteria: Negative    General: NAD, well appearing female sitting in chair  HEENT:  NC/AT  Neuro: A&Ox4, gait steady, speech clear, no focal deficits noted  Respiratory: B/L BS CTA, no wheeze, no rhonchi, no crackles noted  Cardiovascular: RRR, normal S1S2, no murmur noted  GI: Abd soft, NT/ND, +BSx4Q +BM yesterday  Peripheral Vascular:  B/L LE neg edema, 2+ peripheral pulses, no clubbing, cyanosis, varicosities/PVD noted  Musculoskeletal: B/L UE and LE 5/5 strength, groins stable   Psychiatric: Normal mood, normal affect observed  Skin: Normal exam to inspection and palpation. no bleeding, no hematoma.  Sternal Wound: CDI NARDA, stable, small hematoma above incision, soft/nontender    I have personally spent 45 min face to face encounter with patient and discharge note.

## 2022-05-07 NOTE — DISCHARGE NOTE NURSING/CASE MANAGEMENT/SOCIAL WORK - PATIENT PORTAL LINK FT
You can access the FollowMyHealth Patient Portal offered by [facility] by registering at the following website: [email or web address]. By joining [employer]’s FollowMyHealth portal, you will also be able to view your health information using other applications (apps) compatible with our system.

## 2022-05-07 NOTE — DISCHARGE NOTE PROVIDER - PROVIDER TOKENS
PROVIDER:[TOKEN:[[id number]],FOLLOWUP:[2 weeks]],PROVIDER:[TOKEN:[[id number]],FOLLOWUP:[2 weeks]]

## 2022-05-07 NOTE — DISCHARGE NOTE PROVIDER - NSDCFUADDAPPT_GEN_ALL_CORE_FT
Call to make follow up appointment with Dr. Abreu, Cardiac surgeon.   Cardiac Surgery office at [facility]  Office telephone [phone]  Address: [address] [place] [address]  Entrance 3, First floor on left after entering lobby*     Call to make follow up appointment with Dr. Varghese, Endocrinology  Office telephone [phone]

## 2022-05-07 NOTE — DISCHARGE NOTE NURSING/CASE MANAGEMENT/SOCIAL WORK - NSDCPEFALRISK_GEN_ALL_CORE
For information on Fall & Injury Prevention, visit: [email or web address] OR  [email or web address] OR  https://www.cdc.gov/steadi/patient.html

## 2022-05-07 NOTE — DISCHARGE NOTE PROVIDER - NSDCMRMEDTOKEN_GEN_ALL_CORE_FT
amLODIPine 2.5 mg oral tablet: 1 tab(s) orally once a day  aspirin 81 mg oral delayed release tablet: 1 tab(s) orally once a day  atorvastatin 80 mg oral tablet: 1 tab(s) orally once a day (at bedtime)  clopidogrel 75 mg oral tablet: 1 tab(s) orally once a day  insulin glargine 100 units/mL subcutaneous solution: 22 unit(s) subcutaneous once a day (at bedtime)   Janumet 50 mg-1000 mg oral tablet: 1 tab(s) orally 2 times a day   Jardiance 10 mg oral tablet: 1 tab(s) orally once a day (in the morning)   metoprolol succinate 100 mg oral tablet, extended release: 1 tab(s) orally once a day  pantoprazole 40 mg oral delayed release tablet: 1 tab(s) orally once a day (before a meal)  polyethylene glycol 3350 oral powder for reconstitution: 17 gram(s) orally once a day  senna oral tablet: 2 tab(s) orally once a day (at bedtime)   alcohol swabs : Apply topically to affected area 4 times a day   amLODIPine 2.5 mg oral tablet: 1 tab(s) orally once a day  aspirin 81 mg oral delayed release tablet: 1 tab(s) orally once a day  atorvastatin 80 mg oral tablet: 1 tab(s) orally once a day (at bedtime)  clopidogrel 75 mg oral tablet: 1 tab(s) orally once a day  glucometer (per patient&#x27;s insurance): Test blood sugars four times a day. Dispense #1 glucometer.  Insulin Pen Needles, 4mm: 1 application subcutaneously 4 times a day. ** Use with insulin pen **   lancets: 1 application subcutaneously 4 times a day   Lantus Solostar Pen 100 units/mL subcutaneous solution: 22 unit(s) subcutaneous once a day (at bedtime)  metFORMIN 500 mg oral tablet: 1 tab(s) orally 2 times a day   metoprolol succinate 100 mg oral tablet, extended release: 1 tab(s) orally once a day  pantoprazole 40 mg oral delayed release tablet: 1 tab(s) orally once a day (before a meal)  polyethylene glycol 3350 oral powder for reconstitution: 17 gram(s) orally once a day  senna oral tablet: 2 tab(s) orally once a day (at bedtime)  test strips (per patient&#x27;s insurance): 1 application subcutaneously 4 times a day. ** Compatible with patient&#x27;s glucometer **

## 2022-05-07 NOTE — DISCHARGE NOTE PROVIDER - CARE PROVIDER_API CALL
Alexander Abreu)  Surgery; Thoracic Surgery  [address] [place] [address]  Phone: [phone]  Fax: [phone]  Follow Up Time: 2 weeks    Harjeet Varghese)  EndocrinologyMetabDiabetes; Internal Medicine  [address] [place] [address]  Phone: [phone]  Fax: [phone]  Follow Up Time: 2 weeks

## 2022-05-08 ENCOUNTER — TRANSCRIPTION ENCOUNTER (OUTPATIENT)
Age: 47
End: 2022-05-08

## 2022-05-09 ENCOUNTER — NON-APPOINTMENT (OUTPATIENT)
Age: 47
End: 2022-05-09

## 2022-05-09 ENCOUNTER — APPOINTMENT (OUTPATIENT)
Dept: CARE COORDINATION | Facility: HOME HEALTH | Age: 47
End: 2022-05-09
Payer: MEDICAID

## 2022-05-09 VITALS
RESPIRATION RATE: 17 BRPM | DIASTOLIC BLOOD PRESSURE: 72 MMHG | OXYGEN SATURATION: 97 % | HEART RATE: 100 BPM | SYSTOLIC BLOOD PRESSURE: 118 MMHG

## 2022-05-09 PROCEDURE — 99024 POSTOP FOLLOW-UP VISIT: CPT

## 2022-05-09 RX ORDER — PANTOPRAZOLE SODIUM 40 MG/1
40 TABLET, DELAYED RELEASE ORAL DAILY
Refills: 0 | Status: ACTIVE | COMMUNITY

## 2022-05-09 RX ORDER — LOSARTAN POTASSIUM 100 MG/1
TABLET, FILM COATED ORAL
Refills: 0 | Status: DISCONTINUED | COMMUNITY
End: 2022-05-09

## 2022-05-09 RX ORDER — INSULIN GLARGINE 100 [IU]/ML
100 INJECTION, SOLUTION SUBCUTANEOUS
Refills: 0 | Status: ACTIVE | COMMUNITY

## 2022-05-09 NOTE — PHYSICAL EXAM
[] : no respiratory distress [Exaggerated Use Of Accessory Muscles For Inspiration] : no accessory muscle use [Auscultation Breath Sounds / Voice Sounds] : lungs were clear to auscultation bilaterally [Heart Sounds] : normal S1 and S2 [Chest Visual Inspection Thoracic Asymmetry] : no chest asymmetry [1+] : left 1+ [Bowel Sounds] : normal bowel sounds [Abdomen Tenderness] : non-tender [Oriented To Time, Place, And Person] : oriented to person, place, and time [FreeTextEntry2] : no edema noted [FreeTextEntry1] : Left radial, left FA and LLE SVG site jenna and well approximated. No drainage or redness noted. Echymosis noted at sites.

## 2022-05-09 NOTE — REASON FOR VISIT
[Follow-Up: _____] : a [unfilled] follow-up visit [Parent] : parent [FreeTextEntry1] : Transitional Care Management

## 2022-05-09 NOTE — HISTORY OF PRESENT ILLNESS
[FreeTextEntry1] : On 5/2/22, Pt underwent CABG x 3 w/ Lt. radial\par post-op course unremarkable\par norvasc for radial graft\par diurese\par increase bb as tolerated\par 5/4 POD #2 tr. floor. prevena in place preop uti - e. coli - tx w/ cirpro x 2\par day sthen zinacef iv x 5 doses-\par 5/5 VSS -445cc/24hrs. prevena in place. wbc 21--observe--chest PT. Needs\par insulin teaching\par 5/6 PW removed. Continue with insulin administration teaching. Plan on dc\par Sunday/Monday\par 5/7 cleared for discharge as per Dr. Abreu, ambulating without assistance.  pt. remained hemodynamically stable and discharged to home with support of family, homecare services and follow your heart team. Initial visit completed in home. .\par CC" I feel ok"\par

## 2022-05-16 NOTE — PROGRESS NOTE ADULT - SUBJECTIVE AND OBJECTIVE BOX
TRANSFER - OUT REPORT:    Verbal report given to Ruy Guzman RN(name) on Mirian Schwartz  being transferred to 2302(unit) for routine progression of care       Report consisted of patients Situation, Background, Assessment and   Recommendations(SBAR). Information from the following report(s) SBAR was reviewed with the receiving nurse. Lines:   Peripheral IV 05/16/22 Left Antecubital (Active)   Site Assessment Clean, dry, & intact 05/16/22 1140   Phlebitis Assessment 0 05/16/22 1140   Infiltration Assessment 0 05/16/22 1140   Dressing Status Clean, dry, & intact 05/16/22 1140   Dressing Type Transparent 05/16/22 1140   Hub Color/Line Status Pink;Flushed 05/16/22 1140       Peripheral IV 05/16/22 Anterior;Proximal;Right Forearm (Active)   Site Assessment Clean, dry, & intact 05/16/22 1140   Phlebitis Assessment 0 05/16/22 1140   Infiltration Assessment 0 05/16/22 1140   Dressing Status Clean, dry, & intact 05/16/22 1140   Dressing Type Transparent 05/16/22 1140   Hub Color/Line Status Pink;Flushed 05/16/22 1140        Opportunity for questions and clarification was provided. Patient transported with:   Registered Nurse: VALERIA Cadet Pt is s/p panniculectomy on 9/1.  She presented with a 30% wound dehiscence and overlying eschar.  She was advised to go to the hospital for admission for IV abx and wound care due to her pmh.  She refused due to difficulties at home.  She has been on Keflex and Clinda was added this past week.  She was undergoing local wound care and was followed closely in the office.  She denied systemic symptoms, fever, chills or pain    The patient called me earlier today complaining of wound pain.  I told her to meet me at the [facility] ER for admission    The patient was begun on Vanco and Zosyn    WBC 11.83    PMH MI 2014           Hyperlipidemia           Borderline DM    PS  Coronary artery stent 2019    Temp 99.2  Abdomen  Dehiscence with dry eschar immediately above                  Wound C&S taken prior to administration of IV abx                  Wound cleaned with betadine and packed with Betadine soaked Bret                  Fat necrosis immediately underlying distal aspect of eschar-debrided                  Induration and erythema(marked) of infra-umbilical flap-attempted aspiration using sterile precautions-no aspirate obtained    A&P Pt with cellulitis and partial wound dehiscence         Cardiology, ID and Wound Care consultations         CT of abdomen         Admit for IV abx and monitoring-pt to go to telemetry floor due to her pmh         D/W Hospitalist

## 2022-05-17 PROBLEM — Z09 POSTOPERATIVE FOLLOW-UP: Status: ACTIVE | Noted: 2022-05-17

## 2022-05-18 ENCOUNTER — APPOINTMENT (OUTPATIENT)
Dept: CARDIOTHORACIC SURGERY | Facility: CLINIC | Age: 47
End: 2022-05-18
Payer: MEDICAID

## 2022-05-18 VITALS
RESPIRATION RATE: 13 BRPM | TEMPERATURE: 98 F | DIASTOLIC BLOOD PRESSURE: 88 MMHG | OXYGEN SATURATION: 98 % | SYSTOLIC BLOOD PRESSURE: 121 MMHG | BODY MASS INDEX: 39.27 KG/M2 | WEIGHT: 200 LBS | HEIGHT: 60 IN | HEART RATE: 85 BPM

## 2022-05-18 DIAGNOSIS — Z09 ENCOUNTER FOR FOLLOW-UP EXAMINATION AFTER COMPLETED TREATMENT FOR CONDITIONS OTHER THAN MALIGNANT NEOPLASM: ICD-10-CM

## 2022-05-18 DIAGNOSIS — E66.01 MORBID (SEVERE) OBESITY DUE TO EXCESS CALORIES: ICD-10-CM

## 2022-05-18 PROCEDURE — 99024 POSTOP FOLLOW-UP VISIT: CPT

## 2022-05-30 PROCEDURE — 86901 BLOOD TYPING SEROLOGIC RH(D): CPT

## 2022-05-30 PROCEDURE — 97165 OT EVAL LOW COMPLEX 30 MIN: CPT

## 2022-05-30 PROCEDURE — 97530 THERAPEUTIC ACTIVITIES: CPT

## 2022-05-30 PROCEDURE — 82947 ASSAY GLUCOSE BLOOD QUANT: CPT

## 2022-05-30 PROCEDURE — 94010 BREATHING CAPACITY TEST: CPT

## 2022-05-30 PROCEDURE — 82803 BLOOD GASES ANY COMBINATION: CPT

## 2022-05-30 PROCEDURE — 84132 ASSAY OF SERUM POTASSIUM: CPT

## 2022-05-30 PROCEDURE — 85025 COMPLETE CBC W/AUTO DIFF WBC: CPT

## 2022-05-30 PROCEDURE — C1889: CPT

## 2022-05-30 PROCEDURE — 82565 ASSAY OF CREATININE: CPT

## 2022-05-30 PROCEDURE — 86900 BLOOD TYPING SEROLOGIC ABO: CPT

## 2022-05-30 PROCEDURE — 82550 ASSAY OF CK (CPK): CPT

## 2022-05-30 PROCEDURE — 83605 ASSAY OF LACTIC ACID: CPT

## 2022-05-30 PROCEDURE — C1769: CPT

## 2022-05-30 PROCEDURE — 85018 HEMOGLOBIN: CPT

## 2022-05-30 PROCEDURE — 85014 HEMATOCRIT: CPT

## 2022-05-30 PROCEDURE — 87640 STAPH A DNA AMP PROBE: CPT

## 2022-05-30 PROCEDURE — 36415 COLL VENOUS BLD VENIPUNCTURE: CPT

## 2022-05-30 PROCEDURE — 81001 URINALYSIS AUTO W/SCOPE: CPT

## 2022-05-30 PROCEDURE — 80053 COMPREHEN METABOLIC PANEL: CPT

## 2022-05-30 PROCEDURE — 71045 X-RAY EXAM CHEST 1 VIEW: CPT

## 2022-05-30 PROCEDURE — 84100 ASSAY OF PHOSPHORUS: CPT

## 2022-05-30 PROCEDURE — 86850 RBC ANTIBODY SCREEN: CPT

## 2022-05-30 PROCEDURE — P9047: CPT

## 2022-05-30 PROCEDURE — 84295 ASSAY OF SERUM SODIUM: CPT

## 2022-05-30 PROCEDURE — 97161 PT EVAL LOW COMPLEX 20 MIN: CPT

## 2022-05-30 PROCEDURE — 83036 HEMOGLOBIN GLYCOSYLATED A1C: CPT

## 2022-05-30 PROCEDURE — U0005: CPT

## 2022-05-30 PROCEDURE — 86891 AUTOLOGOUS BLOOD OP SALVAGE: CPT

## 2022-05-30 PROCEDURE — 84443 ASSAY THYROID STIM HORMONE: CPT

## 2022-05-30 PROCEDURE — 82553 CREATINE MB FRACTION: CPT

## 2022-05-30 PROCEDURE — 85384 FIBRINOGEN ACTIVITY: CPT

## 2022-05-30 PROCEDURE — 85576 BLOOD PLATELET AGGREGATION: CPT

## 2022-05-30 PROCEDURE — 82330 ASSAY OF CALCIUM: CPT

## 2022-05-30 PROCEDURE — 82962 GLUCOSE BLOOD TEST: CPT

## 2022-05-30 PROCEDURE — 84484 ASSAY OF TROPONIN QUANT: CPT

## 2022-05-30 PROCEDURE — P9045: CPT

## 2022-05-30 PROCEDURE — 76536 US EXAM OF HEAD AND NECK: CPT

## 2022-05-30 PROCEDURE — 93880 EXTRACRANIAL BILAT STUDY: CPT

## 2022-05-30 PROCEDURE — 86923 COMPATIBILITY TEST ELECTRIC: CPT

## 2022-05-30 PROCEDURE — 83735 ASSAY OF MAGNESIUM: CPT

## 2022-05-30 PROCEDURE — 85730 THROMBOPLASTIN TIME PARTIAL: CPT

## 2022-05-30 PROCEDURE — 82435 ASSAY OF BLOOD CHLORIDE: CPT

## 2022-05-30 PROCEDURE — 84439 ASSAY OF FREE THYROXINE: CPT

## 2022-05-30 PROCEDURE — 85610 PROTHROMBIN TIME: CPT

## 2022-05-30 PROCEDURE — U0003: CPT

## 2022-05-30 PROCEDURE — 94002 VENT MGMT INPAT INIT DAY: CPT

## 2022-05-30 PROCEDURE — 87086 URINE CULTURE/COLONY COUNT: CPT

## 2022-05-30 PROCEDURE — 85027 COMPLETE CBC AUTOMATED: CPT

## 2022-05-30 PROCEDURE — 97116 GAIT TRAINING THERAPY: CPT

## 2022-05-30 PROCEDURE — 87186 SC STD MICRODIL/AGAR DIL: CPT

## 2022-05-30 PROCEDURE — C1751: CPT

## 2022-05-30 PROCEDURE — 87641 MR-STAPH DNA AMP PROBE: CPT

## 2022-05-30 PROCEDURE — 80048 BASIC METABOLIC PNL TOTAL CA: CPT

## 2022-05-30 PROCEDURE — 93005 ELECTROCARDIOGRAM TRACING: CPT

## 2022-05-30 PROCEDURE — 83880 ASSAY OF NATRIURETIC PEPTIDE: CPT

## 2022-05-31 PROBLEM — Z95.1 S/P CABG X 3: Status: ACTIVE | Noted: 2022-05-17

## 2022-06-01 ENCOUNTER — APPOINTMENT (OUTPATIENT)
Dept: CARDIOTHORACIC SURGERY | Facility: CLINIC | Age: 47
End: 2022-06-01
Payer: MEDICAID

## 2022-06-01 VITALS
HEIGHT: 60 IN | OXYGEN SATURATION: 99 % | TEMPERATURE: 97.8 F | RESPIRATION RATE: 14 BRPM | WEIGHT: 198 LBS | BODY MASS INDEX: 38.87 KG/M2 | HEART RATE: 82 BPM | DIASTOLIC BLOOD PRESSURE: 76 MMHG | SYSTOLIC BLOOD PRESSURE: 120 MMHG

## 2022-06-01 DIAGNOSIS — Z95.1 PRESENCE OF AORTOCORONARY BYPASS GRAFT: ICD-10-CM

## 2022-06-01 PROCEDURE — 99024 POSTOP FOLLOW-UP VISIT: CPT

## 2022-06-01 RX ORDER — SENNOSIDES 8.6 MG TABLETS 8.6 MG/1
8.6 TABLET ORAL
Qty: 60 | Refills: 0 | Status: COMPLETED | COMMUNITY
End: 2022-06-01

## 2022-06-01 RX ORDER — LORATADINE 5 MG
17 TABLET,CHEWABLE ORAL
Qty: 1 | Refills: 0 | Status: COMPLETED | COMMUNITY
End: 2022-06-01

## 2022-06-06 ENCOUNTER — TRANSCRIPTION ENCOUNTER (OUTPATIENT)
Age: 47
End: 2022-06-06

## 2022-06-17 ENCOUNTER — APPOINTMENT (OUTPATIENT)
Dept: CARDIOTHORACIC SURGERY | Facility: CLINIC | Age: 47
End: 2022-06-17
Payer: MEDICAID

## 2022-06-17 ENCOUNTER — NON-APPOINTMENT (OUTPATIENT)
Age: 47
End: 2022-06-17

## 2022-06-17 ENCOUNTER — APPOINTMENT (OUTPATIENT)
Dept: CARDIOLOGY | Facility: CLINIC | Age: 47
End: 2022-06-17
Payer: MEDICAID

## 2022-06-17 VITALS
HEIGHT: 60 IN | HEART RATE: 69 BPM | DIASTOLIC BLOOD PRESSURE: 83 MMHG | SYSTOLIC BLOOD PRESSURE: 122 MMHG | WEIGHT: 205 LBS | OXYGEN SATURATION: 97 % | BODY MASS INDEX: 40.25 KG/M2

## 2022-06-17 PROCEDURE — 93000 ELECTROCARDIOGRAM COMPLETE: CPT

## 2022-06-17 PROCEDURE — 99214 OFFICE O/P EST MOD 30 MIN: CPT | Mod: 25

## 2022-06-17 PROCEDURE — 97802 MEDICAL NUTRITION INDIV IN: CPT | Mod: 95

## 2022-06-17 NOTE — ASSESSMENT
[FreeTextEntry1] : 1. CAD. ECG done, no changes. No angina.\par 2. HTN. BP well controlled upon reevaluation.\par 3. HLD. Continue atorvastatin.\par \par f/u 6w

## 2022-06-17 NOTE — REASON FOR VISIT
[Symptom and Test Evaluation] : symptom and test evaluation [Hyperlipidemia] : hyperlipidemia [Hypertension] : hypertension [Coronary Artery Disease] : coronary artery disease [FreeTextEntry1] : s/p CABG 5/2/22. Still with MSI pain, taking tylenol daily. \par Blood work done with endo, not currently available. \par On metoprolol, ASA, Plavix, and lipitor.   No ACEi or ARB.\par \par 1. CAD. ECG done, no changes. No angina.\par 2. HTN. BP well controlled upon reevaluation.\par 3. HLD. Continue atorvastatin.\par \par f/u 6w

## 2022-07-22 ENCOUNTER — TRANSCRIPTION ENCOUNTER (OUTPATIENT)
Age: 47
End: 2022-07-22

## 2022-07-27 ENCOUNTER — TRANSCRIPTION ENCOUNTER (OUTPATIENT)
Age: 47
End: 2022-07-27

## 2022-10-26 ENCOUNTER — APPOINTMENT (OUTPATIENT)
Dept: CARDIOLOGY | Facility: CLINIC | Age: 47
End: 2022-10-26

## 2022-10-26 ENCOUNTER — NON-APPOINTMENT (OUTPATIENT)
Age: 47
End: 2022-10-26

## 2022-10-26 VITALS
BODY MASS INDEX: 40.84 KG/M2 | HEART RATE: 99 BPM | WEIGHT: 208 LBS | DIASTOLIC BLOOD PRESSURE: 101 MMHG | TEMPERATURE: 97.8 F | HEIGHT: 60 IN | OXYGEN SATURATION: 96 % | SYSTOLIC BLOOD PRESSURE: 145 MMHG

## 2022-10-26 PROCEDURE — 99214 OFFICE O/P EST MOD 30 MIN: CPT | Mod: 25

## 2022-10-26 PROCEDURE — 93000 ELECTROCARDIOGRAM COMPLETE: CPT

## 2022-10-26 NOTE — ASSESSMENT
[FreeTextEntry1] : 1. CAD. ECG done, no changes. No angina.\par 2. HTN. BP well controlled upon reevaluation. Undergoing a stressful situation currently.\par 3. HLD. Continue atorvastatin. Bloodwork done with endo.

## 2022-10-26 NOTE — REASON FOR VISIT
[Symptom and Test Evaluation] : symptom and test evaluation [Hyperlipidemia] : hyperlipidemia [Hypertension] : hypertension [Coronary Artery Disease] : coronary artery disease [FreeTextEntry1] : Missed her last visit.\par Very busy with work.\par Generally doing well, no CP or dyspnea.\par \par 1. CAD. ECG done, no changes. No angina.\par 2. HTN. BP well controlled upon reevaluation. Undergoing a stressful situation currently.\par 3. HLD. Continue atorvastatin. Bloodwork done with endo.\par

## 2023-01-04 ENCOUNTER — APPOINTMENT (OUTPATIENT)
Dept: CARDIOLOGY | Facility: CLINIC | Age: 48
End: 2023-01-04
Payer: MEDICAID

## 2023-01-04 ENCOUNTER — NON-APPOINTMENT (OUTPATIENT)
Age: 48
End: 2023-01-04

## 2023-01-04 VITALS
BODY MASS INDEX: 38.87 KG/M2 | OXYGEN SATURATION: 97 % | SYSTOLIC BLOOD PRESSURE: 138 MMHG | HEART RATE: 80 BPM | HEIGHT: 60 IN | DIASTOLIC BLOOD PRESSURE: 92 MMHG | WEIGHT: 198 LBS

## 2023-01-04 PROCEDURE — 99214 OFFICE O/P EST MOD 30 MIN: CPT | Mod: 25

## 2023-01-04 PROCEDURE — 93000 ELECTROCARDIOGRAM COMPLETE: CPT

## 2023-01-04 NOTE — REASON FOR VISIT
[Symptom and Test Evaluation] : symptom and test evaluation [Hyperlipidemia] : hyperlipidemia [Hypertension] : hypertension [Coronary Artery Disease] : coronary artery disease [FreeTextEntry1] : Mild CP, exertional, resolves spontaneously. No dyspnea. 2-3 days, lasts for 5 minutes.\par Under a lot of stress, has new granddaughter (Whitney) born at 32 weeks, now home.\par Taking repatha in addition to Lipitor. \par \par 1. CAD. ECG done, no changes. Doubt CP is anginal, but will have short interval f/u. \par 2. HTN. BP well controlled upon reevaluation.\par 3. HLD. Continue atorvastatin and Repatha. Labs with endo.\par

## 2023-01-04 NOTE — ASSESSMENT
[FreeTextEntry1] : 1. CAD. ECG done, no changes. Doubt CP is anginal, but will have short interval f/u. \par 2. HTN. BP well controlled upon reevaluation.\par 3. HLD. Continue atorvastatin and Repatha. Labs with endo.

## 2023-03-28 NOTE — PROVIDER CONTACT NOTE (OTHER) - ASSESSMENT
Patient called requesting a call back from the nurse to discuss medications. He can be reached at [phone]. Patient awake, alert and without respiratory distress. P64, R15, spo2 100% with O2 2L NC, T97.9F. Skin warm & dry to touch.

## 2023-04-19 ENCOUNTER — NON-APPOINTMENT (OUTPATIENT)
Age: 48
End: 2023-04-19

## 2023-04-19 ENCOUNTER — APPOINTMENT (OUTPATIENT)
Dept: CARDIOLOGY | Facility: CLINIC | Age: 48
End: 2023-04-19
Payer: MEDICAID

## 2023-04-19 VITALS
WEIGHT: 198 LBS | SYSTOLIC BLOOD PRESSURE: 162 MMHG | TEMPERATURE: 97.8 F | DIASTOLIC BLOOD PRESSURE: 115 MMHG | OXYGEN SATURATION: 97 % | BODY MASS INDEX: 38.87 KG/M2 | HEART RATE: 90 BPM | HEIGHT: 60 IN

## 2023-04-19 PROCEDURE — 99214 OFFICE O/P EST MOD 30 MIN: CPT | Mod: 25

## 2023-04-19 PROCEDURE — 93000 ELECTROCARDIOGRAM COMPLETE: CPT

## 2023-04-19 NOTE — ASSESSMENT
[FreeTextEntry1] : 1. HLD. Continue atorvastatin and repatha.\par 2. HTN. Blood pressure remains elevated, even upon reevaluation. Increase losartan to 50 mg daily. \par 3. CAD. ECG done, no changes. No angina.

## 2023-04-19 NOTE — REASON FOR VISIT
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 \par On repatha, PMD is check lipids.\par \par 1. HLD. Continue atorvastatin and repatha.\par 2. HTN. Blood pressure remains elevated, even upon reevaluation. Increase losartan to 50 mg daily. \par 3. CAD. ECG done, no changes. No angina.\par

## 2023-05-22 ENCOUNTER — NON-APPOINTMENT (OUTPATIENT)
Age: 48
End: 2023-05-22

## 2023-05-25 NOTE — ASU PATIENT PROFILE, ADULT - PMH
none
Coronary artery disease  s/p MIGUEL ANGEL x 3 (9/2019), MIGUEL ANGEL x1 (2014)  HLD (hyperlipidemia)    HTN (hypertension)    Hypothyroid  Resolved  Obesity

## 2023-07-19 ENCOUNTER — APPOINTMENT (OUTPATIENT)
Dept: CARDIOLOGY | Facility: CLINIC | Age: 48
End: 2023-07-19

## 2023-07-19 NOTE — ED ADULT NURSE NOTE - NSIMPLEMENTINTERV_GEN_ALL_ED
[Time Spent: ___ minutes] : I have spent [unfilled] minutes of time on the encounter.
Implemented All Universal Safety Interventions:  [place] to call system. Call bell, personal items and telephone within reach. Instruct patient to call for assistance. Room bathroom lighting operational. Non-slip footwear when patient is off stretcher. Physically safe environment: no spills, clutter or unnecessary equipment. Stretcher in lowest position, wheels locked, appropriate side rails in place.

## 2023-08-29 ENCOUNTER — TRANSCRIPTION ENCOUNTER (OUTPATIENT)
Age: 48
End: 2023-08-29

## 2023-08-29 RX ORDER — ASPIRIN ENTERIC COATED TABLETS 81 MG 81 MG/1
81 TABLET, DELAYED RELEASE ORAL DAILY
Qty: 90 | Refills: 3 | Status: ACTIVE | COMMUNITY
Start: 2022-06-17 | End: 1900-01-01

## 2023-10-11 NOTE — H&P ADULT - HISTORY OF PRESENT ILLNESS
Pt is a 44 yo female who presents with 1 week right sided flank pain and fevers. Patient states pain started without incident and has been worsening over the last week. Patient states fevers have decreased with motrin but have recurred following. Patient denies hematuria, dysuria, frequency. Patient states she has had intermittent nausea and vomiting. Patient has no history of kidney stones.  CT demonstrates large right sided upper ureteral stone with hydronephrosis. Patient is currently febrile to 101 in ED.  Past medicial history significant for CAD s/p stent on brilinta and ASA and prior hysterectomy. yes

## 2023-10-24 ENCOUNTER — NON-APPOINTMENT (OUTPATIENT)
Age: 48
End: 2023-10-24

## 2023-10-24 ENCOUNTER — APPOINTMENT (OUTPATIENT)
Dept: CARDIOLOGY | Facility: CLINIC | Age: 48
End: 2023-10-24
Payer: MEDICAID

## 2023-10-24 VITALS
BODY MASS INDEX: 38.87 KG/M2 | SYSTOLIC BLOOD PRESSURE: 156 MMHG | DIASTOLIC BLOOD PRESSURE: 93 MMHG | HEIGHT: 60 IN | TEMPERATURE: 97.9 F | OXYGEN SATURATION: 95 % | HEART RATE: 71 BPM | WEIGHT: 198 LBS

## 2023-10-24 DIAGNOSIS — R73.09 OTHER ABNORMAL GLUCOSE: ICD-10-CM

## 2023-10-24 PROCEDURE — 99214 OFFICE O/P EST MOD 30 MIN: CPT | Mod: 25

## 2023-10-24 PROCEDURE — 93000 ELECTROCARDIOGRAM COMPLETE: CPT

## 2023-10-24 PROCEDURE — 36415 COLL VENOUS BLD VENIPUNCTURE: CPT

## 2023-10-25 ENCOUNTER — TRANSCRIPTION ENCOUNTER (OUTPATIENT)
Age: 48
End: 2023-10-25

## 2023-10-25 LAB
ALBUMIN SERPL ELPH-MCNC: 4.1 G/DL
ALP BLD-CCNC: 96 U/L
ALT SERPL-CCNC: 22 U/L
ANION GAP SERPL CALC-SCNC: 13 MMOL/L
AST SERPL-CCNC: 21 U/L
BILIRUB SERPL-MCNC: 0.3 MG/DL
BUN SERPL-MCNC: 14 MG/DL
CALCIUM SERPL-MCNC: 9.1 MG/DL
CHLORIDE SERPL-SCNC: 104 MMOL/L
CHOLEST SERPL-MCNC: 232 MG/DL
CO2 SERPL-SCNC: 22 MMOL/L
CREAT SERPL-MCNC: 0.72 MG/DL
CRP SERPL HS-MCNC: 2.88 MG/L
EGFR: 102 ML/MIN/1.73M2
ESTIMATED AVERAGE GLUCOSE: 140 MG/DL
GLUCOSE SERPL-MCNC: 109 MG/DL
HBA1C MFR BLD HPLC: 6.5 %
HCT VFR BLD CALC: 41.9 %
HDLC SERPL-MCNC: 55 MG/DL
HGB BLD-MCNC: 13.8 G/DL
LDLC SERPL CALC-MCNC: 155 MG/DL
MCHC RBC-ENTMCNC: 30.6 PG
MCHC RBC-ENTMCNC: 32.9 GM/DL
MCV RBC AUTO: 92.9 FL
NONHDLC SERPL-MCNC: 176 MG/DL
PLATELET # BLD AUTO: 266 K/UL
POTASSIUM SERPL-SCNC: 4.6 MMOL/L
PROT SERPL-MCNC: 6.6 G/DL
RBC # BLD: 4.51 M/UL
RBC # FLD: 12.8 %
SODIUM SERPL-SCNC: 138 MMOL/L
TRIGL SERPL-MCNC: 120 MG/DL
WBC # FLD AUTO: 8.94 K/UL

## 2024-01-24 ENCOUNTER — NON-APPOINTMENT (OUTPATIENT)
Age: 49
End: 2024-01-24

## 2024-01-24 ENCOUNTER — APPOINTMENT (OUTPATIENT)
Dept: CARDIOLOGY | Facility: CLINIC | Age: 49
End: 2024-01-24
Payer: COMMERCIAL

## 2024-01-24 VITALS
DIASTOLIC BLOOD PRESSURE: 81 MMHG | WEIGHT: 198 LBS | OXYGEN SATURATION: 91 % | HEIGHT: 60 IN | HEART RATE: 78 BPM | BODY MASS INDEX: 38.87 KG/M2 | SYSTOLIC BLOOD PRESSURE: 121 MMHG

## 2024-01-24 PROCEDURE — 99214 OFFICE O/P EST MOD 30 MIN: CPT | Mod: 25

## 2024-01-24 PROCEDURE — 93000 ELECTROCARDIOGRAM COMPLETE: CPT

## 2024-01-24 RX ORDER — CLOPIDOGREL BISULFATE 75 MG/1
75 TABLET, FILM COATED ORAL DAILY
Qty: 90 | Refills: 3 | Status: ACTIVE | COMMUNITY
Start: 1900-01-01 | End: 1900-01-01

## 2024-01-24 RX ORDER — AMLODIPINE BESYLATE 2.5 MG/1
2.5 TABLET ORAL
Qty: 90 | Refills: 3 | Status: ACTIVE | COMMUNITY
Start: 1900-01-01 | End: 1900-01-01

## 2024-01-24 RX ORDER — METOPROLOL SUCCINATE 100 MG/1
100 TABLET, EXTENDED RELEASE ORAL DAILY
Qty: 90 | Refills: 3 | Status: ACTIVE | COMMUNITY
Start: 1900-01-01 | End: 1900-01-01

## 2024-01-24 RX ORDER — METFORMIN HYDROCHLORIDE 500 MG/1
500 TABLET, COATED ORAL
Qty: 180 | Refills: 3 | Status: DISCONTINUED | COMMUNITY
End: 2024-01-24

## 2024-01-24 RX ORDER — LOSARTAN POTASSIUM 50 MG/1
50 TABLET, FILM COATED ORAL DAILY
Qty: 90 | Refills: 3 | Status: ACTIVE | COMMUNITY
Start: 2022-10-26 | End: 1900-01-01

## 2024-01-24 RX ORDER — FUROSEMIDE 20 MG/1
20 TABLET ORAL EVERY OTHER DAY
Qty: 45 | Refills: 3 | Status: ACTIVE | COMMUNITY
Start: 2023-08-29 | End: 1900-01-01

## 2024-01-24 NOTE — REASON FOR VISIT
[Symptom and Test Evaluation] : symptom and test evaluation [Hyperlipidemia] : hyperlipidemia [Hypertension] : hypertension [Coronary Artery Disease] : coronary artery disease [FreeTextEntry1] : Generally doing well, no CP or dyspnea. Did not have stress or echo, but sx resolved. Started on Ozempic as part of weight loss program, did not see PMD for new diabetes diagnosis. Was on Repatha as atorvastatin was ineffective, but stopped many months ago.  1. HLD. Restart Repatha. LDL uncontrolled and atorvastatin was not effective. 2. HTN. BP well controlled upon reevaluation. 3. CAD. ECG done, no changes. No angina. 4. Reinforced the need for her to see PMD about new diabetes. Unclear if it's being treated partially by Ozempic.

## 2024-01-24 NOTE — ASSESSMENT
[FreeTextEntry1] : 1. HLD. Restart Repatha. LDL uncontrolled and atorvastatin was not effective. 2. HTN. BP well controlled upon reevaluation. 3. CAD. ECG done, no changes. No angina. 4. Reinforced the need for her to see PMD about new diabetes. Unclear if it's being treated partially by Ozempic.

## 2024-02-16 NOTE — ED ADULT TRIAGE NOTE - AS HEIGHT TYPE
[address]  [facility]  [place] [address]  Phone: [phone]  Fax: [phone]        July 5, 2018     Patient: Onelia Reid   YOB: 1939   Date of Visit: 7/5/2018       To Whom It May Concern: It is my medical opinion that Radha Patel requires a disability parking placard for the following reasons:  She cannot walk 200 feet without stopping to rest.  She has limited walking ability due to an arthritic condition. Duration of need: 5 years    If you have any questions or concerns, please don't hesitate to call.     Sincerely,        Weyman Schirmer, DO stated No indicators present

## 2024-04-11 NOTE — ED ADULT NURSE NOTE - NS ED NURSE PATIENT LEFT UNIT TIME
Bedside report recd. SO at bedside supportive. Feeling fetal  movement. Abdomen soft,nontender. Denies ucs ,cramping,vaginal bleed,leaking. Plan of care reviewed   11:00

## 2024-04-24 NOTE — PATIENT PROFILE ADULT - PRO INTERPRETER NEED 2
27w5d  Received BS log for date range 4/17-4/23     Low  High Out of Range   Fasting Blood Sugar 88 108 3/7   Post Breakfast 99 115 0/7   Post Lunch 94 115 0/6   Post Dinner 100 116 0/6     Patient is currently taking Lantus 52 units at bedtime.     **The last 4 days (fasting) have been in normal range.    To Dr. Pratt for review.    English

## 2024-05-24 NOTE — PATIENT PROFILE ADULT - NSPROGENPREVTRANSF_GEN_A_NUR
BMT Nurse Triage - Rash:      Treating Provider:   She     Date of last office visit: 5/23     Onset of symptoms: started this morning, received BMT vaccines yesterday afternoon      Duration of symptoms: a few hours        Recently started any new medication (if yes, what medication): Yes: vaccines yesterday     Any new lotions, soaps or detergents: no     Is it itchy or painful? yes     Location on body: upper arm     Is it spreading? No     Any Fever: No         Recommendations per Arely Self (CHRIS):     -Pepcid  -Benedryl  -Ice pack     Patient instructed to call BMT office if symptoms worsen, instructed to call 911 if she experiences any airway compromise.      Melany Jacques, RN, MSN  BMT Nurse Coordinator  Phone: [phone]      no

## 2024-07-01 ENCOUNTER — NON-APPOINTMENT (OUTPATIENT)
Age: 49
End: 2024-07-01

## 2024-07-01 ENCOUNTER — APPOINTMENT (OUTPATIENT)
Dept: CARDIOLOGY | Facility: CLINIC | Age: 49
End: 2024-07-01
Payer: COMMERCIAL

## 2024-07-01 VITALS — SYSTOLIC BLOOD PRESSURE: 138 MMHG | DIASTOLIC BLOOD PRESSURE: 82 MMHG

## 2024-07-01 VITALS
WEIGHT: 213.1 LBS | HEART RATE: 75 BPM | DIASTOLIC BLOOD PRESSURE: 85 MMHG | SYSTOLIC BLOOD PRESSURE: 144 MMHG | HEIGHT: 60 IN | OXYGEN SATURATION: 92 % | BODY MASS INDEX: 41.84 KG/M2

## 2024-07-01 DIAGNOSIS — I10 ESSENTIAL (PRIMARY) HYPERTENSION: ICD-10-CM

## 2024-07-01 DIAGNOSIS — I25.110 ATHEROSCLEROTIC HEART DISEASE OF NATIVE CORONARY ARTERY WITH UNSTABLE ANGINA PECTORIS: ICD-10-CM

## 2024-07-01 DIAGNOSIS — E78.5 HYPERLIPIDEMIA, UNSPECIFIED: ICD-10-CM

## 2024-07-01 PROCEDURE — 93000 ELECTROCARDIOGRAM COMPLETE: CPT

## 2024-07-01 PROCEDURE — 99214 OFFICE O/P EST MOD 30 MIN: CPT | Mod: 25

## 2024-07-01 PROCEDURE — G2211 COMPLEX E/M VISIT ADD ON: CPT | Mod: NC

## 2024-08-01 ENCOUNTER — DOCTOR'S OFFICE (OUTPATIENT)
Age: 49
Setting detail: OPHTHALMOLOGY
End: 2024-08-01
Payer: COMMERCIAL

## 2024-08-01 ENCOUNTER — RX ONLY (RX ONLY)
Age: 49
End: 2024-08-01

## 2024-08-01 DIAGNOSIS — H02.34: ICD-10-CM

## 2024-08-01 DIAGNOSIS — H52.7: ICD-10-CM

## 2024-08-01 DIAGNOSIS — H34.8312: ICD-10-CM

## 2024-08-01 DIAGNOSIS — H25.13: ICD-10-CM

## 2024-08-01 DIAGNOSIS — H02.31: ICD-10-CM

## 2024-08-01 DIAGNOSIS — H35.61: ICD-10-CM

## 2024-08-01 DIAGNOSIS — H52.4: ICD-10-CM

## 2024-08-01 DIAGNOSIS — H11.153: ICD-10-CM

## 2024-08-01 PROCEDURE — 92250 FUNDUS PHOTOGRAPHY W/I&R: CPT | Performed by: OPHTHALMOLOGY

## 2024-08-01 PROCEDURE — 92004 COMPRE OPH EXAM NEW PT 1/>: CPT | Performed by: OPHTHALMOLOGY

## 2024-08-01 PROCEDURE — 92015 DETERMINE REFRACTIVE STATE: CPT | Performed by: OPHTHALMOLOGY

## 2024-08-01 ASSESSMENT — CONFRONTATIONAL VISUAL FIELD TEST (CVF)
OS_FINDINGS: FULL
OD_FINDINGS: FULL

## 2024-08-01 ASSESSMENT — LID POSITION - COMMENTS
OS_COMMENTS: BLEPHAROCHALASIS
OD_COMMENTS: BLEPHAROCHALASIS

## 2024-08-06 ENCOUNTER — OFFICE (OUTPATIENT)
Facility: LOCATION | Age: 49
Setting detail: OPHTHALMOLOGY
End: 2024-08-06
Payer: COMMERCIAL

## 2024-08-06 DIAGNOSIS — H35.61: ICD-10-CM

## 2024-08-06 DIAGNOSIS — H25.13: ICD-10-CM

## 2024-08-06 DIAGNOSIS — H34.8310: ICD-10-CM

## 2024-08-06 PROCEDURE — 92235 FLUORESCEIN ANGRPH MLTIFRAME: CPT | Performed by: OPHTHALMOLOGY

## 2024-08-06 PROCEDURE — 92014 COMPRE OPH EXAM EST PT 1/>: CPT | Mod: 57 | Performed by: OPHTHALMOLOGY

## 2024-08-06 PROCEDURE — 92134 CPTRZ OPH DX IMG PST SGM RTA: CPT | Performed by: OPHTHALMOLOGY

## 2024-08-06 PROCEDURE — 92201 OPSCPY EXTND RTA DRAW UNI/BI: CPT | Performed by: OPHTHALMOLOGY

## 2024-08-06 PROCEDURE — 67210 TREATMENT OF RETINAL LESION: CPT | Mod: RT | Performed by: OPHTHALMOLOGY

## 2024-08-06 ASSESSMENT — LID POSITION - COMMENTS
OD_COMMENTS: BLEPHAROCHALASIS
OS_COMMENTS: BLEPHAROCHALASIS

## 2024-08-14 ENCOUNTER — DOCTOR'S OFFICE (OUTPATIENT)
Age: 49
Setting detail: OPHTHALMOLOGY
End: 2024-08-14
Payer: COMMERCIAL

## 2024-08-14 DIAGNOSIS — H34.8310: ICD-10-CM

## 2024-08-14 DIAGNOSIS — H35.61: ICD-10-CM

## 2024-08-14 PROBLEM — H02.31 BLEPHAROCHALASIS; RIGHT UPPER LID, LEFT UPPER LID: Status: ACTIVE | Noted: 2024-08-01

## 2024-08-14 PROBLEM — H52.7 REFRACTIVE ERROR: Status: ACTIVE | Noted: 2024-08-01

## 2024-08-14 PROBLEM — H25.13 CATARACT SENILE NUCLEAR SCLEROSIS; BOTH EYES: Status: ACTIVE | Noted: 2024-08-01

## 2024-08-14 PROBLEM — H02.34 BLEPHAROCHALASIS; RIGHT UPPER LID, LEFT UPPER LID: Status: ACTIVE | Noted: 2024-08-01

## 2024-08-14 PROBLEM — H11.153 PINGUECULA; BOTH EYES: Status: ACTIVE | Noted: 2024-08-01

## 2024-08-14 PROCEDURE — 67028 INJECTION EYE DRUG: CPT | Mod: 58,RT | Performed by: OPHTHALMOLOGY

## 2024-08-14 PROCEDURE — 92134 CPTRZ OPH DX IMG PST SGM RTA: CPT | Performed by: OPHTHALMOLOGY

## 2024-08-14 ASSESSMENT — LID POSITION - COMMENTS
OS_COMMENTS: BLEPHAROCHALASIS
OD_COMMENTS: BLEPHAROCHALASIS

## 2024-08-14 ASSESSMENT — CONFRONTATIONAL VISUAL FIELD TEST (CVF)
OD_FINDINGS: FULL
OS_FINDINGS: FULL

## 2024-08-27 ENCOUNTER — TRANSCRIPTION ENCOUNTER (OUTPATIENT)
Age: 49
End: 2024-08-27

## 2024-08-28 ENCOUNTER — TRANSCRIPTION ENCOUNTER (OUTPATIENT)
Age: 49
End: 2024-08-28

## 2024-09-24 ENCOUNTER — DOCTOR'S OFFICE (OUTPATIENT)
Facility: LOCATION | Age: 49
Setting detail: OPHTHALMOLOGY
End: 2024-09-24
Payer: COMMERCIAL

## 2024-09-24 DIAGNOSIS — H35.61: ICD-10-CM

## 2024-09-24 DIAGNOSIS — H34.8310: ICD-10-CM

## 2024-09-24 DIAGNOSIS — H25.13: ICD-10-CM

## 2024-09-24 PROCEDURE — 99024 POSTOP FOLLOW-UP VISIT: CPT | Performed by: OPHTHALMOLOGY

## 2024-09-24 PROCEDURE — 92134 CPTRZ OPH DX IMG PST SGM RTA: CPT | Performed by: OPHTHALMOLOGY

## 2024-09-24 PROCEDURE — 67028 INJECTION EYE DRUG: CPT | Mod: 58,RT | Performed by: OPHTHALMOLOGY

## 2024-09-24 ASSESSMENT — LID POSITION - COMMENTS
OD_COMMENTS: BLEPHAROCHALASIS
OS_COMMENTS: BLEPHAROCHALASIS

## 2024-10-03 ENCOUNTER — TRANSCRIPTION ENCOUNTER (OUTPATIENT)
Age: 49
End: 2024-10-03

## 2024-10-03 RX ORDER — NITROGLYCERIN 0.4 MG/1
0.4 TABLET SUBLINGUAL
Qty: 270 | Refills: 3 | Status: ACTIVE | COMMUNITY
Start: 2024-10-03 | End: 1900-01-01

## 2024-10-09 ENCOUNTER — TRANSCRIPTION ENCOUNTER (OUTPATIENT)
Age: 49
End: 2024-10-09

## 2024-10-25 NOTE — PROGRESS NOTE ADULT - ASSESSMENT
44 year old female PMHx CAD (mLAD x1 MIGUEL ANGEL (2014), x3 stents 09/2019), HTN, prediabetes, hyperlipidemia adm with wound infection s/p panniculectomy on 9/1.     CT- Extensive defects within the lower abdominal wall with foci of gas and inflammation. Findings worrisome for gas forming infection.    Wound growing: Numerous Escherichia coli ESBL, Few Enterococcus faecalis, Rare Pseudomonas aeruginosa 5334

## 2024-12-09 ENCOUNTER — DOCTOR'S OFFICE (OUTPATIENT)
Facility: LOCATION | Age: 49
Setting detail: OPHTHALMOLOGY
End: 2024-12-09
Payer: COMMERCIAL

## 2024-12-09 DIAGNOSIS — H34.8310: ICD-10-CM

## 2024-12-09 PROCEDURE — 67028 INJECTION EYE DRUG: CPT | Mod: RT | Performed by: OPHTHALMOLOGY

## 2024-12-09 PROCEDURE — 92134 CPTRZ OPH DX IMG PST SGM RTA: CPT | Performed by: OPHTHALMOLOGY

## 2024-12-09 ASSESSMENT — KERATOMETRY
OD_AXISANGLE_DEGREES: 067
OS_AXISANGLE_DEGREES: 108
OS_K2POWER_DIOPTERS: 46.25
OD_K2POWER_DIOPTERS: 46.75
OD_K1POWER_DIOPTERS: 46.00
OS_K1POWER_DIOPTERS: 45.25

## 2024-12-09 ASSESSMENT — REFRACTION_AUTOREFRACTION
OD_SPHERE: -0.50
OS_AXIS: 164
OS_SPHERE: 0.00
OS_CYLINDER: +0.75
OD_CYLINDER: +0.50
OD_AXIS: 004

## 2024-12-09 ASSESSMENT — VISUAL ACUITY
OS_BCVA: 20/20-
OD_BCVA: 20/20

## 2024-12-09 ASSESSMENT — LID POSITION - COMMENTS
OS_COMMENTS: BLEPHAROCHALASIS
OD_COMMENTS: BLEPHAROCHALASIS

## 2024-12-30 ENCOUNTER — NON-APPOINTMENT (OUTPATIENT)
Age: 49
End: 2024-12-30

## 2025-01-08 ENCOUNTER — NON-APPOINTMENT (OUTPATIENT)
Age: 50
End: 2025-01-08

## 2025-01-08 ENCOUNTER — APPOINTMENT (OUTPATIENT)
Dept: ORTHOPEDIC SURGERY | Facility: CLINIC | Age: 50
End: 2025-01-08
Payer: COMMERCIAL

## 2025-01-08 ENCOUNTER — APPOINTMENT (OUTPATIENT)
Dept: CARDIOLOGY | Facility: CLINIC | Age: 50
End: 2025-01-08
Payer: COMMERCIAL

## 2025-01-08 VITALS
DIASTOLIC BLOOD PRESSURE: 95 MMHG | TEMPERATURE: 98.1 F | HEIGHT: 60 IN | SYSTOLIC BLOOD PRESSURE: 154 MMHG | WEIGHT: 210 LBS | OXYGEN SATURATION: 97 % | BODY MASS INDEX: 41.23 KG/M2 | HEART RATE: 85 BPM

## 2025-01-08 VITALS
HEIGHT: 60 IN | SYSTOLIC BLOOD PRESSURE: 145 MMHG | WEIGHT: 213 LBS | HEART RATE: 78 BPM | BODY MASS INDEX: 41.82 KG/M2 | DIASTOLIC BLOOD PRESSURE: 92 MMHG | OXYGEN SATURATION: 96 %

## 2025-01-08 DIAGNOSIS — M79.671 PAIN IN RIGHT FOOT: ICD-10-CM

## 2025-01-08 DIAGNOSIS — M76.60 ACHILLES TENDINITIS, UNSPECIFIED LEG: ICD-10-CM

## 2025-01-08 DIAGNOSIS — I10 ESSENTIAL (PRIMARY) HYPERTENSION: ICD-10-CM

## 2025-01-08 DIAGNOSIS — E78.5 HYPERLIPIDEMIA, UNSPECIFIED: ICD-10-CM

## 2025-01-08 PROCEDURE — 73610 X-RAY EXAM OF ANKLE: CPT | Mod: RT

## 2025-01-08 PROCEDURE — 73630 X-RAY EXAM OF FOOT: CPT | Mod: RT

## 2025-01-08 PROCEDURE — 99203 OFFICE O/P NEW LOW 30 MIN: CPT | Mod: 25

## 2025-01-08 PROCEDURE — 99214 OFFICE O/P EST MOD 30 MIN: CPT | Mod: 25

## 2025-01-08 PROCEDURE — 36415 COLL VENOUS BLD VENIPUNCTURE: CPT

## 2025-01-08 PROCEDURE — 93000 ELECTROCARDIOGRAM COMPLETE: CPT

## 2025-01-08 RX ORDER — LIDOCAINE 4% 4 G/100G
4 CREAM TOPICAL
Qty: 1 | Refills: 0 | Status: ACTIVE | COMMUNITY
Start: 2025-01-08 | End: 1900-01-01

## 2025-01-08 RX ORDER — BENZONATATE 100 MG/1
100 CAPSULE ORAL
Qty: 270 | Refills: 3 | Status: ACTIVE | COMMUNITY
Start: 2025-01-08 | End: 1900-01-01

## 2025-01-09 LAB
ALBUMIN SERPL ELPH-MCNC: 4.1 G/DL
ALP BLD-CCNC: 104 U/L
ALT SERPL-CCNC: 22 U/L
ANION GAP SERPL CALC-SCNC: 16 MMOL/L
AST SERPL-CCNC: 24 U/L
BILIRUB SERPL-MCNC: 0.4 MG/DL
BUN SERPL-MCNC: 17 MG/DL
CALCIUM SERPL-MCNC: 9.1 MG/DL
CHLORIDE SERPL-SCNC: 102 MMOL/L
CHOLEST SERPL-MCNC: 254 MG/DL
CO2 SERPL-SCNC: 21 MMOL/L
CREAT SERPL-MCNC: 0.73 MG/DL
CRP SERPL HS-MCNC: 7.4 MG/L
EGFR: 100 ML/MIN/1.73M2
ESTIMATED AVERAGE GLUCOSE: 151 MG/DL
GLUCOSE SERPL-MCNC: 121 MG/DL
HBA1C MFR BLD HPLC: 6.9 %
HCT VFR BLD CALC: 42.6 %
HDLC SERPL-MCNC: 61 MG/DL
HGB BLD-MCNC: 13.7 G/DL
LDLC SERPL CALC-MCNC: 165 MG/DL
MCHC RBC-ENTMCNC: 30.6 PG
MCHC RBC-ENTMCNC: 32.2 G/DL
MCV RBC AUTO: 95.3 FL
NONHDLC SERPL-MCNC: 193 MG/DL
PLATELET # BLD AUTO: 265 K/UL
POTASSIUM SERPL-SCNC: 4.9 MMOL/L
PROT SERPL-MCNC: 7 G/DL
RBC # BLD: 4.47 M/UL
RBC # FLD: 13.1 %
SODIUM SERPL-SCNC: 139 MMOL/L
TRIGL SERPL-MCNC: 153 MG/DL
WBC # FLD AUTO: 9.6 K/UL

## 2025-01-29 ENCOUNTER — APPOINTMENT (OUTPATIENT)
Dept: CARDIOLOGY | Facility: CLINIC | Age: 50
End: 2025-01-29
Payer: COMMERCIAL

## 2025-01-29 DIAGNOSIS — I10 ESSENTIAL (PRIMARY) HYPERTENSION: ICD-10-CM

## 2025-01-29 DIAGNOSIS — E78.5 HYPERLIPIDEMIA, UNSPECIFIED: ICD-10-CM

## 2025-01-29 DIAGNOSIS — E11.9 TYPE 2 DIABETES MELLITUS W/OUT COMPLICATIONS: ICD-10-CM

## 2025-01-29 PROCEDURE — 99401 PREV MED CNSL INDIV APPRX 15: CPT

## 2025-01-29 PROCEDURE — 99214 OFFICE O/P EST MOD 30 MIN: CPT | Mod: 25

## 2025-01-29 RX ORDER — ROSUVASTATIN CALCIUM 40 MG/1
40 TABLET, FILM COATED ORAL
Qty: 90 | Refills: 2 | Status: ACTIVE | COMMUNITY
Start: 2025-01-29 | End: 1900-01-01

## 2025-01-29 RX ORDER — TIRZEPATIDE 2.5 MG/.5ML
2.5 INJECTION, SOLUTION SUBCUTANEOUS
Qty: 4 | Refills: 2 | Status: ACTIVE | COMMUNITY
Start: 2025-01-29 | End: 1900-01-01

## 2025-02-19 ENCOUNTER — APPOINTMENT (OUTPATIENT)
Dept: CARDIOLOGY | Facility: CLINIC | Age: 50
End: 2025-02-19
Payer: COMMERCIAL

## 2025-02-19 ENCOUNTER — APPOINTMENT (OUTPATIENT)
Dept: ORTHOPEDIC SURGERY | Facility: CLINIC | Age: 50
End: 2025-02-19

## 2025-02-19 DIAGNOSIS — I10 ESSENTIAL (PRIMARY) HYPERTENSION: ICD-10-CM

## 2025-02-19 DIAGNOSIS — R73.09 OTHER ABNORMAL GLUCOSE: ICD-10-CM

## 2025-02-19 DIAGNOSIS — E78.5 HYPERLIPIDEMIA, UNSPECIFIED: ICD-10-CM

## 2025-02-19 PROCEDURE — 97802 MEDICAL NUTRITION INDIV IN: CPT | Mod: 95

## 2025-02-26 ENCOUNTER — APPOINTMENT (OUTPATIENT)
Dept: CARDIOLOGY | Facility: CLINIC | Age: 50
End: 2025-02-26
Payer: COMMERCIAL

## 2025-02-26 DIAGNOSIS — E11.9 TYPE 2 DIABETES MELLITUS W/OUT COMPLICATIONS: ICD-10-CM

## 2025-02-26 PROCEDURE — 99214 OFFICE O/P EST MOD 30 MIN: CPT | Mod: 95

## 2025-02-26 PROCEDURE — G2211 COMPLEX E/M VISIT ADD ON: CPT | Mod: 95

## 2025-03-14 NOTE — H&P ADULT - PROBLEM SELECTOR PLAN 4
Foot pain.  Patient with possible tendinitis of toe.  He was given prescription for prednisone tapering dose consisting of 40 mg x 3 days, 20 mg x 3 days, 10 mg x 4 days.  If symptoms persist after medication completed he will obtain x-ray of right foot for further evaluation.   Chronic, stable  - Continue home ARB Chronic, stable  - Continue home Metoprolol Tartrate 50 mg PO BID  - Patient was taken off Losartan on last admission to [facility]  - Monitor routine hemodynamics  - Patient follows outpatient cardiologist Dr. Abdoul Lam from [facility]

## 2025-04-03 ENCOUNTER — RX RENEWAL (OUTPATIENT)
Age: 50
End: 2025-04-03

## 2025-04-09 ENCOUNTER — APPOINTMENT (OUTPATIENT)
Dept: CARDIOLOGY | Facility: CLINIC | Age: 50
End: 2025-04-09
Payer: COMMERCIAL

## 2025-04-09 DIAGNOSIS — I25.10 ATHEROSCLEROTIC HEART DISEASE OF NATIVE CORONARY ARTERY W/OUT ANGINA PECTORIS: ICD-10-CM

## 2025-04-09 PROCEDURE — G2211 COMPLEX E/M VISIT ADD ON: CPT | Mod: NC,95

## 2025-04-09 PROCEDURE — 99214 OFFICE O/P EST MOD 30 MIN: CPT | Mod: 95

## 2025-07-23 ENCOUNTER — APPOINTMENT (OUTPATIENT)
Dept: CARDIOLOGY | Facility: CLINIC | Age: 50
End: 2025-07-23
Payer: COMMERCIAL

## 2025-07-23 DIAGNOSIS — E78.5 HYPERLIPIDEMIA, UNSPECIFIED: ICD-10-CM

## 2025-07-30 ENCOUNTER — APPOINTMENT (OUTPATIENT)
Dept: CARDIOLOGY | Facility: CLINIC | Age: 50
End: 2025-07-30
Payer: COMMERCIAL

## 2025-07-30 VITALS
OXYGEN SATURATION: 97 % | DIASTOLIC BLOOD PRESSURE: 72 MMHG | WEIGHT: 199 LBS | HEART RATE: 85 BPM | BODY MASS INDEX: 38.86 KG/M2 | SYSTOLIC BLOOD PRESSURE: 120 MMHG

## 2025-07-30 DIAGNOSIS — E11.9 TYPE 2 DIABETES MELLITUS W/OUT COMPLICATIONS: ICD-10-CM

## 2025-07-30 DIAGNOSIS — E78.5 HYPERLIPIDEMIA, UNSPECIFIED: ICD-10-CM

## 2025-07-30 DIAGNOSIS — I10 ESSENTIAL (PRIMARY) HYPERTENSION: ICD-10-CM

## 2025-07-30 DIAGNOSIS — I25.10 ATHEROSCLEROTIC HEART DISEASE OF NATIVE CORONARY ARTERY W/OUT ANGINA PECTORIS: ICD-10-CM

## 2025-07-30 PROCEDURE — 99215 OFFICE O/P EST HI 40 MIN: CPT | Mod: 25

## 2025-07-30 PROCEDURE — 93000 ELECTROCARDIOGRAM COMPLETE: CPT

## 2025-08-07 ENCOUNTER — NON-APPOINTMENT (OUTPATIENT)
Age: 50
End: 2025-08-07

## 2025-08-07 ENCOUNTER — APPOINTMENT (OUTPATIENT)
Dept: CARDIOLOGY | Facility: CLINIC | Age: 50
End: 2025-08-07
Payer: COMMERCIAL

## 2025-08-07 PROCEDURE — 93015 CV STRESS TEST SUPVJ I&R: CPT

## 2025-08-07 PROCEDURE — A9500: CPT

## 2025-08-07 PROCEDURE — 78452 HT MUSCLE IMAGE SPECT MULT: CPT

## 2025-08-08 ENCOUNTER — TRANSCRIPTION ENCOUNTER (OUTPATIENT)
Age: 50
End: 2025-08-08

## 2025-08-11 ENCOUNTER — TRANSCRIPTION ENCOUNTER (OUTPATIENT)
Age: 50
End: 2025-08-11

## 2025-08-26 ENCOUNTER — APPOINTMENT (OUTPATIENT)
Dept: CARDIOLOGY | Facility: CLINIC | Age: 50
End: 2025-08-26
Payer: COMMERCIAL

## 2025-08-26 VITALS — BODY MASS INDEX: 39.06 KG/M2 | WEIGHT: 200 LBS

## 2025-08-26 DIAGNOSIS — E78.5 HYPERLIPIDEMIA, UNSPECIFIED: ICD-10-CM

## 2025-08-26 DIAGNOSIS — E66.01 MORBID (SEVERE) OBESITY DUE TO EXCESS CALORIES: ICD-10-CM

## 2025-08-26 DIAGNOSIS — Z95.1 PRESENCE OF AORTOCORONARY BYPASS GRAFT: ICD-10-CM

## 2025-08-26 DIAGNOSIS — E11.9 TYPE 2 DIABETES MELLITUS W/OUT COMPLICATIONS: ICD-10-CM

## 2025-08-26 DIAGNOSIS — I25.2 OLD MYOCARDIAL INFARCTION: ICD-10-CM

## 2025-08-26 DIAGNOSIS — I25.10 ATHEROSCLEROTIC HEART DISEASE OF NATIVE CORONARY ARTERY W/OUT ANGINA PECTORIS: ICD-10-CM

## 2025-08-26 PROCEDURE — G2211 COMPLEX E/M VISIT ADD ON: CPT | Mod: NC,95

## 2025-08-26 PROCEDURE — 99214 OFFICE O/P EST MOD 30 MIN: CPT | Mod: 95

## 2025-09-03 ENCOUNTER — APPOINTMENT (OUTPATIENT)
Dept: CARDIOLOGY | Facility: CLINIC | Age: 50
End: 2025-09-03
Payer: COMMERCIAL

## 2025-09-03 VITALS
BODY MASS INDEX: 39.08 KG/M2 | RESPIRATION RATE: 16 BRPM | DIASTOLIC BLOOD PRESSURE: 85 MMHG | HEART RATE: 75 BPM | WEIGHT: 200.13 LBS | OXYGEN SATURATION: 96 % | SYSTOLIC BLOOD PRESSURE: 139 MMHG | TEMPERATURE: 98.3 F

## 2025-09-03 DIAGNOSIS — I10 ESSENTIAL (PRIMARY) HYPERTENSION: ICD-10-CM

## 2025-09-03 DIAGNOSIS — E78.5 HYPERLIPIDEMIA, UNSPECIFIED: ICD-10-CM

## 2025-09-03 DIAGNOSIS — I25.110 ATHEROSCLEROTIC HEART DISEASE OF NATIVE CORONARY ARTERY WITH UNSTABLE ANGINA PECTORIS: ICD-10-CM

## 2025-09-03 PROCEDURE — 93000 ELECTROCARDIOGRAM COMPLETE: CPT

## 2025-09-03 PROCEDURE — 99214 OFFICE O/P EST MOD 30 MIN: CPT | Mod: 25

## (undated) DEVICE — Device

## (undated) DEVICE — SUT PLEDGET SOFT LARGE 3/8" X 3/16" X 1/16" X6

## (undated) DEVICE — SUT SILK 5-0 60" TIES

## (undated) DEVICE — TUBING IV SET MICROCLAVE ADAPTER

## (undated) DEVICE — DRSG TEGADERM 2.5X3"

## (undated) DEVICE — SUMP PERICARDIAL 20FR 1/4" ADULT

## (undated) DEVICE — SOL IRR BAG NS 0.9% 3000ML

## (undated) DEVICE — PACK CARDIAC YELLOW

## (undated) DEVICE — DRSG CURITY GAUZE SPONGE 4 X 4" 12-PLY

## (undated) DEVICE — SUT POLYSORB 2-0 30" V-20 UNDYED

## (undated) DEVICE — MULTIPLE PERFUSION SET FEMALE 1 INLET LEG W 4 LEGS 15" (BLUE/RED)

## (undated) DEVICE — SENSOR MYOCARDIAL TEMP 15MM

## (undated) DEVICE — STOPCOCK 4-WAY NYLON MALE LL ADAPTER LG BORE

## (undated) DEVICE — SOL INJ LR 1000ML

## (undated) DEVICE — SUT PROLENE 3-0 36" SH

## (undated) DEVICE — DRSG ACE BANDAGE 6"

## (undated) DEVICE — SUT SILK 2-0 18" TIES

## (undated) DEVICE — DRSG OPSITE 2.5 X 2"

## (undated) DEVICE — SYNOVIS VASCULAR PROBE 1.5MM 15CM

## (undated) DEVICE — ELCTR REM POLYHESIVE ADULT PT RETURN 15FT

## (undated) DEVICE — NDL HYPO SAFE 18G X 1.5" (PINK)

## (undated) DEVICE — FOLEY TRAY 16FR 5CC LF LUBRISIL ADVANCE TEMP CLOSED

## (undated) DEVICE — PACING CABLE (BLUE) ATRIAL TEMP SCREW DOWN 12FT

## (undated) DEVICE — PREP DURAPREP 26CC

## (undated) DEVICE — DRSG KLING 6"

## (undated) DEVICE — SUT SOFSILK 4-0 24" CV-15

## (undated) DEVICE — BULLDOG SPRING CLIP LATIS/LATIS 6MM 1/2 FORCE (BLUE)

## (undated) DEVICE — STABILIZER HAND ASSISTANT ATTACHMENT W STABLESOFT 2L

## (undated) DEVICE — AORTIC PUNCH 4.0MM LONG LENGTH HANDLE

## (undated) DEVICE — SUCTION CATH ARGYLE DELEE TIP 10FR CHIMNEY VALVE COIL PACKED

## (undated) DEVICE — SUT PROLENE 4-0 36" SH

## (undated) DEVICE — GLV 8 PROTEXIS (CREAM) MICRO

## (undated) DEVICE — CONNECTOR CARDIAC 1:1 FOR HUBLESS DRAINS

## (undated) DEVICE — PACK UNIVERSAL CARDIAC

## (undated) DEVICE — SUT PROLENE 6-0 30" C-1

## (undated) DEVICE — SAW BLADE MICROAIRE STERNUM 1X34X9.4MM

## (undated) DEVICE — TUBING TRUWAVE PRESSURE MALE/FEMALE 72"

## (undated) DEVICE — PACING CABLE (BROWN) A/V TEMP SCREW DOWN 12FT

## (undated) DEVICE — BAG DECANTER 2

## (undated) DEVICE — GOWN TRIMAX XXL

## (undated) DEVICE — SUT PROLENE 4-0 36" RB-1

## (undated) DEVICE — SUT SOFSILK 0 30" TIES

## (undated) DEVICE — DRAPE TOWEL BLUE 17" X 24"

## (undated) DEVICE — SUT BLUNT SZ 5

## (undated) DEVICE — SOL NORMOSOL-R PH7.4 1000ML

## (undated) DEVICE — SUT PROLENE 7-0 24" BV-1

## (undated) DEVICE — ELCTR BOVIE TIP BLADE MEGADYNE E-Z CLEAN 6.5" (LONG)

## (undated) DEVICE — CONNECTOR STRAIGHT 1/2 X 1/2"

## (undated) DEVICE — VESSEL LOOP MAXI-RED  0.120" X 16"

## (undated) DEVICE — GOWN TRIMAX LG

## (undated) DEVICE — CATH IV SAFE INSYTE 24G X 3/4" (YELLOW)

## (undated) DEVICE — SPECIMEN CONTAINER 100ML

## (undated) DEVICE — SUT VICRYL 1 36" CTX UNDYED

## (undated) DEVICE — SUT ETHIBOND 2-0 36" SH

## (undated) DEVICE — TUBING KIT FAST START ATF 40

## (undated) DEVICE — SYR LUER LOK 3CC

## (undated) DEVICE — SUT SILK 2-0 18" SH (POP-OFF)

## (undated) DEVICE — PACK CUSTOM W/INSPIRE OXYGENATOR

## (undated) DEVICE — NDL ARTL 18G X 2 7/8

## (undated) DEVICE — SYR LUER LOK 30CC

## (undated) DEVICE — SUCTION CATH SAFE-T-VAC 12FR

## (undated) DEVICE — BLOWER MISTER AXIUS WITH IV SET

## (undated) DEVICE — DRSG OPSITE 13.75 X 4"

## (undated) DEVICE — WARMING BLANKET FULL UNDERBODY

## (undated) DEVICE — STABILIZER HAND ASSISTANT ATTACHMENT W STABLESOFT 2S

## (undated) DEVICE — SUT MONOCRYL 4-0 18" PS-2

## (undated) DEVICE — STOPCOCK 4-WAY (BLUE) DISCOFIX SPIN-LOCK CONNECTOR

## (undated) DEVICE — SYR LUER LOK 50CC

## (undated) DEVICE — SPONGE PEANUT AUTO COUNT

## (undated) DEVICE — SYR ASEPTO

## (undated) DEVICE — DRAPE IOBAN 33" X 23"

## (undated) DEVICE — DRSG PREVENA PEEL & PLACE KIT 20CM

## (undated) DEVICE — GETINGE VASOVIEW 7 ENDOSCOPIC VESSEL HARVESTING SYSTEM

## (undated) DEVICE — MEDTRONIC CLEARVIEW BLOWER MISTER KIT W TUBING SET

## (undated) DEVICE — FILTER REINFUSION FOR SALVAGED BLOOD DISP

## (undated) DEVICE — DOPPLER PROBE 20MHZ DISP

## (undated) DEVICE — GLV 7 PROTEXIS (CREAM) MICRO

## (undated) DEVICE — DRSG DERMABOND PRINEO 60CM

## (undated) DEVICE — SUT PROLENE 5-0 36" RB-1

## (undated) DEVICE — SUT PROLENE 7-0 24" BV175-6

## (undated) DEVICE — DRAIN DRAINGE CHEST DRY ADL DUAL

## (undated) DEVICE — SUT POLYSORB 3-0 30" V-20 UNDYED

## (undated) DEVICE — SUT SILK 4-0 17-18"

## (undated) DEVICE — PHRENIC NERVE PAD MEDIUM

## (undated) DEVICE — DRAPE SLUSH / WARMER 44 X 66"